# Patient Record
Sex: MALE | Race: WHITE | NOT HISPANIC OR LATINO | Employment: OTHER | URBAN - METROPOLITAN AREA
[De-identification: names, ages, dates, MRNs, and addresses within clinical notes are randomized per-mention and may not be internally consistent; named-entity substitution may affect disease eponyms.]

---

## 2017-01-17 ENCOUNTER — OFFICE VISIT (OUTPATIENT)
Dept: DERMATOLOGY | Facility: CLINIC | Age: 82
End: 2017-01-17
Payer: MEDICARE

## 2017-01-17 DIAGNOSIS — L57.0 ACTINIC KERATOSIS: ICD-10-CM

## 2017-01-17 DIAGNOSIS — Z80.8 FAMILY HISTORY OF MELANOMA: ICD-10-CM

## 2017-01-17 DIAGNOSIS — D48.5 NEOPLASM OF UNCERTAIN BEHAVIOR OF SKIN: ICD-10-CM

## 2017-01-17 DIAGNOSIS — Z85.828 HISTORY OF NONMELANOMA SKIN CANCER: Primary | ICD-10-CM

## 2017-01-17 PROCEDURE — 17000 DESTRUCT PREMALG LESION: CPT | Performed by: DERMATOLOGY

## 2017-01-17 PROCEDURE — 88305 TISSUE EXAM BY PATHOLOGIST: CPT | Performed by: DERMATOLOGY

## 2017-01-17 PROCEDURE — 99213 OFFICE O/P EST LOW 20 MIN: CPT | Mod: 25 | Performed by: DERMATOLOGY

## 2017-01-17 PROCEDURE — 11100 HC DESTRUCT PREMALIGNANT LESION, FIRST: CPT | Mod: 59 | Performed by: DERMATOLOGY

## 2017-01-17 PROCEDURE — 17003 DESTRUCT PREMALG LES 2-14: CPT | Performed by: DERMATOLOGY

## 2017-01-17 NOTE — NURSING NOTE
Dermatology Rooming Note    Ulices Shah Jr.'s goals for this visit include:   Chief Complaint   Patient presents with     Derm Problem     1 yr skin check. Has sore spot on RT hand.       Is a scribe okay for this visit:YES    Are records needed for this visit(If yes, obtain release of information): n/a     Vitals: There were no vitals taken for this visit.    Referring Provider:  No referring provider defined for this encounter.

## 2017-01-17 NOTE — MR AVS SNAPSHOT
After Visit Summary   1/17/2017    Ulices Shah Jr.    MRN: [id number]           Patient Information     Date Of Birth          1935        Visit Information        Provider Department      1/17/2017 1:15 PM Joceline Stone MD [facility]        Today's Diagnoses     History of nonmelanoma skin cancer    -  1     Actinic keratosis         Neoplasm of uncertain behavior of skin         Family history of melanoma           Care Instructions    Cryotherapy    What is it?    Use of a very cold liquid, such as liquid nitrogen, to freeze and destroy abnormal skin cells that need to be removed    What should I expect?    Tenderness and redness    A small blister that might grow and fill with dark purple blood. There may be crusting.    More than one treatment may be needed if the lesions do not go away.    How do I care for the treated area?    Gently wash the area with your hands when bathing.    Use a thin layer of Vaseline to help with healing. You may use a Band-Aid.     The area should heal within 7-10 days and may leave behind a pink or lighter color.     Do not use an antibiotic or Neosporin ointment.     You may take acetaminophen (Tylenol) for pain.     Call your Doctor if you have:    Severe pain    Signs of infection (warmth, redness, cloudy yellow drainage, and or a bad smell)    Questions or concerns    Who should I call with questions?       [facility]: [phone]       [facility]: [phone]       For urgent needs outside of business hours call the [facility] at [phone]        and ask for the dermatology resident on call    Wound Care After a Biopsy    What is a skin biopsy?  A skin biopsy allows the doctor to examine a very small piece of tissue under the microscope to determine the diagnosis and the best treatment for the skin condition. A local anesthetic (numbing medicine)  is injected  with a very small needle into the skin area to be tested. A small piece of skin is taken from the area. Sometimes a suture (stitch) is used.     What are the risks of a skin biopsy?  I will experience scar, bleeding, swelling, pain, crusting and redness. I may experience incomplete removal or recurrence. Risks of this procedure are excessive bleeding, bruising, infection, nerve damage, numbness, thick (hypertrophic or keloidal) scar and non-diagnostic biopsy.    How should I care for my wound for the first 24 hours?    Keep the wound dry and covered for 24 hours    If it bleeds, hold direct pressure on the area for 15 minutes. If bleeding does not stop then go to the emergency room    Avoid strenuous exercise the first 1-2 days or as your doctor instructs you    How should I care for the wound after 24 hours?    After 24 hours, remove the bandage    You may bathe or shower as normal    If you had a scalp biopsy, you can shampoo as usual and can use shower water to clean the biopsy site daily    Clean the wound twice a day with gentle soap and water    Do not scrub, be gentle    Apply white petroleum/Vaseline after cleaning the wound with a cotton swab or a clean finger, and keep the site covered with a Bandaid /bandage. Bandages are not necessary with a scalp biopsy    If you are unable to cover the site with a Bandaid /bandage, re-apply ointment 2-3 times a day to keep the site moist. Moisture will help with healing    Avoid strenuous activity for first 1-2 days    Avoid lakes, rivers, pools, and oceans until the stitches are removed or the site is healed    How do I clean my wound?    Wash hands for 15 minutes with soap or use hand  before all wound care    Clean the wound with gentle soap and water    Apply white petroleum/Vaseline  to wound after it is clean    Replace the Bandaid /bandage to keep the wound covered for the first few days or as instructed by your doctor    If you had a scalp biopsy, warm  shower water to the area on a daily basis should suffice    What should I use to clean my wound?     Cotton-tipped applicators (Qtips )    White petroleum jelly (Vaseline ). Use a clean new container and use Q-tips to apply.    Bandaids   as needed    Gentle soap     How should I care for my wound long term?    Do not get your wound dirty    Keep up with wound care for one week or until the area is healed.    A small scab will form and fall off by itself when the area is completely healed. The area will be red and will become pink in color as it heals. Sun protection is very important for how your scar will turn out. Sunscreen with an SPF 30 or greater is recommended once the area is healed.    You should have some soreness but it should be mild and slowly go away over several days. Talk to your doctor about using tylenol for pain,    When should I call my doctor?  If you have increased:     Pain or swelling    Pus or drainage (clear or slightly yellow drainage is ok)    Temperature over 100F    Spreading redness or warmth around wound    When will I hear about my results?  The biopsy results can take 2-3 weeks to come back. The clinic will call you with the results, send you a [employer] message, or have you schedule a follow-up clinic or phone time to discuss the results. Contact our clinics if you do not hear from us in 3 weeks.     Who should I call with questions?    [facility]: [phone]     [facility]: [phone]    For urgent needs outside of business hours call the [facility] at [phone] and ask for the dermatology resident on call            Follow-ups after your visit        Who to contact     If you have questions or need follow up information about today's clinic visit or your schedule please contact [facility] directly at [phone].  Normal or non-critical lab and imaging results will be communicated to  you by MyChart, letter or phone within 4 business days after the clinic has received the results. If you do not hear from us within 7 days, please contact the clinic through [employer] or phone. If you have a critical or abnormal lab result, we will notify you by phone as soon as possible.  Submit refill requests through [employer] or call your pharmacy and they will forward the refill request to us. Please allow 3 business days for your refill to be completed.          Additional Information About Your Visit        [employer] Information     [employer] is an electronic gateway that provides easy, online access to your medical records. With [employer], you can request a clinic appointment, read your test results, renew a prescription or communicate with your care team.     To sign up for [employer] visit the website at [email or web address]   You will be asked to enter the access code listed below, as well as some personal information. Please follow the directions to create your username and password.     Your access code is: [email or web address]  Expires: 2017  1:45 PM     Your access code will  in 90 days. If you need help or a new code, please contact your [facility] or call [phone] for assistance.        Care EveryWhere ID     This is your Care EveryWhere ID. This could be used by other organizations to access your [place] medical records  [id number]         Blood Pressure from Last 3 Encounters:   16 123/77   16 138/76   12/07/15 128/72    Weight from Last 3 Encounters:   16 183 lb (83.008 kg)   16 187 lb (84.823 kg)   12/07/15 187 lb (84.823 kg)              We Performed the Following     BIOPSY SKIN/SUBQ/MUC MEM, SINGLE LESION     DESTRUCT PREMALIGNANT LESION, 2-14     DESTRUCT PREMALIGNANT LESION, FIRST     Surgical pathology exam        Primary Care Provider Office Phone # Fax #    Kevin Vaughn [phone]       [facility]  CLINIC [address] [place] [address]        Thank you!     Thank you for choosing [facility]  for your care. Our goal is always to provide you with excellent care. Hearing back from our patients is one way we can continue to improve our services. Please take a few minutes to complete the written survey that you may receive in the mail after your visit with us. Thank you!             Your Updated Medication List - Protect others around you: Learn how to safely use, store and throw away your medicines at www.disposemymeds.org.          This list is accurate as of: 1/17/17  1:45 PM.  Always use your most recent med list.                   Brand Name Dispense Instructions for use    albuterol 108 (90 BASE) MCG/ACT Inhaler    PROAIR HFA/PROVENTIL HFA/VENTOLIN HFA    1 Inhaler    Inhale 2 puffs into the lungs every 4 hours as needed for shortness of breath / dyspnea or wheezing       aspirin 81 MG tablet     100 tablet    Take 1 tablet (81 mg) by mouth daily       atorvastatin 40 MG tablet    LIPITOR     Take 40 mg by mouth daily       clopidogrel 75 MG tablet    PLAVIX    90 tablet    Take 1 tablet by mouth daily.       COREG 25 MG tablet   Generic drug:  carvedilol      Take 25 mg by mouth 2 times daily (with meals).       Fish Oil 1000 MG Cpdr      Take 1 capsule by mouth 2 times daily.       lisinopril 5 MG tablet    PRINIVIL/ZESTRIL     Take 2.5 mg by mouth 2 times daily       nitroglycerin 0.4 MG sublingual tablet    NITROSTAT    25 tablet    Place 1 tablet (0.4 mg) under the tongue every 5 minutes as needed

## 2017-01-17 NOTE — PROGRESS NOTES
[facility] Dermatology Note      Dermatology Problem List:  1. Family history of melanoma  2. NMSC  -BCC, superficial nodular, right mid cheek, s/p Mohs 9/4/14  -BCC, nodular pigmented, left nasolabial fold, s/p Mohs 9/4/14  -BCC, left superior helix, s/p Mohs 9/4/14  -SCC, left postauricular, s/p Mohs 9/3/2015  -Prior to 2005, history of BCC on the forehead, s/p excision  3. Actinic keratoses  -s/p cryotherapy    Encounter Date: Jan 17, 2017    CC:  Chief Complaint   Patient presents with     Derm Problem     1 yr skin check. Has sore spot on RT hand.         History of Present Illness:  Mr. Ulices Shah Jr. is a 81 year old male who presents as a follow-up for family history of melanoma, personal history of NMSC and lesion on the right hand. The patient was last seen 1/21/2016 when 3 AKs were treated for cryotherapy. Today, the patient reports a tender lesion on the right dorsal hand. No other lesions bleeding, crusting or changing. The patient reports no other lesions of concern.      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     History of heart bypass surgery     Past Medical History   Diagnosis Date     Unspecified essential hypertension      Past Surgical History   Procedure Laterality Date     C appendectomy  1951     C explor heart surg wnd w cp bypass  10/25/01     4 way heart bypass       remv cataract extracap,insert lens  10/21/2004     left     Hc remv cataract extracap,insert lens  11/18/2004     right     Colonoscopy  12/10/08     Hc yag laser capsulotomy  06/18/09     right eye     Heart cath, angioplasty  4/30/12     3 stents     Hemilaminectomy, discectomy lumbar three levels, combined  6/26/2013     Procedure: COMBINED HEMILAMINECTOMY, DISCECTOMY LUMBAR THREE LEVELS;  Bilateral L3, L4 and L5 Guille-Laminectomies;  Surgeon: Ollie Dodson MD;  Location: [facility] OR     Social History:  The patient is retired. He denies use of tanning beds. He has never been .     Family History:  The patient reports a family history of melanoma in his father.  The patient denies family history of asthma, psoriasis, eczema or seasonal allergies.    Medications:  Current Outpatient Prescriptions   Medication Sig Dispense Refill     albuterol (PROAIR HFA, PROVENTIL HFA, VENTOLIN HFA) 108 (90 BASE) MCG/ACT inhaler Inhale 2 puffs into the lungs every 4 hours as needed for shortness of breath / dyspnea or wheezing 1 Inhaler 0     aspirin 81 MG tablet Take 1 tablet (81 mg) by mouth daily 100 tablet 0     nitroglycerin (NITROSTAT) 0.4 MG SL tablet Place 1 tablet (0.4 mg) under the tongue every 5 minutes as needed 25 tablet 1     atorvastatin (LIPITOR) 40 MG tablet Take 40 mg by mouth daily       clopidogrel (PLAVIX) 75 MG tablet Take 1 tablet by mouth daily. 90 tablet 3     lisinopril (PRINIVIL,ZESTRIL) 5 MG tablet Take 2.5 mg by mouth 2 times daily        carvedilol (COREG) 25 MG tablet Take 25 mg by mouth 2 times daily (with meals).       Omega-3 Fatty Acids (FISH OIL) 1000 MG CPDR Take 1 capsule by mouth 2 times daily.       Allergies   Allergen Reactions     No Known Drug Allergies      Review of Systems:   -Skin: As above in HPI. No additional skin concerns.  -Const: The patient is generally feeling well today.     Physical exam:  There were no vitals taken for this visit.  -This is a well developed,  well-nourished male in no acute distress, in a pleasant mood.    SKIN: Full skin, which includes the head/face, both arms, chest, back, abdomen,both legs, genitalia and/or groin buttocks, digits and/or nails, was examined.  -There are well healed surgical scars without erythema, nodularity or telangiectasias on the right mid cheek, left nasolabial fold, left superior helix and left postauricular.   -There are erythematous macules with overyling adherent scale on the right earlobe, left cheek, left tragus, right forehead, and vertex scalp.   -Tender 5mm macule with scale on the right dorsal hand  -No other lesions of concern on areas examined.     Impression/Plan:  1. Family history of melanoma  2. History of nonmelanoma skin cancer, no clincial evidence of recurrence:  Sun precaution was advised including the use of sun screens of SPF 30 or higher, and sun protective clothing.  3. Actinic keratosis, right earlobe, left cheek, left tragus, vertex scalp, right forehead  Cryotherapy procedure note: After verbal consent and discussion of risks and benefits including but no limited to dyspigmentation/scar, blister, and pain, 9 were treated with 1-2mm freeze border for 1 cycle with liquid nitrogen. Post cryotherapy instructions were provided.  4. Tender 5mm macule with scale, right dorsal hand. Neoplasm of uncertain behavior. Differential diagnosis includes AK versus other    Shave biopsy:  After discussion of benefits and risks including but not limited to bleeding/bruising, pain/swelling, infection, scar, incomplete removal, nerve damage/numbness, recurrence, and non-diagnostic biopsy, written consent, verbal consent and photographs were obtained. Time-out was performed. The area was cleaned with isopropyl alcohol. 0.5 mL of 1% lidocaine with epinephrine was injected to obtain adequate anesthesia of the lesion on the right dorsal hand. A shave biopsy was performed. Hemostasis was achieved with aluminium chloride.  Vaseline and a sterile dressing were applied. The patient tolerated the procedure and no complications were noted. The patient was provided with verbal and written post care instructions.     Follow up in 1 year, earlier for new or changing lesions and pending results of biopsy    Staff Involved:  Scribe/Staff    Scribe Disclosure:   I, Iram Solis, am serving as a scribe to document services personally performed by Dr. Joceline Stone, based on data collection and the provider's statements to me.       Provider Disclosure:   I agree with above History, Review of Systems, Physical exam and Plan. I have reviewed the content of the documentation and have edited it as needed. I have personally performed the services documented here and the documentation accurately represents those services and the decisions I have made.     Joceline Stone MD    Department of Dermatology  [facility]: Phone: [phone], Fax:[phone]  [facility]: Phone: [phone], Fax: [phone]

## 2017-01-17 NOTE — Clinical Note
1/17/2017       RE: Ulices Shah Jr.  [address]   [facility] [address]     Dear Colleague,    Thank you for referring your patient, Ulices Shah Jr., to the [facility]. Please see a copy of my visit note below.    [facility] Note    Dermatology Problem List:  1. Family history of melanoma  2. NMSC  -BCC, superficial nodular, right mid cheek, s/p Mohs 9/4/14  -BCC, nodular pigmented, left nasolabial fold, s/p Mohs 9/4/14  -BCC, left superior helix, s/p Mohs 9/4/14  -SCC, left postauricular, s/p Mohs 9/3/2015  -Prior to 2005, history of BCC on the forehead, s/p excision  3. Actinic keratoses  -s/p cryotherapy    Encounter Date: Jan 17, 2017    CC:  Chief Complaint   Patient presents with     Derm Problem     1 yr skin check. Has sore spot on RT hand.     History of Present Illness:  Mr. Ulices Shah Jr. is a 81 year old male who presents as a follow-up for family history of melanoma, personal history of NMSC and lesion on the right hand. The patient was last seen 1/21/2016 when 3 AKs were treated for cryotherapy. Today, the patient reports a tender lesion on the right dorsal hand. No other lesions bleeding, crusting or changing. The patient reports no other lesions of concern.      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  s/p [facility] 9/3/15     History of heart bypass surgery     Past Medical History   Diagnosis Date     Unspecified essential hypertension      Past Surgical History   Procedure Laterality Date     C appendectomy  1951     C explor heart surg wnd w cp bypass  10/25/01     4 way heart bypass     Hc remv cataract extracap,insert lens  10/21/2004     left     Hc remv cataract extracap,insert lens  11/18/2004     right     Colonoscopy  12/10/08     Hc yag laser capsulotomy  06/18/09     right eye     Heart cath, angioplasty  4/30/12     3 stents     Hemilaminectomy, discectomy lumbar three levels, combined  6/26/2013     Procedure: COMBINED HEMILAMINECTOMY, DISCECTOMY LUMBAR THREE LEVELS;  Bilateral L3, L4 and L5 Guille-Laminectomies;  Surgeon: Ollie Dodson MD;  Location:  OR     Social History:  The patient is retired. He denies use of tanning beds. He has never been .     Family History:  The patient reports a family history of melanoma in his father.  The patient denies family history of asthma, psoriasis, eczema or seasonal allergies.    Medications:  Current Outpatient Prescriptions   Medication Sig Dispense Refill     albuterol (PROAIR HFA, PROVENTIL HFA, VENTOLIN HFA) 108 (90 BASE) MCG/ACT inhaler Inhale 2 puffs into the lungs every 4 hours as needed for shortness of breath / dyspnea or wheezing 1 Inhaler 0     aspirin 81 MG tablet Take 1 tablet (81 mg) by mouth daily 100 tablet 0     nitroglycerin (NITROSTAT) 0.4 MG SL tablet Place 1 tablet (0.4 mg) under the tongue every 5 minutes as needed 25 tablet 1     atorvastatin (LIPITOR) 40 MG tablet Take 40 mg by mouth daily       clopidogrel (PLAVIX) 75 MG tablet Take 1 tablet by mouth daily. 90 tablet 3     lisinopril (PRINIVIL,ZESTRIL) 5 MG tablet Take 2.5 mg by mouth 2 times daily        carvedilol (COREG) 25 MG tablet Take 25 mg by mouth 2 times daily (with meals).       Omega-3 Fatty Acids (FISH OIL) 1000 MG CPDR Take 1 capsule by mouth 2 times daily.        Allergies   Allergen Reactions     No Known Drug Allergies      Review of Systems:   -Skin: As above in HPI. No additional skin concerns.  -Const: The patient is generally feeling well today.     Physical exam:  There were no vitals taken for this visit.  -This is a well developed, well-nourished male in no acute distress, in a pleasant mood.    SKIN: Full skin, which includes the head/face, both arms, chest, back, abdomen,both legs, genitalia and/or groin buttocks, digits and/or nails, was examined.  -There are well healed surgical scars without erythema, nodularity or telangiectasias on the right mid cheek, left nasolabial fold, left superior helix and left postauricular.   -There are erythematous macules with overyling adherent scale on the right earlobe, left cheek, left tragus, right forehead, and vertex scalp.   -Tender 5mm macule with scale on the right dorsal hand  -No other lesions of concern on areas examined.     Impression/Plan:  1. Family history of melanoma  2. History of nonmelanoma skin cancer, no clincial evidence of recurrence:  Sun precaution was advised including the use of sun screens of SPF 30 or higher, and sun protective clothing.  3. Actinic keratosis, right earlobe, left cheek, left tragus, vertex scalp, right forehead  Cryotherapy procedure note: After verbal consent and discussion of risks and benefits including but no limited to dyspigmentation/scar, blister, and pain, 9 were treated with 1-2mm freeze border for 1 cycle with liquid nitrogen. Post cryotherapy instructions were provided.  4. Tender 5mm macule with scale, right dorsal hand. Neoplasm of uncertain behavior. Differential diagnosis includes AK versus other    Shave biopsy:  After discussion of benefits and risks including but not limited to bleeding/bruising, pain/swelling, infection, scar, incomplete removal, nerve damage/numbness, recurrence, and non-diagnostic biopsy, written consent, verbal consent and photographs were  obtained. Time-out was performed. The area was cleaned with isopropyl alcohol. 0.5 mL of 1% lidocaine with epinephrine was injected to obtain adequate anesthesia of the lesion on the right dorsal hand. A shave biopsy was performed. Hemostasis was achieved with aluminium chloride. Vaseline and a sterile dressing were applied. The patient tolerated the procedure and no complications were noted. The patient was provided with verbal and written post care instructions.     Follow up in 1 year, earlier for new or changing lesions and pending results of biopsy    Staff Involved:  Scribe/Staff    Scribe Disclosure:   I, Iram Solis, am serving as a scribe to document services personally performed by Dr. Joceline Stone, based on data collection and the provider's statements to me.     Provider Disclosure:   I agree with above History, Review of Systems, Physical exam and Plan. I have reviewed the content of the documentation and have edited it as needed. I have personally performed the services documented here and the documentation accurately represents those services and the decisions I have made.     Joceline Stone MD    Department of Dermatology  [facility] [place]: Phone: [phone], Fax:[phone]  [facility]: Phone: [phone], Fax: [phone]

## 2017-01-23 LAB — COPATH REPORT: NORMAL

## 2017-01-24 ENCOUNTER — TELEPHONE (OUTPATIENT)
Dept: DERMATOLOGY | Facility: CLINIC | Age: 82
End: 2017-01-24

## 2017-01-24 NOTE — TELEPHONE ENCOUNTER
I left a message for patient to call [facility].  Per result note:    Notes Recorded by Ligia Vaca MD on 1/23/2017 at 4:36 PM  Schedule for mohs and notify patient SCC, invasive        1/17/17  1:36 PM     Copath Report Patient Name: GENA CABELLO   MR#: [id number]   Specimen #:    Collected: 1/17/2017   Received: 1/18/2017   Reported: 1/23/2017 09:50   Ordering Phy(s): LIGIA VACA     For improved result formatting, select 'View Enhanced Report Format'   under Linked Documents section.     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

## 2017-01-24 NOTE — Clinical Note
January 25, 2017        Mr. Ulices Shah Jr.  [address]   [facility] [address]        Dear Paige Alyssa,    You have an upcoming appointment with Dr. Caballero for Mohs surgery.   It is scheduled for 3/2/17 at 8:30 AM.    Your appointment will be at:  [id number] [address] [place], [place] [address]      Please arrive 10 minutes prior to your appointment at check in #4 on the 2nd level.    Please bring any insurance changes and updated medication list.  As discussed, you may be here for several hours.  Please eat breakfast before your appointment and pack a cold lunch.  If you have any questions or concerns, please call [phone].    Sincerely,      Shyla Wetzel RN

## 2017-01-25 NOTE — TELEPHONE ENCOUNTER
Results reviewed with patient.  He verbalized understanding and agreed with the plan.  Mohs scheduled for 3/2/17.    MOHS previsit information                                                    Ulices Shah Jr. is a 81 year old male       Patient is being referred to Dr. Pamla Caballero  Referring Provider: Dr. Stone  For treatment of: squamous cell carcinoma  Site(s): right dorsal hand  Previous Records received:  N/A      Medication & Allergy Information                                                    CURRENT MEDICATIONS:   Current Outpatient Prescriptions   Medication Sig Dispense Refill     albuterol (PROAIR HFA, PROVENTIL HFA, VENTOLIN HFA) 108 (90 BASE) MCG/ACT inhaler Inhale 2 puffs into the lungs every 4 hours as needed for shortness of breath / dyspnea or wheezing 1 Inhaler 0     aspirin 81 MG tablet Take 1 tablet (81 mg) by mouth daily 100 tablet 0     nitroglycerin (NITROSTAT) 0.4 MG SL tablet Place 1 tablet (0.4 mg) under the tongue every 5 minutes as needed 25 tablet 1     atorvastatin (LIPITOR) 40 MG tablet Take 40 mg by mouth daily       clopidogrel (PLAVIX) 75 MG tablet Take 1 tablet by mouth daily. 90 tablet 3     lisinopril (PRINIVIL,ZESTRIL) 5 MG tablet Take 2.5 mg by mouth 2 times daily        carvedilol (COREG) 25 MG tablet Take 25 mg by mouth 2 times daily (with meals).       Omega-3 Fatty Acids (FISH OIL) 1000 MG CPDR Take 1 capsule by mouth 2 times daily.         Do you take the following medications:  Aspirin:  YES  Ibuprofen, Advil, Motrin, Naproxen:   NO  Warfarin/Coumadin:   NO  Vitamin E:   NO  Plavix:   YES    Caryn's warts: NO   Garlic supplement:  NO   Antibiotic Prophylaxis:  NO  Fish Oil: YES    Do you have an ALLERGIC REACTION to any medications:  NO   No known drug allergies      Past Medical History                                                    Do you currently or have you previously had any of the following conditions:       HIV/AIDS:  NO    Hepatitis:   NO    Suppressed Immune System:  NO    Autoimmune disorder/Lupus:  NO    Prolonged bleeding or Bleeding disorder:  YES    Fever blisters/herpes, cold sores:  NO    Kidney disease:  NO     CREATININE   Date Value Ref Range Status   01/11/2016 0.90 0.66 - 1.25 mg/dL Final     ]    Implanted device (pacemaker, defibrillator):  NO.      History of artificial or heart valve replacement:  NO.      Skin cancer:  YES.      Previous Mohs surgery: YES.   See Epic chart     Organ transplant: NO.      Diabetes: NO    Pregnant: N/A    Keloids or Abnormal scars: NO    Mobility device (wheelchair, transfer difficulty): NO    Tobacco use:  NO.       History   Smoking status     Never Smoker    Smokeless tobacco     Never Used         Reviewed by:  Shyla Wetzel RN

## 2017-02-17 ENCOUNTER — OFFICE VISIT (OUTPATIENT)
Dept: FAMILY MEDICINE | Facility: CLINIC | Age: 82
End: 2017-02-17
Payer: MEDICARE

## 2017-02-17 VITALS
HEART RATE: 80 BPM | BODY MASS INDEX: 27.55 KG/M2 | TEMPERATURE: 96.7 F | HEIGHT: 69 IN | OXYGEN SATURATION: 96 % | RESPIRATION RATE: 16 BRPM | WEIGHT: 186 LBS | DIASTOLIC BLOOD PRESSURE: 70 MMHG | SYSTOLIC BLOOD PRESSURE: 126 MMHG

## 2017-02-17 DIAGNOSIS — E78.5 HYPERLIPIDEMIA LDL GOAL <100: ICD-10-CM

## 2017-02-17 DIAGNOSIS — Z71.89 ADVANCED DIRECTIVES, COUNSELING/DISCUSSION: ICD-10-CM

## 2017-02-17 DIAGNOSIS — I25.9 CHRONIC ISCHEMIC HEART DISEASE, UNSPECIFIED: ICD-10-CM

## 2017-02-17 DIAGNOSIS — J01.90 ACUTE SINUSITIS WITH SYMPTOMS > 10 DAYS: Primary | ICD-10-CM

## 2017-02-17 DIAGNOSIS — I10 HYPERTENSION GOAL BP (BLOOD PRESSURE) < 140/90: ICD-10-CM

## 2017-02-17 PROCEDURE — 99213 OFFICE O/P EST LOW 20 MIN: CPT | Performed by: FAMILY MEDICINE

## 2017-02-17 RX ORDER — PREDNISONE 20 MG/1
20 TABLET ORAL DAILY
Qty: 5 TABLET | Refills: 0 | Status: SHIPPED | OUTPATIENT
Start: 2017-02-17 | End: 2017-03-27

## 2017-02-17 RX ORDER — AMOXICILLIN 500 MG/1
500 CAPSULE ORAL 3 TIMES DAILY
Qty: 30 CAPSULE | Refills: 0 | Status: SHIPPED | OUTPATIENT
Start: 2017-02-17 | End: 2017-03-02

## 2017-02-17 ASSESSMENT — PAIN SCALES - GENERAL: PAINLEVEL: NO PAIN (0)

## 2017-02-17 NOTE — MR AVS SNAPSHOT
After Visit Summary   2/17/2017    Ulices Shah Jr.    MRN: [id number]           Patient Information     Date Of Birth          1935        Visit Information        Provider Department      2/17/2017 9:00 AM Kevin Vaughn MD [facility]        Today's Diagnoses     Acute sinusitis with symptoms > 10 days    -  1    CHR ISCHEMIC HRT DIS NOS        Hypertension goal BP (blood pressure) < 140/90        Hyperlipidemia LDL goal <100        Advanced directives, counseling/discussion          Care Instructions    If your nasal spray has a decongestant often oxymetazoline, make sure you do limit use.  Use antibiotics and prednisone for sinus  Come for visit as planned, I will order labs ahead of time        Follow-ups after your visit        Your next 10 appointments already scheduled     Mar 02, 2017  8:30 AM CST   PROCEDURE with Palma Caballero MD   [facility]    [address] [place] [id number] [phone]            Mar 27, 2017  8:00 AM CDT   Office Visit with Kevin Vaughn MD   [facility]    [address] [place] [id number] [phone]           Bring a current list of meds and any records pertaining to this visit.  For Physicals, please bring immunization records and any forms needing to be filled out.  Please arrive 10 minutes early to complete paperwork.            Jan 18, 2018  1:00 PM CST   Return Visit with Joceline Stone MD   [facility]    [address] [place] [address] [phone]              Who to contact     If you have questions or need follow up information about today's clinic visit or your schedule please contact [facility] directly at [phone].  Normal or non-critical lab and imaging results will be communicated to you by MyChart, letter or  "phone within 4 business days after the clinic has received the results. If you do not hear from us within 7 days, please contact the clinic through [employer] or phone. If you have a critical or abnormal lab result, we will notify you by phone as soon as possible.  Submit refill requests through [employer] or call your pharmacy and they will forward the refill request to us. Please allow 3 business days for your refill to be completed.          Additional Information About Your Visit        [employer] Information     [employer] lets you send messages to your doctor, view your test results, renew your prescriptions, schedule appointments and more. To sign up, go to [email or web address] . Click on \"Log in\" on the left side of the screen, which will take you to the Welcome page. Then click on \"Sign up Now\" on the right side of the page.     You will be asked to enter the access code listed below, as well as some personal information. Please follow the directions to create your username and password.     Your access code is: [email or web address]  Expires: 2017  1:45 PM     Your access code will  in 90 days. If you need help or a new code, please call your [facility] clinic or [phone].        Care EveryWhere ID     This is your Care EveryWhere ID. This could be used by other organizations to access your [facility] medical records  [id number]        Your Vitals Were     Pulse Temperature Respirations Height Pulse Oximetry BMI (Body Mass Index)    80 96.7  F (35.9  C) (Temporal) 16 5' 9\" (1.753 m) 96% 27.47 kg/m2       Blood Pressure from Last 3 Encounters:   17 126/70   16 123/77   16 138/76    Weight from Last 3 Encounters:   17 186 lb (84.4 kg)   16 183 lb (83 kg)   16 187 lb (84.8 kg)              Today, you had the following     No orders found for display         Today's Medication Changes          These changes are accurate as of: 17  9:25 AM.  If you have any questions, ask " your nurse or doctor.               Start taking these medicines.        Dose/Directions    amoxicillin 500 MG capsule   Commonly known as:  AMOXIL   Used for:  Acute sinusitis with symptoms > 10 days   Started by:  Kevin Vaughn MD        Dose:  500 mg   Take 1 capsule (500 mg) by mouth 3 times daily   Quantity:  30 capsule   Refills:  0       predniSONE 20 MG tablet   Commonly known as:  DELTASONE   Used for:  Acute sinusitis with symptoms > 10 days   Started by:  Kevin Vaughn MD        Dose:  20 mg   Take 1 tablet (20 mg) by mouth daily   Quantity:  5 tablet   Refills:  0            Where to get your medicines      These medications were sent to [id number] [place] [address]   [address] [place] [address]     Phone:  [phone]     amoxicillin 500 MG capsule    predniSONE 20 MG tablet                Primary Care Provider Office Phone # Fax #    Kevin Vaughn [phone]       [facility] [address] [place] [address]        Thank you!     Thank you for choosing [facility]  for your care. Our goal is always to provide you with excellent care. Hearing back from our patients is one way we can continue to improve our services. Please take a few minutes to complete the written survey that you may receive in the mail after your visit with us. Thank you!             Your Updated Medication List - Protect others around you: Learn how to safely use, store and throw away your medicines at www.disposemymeds.org.          This list is accurate as of: 2/17/17  9:25 AM.  Always use your most recent med list.                   Brand Name Dispense Instructions for use    amoxicillin 500 MG capsule    AMOXIL    30 capsule    Take 1 capsule (500 mg) by mouth 3 times daily       aspirin 81 MG tablet     100 tablet    Take 1 tablet (81 mg) by mouth daily       atorvastatin 40 MG tablet    LIPITOR     Take 40 mg by mouth  daily       clopidogrel 75 MG tablet    PLAVIX    90 tablet    Take 1 tablet by mouth daily.       COREG 25 MG tablet   Generic drug:  carvedilol      Take 25 mg by mouth 2 times daily (with meals).       Fish Oil 1000 MG Cpdr      Take 1 capsule by mouth 2 times daily.       lisinopril 5 MG tablet    PRINIVIL/ZESTRIL     Take 2.5 mg by mouth 2 times daily       nitroglycerin 0.4 MG sublingual tablet    NITROSTAT    25 tablet    Place 1 tablet (0.4 mg) under the tongue every 5 minutes as needed       predniSONE 20 MG tablet    DELTASONE    5 tablet    Take 1 tablet (20 mg) by mouth daily

## 2017-02-17 NOTE — NURSING NOTE
"Chief Complaint   Patient presents with     Sinus Problem       Initial /70 (BP Location: Left arm, Patient Position: Chair, Cuff Size: Adult Regular)  Pulse 80  Temp 96.7  F (35.9  C) (Temporal)  Resp 16  Ht 5' 9\" (1.753 m)  Wt 186 lb (84.4 kg)  SpO2 96%  BMI 27.47 kg/m2 Estimated body mass index is 27.47 kg/(m^2) as calculated from the following:    Height as of this encounter: 5' 9\" (1.753 m).    Weight as of this encounter: 186 lb (84.4 kg).  Medication Reconciliation: complete   Apt for chronic conditions and Medicare Pe scheduled in March  "

## 2017-02-17 NOTE — PATIENT INSTRUCTIONS
If your nasal spray has a decongestant often oxymetazoline, make sure you do limit use.  Use antibiotics and prednisone for sinus  Come for visit as planned, I will order labs ahead of time

## 2017-02-18 NOTE — PROGRESS NOTES
Ulices Shah Jr. is a 81 year old male who presents to clinic today for Sinus Problem   He complains of nasal pressure and drainage    For the lasts few weeks he has facial pressure, drainage, maybe with fever at first.  Not clearing with OTC meds. Some slight cough.      He is well known to me.    Past medical, surgical, social histories were reviewed and updated.  Social History   Substance Use Topics     Smoking status: Never Smoker     Smokeless tobacco: Never Used     Alcohol use 0.0 oz/week     0 Standard drinks or equivalent per week      Comment: rare, maybe 3 beers all year.     Current Outpatient Prescriptions   Medication Sig     aspirin 81 MG tablet Take 1 tablet (81 mg) by mouth daily     predniSONE (DELTASONE) 20 MG tablet Take 1 tablet (20 mg) by mouth daily     amoxicillin (AMOXIL) 500 MG capsule Take 1 capsule (500 mg) by mouth 3 times daily     atorvastatin (LIPITOR) 40 MG tablet Take 40 mg by mouth daily     clopidogrel (PLAVIX) 75 MG tablet Take 1 tablet by mouth daily.     lisinopril (PRINIVIL,ZESTRIL) 5 MG tablet Take 2.5 mg by mouth 2 times daily      carvedilol (COREG) 25 MG tablet Take 25 mg by mouth 2 times daily (with meals).     Omega-3 Fatty Acids (FISH OIL) 1000 MG CPDR Take 1 capsule by mouth 2 times daily.     nitroglycerin (NITROSTAT) 0.4 MG SL tablet Place 1 tablet (0.4 mg) under the tongue every 5 minutes as needed     No current facility-administered medications for this visit.        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    EXAM:  /70 (BP Location: Left arm, Patient Position: Chair, Cuff Size: Adult Regular)  Pulse 80  Temp 96.7  F (35.9  C) (Temporal)  Resp 16  Ht 5' 9\" (1.753 m)  Wt 186 lb (84.4 kg)  SpO2 96%  BMI 27.47 kg/m2  GENERAL APPEARANCE: healthy, alert and no distress  EYES: Eyes grossly normal to inspection, PERRL and conjunctivae and sclerae normal  HENT: tender frontal sinus area and boggy red nasal mucosa, possible polyp right nasal passage, more narrowed left side, oral cavity ok  NECK: no adenopathy, no asymmetry, masses, or scars and thyroid normal to palpation  RESP: lungs clear to auscultation - no rales, rhonchi or wheezes  CV: regular rate and rhythm, normal S1 S2, no S3 or S4 and no murmur, click or rub     ASSESSMENT:    ICD-10-CM    1. Acute sinusitis with symptoms > 10 days J01.90 predniSONE (DELTASONE) 20 MG tablet     amoxicillin (AMOXIL) 500 MG capsule   2. CHR ISCHEMIC HRT DIS NOS I25.9 aspirin 81 MG tablet   3. Hypertension goal BP (blood pressure) < 140/90 I10 aspirin 81 MG tablet   4. Hyperlipidemia LDL goal <100 E78.5 aspirin 81 MG tablet   5. Advanced directives, counseling/discussion Z71.89        PLAN:  Antibiotic and prednisone for sinus.  Allergy may play part.  For 2-5, he is coming back for recheck with labs. currently ok.       Dicussed general issues around the treatment, evaluation and prevetion  Reviewed diet, exercise and weight control.  Reviewed medications and side effects in detail.  Return to clinic 2 months  AVS printed. Electronically signed by Kevin Vaughn MD      "

## 2017-03-02 ENCOUNTER — OFFICE VISIT (OUTPATIENT)
Dept: DERMATOLOGY | Facility: CLINIC | Age: 82
End: 2017-03-02
Payer: MEDICARE

## 2017-03-02 VITALS — SYSTOLIC BLOOD PRESSURE: 109 MMHG | HEART RATE: 73 BPM | DIASTOLIC BLOOD PRESSURE: 62 MMHG

## 2017-03-02 DIAGNOSIS — C44.622 SQUAMOUS CELL CARCINOMA OF DORSUM OF RIGHT HAND: Primary | ICD-10-CM

## 2017-03-02 PROCEDURE — 17311 MOHS 1 STAGE H/N/HF/G: CPT | Performed by: DERMATOLOGY

## 2017-03-02 PROCEDURE — 12041 INTMD RPR N-HF/GENIT 2.5CM/<: CPT | Performed by: DERMATOLOGY

## 2017-03-02 RX ORDER — MUPIROCIN 20 MG/G
OINTMENT TOPICAL
Qty: 22 G | Refills: 0 | Status: SHIPPED | OUTPATIENT
Start: 2017-03-02 | End: 2017-03-27

## 2017-03-02 ASSESSMENT — PAIN SCALES - GENERAL: PAINLEVEL: NO PAIN (0)

## 2017-03-02 NOTE — PATIENT INSTRUCTIONS
MOHS Wound Care Instructions  I will experience scar, altered skin color, bleeding, swelling, pain, crusting and redness. I may experience altered sensation. Risks are excessive bleeding, infection, muscle weakness, thick (hypertrophic or keloidal) scar, and recurrence,. A second procedure may be recommended to obtain the best cosmetic or functional result.  Possible complications of any surgical procedure are bleeding, infection, scarring, alteration in skin color and sensation, muscle weakness in the area, wound dehiscence or seperation, or recurrence of the lesion or disease. On occasion, after healing, a secondary procedure or revision may be recommended in order to obtain the best cosmetic or functional result.   After your surgery, a pressure bandage will be placed over the area that has sutures. This will help prevent bleeding. Please follow these instructions as they will help you to prevent complications as your wound heals.  For the First 48 hours After Surgery:  1. Leave the pressure bandage on and keep it dry. If it should come loose, you may retape it, but do not take it off.  2. Relax and take it easy. Do not do any vigorous exercise, heavy lifting, or bending forward. This could cause the wound to bleed.  3. Post-operative pain is usually mild. You may take plain or extra strength Tylenol every 4 hours as needed (do not take more than 4,000mg in one day). Do not take any medicine that contains aspirin, ibuprofen or motrin unless you have been recommended these by a doctor.  Avoid alcohol and vitamin E as these may increase your tendency to bleed.  4. You may put an ice pack around the bandaged area for 20 minutes every 2-3 hours. This may help reduce swelling, bruising, and pain. Make sure the ice pack is waterproof so that the pressure bandage does not get wet.   5. You may see a small amount of drainage or blood on your pressure bandage. This is normal. However, if drainage or bleeding continues or  saturates the bandage, you will need to apply firm pressure over the bandage with a washcloth for 15 minutes. If bleeding continues after applying pressure for 15 minutes then go to the nearest emergency room.  48 Hours After Surgery  Carefully remove the bandage and start daily wound care and dressing changes. You may also now shower and get the wound wet. Wash wound with a mild soap and water.  Use caution when washing the wound. Be gentle and do not let the forceful shower stream hit the wound directly.  PAT dry.  Daily Wound Care:  1. Wash wound with a mild soap and water.  Use caution when washing the wound, be gentle and do not let the forceful shower stream hit the wound directly.  2. PAT DRY.  3. Apply Mupirocin over the suture line with a Q-tip. It is very important to keep the wound continuously moist, as wounds heal best in a moist environment.  4.  Keep the site covered until sutures are removed, you can cover it with a Telfa (non-stick) dressing and tape or a band-aid.    5. If you are unable to keep wound covered, you must apply Mupirocin every 2 - 3 hours (while awake) to ensure it is being kept moist for optimal healing. A dressing overnight is recommended to keep the area moist.   Call Us If:  1. You have pain that is not controlled with Tylenol.  2. You have signs or symptoms of an infection, such as: fever over 100 degrees F, redness, warmth, or foul-smelling or yellow/creamy drainage from the wound.  Who should I call with questions?    [facility]: [phone]     [facility]: [phone]    For urgent needs outside of business hours call the [facility] at [phone] and ask for the dermatology resident on call

## 2017-03-02 NOTE — LETTER
3/2/2017       RE: Ulices Shah JrPaige  [address]   [facility] [address]     Dear Colleague,    Thank you for referring your patient, Ulices Shah Jr., to the [email or web address] at [facility]. Please see a copy of my visit note below.    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  Because of its size, location and morphology, Mohs surgery was indicated.  After appropriate discussion and informed consent the patient underwent Mohs surgery using the fresh tissue technique as follows:    STAGE I:  The patient was placed on the operating room table.  The area was infiltrated with 1% lidocaine and epinephrine mixed 1:2 with 0.5% Bupivacaine. Using a #15-blade, complete excision was made around the tumor in one section.  Hemostasis was obtained by electrodessication.  A dressing was placed.  Tissue was divided into two tissue blocks which were subsequently mapped, color coded at their margins and processed in the Mohs Laboratory.  Microscopic tumor was not found in the tissue blocks.    With the lesion clear of micrographic tumor, surgery was considered complete.  The defect extended to the subcutaneous fat and measured 1.0 cm x 0.8 cm. After discussion with patient, the defect was reconstructed.      Palma Caballero MD             Test Performed at:   Dept. of Dermatology   [facility]    [facility]    [address] [place] [address]          [phone]        DERMATOLOGIC  SURGERY REPORT    NAME OF PROCEDURE: INTERMEDIATE LINEAR LAYERED CLOSURE    Surgeon: Palma Caballero  Fellow:    Resident:     PREOPERATIVE DIAGNOSIS:   Status post Mohs excision of a primary well differentiated squamous cell carcinoma  POSTOPERATIVE DIAGNOSIS:  Same  FINAL REPAIR LENGTH: 2.4 cm    INDICATIONS:  This patient was left with a 1.0 cm x 0.8 cm defect involving the right dorsal hand following Mohs excision of a primary well differentiated squamous cell carcinoma. In order to repair this defect while maintaining the normal anatomic relations and function, we elected to utilize an intermediate linear closure.  We discussed the principles of treatment and most likely complications including bleeding, infection, wound dehiscence and scarring.  Informed consent was obtained and the patient underwent the procedure as follows.    PROCEDURE:  The patient was taken to the operative suite and placed on the operating room table.  The treatment area was anesthetized with 1% Lidocaine and epinephrine mixed 1:2 with 0.5% bupivacaine.  The area was washed with Hibiclens, rinsed with saline and draped with sterile towels.  The wound edges were undermined.  Hemostasis was obtained by electrocoagulation.  Closure was oriented so that the wound was in the patient's natural skin tension lines.  Deeper layers of subcutaneous tissue were undermined first.  Deep dermal and subcutaneous closure was performed using 4-0 Vicryl deep, intradermal and subcutaneous sutures.  Two standing cones were removed by triangulation.  Final cutaneous approximation was achieved with 4-0 Chromic Gut simple running sutures.       The final repair length was 2.4 cm.  Total anesthesia used was 4.5 ml and estimated blood loss was less than 10.0 ml.  A sterile pressure dressing was applied and wound care instructions, with a written handout, were given.  The patient was discharged from the [facility] alert and  ambulatory.      Palma Caballero MD             Test Performed at:   Dept. of Dermatology   [facility]    Dermatologic Surgery & [facility] Center    [address] [place] [address]          [phone]            [facility] Mohs Micrographic Surgery Note:  Chief complaint: Squamous cell carcinoma.    Ulices Shah Jr. is a 81 year old male who was referred by Dr. Stone for Mohs excision of a Squamous cell carcinoma located on the R dorsal hand. The lesion was excised using Mohs micrographic surgery, and the defect was closed using layered linear closure technique. Please refer to the operative reports. The patient was asked to return in 6 mo for skin check, sooner as necessary.  Palma Caballero MD    [facility]

## 2017-03-02 NOTE — MR AVS SNAPSHOT
After Visit Summary   3/2/2017    Ulices Shah Jr.    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 I may experience altered sensation. Risks are excessive bleeding, infection, muscle weakness, thick (hypertrophic or keloidal) scar, and recurrence,. A second procedure may be recommended to obtain the best cosmetic or functional result.  Possible complications of any surgical procedure are bleeding, infection, scarring, alteration in skin color and sensation, muscle weakness in the area, wound dehiscence or seperation, or recurrence of the lesion or disease. On occasion, after healing, a secondary procedure or revision may be recommended in order to obtain the best cosmetic or functional result.   After your surgery, a pressure bandage will be placed over the area that has sutures. This will help prevent bleeding. Please follow these instructions as they will help you to prevent complications as your wound heals.  For the First 48 hours After Surgery:  1. Leave the pressure bandage on and keep it dry. If it should come loose, you may retape it, but do not take it off.  2. Relax and take it easy. Do not do any vigorous exercise, heavy lifting, or bending forward. This could cause the wound to bleed.  3. Post-operative pain is usually mild. You may take plain or extra strength Tylenol every 4 hours as needed (do not take more than 4,000mg in one day). Do not take any medicine that contains aspirin, ibuprofen or motrin unless you have been recommended these by a doctor.  Avoid alcohol and vitamin E as these may  increase your tendency to bleed.  4. You may put an ice pack around the bandaged area for 20 minutes every 2-3 hours. This may help reduce swelling, bruising, and pain. Make sure the ice pack is waterproof so that the pressure bandage does not get wet.   5. You may see a small amount of drainage or blood on your pressure bandage. This is normal. However, if drainage or bleeding continues or saturates the bandage, you will need to apply firm pressure over the bandage with a washcloth for 15 minutes. If bleeding continues after applying pressure for 15 minutes then go to the nearest emergency room.  48 Hours After Surgery  Carefully remove the bandage and start daily wound care and dressing changes. You may also now shower and get the wound wet. Wash wound with a mild soap and water.  Use caution when washing the wound. Be gentle and do not let the forceful shower stream hit the wound directly.  PAT dry.  Daily Wound Care:  1. Wash wound with a mild soap and water.  Use caution when washing the wound, be gentle and do not let the forceful shower stream hit the wound directly.  2. PAT DRY.  3. Apply Mupirocin over the suture line with a Q-tip. It is very important to keep the wound continuously moist, as wounds heal best in a moist environment.  4.  Keep the site covered until sutures are removed, you can cover it with a Telfa (non-stick) dressing and tape or a band-aid.    5. If you are unable to keep wound covered, you must apply Mupirocin every 2 - 3 hours (while awake) to ensure it is being kept moist for optimal healing. A dressing overnight is recommended to keep the area moist.   Call Us If:  1. You have pain that is not controlled with Tylenol.  2. You have signs or symptoms of an infection, such as: fever over 100 degrees F, redness, warmth, or foul-smelling or yellow/creamy drainage from the wound.  Who should I call with questions?    [facility]: [phone]     [place]  [facility]: [phone]    For urgent needs outside of business hours call the [facility] at [phone] and ask for the dermatology resident on call            Follow-ups after your visit        Your next 10 appointments already scheduled     Mar 27, 2017  8:00 AM CDT   [facility]    [address] [address] [phone]           Bring a current list of meds and any records pertaining to this visit.  For Physicals, please bring immunization records and any forms needing to be filled out.  Please arrive 10 minutes early to complete paperwork.            Aug 14, 2017  1:00 PM CDT   [facility] with Akhil High MD   [facility]    [address] [place] [address] [phone]            Jan 18, 2018  1:00 PM CST   Return Visit with Joceline Stone MD   [facility]    [address] [place] [address] [phone]              Who to contact     If you have questions or need follow up information about today's clinic visit or your schedule please contact [facility] directly at [phone].  Normal or non-critical lab and imaging results will be communicated to you by [employer], letter or phone within 4 business days after the clinic has received the results. If you do not hear from us within 7 days, please contact the clinic through [employer] or phone. If you have a critical or abnormal lab result, we will notify you by phone as soon as possible.  Submit refill requests through [employer] or call your pharmacy and they will forward the refill request to us. Please allow 3 business days for your refill to be completed.          Additional Information About Your Visit        [employer] Information     [employer] is an electronic gateway that provides  easy, online access to your medical records. With [employer], you can request a clinic appointment, read your test results, renew a prescription or communicate with your care team.     To sign up for [employer] visit the website at [email or web address]   You will be asked to enter the access code listed below, as well as some personal information. Please follow the directions to create your username and password.     Your access code is: [email or web address]  Expires: 2017  1:45 PM     Your access code will  in 90 days. If you need help or a new code, please contact your [facility] or call [phone] for assistance.        Care EveryWhere ID     This is your Care EveryWhere ID. This could be used by other organizations to access your [facility] medical records  [id number]        Your Vitals Were     Pulse                   73            Blood Pressure from Last 3 Encounters:   17 109/62   17 126/70   16 123/77    Weight from Last 3 Encounters:   17 84.4 kg (186 lb)   16 83 kg (183 lb)   16 84.8 kg (187 lb)              Today, you had the following     No orders found for display         Today's Medication Changes          These changes are accurate as of: 3/2/17 10:51 AM.  If you have any questions, ask your nurse or doctor.               Start taking these medicines.        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   Quantity:  22 g   Refills:  0            Where to get your medicines      These medications were sent to [id number] - [place]  [address]  [address], [place] [address]     Phone:  [phone]     mupirocin 2 % ointment                Primary Care Provider Office Phone # Fax #    Kevin Vaughn [phone]  [phone]       [facility] [address] [place] [address]        Thank you!     Thank you for choosing [facility]  for your care. Our goal is always to provide you with excellent care. Hearing back from our patients is one way we can continue to improve our services. Please take a few minutes to complete the written survey that you may receive in the mail after your visit with us. Thank you!             Your Updated Medication List - Protect others around you: Learn how to safely use, store and throw away your medicines at www.disposemymeds.org.          This list is accurate as of: 3/2/17 10:51 AM.  Always use your most recent med list.                   Brand Name Dispense Instructions for use    aspirin 81 MG tablet     100 tablet    Take 1 tablet (81 mg) by mouth daily       atorvastatin 40 MG tablet    LIPITOR     Take 40 mg by mouth daily       clopidogrel 75 MG tablet    PLAVIX    90 tablet    Take 1 tablet by mouth daily.       COREG 25 MG tablet   Generic drug:  carvedilol      Take 25 mg by mouth 2 times daily (with meals).       Fish Oil 1000 MG Cpdr      Take 1 capsule by mouth 2 times daily.       lisinopril 5 MG tablet    PRINIVIL/ZESTRIL     Take 2.5 mg by mouth 2 times daily       mupirocin 2 % ointment    BACTROBAN    22 g    Apply to surgical wound on hand once daily with dressing change for 14 days.       nitroglycerin 0.4 MG sublingual tablet    NITROSTAT    25 tablet    Place 1 tablet (0.4 mg) under the tongue every 5 minutes as needed       predniSONE 20 MG tablet    DELTASONE    5 tablet    Take 1 tablet (20 mg) by mouth daily

## 2017-03-02 NOTE — PROGRESS NOTES
[facility] Mohs Micrographic Surgery Note:  Chief complaint: Squamous cell carcinoma.    Ulices Shah Jr. is a 81 year old male who was referred by Dr. Stone for Mohs excision of a Squamous cell carcinoma located on the R dorsal hand. The lesion was excised using Mohs micrographic surgery, and the defect was closed using layered linear closure technique. Please refer to the operative reports. The patient was asked to return in 6 mo for skin check, sooner as necessary.  Palma Caballero MD    [facility]

## 2017-03-02 NOTE — NURSING NOTE
Dermatology Rooming Note    Ulices Shah Jr.'s goals for this visit include:   Chief Complaint   Patient presents with     Procedure     Mohs - SCC right dorsal hand       Is a scribe okay for this visit:Not applicable    Are records needed for this visit(If yes, obtain release of information): No     Vitals: There were no vitals taken for this visit.    Referring Provider:  No referring provider defined for this encounter.      Shyla Wetzel RN

## 2017-03-06 ENCOUNTER — TELEPHONE (OUTPATIENT)
Dept: DERMATOLOGY | Facility: CLINIC | Age: 82
End: 2017-03-06

## 2017-03-06 NOTE — PROGRESS NOTES
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  Because of its size, location and morphology, Mohs surgery was indicated.  After appropriate discussion and informed consent the patient underwent Mohs surgery using the fresh tissue technique as follows:    STAGE I:  The patient was placed on the operating room table.  The area was infiltrated with 1% lidocaine and epinephrine mixed 1:2 with 0.5% Bupivacaine. Using a #15-blade, complete excision was made around the tumor in one section.  Hemostasis was obtained by electrodessication.  A dressing was placed.  Tissue was divided into two tissue blocks which were subsequently mapped, color coded at their margins and processed in the Mohs Laboratory.  Microscopic tumor was not found in the tissue blocks.    With the lesion clear of micrographic tumor, surgery was considered complete.  The defect extended to the subcutaneous fat and measured 1.0 cm x 0.8 cm. After discussion with patient, the defect was reconstructed.      Palma Caballero MD             Test Performed at:   Dept. of Dermatology   [facility]    Dermatologic Surgery & Laser Center    [address] [place] [address] [place] [address]          [phone]        DERMATOLOGIC SURGERY REPORT    NAME OF PROCEDURE: INTERMEDIATE LINEAR LAYERED CLOSURE    Surgeon: Palma Caballero  Fellow:    Resident:     PREOPERATIVE DIAGNOSIS:   Status post Mohs excision of a primary well differentiated squamous cell carcinoma  POSTOPERATIVE DIAGNOSIS:  Same  FINAL REPAIR LENGTH: 2.4  cm    INDICATIONS:  This patient was left with a 1.0 cm x 0.8 cm defect involving the right dorsal hand following Mohs excision of a primary well differentiated squamous cell carcinoma. In order to repair this defect while maintaining the normal anatomic relations and function, we elected to utilize an intermediate linear closure.  We discussed the principles of treatment and most likely complications including bleeding, infection, wound dehiscence and scarring.  Informed consent was obtained and the patient underwent the procedure as follows.    PROCEDURE:  The patient was taken to the operative suite and placed on the operating room table.  The treatment area was anesthetized with 1% Lidocaine and epinephrine mixed 1:2 with 0.5% bupivacaine.  The area was washed with Hibiclens, rinsed with saline and draped with sterile towels.  The wound edges were undermined.  Hemostasis was obtained by electrocoagulation.  Closure was oriented so that the wound was in the patient's natural skin tension lines.  Deeper layers of subcutaneous tissue were undermined first.  Deep dermal and subcutaneous closure was performed using 4-0 Vicryl deep, intradermal and subcutaneous sutures.  Two standing cones were removed by triangulation.  Final cutaneous approximation was achieved with 4-0 Chromic Gut simple running sutures.       The final repair length was 2.4 cm.  Total anesthesia used was 4.5 ml and estimated blood loss was less than 10.0 ml.  A sterile pressure dressing was applied and wound care instructions, with a written handout, were given.  The patient was discharged from the [facility] alert and ambulatory.      Palma Caballero MD             Test Performed at:   Dept. of Dermatology   [facility]    Dermatologic Surgery [facility] Center    [id number] [place] [id number]          [phone]

## 2017-03-06 NOTE — TELEPHONE ENCOUNTER
Mohs Post-Op Call  Patient had Mohs surgery on 3/2/17 for SCC on right dorsal hand.  I left a message asking patient to call back if he has any questions or concerns.  Shyla Wetzel RN

## 2017-03-22 DIAGNOSIS — I10 HYPERTENSION GOAL BP (BLOOD PRESSURE) < 140/90: ICD-10-CM

## 2017-03-22 DIAGNOSIS — R73.09 ELEVATED GLUCOSE: Primary | ICD-10-CM

## 2017-03-22 DIAGNOSIS — E78.5 HYPERLIPIDEMIA LDL GOAL <100: ICD-10-CM

## 2017-03-22 LAB
ANION GAP SERPL CALCULATED.3IONS-SCNC: 7 MMOL/L (ref 3–14)
BUN SERPL-MCNC: 13 MG/DL (ref 7–30)
CALCIUM SERPL-MCNC: 8.6 MG/DL (ref 8.5–10.1)
CHLORIDE SERPL-SCNC: 105 MMOL/L (ref 94–109)
CHOLEST SERPL-MCNC: 145 MG/DL
CO2 SERPL-SCNC: 28 MMOL/L (ref 20–32)
CREAT SERPL-MCNC: 0.9 MG/DL (ref 0.66–1.25)
ERYTHROCYTE [DISTWIDTH] IN BLOOD BY AUTOMATED COUNT: 12.7 % (ref 10–15)
GFR SERPL CREATININE-BSD FRML MDRD: 81 ML/MIN/1.7M2
GLUCOSE SERPL-MCNC: 177 MG/DL (ref 70–99)
HBA1C MFR BLD: 8.3 % (ref 4.3–6)
HCT VFR BLD AUTO: 43.4 % (ref 40–53)
HDLC SERPL-MCNC: 43 MG/DL
HGB BLD-MCNC: 15.1 G/DL (ref 13.3–17.7)
LDLC SERPL CALC-MCNC: 83 MG/DL
MCH RBC QN AUTO: 31.3 PG (ref 26.5–33)
MCHC RBC AUTO-ENTMCNC: 34.8 G/DL (ref 31.5–36.5)
MCV RBC AUTO: 90 FL (ref 78–100)
NONHDLC SERPL-MCNC: 102 MG/DL
PLATELET # BLD AUTO: 205 10E9/L (ref 150–450)
POTASSIUM SERPL-SCNC: 4.2 MMOL/L (ref 3.4–5.3)
RBC # BLD AUTO: 4.82 10E12/L (ref 4.4–5.9)
SODIUM SERPL-SCNC: 140 MMOL/L (ref 133–144)
TRIGL SERPL-MCNC: 97 MG/DL
WBC # BLD AUTO: 5.8 10E9/L (ref 4–11)

## 2017-03-22 PROCEDURE — 80048 BASIC METABOLIC PNL TOTAL CA: CPT | Performed by: FAMILY MEDICINE

## 2017-03-22 PROCEDURE — 36415 COLL VENOUS BLD VENIPUNCTURE: CPT | Performed by: FAMILY MEDICINE

## 2017-03-22 PROCEDURE — 80061 LIPID PANEL: CPT | Performed by: FAMILY MEDICINE

## 2017-03-22 PROCEDURE — 83036 HEMOGLOBIN GLYCOSYLATED A1C: CPT | Mod: QW | Performed by: FAMILY MEDICINE

## 2017-03-22 PROCEDURE — 85027 COMPLETE CBC AUTOMATED: CPT | Performed by: FAMILY MEDICINE

## 2017-03-27 ENCOUNTER — OFFICE VISIT (OUTPATIENT)
Dept: FAMILY MEDICINE | Facility: CLINIC | Age: 82
End: 2017-03-27
Payer: MEDICARE

## 2017-03-27 VITALS
SYSTOLIC BLOOD PRESSURE: 130 MMHG | OXYGEN SATURATION: 97 % | HEIGHT: 69 IN | WEIGHT: 182 LBS | DIASTOLIC BLOOD PRESSURE: 62 MMHG | RESPIRATION RATE: 16 BRPM | BODY MASS INDEX: 26.96 KG/M2 | HEART RATE: 80 BPM | TEMPERATURE: 96.8 F

## 2017-03-27 DIAGNOSIS — I25.9 CHRONIC ISCHEMIC HEART DISEASE: ICD-10-CM

## 2017-03-27 DIAGNOSIS — M48.061 SPINAL STENOSIS, LUMBAR REGION, WITHOUT NEUROGENIC CLAUDICATION: ICD-10-CM

## 2017-03-27 DIAGNOSIS — E78.5 HYPERLIPIDEMIA LDL GOAL <100: ICD-10-CM

## 2017-03-27 DIAGNOSIS — Z00.01 ENCOUNTER FOR ROUTINE ADULT HEALTH EXAMINATION WITH ABNORMAL FINDINGS: Primary | ICD-10-CM

## 2017-03-27 DIAGNOSIS — R73.9 ELEVATED BLOOD SUGAR: ICD-10-CM

## 2017-03-27 DIAGNOSIS — G47.00 PERSISTENT INSOMNIA: ICD-10-CM

## 2017-03-27 DIAGNOSIS — I10 HYPERTENSION GOAL BP (BLOOD PRESSURE) < 140/90: ICD-10-CM

## 2017-03-27 PROBLEM — R01.1 HEART MURMUR, SYSTOLIC: Status: ACTIVE | Noted: 2017-03-27

## 2017-03-27 PROCEDURE — G0439 PPPS, SUBSEQ VISIT: HCPCS | Performed by: FAMILY MEDICINE

## 2017-03-27 RX ORDER — MIRTAZAPINE 15 MG/1
15 TABLET, FILM COATED ORAL AT BEDTIME
Qty: 30 TABLET | Refills: 1 | Status: SHIPPED | OUTPATIENT
Start: 2017-03-27 | End: 2017-06-30

## 2017-03-27 ASSESSMENT — PAIN SCALES - GENERAL: PAINLEVEL: NO PAIN (0)

## 2017-03-27 NOTE — PATIENT INSTRUCTIONS
Preventive Health Recommendations:       Male Ages 65 and over    Yearly exam:             See your health care provider every year in order to  o   Review health changes.   o   Discuss preventive care.    o   Review your medicines if your doctor has prescribed any.    Talk with your health care provider about whether you should have a test to screen for prostate cancer (PSA).    Every 3 years, have a diabetes test (fasting glucose). If you are at risk for diabetes, you should have this test more often.    Every 5 years, have a cholesterol test. Have this test more often if you are at risk for high cholesterol or heart disease.     Every 10 years, have a colonoscopy. Or, have a yearly FIT test (stool test). These exams will check for colon cancer.    Talk to with your health care provider about screening for Abdominal Aortic Aneurysm if you have a family history of AAA or have a history of smoking.  Shots:     Get a flu shot each year.     Get a tetanus shot every 10 years.     Talk to your doctor about your pneumonia vaccines. There are now two you should receive - Pneumovax (PPSV 23) and Prevnar (PCV 13).    Talk to your doctor about a shingles vaccine.     Talk to your doctor about the hepatitis B vaccine.  Nutrition:     Eat at least 5 servings of fruits and vegetables each day.     Eat whole-grain bread, whole-wheat pasta and brown rice instead of white grains and rice.     Talk to your doctor about Calcium and Vitamin D.   Lifestyle    Exercise for at least 150 minutes a week (30 minutes a day, 5 days a week). This will help you control your weight and prevent disease.     Limit alcohol to one drink per day.     No smoking.     Wear sunscreen to prevent skin cancer.     See your dentist every six months for an exam and cleaning.     See your eye doctor every 1 to 2 years to screen for conditions such as glaucoma, macular degeneration and cataracts.    With HGBA1C at 8.3 we should recheck labs in June. Come  fasting for these

## 2017-03-27 NOTE — PROGRESS NOTES
SUBJECTIVE:                                                            Ulices Shah Jr. is a 81 year old male who presents for Preventive Visit.      Are you in the first 12 months of your Medicare Part B coverage?  No    Healthy Habits:    Do you get at least three servings of calcium containing foods daily (dairy, green leafy vegetables, etc.)? yes    Amount of exercise or daily activities, outside of work: 3 day(s) per week    Problems taking medications regularly No    Medication side effects: No    Have you had an eye exam in the past two years? yes    Do you see a dentist twice per year? yes    Do you have sleep apnea, excessive snoring or daytime drowsiness?unsure    COGNITIVE SCREEN  1) Repeat 3 items (Banana, Sunrise, Chair)    2) Clock draw: ABNORMAL   3) 3 item recall: Recalls 3 objects  Results: ABNORMAL clock, 1-2 items recalled: PROBABLE COGNITIVE IMPAIRMENT, **INFORM PROVIDER**    Mini-CogTM Copyright S Canelo. Licensed by the author for use in [facility]; reprinted with permission (ghada@[facility]). All rights reserved.            Hyperlipidemia Follow-Up      Rate your low fat/cholesterol diet?: good    Taking statin?  No    Other lipid medications/supplements?:  Fish oil/Omega 3, dose 2,000 mg without side effects     Hypertension Follow-up      Outpatient blood pressures are being checked at home.  Results are 130's - 120's/70's.    Low Salt Diet: no added salt     Vascular Disease Follow-up:  Coronary Artery Disease (CAD)      Chest pain or pressure, left side neck or arm pain: No    Shortness of breath/increased sweats/nausea with exertion: No    Pain in calves walking 1-2 blocks: No    Worsened or new symptoms since last visit: No    Nitroglycerin use: no and yes    Daily aspirin use: Yes       Reviewed and updated as needed this visit by clinical staff         Reviewed and updated as needed this visit by Provider        Social History   Substance Use Topics     Smoking status:  Never Smoker     Smokeless tobacco: Never Used     Alcohol use 0.0 oz/week     0 Standard drinks or equivalent per week      Comment: rare, maybe 3 beers all year.       The patient does not drink >3 drinks per day nor >7 drinks per week.    Today's PHQ-2 Score:   PHQ-2 ( 1999 Pfizer) 2/17/2017 1/11/2016   Q1: Little interest or pleasure in doing things 0 0   Q2: Feeling down, depressed or hopeless 0 0   PHQ-2 Score 0 0       Do you feel safe in your environment - Yes    Do you have a Health Care Directive?: No: Advance care planning was reviewed with patient; patient declined at this time.    Current providers sharing in care for this patient include:   Patient Care Team:  Kevin Vaughn MD as PCP - General (Family Practice)  None      Hearing impairment: Yes, Difficulty following a conversation in a noisy restaurant or crowded room.    Ability to successfully perform activities of daily living: Yes, no assistance needed     Fall risk:  Fallen 2 or more times in the past year?: No  Any fall with injury in the past year?: No    Home safety:  none identified      The following health maintenance items are reviewed in Epic and correct as of today:  Health Maintenance   Topic Date Due     MEDICARE ANNUAL WELLNESS VISIT  01/11/2017     INFLUENZA VACCINE (SYSTEM ASSIGNED)  09/01/2017     FALL RISK ASSESSMENT  02/17/2018     TETANUS IMMUNIZATION (SYSTEM ASSIGNED)  11/26/2020     ADVANCE DIRECTIVE PLANNING Q5 YRS (NO INBASKET)  02/17/2022     LIPID SCREEN Q5 YR MALE (SYSTEM ASSIGNED)  03/22/2022     PNEUMOCOCCAL  Completed         Pneumonia Vaccine:Adults age 65+ who received Pneumovax (PPSV23) at 65 years or older: Should be given PCV13 > 1 year after their most recent PPSV23     ROS:  Constitutional, HEENT, cardiovascular, pulmonary, gi and gu systems are negative, except as otherwise noted.    Problem list, Medication list, Allergies, and Medical/Social/Surgical histories reviewed in EPIC and updated as  "appropriate.  Labs reviewed in EPIC  BP Readings from Last 3 Encounters:   03/27/17 130/62   03/02/17 109/62   02/17/17 126/70    Wt Readings from Last 3 Encounters:   03/27/17 182 lb (82.6 kg)   02/17/17 186 lb (84.4 kg)   03/12/16 183 lb (83 kg)                  OBJECTIVE:                                                            /62 (BP Location: Right arm, Patient Position: Chair, Cuff Size: Adult Regular)  Pulse 80  Temp 96.8  F (36  C) (Temporal)  Resp 16  Ht 5' 8.75\" (1.746 m)  Wt 182 lb (82.6 kg)  SpO2 97%  BMI 27.07 kg/m2 Estimated body mass index is 27.47 kg/(m^2) as calculated from the following:    Height as of 2/17/17: 5' 9\" (1.753 m).    Weight as of 2/17/17: 186 lb (84.4 kg).  EXAM:   GENERAL: healthy, alert and no distress  EYES: Eyes grossly normal to inspection, PERRL and conjunctivae and sclerae normal  NECK: no adenopathy, no asymmetry, masses, or scars and thyroid normal to palpation  RESP: lungs clear to auscultation - no rales, rhonchi or wheezes  CV: regular rate and rhythm, normal S1 S2, no S3 or S4, no murmur, click or rub, no peripheral edema and peripheral pulses strong  ABDOMEN: soft, nontender, no hepatosplenomegaly, no masses and bowel sounds normal  MS: no gross musculoskeletal defects noted, no edema  SKIN: no suspicious lesions or rashes  NEURO: Normal strength and tone, mentation intact and speech normal  PSYCH: mentation appears normal, affect normal/bright    ASSESSMENT / PLAN:                                                            1. Encounter for routine adult health examination with abnormal findings  Active healthy gentleman    2. Chronic ischemic heart disease  No signs of problems    3. Spinal stenosis, lumbar region, without neurogenic claudication  Moves well with no symptoms    4. Hypertension goal BP (blood pressure) < 140/90  Controlled    5. Hyperlipidemia LDL goal <100  Controlled    6. Persistent insomnia  Patient describes falling asleep and " "waking up short time later and then not sleeping much all night. Does not have daytime sleepiness. He is interested in trying something for sleep and mirtazapine will be tried as a routine use of medication. Side effects discussed  - mirtazapine (REMERON) 15 MG tablet; Take 1 tablet (15 mg) by mouth At Bedtime  Dispense: 30 tablet; Refill: 1    7. Elevated blood sugar  Since both were elevated this time and not just the blood sugar, diabetes is a possibility. He has been less active this winter and had a sinus infection, we will recheck in 3 months. Dietary changes will be made as well  - **A1C FUTURE 3mo; Future  - **Glucose FUTURE 2mo; Future    End of Life Planning:  Patient currently has an advanced directive: Yes.  Practitioner is supportive of decision.    COUNSELING:  Reviewed preventive health counseling, as reflected in patient instructions        Estimated body mass index is 27.47 kg/(m^2) as calculated from the following:    Height as of 2/17/17: 5' 9\" (1.753 m).    Weight as of 2/17/17: 186 lb (84.4 kg).  Weight management plan: Discussed healthy diet and exercise guidelines and patient will follow up in 3 months in clinic to re-evaluate.   reports that he has never smoked. He has never used smokeless tobacco.      Appropriate preventive services were discussed with this patient, including applicable screening as appropriate for cardiovascular disease, diabetes, osteopenia/osteoporosis, and glaucoma.  As appropriate for age/gender, discussed screening for colorectal cancer, prostate cancer, breast cancer, and cervical cancer. Checklist reviewing preventive services available has been given to the patient.    Reviewed patients plan of care and provided an AVS. The Basic Care Plan (routine screening as documented in Health Maintenance) for Ulices meets the Care Plan requirement. This Care Plan has been established and reviewed with the Patient.    Counseling Resources:  ATP IV Guidelines  Pooled Cohorts " Equation Calculator  Breast Cancer Risk Calculator  FRAX Risk Assessment  ICSI Preventive Guidelines  Dietary Guidelines for Americans, 2010  [employer]'s MyPlate  ASA Prophylaxis  Lung CA Screening    Kevin Brayden Vaughn MD  [facility]

## 2017-03-27 NOTE — NURSING NOTE
"Chief Complaint   Patient presents with     Medicare Visit     Coronary Artery Disease     Recheck     Hypertension     Recheck     Lipids     Recheck        Initial There were no vitals taken for this visit. Estimated body mass index is 27.47 kg/(m^2) as calculated from the following:    Height as of 2/17/17: 5' 9\" (1.753 m).    Weight as of 2/17/17: 186 lb (84.4 kg).  Medication Reconciliation: complete  "

## 2017-03-27 NOTE — MR AVS SNAPSHOT
After Visit Summary   3/27/2017    Ulices Shah Jr.    MRN: [id number]           Patient Information     Date Of Birth          1935        Visit Information        Provider Department      3/27/2017 8:00 AM Kevin Vaughn MD [facility]        Today's Diagnoses     Encounter for routine adult health examination with abnormal findings    -  1    Chronic ischemic heart disease        Spinal stenosis, lumbar region, without neurogenic claudication        Hypertension goal BP (blood pressure) < 140/90        Hyperlipidemia LDL goal <100        Persistent insomnia          Care Instructions      Preventive Health Recommendations:       Male Ages 65 and over    Yearly exam:             See your health care provider every year in order to  o   Review health changes.   o   Discuss preventive care.    o   Review your medicines if your doctor has prescribed any.    Talk with your health care provider about whether you should have a test to screen for prostate cancer (PSA).    Every 3 years, have a diabetes test (fasting glucose). If you are at risk for diabetes, you should have this test more often.    Every 5 years, have a cholesterol test. Have this test more often if you are at risk for high cholesterol or heart disease.     Every 10 years, have a colonoscopy. Or, have a yearly FIT test (stool test). These exams will check for colon cancer.    Talk to with your health care provider about screening for Abdominal Aortic Aneurysm if you have a family history of AAA or have a history of smoking.  Shots:     Get a flu shot each year.     Get a tetanus shot every 10 years.     Talk to your doctor about your pneumonia vaccines. There are now two you should receive - Pneumovax (PPSV 23) and Prevnar (PCV 13).    Talk to your doctor about a shingles vaccine.     Talk to your doctor about the hepatitis B vaccine.  Nutrition:     Eat at least 5 servings of fruits and vegetables each day.      Eat whole-grain bread, whole-wheat pasta and brown rice instead of white grains and rice.     Talk to your doctor about Calcium and Vitamin D.   Lifestyle    Exercise for at least 150 minutes a week (30 minutes a day, 5 days a week). This will help you control your weight and prevent disease.     Limit alcohol to one drink per day.     No smoking.     Wear sunscreen to prevent skin cancer.     See your dentist every six months for an exam and cleaning.     See your eye doctor every 1 to 2 years to screen for conditions such as glaucoma, macular degeneration and cataracts.    With HGBA1C at 8.3 we should recheck labs in June. Come fasting for these        Follow-ups after your visit        Your next 10 appointments already scheduled     Aug 14, 2017  1:00 PM CDT   Return Visit with Akhil High MD   [facility])    [address] [place] [id number] [phone]            Jan 18, 2018  1:00 PM CST   Return Visit with Joceline Stone MD   [facility])    [address] [place] [id number] [phone]              Who to contact     If you have questions or need follow up information about today's clinic visit or your schedule please contact [facility] directly at [phone].  Normal or non-critical lab and imaging results will be communicated to you by MyChart, letter or phone within 4 business days after the clinic has received the results. If you do not hear from us within 7 days, please contact the clinic through MyChart or phone. If you have a critical or abnormal lab result, we will notify you by phone as soon as possible.  Submit refill requests through [employer] or call your pharmacy and they will forward the refill request to us. Please allow 3 business days for your refill to be completed.          Additional Information About Your Visit        MyChart Information   "   [employer] lets you send messages to your doctor, view your test results, renew your prescriptions, schedule appointments and more. To sign up, go to [email or web address] . Click on \"Log in\" on the left side of the screen, which will take you to the Welcome page. Then click on \"Sign up Now\" on the right side of the page.     You will be asked to enter the access code listed below, as well as some personal information. Please follow the directions to create your username and password.     Your access code is: [email or web address]  Expires: 2017  2:45 PM     Your access code will  in 90 days. If you need help or a new code, please call your [place] clinic or [phone].        Care EveryWhere ID     This is your Care EveryWhere ID. This could be used by other organizations to access your [place] medical records  [id number]        Your Vitals Were     Pulse Temperature Respirations Height Pulse Oximetry BMI (Body Mass Index)    80 96.8  F (36  C) (Temporal) 16 5' 8.75\" (1.746 m) 97% 27.07 kg/m2       Blood Pressure from Last 3 Encounters:   17 130/62   17 109/62   17 126/70    Weight from Last 3 Encounters:   17 182 lb (82.6 kg)   17 186 lb (84.4 kg)   16 183 lb (83 kg)              Today, you had the following     No orders found for display         Today's Medication Changes          These changes are accurate as of: 3/27/17  8:32 AM.  If you have any questions, ask your nurse or doctor.               Start taking these medicines.        Dose/Directions    mirtazapine 15 MG tablet   Commonly known as:  REMERON   Used for:  Persistent insomnia   Started by:  Kevin Vaughn MD        Dose:  15 mg   Take 1 tablet (15 mg) by mouth At Bedtime   Quantity:  30 tablet   Refills:  1            Where to get your medicines      These medications were sent to [id number] - [place] - [address]  [address] [place] [address]     Phone:  [phone] "     mirtazapine 15 MG tablet                Primary Care Provider Office Phone # Fax #    Kevin Vaughn [phone]       [facility] [address] [place] [address]        Thank you!     Thank you for choosing [facility]  for your care. Our goal is always to provide you with excellent care. Hearing back from our patients is one way we can continue to improve our services. Please take a few minutes to complete the written survey that you may receive in the mail after your visit with us. Thank you!             Your Updated Medication List - Protect others around you: Learn how to safely use, store and throw away your medicines at www.disposemymeds.org.          This list is accurate as of: 3/27/17  8:32 AM.  Always use your most recent med list.                   Brand Name Dispense Instructions for use    aspirin 81 MG tablet     100 tablet    Take 1 tablet (81 mg) by mouth daily       atorvastatin 40 MG tablet    LIPITOR     Take 40 mg by mouth daily       clopidogrel 75 MG tablet    PLAVIX    90 tablet    Take 1 tablet by mouth daily.       COREG 25 MG tablet   Generic drug:  carvedilol      Take 25 mg by mouth 2 times daily (with meals).       Fish Oil 1000 MG Cpdr      Take 1 capsule by mouth 2 times daily.       lisinopril 5 MG tablet    PRINIVIL/ZESTRIL     Take 2.5 mg by mouth 2 times daily       mirtazapine 15 MG tablet    REMERON    30 tablet    Take 1 tablet (15 mg) by mouth At Bedtime       nitroglycerin 0.4 MG sublingual tablet    NITROSTAT    25 tablet    Place 1 tablet (0.4 mg) under the tongue every 5 minutes as needed

## 2017-06-27 DIAGNOSIS — R73.9 ELEVATED BLOOD SUGAR: ICD-10-CM

## 2017-06-27 LAB
GLUCOSE SERPL-MCNC: 128 MG/DL (ref 70–99)
HBA1C MFR BLD: 6.9 % (ref 4.3–6)

## 2017-06-27 PROCEDURE — 36415 COLL VENOUS BLD VENIPUNCTURE: CPT | Performed by: FAMILY MEDICINE

## 2017-06-27 PROCEDURE — 82947 ASSAY GLUCOSE BLOOD QUANT: CPT | Performed by: FAMILY MEDICINE

## 2017-06-27 PROCEDURE — 83036 HEMOGLOBIN GLYCOSYLATED A1C: CPT | Mod: QW | Performed by: FAMILY MEDICINE

## 2017-06-30 ENCOUNTER — OFFICE VISIT (OUTPATIENT)
Dept: FAMILY MEDICINE | Facility: CLINIC | Age: 82
End: 2017-06-30
Payer: MEDICARE

## 2017-06-30 VITALS
SYSTOLIC BLOOD PRESSURE: 130 MMHG | OXYGEN SATURATION: 98 % | HEART RATE: 72 BPM | WEIGHT: 184 LBS | DIASTOLIC BLOOD PRESSURE: 62 MMHG | TEMPERATURE: 96.9 F | RESPIRATION RATE: 16 BRPM | BODY MASS INDEX: 27.37 KG/M2

## 2017-06-30 DIAGNOSIS — I10 HYPERTENSION GOAL BP (BLOOD PRESSURE) < 140/90: ICD-10-CM

## 2017-06-30 DIAGNOSIS — R73.9 ELEVATED BLOOD SUGAR: Primary | ICD-10-CM

## 2017-06-30 DIAGNOSIS — G47.00 PERSISTENT INSOMNIA: ICD-10-CM

## 2017-06-30 DIAGNOSIS — I25.810 CORONARY ATHEROSCLEROSIS OF AUTOLOGOUS VEIN BYPASS GRAFT WITHOUT ANGINA: ICD-10-CM

## 2017-06-30 DIAGNOSIS — I25.9 CHRONIC ISCHEMIC HEART DISEASE: ICD-10-CM

## 2017-06-30 DIAGNOSIS — R01.1 HEART MURMUR, SYSTOLIC: ICD-10-CM

## 2017-06-30 PROCEDURE — 99214 OFFICE O/P EST MOD 30 MIN: CPT | Performed by: FAMILY MEDICINE

## 2017-06-30 RX ORDER — MIRTAZAPINE 15 MG/1
15 TABLET, FILM COATED ORAL AT BEDTIME
Qty: 30 TABLET | Refills: 5 | Status: SHIPPED | OUTPATIENT
Start: 2017-06-30 | End: 2023-05-30

## 2017-06-30 ASSESSMENT — PAIN SCALES - GENERAL: PAINLEVEL: NO PAIN (0)

## 2017-06-30 NOTE — PROGRESS NOTES
SUBJECTIVE:                                                    Ulices Shah Jr. is a 82 year old male who presents to clinic today for the following health issues:       HIgh blood sugar follow-up      Patient is checking blood sugars: not at all    Diabetic concerns: None     Symptoms of hypoglycemia (low blood sugar): none     Paresthesias (numbness or burning in feet) or sores: No     Date of last diabetic eye exam: had this year    Hyperlipidemia Follow-Up      Rate your low fat/cholesterol diet?: good    Taking statin?  Yes, no muscle aches from statin    Other lipid medications/supplements?:  Fish oil/Omega 3, dose 2000 without side effects    Hypertension Follow-up      Outpatient blood pressures are being checked at home.  Results are 120's/80's.    Low Salt Diet: no added salt      Amount of exercise or physical activity: 4-5 days/week for an average of 45-60 minutes    Problems taking medications regularly: No    Medication side effects: none    Diet: low salt, low fat/cholesterol and carbohydrate counting          Problem list and histories reviewed & adjusted, as indicated.  Additional history: as documented    BP Readings from Last 3 Encounters:   06/30/17 130/62   03/27/17 130/62   03/02/17 109/62    Wt Readings from Last 3 Encounters:   06/30/17 184 lb (83.5 kg)   03/27/17 182 lb (82.6 kg)   02/17/17 186 lb (84.4 kg)                  Labs reviewed in EPIC    Reviewed and updated as needed this visit by clinical staff       Reviewed and updated as needed this visit by Provider         ROS:  Constitutional, HEENT, cardiovascular, pulmonary, gi and gu systems are negative, except as otherwise noted.  Can tell he is getting stronger with more bicycle riding every day.    OBJECTIVE:     /62 (BP Location: Right arm, Patient Position: Chair, Cuff Size: Adult Regular)  Pulse 72  Temp 96.9  F (36.1  C) (Temporal)  Resp 16  Wt 184 lb (83.5 kg)  SpO2 98%  BMI 27.37 kg/m2  Body mass index is  "27.37 kg/(m^2).  GENERAL: healthy, alert and no distress  NECK: no adenopathy, no asymmetry, masses, or scars and thyroid normal to palpation  RESP: lungs clear to auscultation - no rales, rhonchi or wheezes  CV: regular rate and rhythm, normal S1 S2, no S3 or S4, no murmur, click or rub, no peripheral edema and peripheral pulses strong  ABDOMEN: soft, nontender, no hepatosplenomegaly, no masses and bowel sounds normal  MS: no gross musculoskeletal defects noted, no edema  NEURO: Normal strength and tone, mentation intact and speech normal  PSYCH: mentation appears normal, affect normal/bright    Diagnostic Test Results:  Results for orders placed or performed in visit on 06/27/17   **A1C FUTURE 3mo   Result Value Ref Range    Hemoglobin A1C 6.9 (H) 4.3 - 6.0 %   **Glucose FUTURE 2mo   Result Value Ref Range    Glucose 128 (H) 70 - 99 mg/dL       ASSESSMENT/PLAN:           BMI:   Estimated body mass index is 27.37 kg/(m^2) as calculated from the following:    Height as of 3/27/17: 5' 8.75\" (1.746 m).    Weight as of this encounter: 184 lb (83.5 kg).   Weight management plan: Discussed healthy diet and exercise guidelines and patient will follow up in 6 months in clinic to re-evaluate.      1. Elevated blood sugar  This blood sugar is elevated but his hemoglobin A1c is less than 7. He will continue doing exactly what is doing with diet and exercise and weight loss. No medications added. No reason to consider him diabetic    2. Persistent insomnia  Try mirtazapine or continuing it.  - mirtazapine (REMERON) 15 MG tablet; Take 1 tablet (15 mg) by mouth At Bedtime  Dispense: 30 tablet; Refill: 5    3. Hypertension goal BP (blood pressure) < 140/90  Well-controlled and on ACE inhibitor and beta blocker.    4. Heart murmur, systolic  Present but no change    5. Chronic ischemic heart disease  No signs of coronary disease and very active    6. 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    For reasons that we already have him on every medication we would use if he were diabetic and he is physically very active and not being actively treated for diabetic I will not officially consider him diabetic. I will reconsider when I see him in 6 months.      MEDICATIONS:  Continue current medications without change  Patient Instructions   Keep the good work and see you in six months  Come in if you notice any symptoms that is troublesome.       Kevinjessika Vaughn MD  [facility]

## 2017-06-30 NOTE — PATIENT INSTRUCTIONS
Keep the good work and see you in six months  Come in if you notice any symptoms that is troublesome.

## 2017-06-30 NOTE — NURSING NOTE
"Chief Complaint   Patient presents with     RECHECK     blood sugar     Lipids     Recheck     Hypertension     Recheck       Initial /62 (BP Location: Right arm, Patient Position: Chair, Cuff Size: Adult Regular)  Pulse 72  Temp 96.9  F (36.1  C) (Temporal)  Resp 16  Wt 184 lb (83.5 kg)  SpO2 98%  BMI 27.37 kg/m2 Estimated body mass index is 27.37 kg/(m^2) as calculated from the following:    Height as of 3/27/17: 5' 8.75\" (1.746 m).    Weight as of this encounter: 184 lb (83.5 kg).  Medication Reconciliation: complete  "

## 2017-06-30 NOTE — MR AVS SNAPSHOT
After Visit Summary   6/30/2017    Ulices Shah Jr.    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           Follow-ups after your visit        Your next 10 appointments already scheduled     Aug 14, 2017  1:00 PM CDT   [facility]    [address] [place] [address] [phone]            Jan 18, 2018  1:00 PM CST   [facility] Visit with [facility]eline Stone [facility]   [facility]    [address] [place] [address] [phone]              Who to contact     If you have questions or need follow up information about today's clinic visit or your schedule please contact [facility] directly at [phone].  Normal or non-critical lab and imaging results will be communicated to you by MyChart, letter or phone within 4 business days after the clinic has received the results. If you do not hear from us within 7 days, please contact the clinic through MyChart or phone. If you have a critical or abnormal lab result, we will notify you by phone as soon as possible.  Submit refill requests through [employer] or call your pharmacy and they will forward the refill request to us. Please allow 3 business days for your refill to be completed.          Additional Information About  "Your Visit        MyChart Information     [email or web address] lets you send messages to your doctor, view your test results, renew your prescriptions, schedule appointments and more. To sign up, go to [email or web address] . Click on \"Log in\" on the left side of the screen, which will take you to the Welcome page. Then click on \"Sign up Now\" on the right side of the page.     You will be asked to enter the access code listed below, as well as some personal information. Please follow the directions to create your username and password.     Your access code is: [email or web address]  Expires: 2017  1:38 PM     Your access code will  in 90 days. If you need help or a new code, please call your [place] clinic or [phone].        Care EveryWhere ID     This is your Care EveryWhere ID. This could be used by other organizations to access your [place] medical records  [id number]        Your Vitals Were     Pulse Temperature Respirations Pulse Oximetry BMI (Body Mass Index)       72 96.9  F (36.1  C) (Temporal) 16 98% 27.37 kg/m2        Blood Pressure from Last 3 Encounters:   17 130/62   17 130/62   17 109/62    Weight from Last 3 Encounters:   17 184 lb (83.5 kg)   17 182 lb (82.6 kg)   17 186 lb (84.4 kg)              Today, you had the following     No orders found for display         Where to get your medicines      These medications were sent to [id number] - [place], [place] - [address]  [address] [address] [place] [address]     Phone:  [phone]     mirtazapine 15 MG tablet          Primary Care Provider Office Phone # Fax #    Kevin Vaughn [phone]       [facility] [address] [address]        Equal Access to Services     [facility] VIVI AH: Elie Gray, wamarcioda shahnazqena, qaedvin chinchilla. [facility] [phone].    ATENCIÓN: Si george clark, " tiene a payne disposición servicios gratuitos de asistencia lingüística. Azalia akers [phone].    We comply with applicable federal civil rights laws and [place] laws. We do not discriminate on the basis of race, color, national origin, age, disability sex, sexual orientation or gender identity.            Thank you!     Thank you for choosing [facility]  for your care. Our goal is always to provide you with excellent care. Hearing back from our patients is one way we can continue to improve our services. Please take a few minutes to complete the written survey that you may receive in the mail after your visit with us. Thank you!             Your Updated Medication List - Protect others around you: Learn how to safely use, store and throw away your medicines at www.disposemymeds.org.          This list is accurate as of: 6/30/17  1:38 PM.  Always use your most recent med list.                   Brand Name Dispense Instructions for use Diagnosis    aspirin 81 MG tablet     100 tablet    Take 1 tablet (81 mg) by mouth daily    Chronic ischemic heart disease, unspecified, Hypertension goal BP (blood pressure) < 140/90, Hyperlipidemia LDL goal <100       atorvastatin 40 MG tablet    LIPITOR     Take 40 mg by mouth daily        clopidogrel 75 MG tablet    PLAVIX    90 tablet    Take 1 tablet by mouth daily.    Chronic ischemic heart disease, unspecified       COREG 25 MG tablet   Generic drug:  carvedilol      Take 25 mg by mouth 2 times daily (with meals).        Fish Oil 1000 MG Cpdr      Take 1 capsule by mouth 2 times daily.        lisinopril 5 MG tablet    PRINIVIL/ZESTRIL     Take 2.5 mg by mouth 2 times daily        mirtazapine 15 MG tablet    REMERON    30 tablet    Take 1 tablet (15 mg) by mouth At Bedtime    Persistent insomnia       nitroGLYcerin 0.4 MG sublingual tablet    NITROSTAT    25 tablet    Place 1 tablet (0.4 mg) under the tongue every 5 minutes as needed    Chronic ischemic heart  disease, unspecified

## 2017-08-14 ENCOUNTER — OFFICE VISIT (OUTPATIENT)
Dept: DERMATOLOGY | Facility: CLINIC | Age: 82
End: 2017-08-14
Payer: MEDICARE

## 2017-08-14 DIAGNOSIS — Z80.8 FAMILY HISTORY OF MELANOMA: Primary | ICD-10-CM

## 2017-08-14 DIAGNOSIS — D18.01 CHERRY ANGIOMA: ICD-10-CM

## 2017-08-14 DIAGNOSIS — L57.0 AK (ACTINIC KERATOSIS): ICD-10-CM

## 2017-08-14 DIAGNOSIS — Z85.828 HISTORY OF NONMELANOMA SKIN CANCER: ICD-10-CM

## 2017-08-14 DIAGNOSIS — D22.9 MULTIPLE NEVI: ICD-10-CM

## 2017-08-14 DIAGNOSIS — B07.0 VERRUCA PLANTARIS: ICD-10-CM

## 2017-08-14 DIAGNOSIS — L82.1 SK (SEBORRHEIC KERATOSIS): ICD-10-CM

## 2017-08-14 PROCEDURE — 17003 DESTRUCT PREMALG LES 2-14: CPT | Performed by: DERMATOLOGY

## 2017-08-14 PROCEDURE — 17110 DESTRUCTION B9 LES UP TO 14: CPT | Performed by: DERMATOLOGY

## 2017-08-14 PROCEDURE — 17000 DESTRUCT PREMALG LESION: CPT | Mod: 59 | Performed by: DERMATOLOGY

## 2017-08-14 PROCEDURE — 99214 OFFICE O/P EST MOD 30 MIN: CPT | Mod: 25 | Performed by: DERMATOLOGY

## 2017-08-14 NOTE — NURSING NOTE
Dermatology Rooming Note    Ulices Shah Jr.'s goals for this visit include:   Chief Complaint   Patient presents with     Derm Problem     6 month skin check        Is a scribe okay for this visit:YES    Are records needed for this visit(If yes, obtain release of information): Not applicable     Vitals: There were no vitals taken for this visit.    Referring Provider:  No referring provider defined for this encounter.

## 2017-08-14 NOTE — MR AVS SNAPSHOT
After Visit Summary   8/14/2017    Ulices Shah Jr.    MRN: [id number]           Patient Information     Date Of Birth          1935        Visit Information        Provider Department      8/14/2017 1:00 PM Akhil High MD [facility]        Today's Diagnoses     Family history of melanoma    -  1    History of nonmelanoma skin cancer        Verruca plantaris        Cherry angioma        SK (seborrheic keratosis)        Multiple nevi        AK (actinic keratosis)          Care Instructions    Cryotherapy    What is it?    Use of a very cold liquid, such as liquid nitrogen, to freeze and destroy abnormal skin cells that need to be removed    What should I expect?    Tenderness and redness    A small blister that might grow and fill with dark purple blood. There may be crusting.    More than one treatment may be needed if the lesions do not go away.    How do I care for the treated area?    Gently wash the area with your hands when bathing.    Use a thin layer of Vaseline to help with healing. You may use a Band-Aid.     The area should heal within 7-10 days and may leave behind a pink or lighter color.     Do not use an antibiotic or Neosporin ointment.     You may take acetaminophen (Tylenol) for pain.     Call your Doctor if you have:    Severe pain    Signs of infection (warmth, redness, cloudy yellow drainage, and or a bad smell)    Questions or concerns    Who should I call with questions?       [facility]: [phone]       [facility]: [phone]       For urgent needs outside of business hours call the [facility] at [phone]        and ask for the dermatology resident on call            Follow-ups after your visit        Your next 10 appointments already scheduled     Jan 16, 2018  1:15 PM CST   Return Visit with Joceline Stone MD   [facility] ([facility])    [address] [place] [address] [phone]            2018  1:00 PM CST   Return Visit with Joceline Stone MD   [facility]    [address] [place] [address]   [phone]              Who to contact     If you have questions or need follow up information about today's clinic visit or your schedule please contact [facility] directly at [phone].  Normal or non-critical lab and imaging results will be communicated to you by [employer], letter or phone within 4 business days after the clinic has received the results. If you do not hear from us within 7 days, please contact the clinic through [employer] or phone. If you have a critical or abnormal lab result, we will notify you by phone as soon as possible.  Submit refill requests through [employer] or call your pharmacy and they will forward the refill request to us. Please allow 3 business days for your refill to be completed.          Additional Information About Your Visit        [employer] Information     [employer] is an electronic gateway that provides easy, online access to your medical records. With [employer], you can request a clinic appointment, read your test results, renew a prescription or communicate with your care team.     To sign up for [employer] visit the website at [email or web address]   You will be asked to enter the access code listed below, as well as some personal information. Please follow the directions to create your username and password.     Your access code is: [email or web address]  Expires: 2017  1:38 PM     Your access code will  in 90 days. If you need help or a new code, please contact your [facility] or call [phone] for assistance.        Care EveryWhere ID     This is your Care EveryWhere ID. This could be used by other organizations to access your [facility]  records  [id number]         Blood Pressure from Last 3 Encounters:   06/30/17 130/62   03/27/17 130/62   03/02/17 109/62    Weight from Last 3 Encounters:   06/30/17 83.5 kg (184 lb)   03/27/17 82.6 kg (182 lb)   02/17/17 84.4 kg (186 lb)              We Performed the Following     DESTRUCT BENIGN LESION, UP TO 14     DESTRUCT PREMALIGNANT LESION, 2-14     DESTRUCT PREMALIGNANT LESION, FIRST        Primary Care Provider Office Phone # Fax #    Kevin Brayden Vaughn [phone] [address] [place] [address]        Equal Access to Services     DOMENICA TRINIDAD : Elie Gray, wamarcioda belinda, qaybta kaalmada soledad, edvin villalta. [facility] [phone].    ATENCIÓN: Si habla español, tiene a payne disposición servicios gratuitos de asistencia lingüística. Llame al [phone].    We comply with applicable federal civil rights laws and [place] laws. We do not discriminate on the basis of race, color, national origin, age, disability sex, sexual orientation or gender identity.            Thank you!     Thank you for choosing [facility]  for your care. Our goal is always to provide you with excellent care. Hearing back from our patients is one way we can continue to improve our services. Please take a few minutes to complete the written survey that you may receive in the mail after your visit with us. Thank you!             Your Updated Medication List - Protect others around you: Learn how to safely use, store and throw away your medicines at www.disposemymeds.org.          This list is accurate as of: 8/14/17  1:17 PM.  Always use your most recent med list.                   Brand Name Dispense Instructions for use Diagnosis    aspirin 81 MG tablet     100 tablet    Take 1 tablet (81 mg) by mouth daily    Chronic ischemic heart disease, unspecified, Hypertension goal BP (blood pressure) < 140/90, Hyperlipidemia LDL goal <100        atorvastatin 40 MG tablet    LIPITOR     Take 40 mg by mouth daily        clopidogrel 75 MG tablet    PLAVIX    90 tablet    Take 1 tablet by mouth daily.    Chronic ischemic heart disease, unspecified       COREG 25 MG tablet   Generic drug:  carvedilol      Take 25 mg by mouth 2 times daily (with meals).        Fish Oil 1000 MG Cpdr      Take 1 capsule by mouth 2 times daily.        lisinopril 5 MG tablet    PRINIVIL/ZESTRIL     Take 2.5 mg by mouth 2 times daily        mirtazapine 15 MG tablet    REMERON    30 tablet    Take 1 tablet (15 mg) by mouth At Bedtime    Persistent insomnia       nitroGLYcerin 0.4 MG sublingual tablet    NITROSTAT    25 tablet    Place 1 tablet (0.4 mg) under the tongue every 5 minutes as needed    Chronic ischemic heart disease, unspecified

## 2017-08-14 NOTE — PATIENT INSTRUCTIONS
Cryotherapy    What is it?    Use of a very cold liquid, such as liquid nitrogen, to freeze and destroy abnormal skin cells that need to be removed    What should I expect?    Tenderness and redness    A small blister that might grow and fill with dark purple blood. There may be crusting.    More than one treatment may be needed if the lesions do not go away.    How do I care for the treated area?    Gently wash the area with your hands when bathing.    Use a thin layer of Vaseline to help with healing. You may use a Band-Aid.     The area should heal within 7-10 days and may leave behind a pink or lighter color.     Do not use an antibiotic or Neosporin ointment.     You may take acetaminophen (Tylenol) for pain.     Call your Doctor if you have:    Severe pain    Signs of infection (warmth, redness, cloudy yellow drainage, and or a bad smell)    Questions or concerns    Who should I call with questions?       [facility]: [phone]       [facility]: [phone]       For urgent needs outside of business hours call the [facility] at [phone]        and ask for the dermatology resident on call

## 2017-08-14 NOTE — PROGRESS NOTES
[facility] Dermatology Note      Dermatology Problem List:  1. Family history of melanoma  2. NMSC  -SCC, right dorsal hand, s/p Mohs 3/2/2017  -SCC, left postauricular, s/p Mohs 9/3/2015  -BCC, superficial nodular, right mid cheek, s/p Mohs 9/4/14  -BCC, nodular pigmented, left nasolabial fold, s/p Mohs 9/4/14  -BCC, left superior helix, s/p Mohs 9/4/14  -Prior to 2005, history of BCC on the forehead, s/p excision  3. Actinic keratoses: discuss field therapy on follow up Winter 4117-7930  -s/p cryotherapy    Last TBSE: 8/14/2017    Encounter Date: Aug 14, 2017    CC:  Chief Complaint   Patient presents with     Derm Problem     6 month skin check        History of Present Illness:  Mr. Ulices Shah Jr. is a 82 year old male who presents as a follow-up for family history of melanoma and personal history of NMSC. She was last seen by Dr. Stone 1/17/2017 when a lesion of the right dorsal hand was biopsied as SCC which was later removed via Mohs by Dr. Caballero. Today Ulices reports two lesions he would like examined, one on his right wrist and one on the right elbow. The lesion on his wrist looks to him like a pimple that is slightly tender but is otherwise not bothersome or symptomatic. The lesion on his right elbow has been present for a long time and he forgot to mention it at his last visit. No pruritus, burning, or spontaneous bleeding. No pain on palpation. No other skin lesions of concern.    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     History of heart bypass surgery     Atherosclerosis of coronary artery     Persistent insomnia     Elevated blood sugar     Heart murmur, systolic     Past Medical History:   Diagnosis Date     Unspecified essential hypertension      Past Surgical History:   Procedure Laterality Date     C APPENDECTOMY  1951     C EXPLOR HEART SURG WND W CP BYPASS  10/25/01    4 way heart bypass     COLONOSCOPY  12/10/08     EXCISE MASS HAND Right 02/2017    \"skin cancer\" removal     HC REMV CATARACT EXTRACAP,INSERT LENS  10/21/2004    left     HC REMV CATARACT EXTRACAP,INSERT LENS  11/18/2004    right     HC YAG LASER CAPSULOTOMY  06/18/09    right eye     HEART CATH, ANGIOPLASTY  4/30/12    3 stents     HEMILAMINECTOMY, DISCECTOMY LUMBAR THREE LEVELS, COMBINED  6/26/2013    Procedure: COMBINED HEMILAMINECTOMY, DISCECTOMY LUMBAR THREE LEVELS;  Bilateral L3, L4 and L5 Guille-Laminectomies;  Surgeon: Ollie Dodson MD;  Location: [facility] OR     Social History:  Reviewed and unchanged but kept in chart for clinician convenience  The patient is retired. He denies use of tanning beds. He has never been .     Family History:  Reviewed and unchanged but kept in chart for clinician convenience  The patient reports a family history of melanoma in his father.  The patient denies family history of asthma, psoriasis, eczema or seasonal allergies.    Medications:  Current Outpatient Prescriptions   Medication Sig Dispense Refill     mirtazapine (REMERON) 15 MG tablet Take 1 tablet (15 mg) by mouth At Bedtime 30 tablet 5     aspirin 81 MG tablet Take 1 tablet (81 mg) by mouth daily 100 tablet 0     nitroglycerin (NITROSTAT) 0.4 MG SL tablet Place 1 tablet (0.4 mg) under the tongue every 5 minutes as needed 25 tablet 1     atorvastatin (LIPITOR) 40 MG tablet Take 40 mg by mouth daily       clopidogrel " (PLAVIX) 75 MG tablet Take 1 tablet by mouth daily. 90 tablet 3     lisinopril (PRINIVIL,ZESTRIL) 5 MG tablet Take 2.5 mg by mouth 2 times daily        carvedilol (COREG) 25 MG tablet Take 25 mg by mouth 2 times daily (with meals).       Omega-3 Fatty Acids (FISH OIL) 1000 MG CPDR Take 1 capsule by mouth 2 times daily.       Allergies   Allergen Reactions     No Known Drug Allergies      Review of Systems:   -Skin: As above in HPI. No additional skin concerns.  -Const: The patient is generally feeling well today.     Physical exam:  There were no vitals taken for this visit.  GEN: This is a well-nourished, well developed male in no acute distress  NEURO: Alert and oriented  PSYCH: in a pleasant mood, appropriate affect  SKIN: Total skin excluding the undergarment areas was performed. The exam included the head/face, neck, both arms, chest, back, abdomen, both legs, digits and/or nails.   -there is a pink scaly papule on the right medial wrist  -on the right elbow there is a pink scaly papule  -There are bright red some shaped papules scattered on examined surfaces.   -Multiple regular brown pigmented macules and papules are identified on examined surfaces.   -There are waxy stuck on tan to brown papules on examined surfaces.  -papilloma on the left plantar surface on the left 4th MTP  -multiple AKs on the right temple, vertex scalp, frontal scalp, and right cheek  -sites of prior NMSC without evidence of recurrence  -No other lesions of concern on areas examined.     Impression/Plan:  1. Family history of melanoma: no concerns today.  2. History of nonmelanoma skin cancer, no clincial evidence of recurrence:  Sun precaution was advised including the use of sun screens of SPF 30 or higher, and sun protective clothing.    3. Verruca plantaris: L 4th MTP joint area. Cryo today.   4. Actinic keratoses: pt has a lot of AK's and should undergo field therapy in the winter.  Cryotherapy procedure note: After verbal consent  and discussion of risks and benefits including but no limited to dyspigmentation/scar, blister, and pain, 13 AKs, and 1 wart were treated with 1-2mm freeze border for 2 cycles with liquid nitrogen. Post cryotherapy instructions were provided.     5. Cherry angiomas, Multiple clinically benign nevi, and Seborrheic keratosis, non irritated    Benign nature was discussed. No further intervention required at this time.     Follow up in 5 months for discussion/initiation of AK field treatment, earlier for new or changing lesions and pending results of biopsy    Staff Involved:  Scribe/Staff    Scribe Disclosure:   I, Vern Durán, am serving as a scribe to document services personally performed by Dr. Akhil High, based on data collection and the provider's statements to me.    Provider Disclosure:   I have reviewed the documentation recorded by the scribe and have edited it as needed. I have personally performed the services documented here and the documentation accurately represents those services and the decisions made by me.     Akhil High MD, MS    Department of Dermatology  [facility]: Phone: [phone], Fax:[phone]  [facility]: Phone: [phone], Fax: [phone]

## 2017-11-30 ENCOUNTER — TRANSFERRED RECORDS (OUTPATIENT)
Dept: HEALTH INFORMATION MANAGEMENT | Facility: CLINIC | Age: 82
End: 2017-11-30

## 2018-01-18 ENCOUNTER — OFFICE VISIT (OUTPATIENT)
Dept: DERMATOLOGY | Facility: CLINIC | Age: 83
End: 2018-01-18
Payer: MEDICARE

## 2018-01-18 DIAGNOSIS — D18.01 CHERRY ANGIOMA: ICD-10-CM

## 2018-01-18 DIAGNOSIS — L57.0 ACTINIC KERATOSIS: Primary | ICD-10-CM

## 2018-01-18 DIAGNOSIS — L82.1 SEBORRHEIC KERATOSIS: ICD-10-CM

## 2018-01-18 PROCEDURE — 99213 OFFICE O/P EST LOW 20 MIN: CPT | Mod: 25 | Performed by: DERMATOLOGY

## 2018-01-18 PROCEDURE — 17000 DESTRUCT PREMALG LESION: CPT | Performed by: DERMATOLOGY

## 2018-01-18 PROCEDURE — 17003 DESTRUCT PREMALG LES 2-14: CPT | Performed by: DERMATOLOGY

## 2018-01-18 RX ORDER — FLUOROURACIL 50 MG/G
CREAM TOPICAL
Qty: 80 G | Refills: 0 | Status: SHIPPED | OUTPATIENT
Start: 2018-01-18 | End: 2018-07-12

## 2018-01-18 NOTE — PROGRESS NOTES
[facility] Dermatology Note      Dermatology Problem List:  1. Family history of melanoma  2. NMSC  -SCC, right dorsal hand, s/p Mohs 3/2/2017  -SCC, left postauricular, s/p Mohs 9/3/2015  -BCC, superficial nodular, right mid cheek, s/p Mohs 9/4/14  -BCC, nodular pigmented, left nasolabial fold, s/p Mohs 9/4/14  -BCC, left superior helix, s/p Mohs 9/4/14  -Prior to 2005, history of BCC on the forehead, s/p excision  3. Actinic keratoses:   -s/p cryotherapy  -s/p Efudex cream     Last TBSE: 1/18/17    Encounter Date: Jan 18, 2018    CC:  Chief Complaint   Patient presents with     RECHECK     6 month skin check        History of Present Illness:  Mr. Ulices Shah Jr. is a 82 year old male who presents as a follow-up for family history of melanoma and personal history of NMSC. She was last seen by Dr. Stone 8/14/17 when the patient had cryo. He has no areas of concern today. Nothing bleeding, crusting or changing.     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     History of heart bypass surgery     Atherosclerosis of coronary artery     Persistent insomnia     Elevated blood sugar     Heart murmur, systolic     Past Medical History:   Diagnosis Date     Unspecified essential hypertension      Past Surgical History:   Procedure Laterality Date     C APPENDECTOMY  1951     C EXPLOR HEART SURG WND W CP  "BYPASS  10/25/01    4 way heart bypass     COLONOSCOPY  12/10/08     EXCISE MASS HAND Right 02/2017    \"skin cancer\" removal     HC REMV CATARACT EXTRACAP,INSERT LENS  10/21/2004    left     HC REMV CATARACT EXTRACAP,INSERT LENS  11/18/2004    right     HC YAG LASER CAPSULOTOMY  06/18/09    right eye     HEART CATH, ANGIOPLASTY  4/30/12    3 stents     HEMILAMINECTOMY, DISCECTOMY LUMBAR THREE LEVELS, COMBINED  6/26/2013    Procedure: COMBINED HEMILAMINECTOMY, DISCECTOMY LUMBAR THREE LEVELS;  Bilateral L3, L4 and L5 Guille-Laminectomies;  Surgeon: Ollie Dodson MD;  Location: [facility] OR     Social History:  Reviewed and unchanged but kept in chart for clinician convenience  The patient is retired. He denies use of tanning beds. He has never been .  He has no children. He lives in [place].     Family History:  Reviewed and unchanged but kept in chart for clinician convenience  The patient reports a family history of melanoma in his father.  The patient denies family history of asthma, psoriasis, eczema or seasonal allergies.    Medications:  Current Outpatient Prescriptions   Medication Sig Dispense Refill     mirtazapine (REMERON) 15 MG tablet Take 1 tablet (15 mg) by mouth At Bedtime 30 tablet 5     aspirin 81 MG tablet Take 1 tablet (81 mg) by mouth daily 100 tablet 0     nitroglycerin (NITROSTAT) 0.4 MG SL tablet Place 1 tablet (0.4 mg) under the tongue every 5 minutes as needed 25 tablet 1     atorvastatin (LIPITOR) 40 MG tablet Take 40 mg by mouth daily       clopidogrel (PLAVIX) 75 MG tablet Take 1 tablet by mouth daily. 90 tablet 3     lisinopril (PRINIVIL,ZESTRIL) 5 MG tablet Take 2.5 mg by mouth 2 times daily        carvedilol (COREG) 25 MG tablet Take 25 mg by mouth 2 times daily (with meals).       Omega-3 Fatty Acids (FISH OIL) 1000 MG CPDR Take 1 capsule by mouth 2 times daily.       Allergies   Allergen Reactions     No Known Drug Allergies      Review of Systems:   -Skin: As above in HPI. No " additional skin concerns.  -Const: The patient is generally feeling well today.     Physical exam:  There were no vitals taken for this visit.  GEN: This is a well-nourished, well developed male in no acute distress  SKIN: Total skin excluding the undergarment areas was performed. The exam included the head/face, neck, both arms, chest, back, abdomen, both legs, digits and/or nails. Declines genital exam.   - There is no erythema, telangectasias, nodularity, or pigmentation on the right dorsal hand, left postauricular, right mid cheek,  left nasolabial fold, left superior helix.   - Multiple regular brown pigmented macules and papules are identified on the trunk and extremities.   - There are waxy stuck on tan to brown papules on the trunk and extremities.  - There are bright red some shaped papules scattered on the trunk and extremities.   -There is/are erythematous macules with overlying adherent scale on the right temple, right preauricular, right helix, left preauricular and vertex scalp.     -No other lesions of concern on areas examined.     Impression/Plan:  1. Family history of melanoma  2. History of nonmelanoma skin cancer, no clincial evidence of recurrence:  Sun precaution was advised including the use of sun screens of SPF 30 or higher, and sun protective clothing.  3. Actinic keratoses: right temple, right preauricular, right helix, left preauricular, vertex scalp  Cryotherapy procedure note: After verbal consent and discussion of risks and benefits including but no limited to dyspigmentation/scar, blister, and pain, 13 was(were) treated with 1-2mm freeze border for 2 cycles with liquid nitrogen. Post cryotherapy instructions were provided.   After sties are healed, start Efudex twice daily for 2-3 weeks or until the onset of irritation (he will start with temples and forehead, if he does well then cheeks and nose, if he does well then tops of the hands. Discussed that we would expect irritation form  this medications. Recommend the patient wash hands after use or use gloves to apply. Keep medication away from pets. Avoid sun exposure to treated area. Do not occlude treated area with bandages. Follow up 4 weeks after the last application.   4. Cherry angiomas,    Benign nature was discussed. No further intervention required at this time.   5. Multiple clinically benign nevi,     Benign nature was discussed. No further intervention required at this time.   6. Seborrheic keratosis, non irritated    Benign nature was discussed. No further intervention required at this time.     Follow up in 6 months, earlier for new or changing lesions and pending results of biopsy    Staff Involved:  Scribe/Staff    Scribe Disclosure:   I, Tennille Mock, am serving as a scribe to document services personally performed by Dr. Joceline Stone, based on data collection and the provider's statements to me.       Provider Disclosure:   The documentation recorded by the scribe accurately reflects the services I personally performed and the decisions made by me.    Joceline Stone MD    Department of Dermatology  [facility]: Phone: [phone], Fax:[phone]  [facility]: Phone: [phone], Fax: [phone]

## 2018-01-18 NOTE — MR AVS SNAPSHOT
After Visit Summary   1/18/2018    Ulices Shah Jr.    MRN: [id number]           Patient Information     Date Of Birth          1935        Visit Information        Provider Department      1/18/2018 1:00 PM Joceline Stone MD [facility]        Today's Diagnoses     Actinic keratosis    -  1    Seborrheic keratosis        Cherry angioma          Care Instructions    Efudex Treatment    Today, you are being prescribed Fluorouracil (Efudex) a topical cream used for the treatment of Actinic Keratosis (AK's).  The medication is working to eliminate the unhealthy cells. Even though this treatment may be unattractive and somewhat uncomfortable.    Your treatment will last twice daily for 2-3 weeks or until the onset of irritation on the scalp and forehead first, once healed then do cheeks and nose, then once healed do the tops of the hands.  You may experience some mild discomfort while being treated.    You will want to stop any other creams such as glycolic acid products, retin A, Tazorac, etc. to the area. You may use bland makeup/cover-up as long as it doesn't sting or cause you discomfort.    Apply the cream at night as your physician recommends. Use a cotton-tipped applicator, or use gloves if applying it with your fingertips. If applied with unprotected fingertips, it is important to wash your hands well after you apply this medicine.     Keep this medication away from pets.    We recommend avoiding excessive sun exposure to the treated area    You may use moisturizing creams over bothersome areas such as Vanicream or Cetaphil cream if the reaction becomes too bothersome. Please, call the clinic if this occurs.   Potential Side Effects    Your treated areas may be unsightly during therapy.  This will improve slowly following the discontinuation of therapy.     During the first week of application, mild inflammation may occur.     During the following weeks, redness, and  swelling may occur with some crusting and burning.     Lesions resolve as the skin exfoliates.     Over 1 to 2 weeks, new skin grows into the treatment area.    Keep this medication away from pets  Specific side effects that usually do not require medical attention (report to your doctor or health care professional if they continue or are bothersome) include: Red or dark-colored skin     Mild erosion (loss of upper layer of skin)     Mild eye irritation including burning, itching, sensitivity, stinging, or watering     Increased sensitivity of the skin to sun and ultraviolet light     Pain and burning of the affected area     Dryness, scaling or swelling of the affected area     Skin rash, itching of the affected area     Tenderness   Who should I call with questions?     [facility]: [phone]     [facility]: [phone]     For urgent needs outside of business hours call the [facility] at [phone]  and ask for the dermatology resident on call        Cryotherapy    What is it?    Use of a very cold liquid, such as liquid nitrogen, to freeze and destroy abnormal skin cells that need to be removed    What should I expect?    Tenderness and redness    A small blister that might grow and fill with dark purple blood. There may be crusting.    More than one treatment may be needed if the lesions do not go away.    How do I care for the treated area?    Gently wash the area with your hands when bathing.    Use a thin layer of Vaseline to help with healing. You may use a Band-Aid.     The area should heal within 7-10 days and may leave behind a pink or lighter color.     Do not use an antibiotic or Neosporin ointment.     You may take acetaminophen (Tylenol) for pain.     Call your Doctor if you have:    Severe pain    Signs of infection (warmth, redness, cloudy yellow drainage, and or a bad smell)    Questions or concerns    Who should I call  with questions?       [facility]: [phone]       [facility]: [phone]       For urgent needs outside of business hours call the [facility] at [phone]        and ask for the dermatology resident on call            Follow-ups after your visit        Follow-up notes from your care team     Return in about 6 months (around 2018) for hx of NMSC.      Who to contact     If you have questions or need follow up information about today's clinic visit or your schedule please contact [facility] directly at [phone].  Normal or non-critical lab and imaging results will be communicated to you by [employer], letter or phone within 4 business days after the clinic has received the results. If you do not hear from us within 7 days, please contact the clinic through [employer] or phone. If you have a critical or abnormal lab result, we will notify you by phone as soon as possible.  Submit refill requests through [employer] or call your pharmacy and they will forward the refill request to us. Please allow 3 business days for your refill to be completed.          Additional Information About Your Visit        [employer]-Generator Information     [employer]-Generator is an electronic gateway that provides easy, online access to your medical records. With [employer]-Generator, you can request a clinic appointment, read your test results, renew a prescription or communicate with your care team.     To sign up for [employer] visit the website at [email or web address]   You will be asked to enter the access code listed below, as well as some personal information. Please follow the directions to create your username and password.     Your access code is: [email or web address]  Expires: 2018  1:50 PM     Your access code will  in 90 days. If you need help or a new code, please contact your [facility] or call [phone] for assistance.         Care EveryWhere ID     This is your Care EveryWhere ID. This could be used by other organizations to access your [place] medical records  [id number]         Blood Pressure from Last 3 Encounters:   06/30/17 130/62   03/27/17 130/62   03/02/17 109/62    Weight from Last 3 Encounters:   06/30/17 83.5 kg (184 lb)   03/27/17 82.6 kg (182 lb)   02/17/17 84.4 kg (186 lb)              We Performed the Following     DESTRUCT PREMALIGNANT LESION, 2-14     DESTRUCT PREMALIGNANT LESION, FIRST          Today's Medication Changes          These changes are accurate as of: 1/18/18  1:54 PM.  If you have any questions, ask your nurse or doctor.               Start taking these medicines.        Dose/Directions    fluorouracil 5 % cream   Commonly known as:  EFUDEX   Used for:  Actinic keratosis        Apply twice daily for up to 2 weeks in a row. Start with forehead and temples   Quantity:  80 g   Refills:  0            Where to get your medicines      These medications were sent to [id number] - [place], [place] - [address] [facility]   [address] [place] [address] , [facility] [address]     Phone:  [phone]     fluorouracil 5 % cream                Primary Care Provider Office Phone # Fax #    Kevin Brayden Vaughn [phone] [address] [facility]   [facility] [place] [place] [address]        Equal Access to Services     [facility] : Hadii mikie ku hadasho Soomaali, waaxda luqadaha, qaybta kaalmada adeegyada, edvin villalta. So [facility] [phone].    ATENCIÓN: Si habla español, tiene a payne disposición servicios gratuitos de asistencia lingüística. Azalia al [phone].    We comply with applicable federal civil rights laws and [place] laws. We do not discriminate on the basis of race, color, national origin, age, disability, sex, sexual orientation, or gender identity.            Thank you!     Thank you for choosing [facility]  for your care. Our goal is always to provide you with  excellent care. Hearing back from our patients is one way we can continue to improve our services. Please take a few minutes to complete the written survey that you may receive in the mail after your visit with us. Thank you!             Your Updated Medication List - Protect others around you: Learn how to safely use, store and throw away your medicines at www.disposemymeds.org.          This list is accurate as of: 1/18/18  1:54 PM.  Always use your most recent med list.                   Brand Name Dispense Instructions for use Diagnosis    aspirin 81 MG tablet     100 tablet    Take 1 tablet (81 mg) by mouth daily    Chronic ischemic heart disease, unspecified, Hypertension goal BP (blood pressure) < 140/90, Hyperlipidemia LDL goal <100       atorvastatin 40 MG tablet    LIPITOR     Take 40 mg by mouth daily        clopidogrel 75 MG tablet    PLAVIX    90 tablet    Take 1 tablet by mouth daily.    Chronic ischemic heart disease, unspecified       COREG 25 MG tablet   Generic drug:  carvedilol      Take 25 mg by mouth 2 times daily (with meals).        Fish Oil 1000 MG Cpdr      Take 1 capsule by mouth 2 times daily.        fluorouracil 5 % cream    EFUDEX    80 g    Apply twice daily for up to 2 weeks in a row. Start with forehead and temples    Actinic keratosis       lisinopril 5 MG tablet    PRINIVIL/ZESTRIL     Take 2.5 mg by mouth 2 times daily        mirtazapine 15 MG tablet    REMERON    30 tablet    Take 1 tablet (15 mg) by mouth At Bedtime    Persistent insomnia       nitroGLYcerin 0.4 MG sublingual tablet    NITROSTAT    25 tablet    Place 1 tablet (0.4 mg) under the tongue every 5 minutes as needed    Chronic ischemic heart disease, unspecified

## 2018-01-18 NOTE — NURSING NOTE
"Ulices Shah Jr.'s goals for this visit include:   Chief Complaint   Patient presents with     RECHECK     6 month skin check        He requests these members of his care team be copied on today's visit information: yes    PCP: Kevin Vaughn    Referring Provider:  No referring provider defined for this encounter.    Chief Complaint   Patient presents with     RECHECK     6 month skin check        Initial There were no vitals taken for this visit. Estimated body mass index is 27.37 kg/(m^2) as calculated from the following:    Height as of 3/27/17: 1.746 m (5' 8.75\").    Weight as of 6/30/17: 83.5 kg (184 lb).  Medication Reconciliation: complete    Do you need any medication refills at today's visit? No     Amorrbenson Mosley CMA        "

## 2018-01-18 NOTE — PATIENT INSTRUCTIONS
Efudex Treatment    Today, you are being prescribed Fluorouracil (Efudex) a topical cream used for the treatment of Actinic Keratosis (AK's).  The medication is working to eliminate the unhealthy cells. Even though this treatment may be unattractive and somewhat uncomfortable.    Your treatment will last twice daily for 2-3 weeks or until the onset of irritation on the scalp and forehead first, once healed then do cheeks and nose, then once healed do the tops of the hands.  You may experience some mild discomfort while being treated.    You will want to stop any other creams such as glycolic acid products, retin A, Tazorac, etc. to the area. You may use bland makeup/cover-up as long as it doesn't sting or cause you discomfort.    Apply the cream at night as your physician recommends. Use a cotton-tipped applicator, or use gloves if applying it with your fingertips. If applied with unprotected fingertips, it is important to wash your hands well after you apply this medicine.     Keep this medication away from pets.    We recommend avoiding excessive sun exposure to the treated area    You may use moisturizing creams over bothersome areas such as Vanicream or Cetaphil cream if the reaction becomes too bothersome. Please, call the clinic if this occurs.   Potential Side Effects    Your treated areas may be unsightly during therapy.  This will improve slowly following the discontinuation of therapy.     During the first week of application, mild inflammation may occur.     During the following weeks, redness, and swelling may occur with some crusting and burning.     Lesions resolve as the skin exfoliates.     Over 1 to 2 weeks, new skin grows into the treatment area.    Keep this medication away from pets  Specific side effects that usually do not require medical attention (report to your doctor or health care professional if they continue or are bothersome) include: Red or dark-colored skin     Mild erosion (loss of  upper layer of skin)     Mild eye irritation including burning, itching, sensitivity, stinging, or watering     Increased sensitivity of the skin to sun and ultraviolet light     Pain and burning of the affected area     Dryness, scaling or swelling of the affected area     Skin rash, itching of the affected area     Tenderness   Who should I call with questions?     [facility]: [phone]     [facility]: [phone]     For urgent needs outside of business hours call the [facility] at [phone]  and ask for the dermatology resident on call        Cryotherapy    What is it?    Use of a very cold liquid, such as liquid nitrogen, to freeze and destroy abnormal skin cells that need to be removed    What should I expect?    Tenderness and redness    A small blister that might grow and fill with dark purple blood. There may be crusting.    More than one treatment may be needed if the lesions do not go away.    How do I care for the treated area?    Gently wash the area with your hands when bathing.    Use a thin layer of Vaseline to help with healing. You may use a Band-Aid.     The area should heal within 7-10 days and may leave behind a pink or lighter color.     Do not use an antibiotic or Neosporin ointment.     You may take acetaminophen (Tylenol) for pain.     Call your Doctor if you have:    Severe pain    Signs of infection (warmth, redness, cloudy yellow drainage, and or a bad smell)    Questions or concerns    Who should I call with questions?       [facility]: [phone]       [facility]: [phone]       For urgent needs outside of business hours call the [facility] at [phone]        and ask for the dermatology resident on call

## 2018-01-18 NOTE — LETTER
1/18/2018         RE: Ulices Shah Jr.  [address]   [facility] [address]        Dear Colleague,    Thank you for referring your patient, Ulices Shah Jr., to the [facility]. Please see a copy of my visit note below.    [facility] Dermatology Note      Dermatology Problem List:  1. Family history of melanoma  2. NMSC  -SCC, right dorsal hand, s/p Mohs 3/2/2017  -SCC, left postauricular, s/p Mohs 9/3/2015  -BCC, superficial nodular, right mid cheek, s/p Mohs 9/4/14  -BCC, nodular pigmented, left nasolabial fold, s/p Mohs 9/4/14  -BCC, left superior helix, s/p Mohs 9/4/14  -Prior to 2005, history of BCC on the forehead, s/p excision  3. Actinic keratoses:   -s/p cryotherapy  -s/p Efudex cream     Last TBSE: 1/18/17    Encounter Date: Jan 18, 2018    CC:  Chief Complaint   Patient presents with     RECHECK     6 month skin check        History of Present Illness:  Mr. Ulices Shah Jr. is a 82 year old male who presents as a follow-up for family history of melanoma and personal history of NMSC. She was last seen by Dr. Stone 8/14/17 when the patient had cryo. He has no areas of concern today. Nothing bleeding, crusting or changing.     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     History of heart bypass surgery     Atherosclerosis of coronary artery     Persistent  "insomnia     Elevated blood sugar     Heart murmur, systolic     Past Medical History:   Diagnosis Date     Unspecified essential hypertension      Past Surgical History:   Procedure Laterality Date     C APPENDECTOMY  1951     C EXPLOR HEART SURG WND W CP BYPASS  10/25/01    4 way heart bypass     COLONOSCOPY  12/10/08     EXCISE MASS HAND Right 02/2017    \"skin cancer\" removal     HC REMV CATARACT EXTRACAP,INSERT LENS  10/21/2004    left     HC REMV CATARACT EXTRACAP,INSERT LENS  11/18/2004    right     HC YAG LASER CAPSULOTOMY  06/18/09    right eye     HEART CATH, ANGIOPLASTY  4/30/12    3 stents     HEMILAMINECTOMY, DISCECTOMY LUMBAR THREE LEVELS, COMBINED  6/26/2013    Procedure: COMBINED HEMILAMINECTOMY, DISCECTOMY LUMBAR THREE LEVELS;  Bilateral L3, L4 and L5 Guille-Laminectomies;  Surgeon: Ollie Dodson MD;  Location:  OR     Social History:  Reviewed and unchanged but kept in chart for clinician convenience  The patient is retired. He denies use of tanning beds. He has never been .  He has no children. He lives in [place].     Family History:  Reviewed and unchanged but kept in chart for clinician convenience  The patient reports a family history of melanoma in his father.  The patient denies family history of asthma, psoriasis, eczema or seasonal allergies.    Medications:  Current Outpatient Prescriptions   Medication Sig Dispense Refill     mirtazapine (REMERON) 15 MG tablet Take 1 tablet (15 mg) by mouth At Bedtime 30 tablet 5     aspirin 81 MG tablet Take 1 tablet (81 mg) by mouth daily 100 tablet 0     nitroglycerin (NITROSTAT) 0.4 MG SL tablet Place 1 tablet (0.4 mg) under the tongue every 5 minutes as needed 25 tablet 1     atorvastatin (LIPITOR) 40 MG tablet Take 40 mg by mouth daily       clopidogrel (PLAVIX) 75 MG tablet Take 1 tablet by mouth daily. 90 tablet 3     lisinopril (PRINIVIL,ZESTRIL) 5 MG tablet Take 2.5 mg by mouth 2 times daily        carvedilol (COREG) 25 MG " tablet Take 25 mg by mouth 2 times daily (with meals).       Omega-3 Fatty Acids (FISH OIL) 1000 MG CPDR Take 1 capsule by mouth 2 times daily.       Allergies   Allergen Reactions     No Known Drug Allergies      Review of Systems:   -Skin: As above in HPI. No additional skin concerns.  -Const: The patient is generally feeling well today.     Physical exam:  There were no vitals taken for this visit.  GEN: This is a well-nourished, well developed male in no acute distress  SKIN: Total skin excluding the undergarment areas was performed. The exam included the head/face, neck, both arms, chest, back, abdomen, both legs, digits and/or nails. Declines genital exam.   - There is no erythema, telangectasias, nodularity, or pigmentation on the right dorsal hand, left postauricular, right mid cheek,  left nasolabial fold, left superior helix.   - Multiple regular brown pigmented macules and papules are identified on the trunk and extremities.   - There are waxy stuck on tan to brown papules on the trunk and extremities.  - There are bright red some shaped papules scattered on the trunk and extremities.   -There is/are erythematous macules with overlying adherent scale on the right temple, right preauricular, right helix, left preauricular and vertex scalp.     -No other lesions of concern on areas examined.     Impression/Plan:  1. Family history of melanoma  2. History of nonmelanoma skin cancer, no clincial evidence of recurrence:  Sun precaution was advised including the use of sun screens of SPF 30 or higher, and sun protective clothing.  3. Actinic keratoses: right temple, right preauricular, right helix, left preauricular, vertex scalp  Cryotherapy procedure note: After verbal consent and discussion of risks and benefits including but no limited to dyspigmentation/scar, blister, and pain, 13 was(were) treated with 1-2mm freeze border for 2 cycles with liquid nitrogen. Post cryotherapy instructions were provided.    After sties are healed, start Efudex twice daily for 2-3 weeks or until the onset of irritation (he will start with temples and forehead, if he does well then cheeks and nose, if he does well then tops of the hands. Discussed that we would expect irritation form this medications. Recommend the patient wash hands after use or use gloves to apply. Keep medication away from pets. Avoid sun exposure to treated area. Do not occlude treated area with bandages. Follow up 4 weeks after the last application.   4. Cherry angiomas,    Benign nature was discussed. No further intervention required at this time.   5. Multiple clinically benign nevi,     Benign nature was discussed. No further intervention required at this time.   6. Seborrheic keratosis, non irritated    Benign nature was discussed. No further intervention required at this time.     Follow up in 6 months, earlier for new or changing lesions and pending results of biopsy    Staff Involved:  Scribe/Staff    Scribe Disclosure:   I, Tennille Mock, am serving as a scribe to document services personally performed by Dr. Joceline Stone, based on data collection and the provider's statements to me.       Provider Disclosure:   The documentation recorded by the scribe accurately reflects the services I personally performed and the decisions made by me.    Joceline Stone MD    Department of Dermatology  [facility]: Phone: [phone], Fax:[phone]  [facility]: Phone: [phone], Fax: [phone]        Again, thank you for allowing me to participate in the care of your patient.        Sincerely,        Joceline Stone MD

## 2018-07-12 ENCOUNTER — OFFICE VISIT (OUTPATIENT)
Dept: FAMILY MEDICINE | Facility: CLINIC | Age: 83
End: 2018-07-12
Payer: MEDICARE

## 2018-07-12 ENCOUNTER — HOSPITAL ENCOUNTER (OUTPATIENT)
Dept: ULTRASOUND IMAGING | Facility: CLINIC | Age: 83
Discharge: HOME | End: 2018-07-12
Attending: OBSTETRICS & GYNECOLOGY | Admitting: OBSTETRICS & GYNECOLOGY
Payer: MEDICARE

## 2018-07-12 VITALS
OXYGEN SATURATION: 98 % | RESPIRATION RATE: 18 BRPM | DIASTOLIC BLOOD PRESSURE: 68 MMHG | HEART RATE: 80 BPM | BODY MASS INDEX: 26.22 KG/M2 | TEMPERATURE: 96.8 F | SYSTOLIC BLOOD PRESSURE: 124 MMHG | HEIGHT: 69 IN | WEIGHT: 177 LBS

## 2018-07-12 DIAGNOSIS — M79.605 PAIN OF LEFT LOWER EXTREMITY: Primary | ICD-10-CM

## 2018-07-12 DIAGNOSIS — M79.605 PAIN OF LEFT LOWER EXTREMITY: ICD-10-CM

## 2018-07-12 PROCEDURE — 99213 OFFICE O/P EST LOW 20 MIN: CPT | Performed by: OBSTETRICS & GYNECOLOGY

## 2018-07-12 PROCEDURE — 93971 EXTREMITY STUDY: CPT | Mod: LT

## 2018-07-12 ASSESSMENT — PAIN SCALES - GENERAL: PAINLEVEL: MILD PAIN (3)

## 2018-07-12 NOTE — PROGRESS NOTES
Nicolette Please inform Ulices/ or caretaker  that this result(s) is/are normal.  Thanks. ALANNA Tello MD

## 2018-07-12 NOTE — PROGRESS NOTES
"Subjective: He does quite a bit of biking years.  This year not so much.  2 weeks ago he went to 12 miles in the next day he developed some pain on the anterior left thigh.  Also some minor low back pain.  No numbness of either leg.  The pain comes and goes but he still has some discomfort over the anterior left thigh.      The past medical history, social history, past surgical history and family history as shown below have been reviewed by me today.  Past Medical History:   Diagnosis Date     Unspecified essential hypertension         Allergies   Allergen Reactions     No Known Drug Allergies      Current Outpatient Prescriptions   Medication Sig Dispense Refill     aspirin 81 MG tablet Take 1 tablet (81 mg) by mouth daily 100 tablet 0     atorvastatin (LIPITOR) 40 MG tablet Take 40 mg by mouth daily       carvedilol (COREG) 25 MG tablet Take 25 mg by mouth 2 times daily (with meals).       clopidogrel (PLAVIX) 75 MG tablet Take 1 tablet by mouth daily. 90 tablet 3     lisinopril (PRINIVIL,ZESTRIL) 5 MG tablet Take 2.5 mg by mouth 2 times daily        mirtazapine (REMERON) 15 MG tablet Take 1 tablet (15 mg) by mouth At Bedtime 30 tablet 5     nitroglycerin (NITROSTAT) 0.4 MG SL tablet Place 1 tablet (0.4 mg) under the tongue every 5 minutes as needed 25 tablet 1     Omega-3 Fatty Acids (FISH OIL) 1000 MG CPDR Take 1 capsule by mouth 2 times daily.       Past Surgical History:   Procedure Laterality Date     C APPENDECTOMY  1951     C EXPLOR HEART SURG WND W CP BYPASS  10/25/01    4 way heart bypass     COLONOSCOPY  12/10/08     EXCISE MASS HAND Right 02/2017    \"skin cancer\" removal     HC REMV CATARACT EXTRACAP,INSERT LENS  10/21/2004    left     HC REMV CATARACT EXTRACAP,INSERT LENS  11/18/2004    right     HC YAG LASER CAPSULOTOMY  06/18/09    right eye     HEART CATH, ANGIOPLASTY  4/30/12    3 stents     HEMILAMINECTOMY, DISCECTOMY LUMBAR THREE LEVELS, COMBINED  6/26/2013    Procedure: COMBINED " "HEMILAMINECTOMY, DISCECTOMY LUMBAR THREE LEVELS;  Bilateral L3, L4 and L5 Guille-Laminectomies;  Surgeon: Ollie Dodson MD;  Location:  OR     Social History     Social History     Marital status: Single     Spouse name: N/A     Number of children: 0     Years of education: 12     Occupational History     retired       Retired     Social History Main Topics     Smoking status: Never Smoker     Smokeless tobacco: Never Used     Alcohol use 0.0 oz/week     0 Standard drinks or equivalent per week      Comment: rare, maybe 3 beers all year.     Drug use: No     Sexual activity: No     Other Topics Concern      Service Yes     [employer] for 3 years.     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      Stress Concern No     Weight Concern No     Special Diet Yes     cholesterol      Back Care No     Exercise Yes     3-4 times a week and rides alot of bike (900 miles)     Bike Helmet Yes     Seat Belt Yes     Self-Exams Yes     Social History Narrative     Family History   Problem Relation Age of Onset     C.A.D. Mother      Alzheimer Disease Mother      Neurologic Disorder Mother 96     dementia     Cancer Father      colon     Cancer - colorectal Father        ROS: A 12 point review of systems was done. Except for what is listed above in the HPI, the systems review is negative .      Objective: Vital signs: Blood pressure 124/68, pulse 80, temperature 96.8  F (36  C), temperature source Tympanic, resp. rate 18, height 5' 9\" (1.753 m), weight 177 lb (80.3 kg), SpO2 98 %.    Chest is clear to auscultation.  No wheezes, rales or rhonchi heard.  Cardiac exam is normal with s1, s2, no adventitious sounds.Normal rate and rhythm is heard.  Slight grade 1/6 systolic " murmur heard.  He has tenderness to palpation over the lateral aspect of the anterior thigh over the quadriceps muscle.  This is quite consistent with a strain from biking.  There is no redness or swelling.  He has normal pulses the groin and also in the feet.  Normal sensation in both legs.  No tenderness to palpation of the lower back.        Assessment/Plan:    1.  Painful left anterior thigh.  I suspect this is a biking injury related to muscle strain from biking and rule out a DVT.  UltraSound to rule this out.  I suggested he give this several weeks for it to resolve.  I discussed stretching exercises.  If pain persists will consider MRI scan of lumbar spine to look for disc impingement.        ALANNA Tello MD

## 2018-07-17 ENCOUNTER — OFFICE VISIT (OUTPATIENT)
Dept: ORTHOPEDICS | Facility: CLINIC | Age: 83
End: 2018-07-17
Payer: MEDICARE

## 2018-07-17 VITALS
BODY MASS INDEX: 26.22 KG/M2 | WEIGHT: 177 LBS | DIASTOLIC BLOOD PRESSURE: 73 MMHG | SYSTOLIC BLOOD PRESSURE: 115 MMHG | HEIGHT: 69 IN | HEART RATE: 93 BPM

## 2018-07-17 DIAGNOSIS — M79.652 LEFT THIGH PAIN: Primary | ICD-10-CM

## 2018-07-17 PROCEDURE — 99203 OFFICE O/P NEW LOW 30 MIN: CPT | Performed by: PHYSICAL MEDICINE & REHABILITATION

## 2018-07-17 NOTE — LETTER
"    7/17/2018         RE: Ulices Shah Jr.  [address]  [facility] [address]        Dear Colleague,    Thank you for referring your patient, Ulices Shah Jr., to the [facility]. Please see a copy of my visit note below.    Sports Medicine Clinic Visit    PCP: Kevin Vaughn    CC: Patient presents with:  Leg Pain: left      HPI:  Ulices Shah Jr. is a 83 year old male who is seen in consultation at the request of Dr. Tello.   He notes left anterior thigh and medial thigh pain that began a few days after a 10 mile bike ride. He notes he bikes on a regular basis and can go up to 30 miles per day. He did not have pain right away, it developed a few days after the bike ride so he is unsure if this is the cause of the pain. He notes the pain moves and is sometimes in the mid medial thigh and sometimes closer to the groin. He notes the pain is always worse in the morning. He reports the the pain goes down the medial thigh to the medial knee. He rates the pain at a 9/10 at its worst and a 4/10 currently.  Symptoms are relieved with Percocet (had the medication from a surgery 5 years ago). Symptoms are worsened by walking and first thing in the morning. He endorses pain.   He denies swelling, bruising, popping, grinding, catching, locking, instability, numbness and tingling.  Other treatment has included Tylenol without relief.       Of note, he had a lumbar hemilaminectomy and discectomy in 2013 for \"sciatica\" per Filiberto.  He has done well since then.        Review of Systems:  Musculoskeletal: as above  Remainder of review of systems is negative including constitutional, eyes, ENT, CV, pulmonary, GI, , endocrine, skin, hematologic, and neurologic except as noted in HPI or medical history.    History reviewed. No pertinent past surgical/medical/family/social history other than as mentioned in HPI.    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     History of heart bypass surgery     Atherosclerosis of coronary artery     Persistent insomnia     Elevated blood sugar     Heart murmur, systolic     Past Medical History:   Diagnosis Date     Unspecified essential hypertension      Past Surgical History:   Procedure Laterality Date     C APPENDECTOMY  1951     C EXPLOR HEART SURG WND W CP BYPASS  10/25/01    4 way heart bypass     COLONOSCOPY  12/10/08     EXCISE MASS HAND Right 02/2017    \"skin cancer\" removal     HC REMV CATARACT EXTRACAP,INSERT LENS  10/21/2004    left     HC REMV CATARACT EXTRACAP,INSERT LENS  11/18/2004    right     HC YAG LASER CAPSULOTOMY  06/18/09    right eye     HEART CATH, ANGIOPLASTY  4/30/12    3 stents     HEMILAMINECTOMY, DISCECTOMY LUMBAR THREE LEVELS, COMBINED  6/26/2013    Procedure: COMBINED HEMILAMINECTOMY, DISCECTOMY LUMBAR THREE LEVELS;  Bilateral L3, L4 and L5 Guille-Laminectomies;  Surgeon: Ollie Dodson MD;  Location: [facility] OR     Family History   Problem Relation Age of Onset     C.A.D. Mother      Alzheimer Disease Mother      Neurologic Disorder Mother 96     dementia     Cancer Father      colon     Cancer - colorectal Father      Social History     Social History     Marital status: Single     Spouse name: N/A     Number of children: 0     Years of education: 12     Occupational History     retired       Retired     Social History Main Topics     Smoking status: Never Smoker     " "Smokeless tobacco: Never Used     Alcohol use 0.0 oz/week     0 Standard drinks or equivalent per week      Comment: rare, maybe 3 beers all year.     Drug use: No     Sexual activity: No     Other Topics Concern      Service Yes     [employer] for 3 years.     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      Stress Concern No     Weight Concern No     Special Diet Yes     cholesterol      Back Care No     Exercise Yes     3-4 times a week and rides alot of bike (900 miles)     Bike Helmet Yes     Seat Belt Yes     Self-Exams Yes     Social History Narrative       He is retired.     Current Outpatient Prescriptions   Medication     aspirin 81 MG tablet     atorvastatin (LIPITOR) 40 MG tablet     carvedilol (COREG) 25 MG tablet     clopidogrel (PLAVIX) 75 MG tablet     lisinopril (PRINIVIL,ZESTRIL) 5 MG tablet     mirtazapine (REMERON) 15 MG tablet     nitroglycerin (NITROSTAT) 0.4 MG SL tablet     Omega-3 Fatty Acids (FISH OIL) 1000 MG CPDR     No current facility-administered medications for this visit.      Allergies   Allergen Reactions     No Known Drug Allergies          Objective:  /73  Pulse 93  Ht 5' 9\" (1.753 m)  Wt 177 lb (80.3 kg)  BMI 26.14 kg/m2    General: Alert and in no distress    Head: Normocephalic, atraumatic  Eyes: no scleral icterus or conjunctival erythema   Oropharynx:  Mucous membranes moist  Skin: no erythema, petechiae, or jaundice  CV: regular rhythm by palpation, 2+ distal pulses  Resp: normal respiratory effort without conversational dyspnea   Psych: normal mood and affect    Gait: Non-antalgic, appropriate coordination and balance   Neuro: Motor strength and sensation as noted below    Musculoskeletal:    Low back and hip " exam:    Inspection:    Healed lumbar surgical scar, healed groin scar       normal skin       normal vascular       normal lymphatic       no asymmetry    Palpation:  -No tenderness to palpation over the lumbar spine, paraspinal muscles, or bilateral thighs    ROM: Full lumbar extension and rotation without pain.  Forward flexion and lateral flexion are decreased but not painful.  Hip internal rotation is decreased bilaterally but not painful.    Strength:  5/5 hip flexion/abduction/adduction, knee flexion/extension, ankle dorsiflexion/plantarflexion, great toe extension, toe flexion    Sensation:    grossly intact throughout lower extremities    Special tests: Negative log roll        Radiology:  No imaging today  Recent Results (from the past 744 hour(s))   US Lower Extremity Venous Duplex Left    Narrative    VENOUS ULTRASOUND LEFT LEG  7/12/2018 12:04 PM     HISTORY:  Pain of left lower extremity.    COMPARISON: None.    FINDINGS:  Examination of the deep veins with graded compression and  color flow Doppler with spectral wave form analysis shows no evidence  of thrombus in the left common femoral vein, femoral vein, popliteal  vein or calf veins. Left great saphenous vein was removed for bypass  surgery. Images obtained at the site of patient's pain show no focal  normality.    Right common femoral vein was evaluated for comparison and is normal.      Impression    IMPRESSION: No deep vein thrombosis in the left lower extremity.  Images obtained at the site of patient's pain show no focal  abnormality.    RAVI FREEDMAN,          Assessment:  1. Left thigh pain        Plan:  Discussed the assessment with the patient and developed a plan together:  -Pain likely related to muscular etiology versus radiculopathy pain arising from the lumbar spine.  -Physical therapy ordered in [place].  Please do 5-6 days of exercises per week between formal sessions and the home exercises they provide.  -Ice or heat 15-20  minutes as needed (Avoid sleeping on a heating pad or ice)  -Patient's preferred over the counter medications as directed on packaging as needed for pain or soreness.  Please take ibuprofen with food.  -Over the counter lidocaine cream as needed (i.e. Aspercreme or generic equivalent)  -Percocet for severe pain as previously prescribed.  Do not take prior to driving.  -Avoid aggravating activities.  -We also discussed MRI of the lumbar spine.    -Follow up in 4-6 weeks or sooner if symptoms fail to improve or worsen.  Please call with questions or concerns.        Neyda Alfaro MD, [facility] Sports Medicine  [facility]      Again, thank you for allowing me to participate in the care of your patient.        Sincerely,        Nhi Alfaro MD

## 2018-07-17 NOTE — PROGRESS NOTES
"Sports Medicine Clinic Visit    PCP: Kevin Vaughn    CC: Patient presents with:  Leg Pain: left      HPI:  Ulices Shah Jr. is a 83 year old male who is seen in consultation at the request of Dr. Tello.   He notes left anterior thigh and medial thigh pain that began a few days after a 10 mile bike ride. He notes he bikes on a regular basis and can go up to 30 miles per day. He did not have pain right away, it developed a few days after the bike ride so he is unsure if this is the cause of the pain. He notes the pain moves and is sometimes in the mid medial thigh and sometimes closer to the groin. He notes the pain is always worse in the morning. He reports the the pain goes down the medial thigh to the medial knee. He rates the pain at a 9/10 at its worst and a 4/10 currently.  Symptoms are relieved with Percocet (had the medication from a surgery 5 years ago). Symptoms are worsened by walking and first thing in the morning. He endorses pain.   He denies swelling, bruising, popping, grinding, catching, locking, instability, numbness and tingling.  Other treatment has included Tylenol without relief.       Of note, he had a lumbar hemilaminectomy and discectomy in 2013 for \"sciatica\" per Filiberto.  He has done well since then.        Review of Systems:  Musculoskeletal: as above  Remainder of review of systems is negative including constitutional, eyes, ENT, CV, pulmonary, GI, , endocrine, skin, hematologic, and neurologic except as noted in HPI or medical history.    History reviewed. No pertinent past surgical/medical/family/social history other than as mentioned in HPI.    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     History of heart bypass surgery     Atherosclerosis of coronary artery     Persistent insomnia     Elevated blood sugar     Heart murmur, systolic     Past Medical History:   Diagnosis Date     Unspecified essential hypertension      Past Surgical History:   Procedure Laterality Date     C APPENDECTOMY  1951     C EXPLOR HEART SURG WND W CP BYPASS  10/25/01    4 way heart bypass     COLONOSCOPY  12/10/08     EXCISE MASS HAND Right 02/2017    \"skin cancer\" removal     HC REMV CATARACT EXTRACAP,INSERT LENS  10/21/2004    left     HC REMV CATARACT EXTRACAP,INSERT LENS  11/18/2004    right     HC YAG LASER CAPSULOTOMY  06/18/09    right eye     HEART CATH, ANGIOPLASTY  4/30/12    3 stents     HEMILAMINECTOMY, DISCECTOMY LUMBAR THREE LEVELS, COMBINED  6/26/2013    Procedure: COMBINED HEMILAMINECTOMY, DISCECTOMY LUMBAR THREE LEVELS;  Bilateral L3, L4 and L5 Guille-Laminectomies;  Surgeon: Ollie Dodson MD;  Location: [facility] OR     Family History   Problem Relation Age of Onset     C.A.D. Mother      Alzheimer Disease Mother      Neurologic Disorder Mother 96     dementia     Cancer Father      colon     Cancer - colorectal Father      Social History     Social History     Marital status: Single     Spouse name: N/A     Number of children: 0     Years of education: 12     Occupational History     retired       Retired     Social History Main Topics     Smoking status: Never Smoker     Smokeless tobacco: Never Used     Alcohol use 0.0 oz/week     0 Standard drinks or equivalent per week      Comment: rare, maybe 3 beers all year.     Drug use: No     Sexual activity: No     Other Topics Concern      Service Yes     [employer] " "for 3 years.     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      Stress Concern No     Weight Concern No     Special Diet Yes     cholesterol      Back Care No     Exercise Yes     3-4 times a week and rides alot of bike (900 miles)     Bike Helmet Yes     Seat Belt Yes     Self-Exams Yes     Social History Narrative       He is retired.     Current Outpatient Prescriptions   Medication     aspirin 81 MG tablet     atorvastatin (LIPITOR) 40 MG tablet     carvedilol (COREG) 25 MG tablet     clopidogrel (PLAVIX) 75 MG tablet     lisinopril (PRINIVIL,ZESTRIL) 5 MG tablet     mirtazapine (REMERON) 15 MG tablet     nitroglycerin (NITROSTAT) 0.4 MG SL tablet     Omega-3 Fatty Acids (FISH OIL) 1000 MG CPDR     No current facility-administered medications for this visit.      Allergies   Allergen Reactions     No Known Drug Allergies          Objective:  /73  Pulse 93  Ht 5' 9\" (1.753 m)  Wt 177 lb (80.3 kg)  BMI 26.14 kg/m2    General: Alert and in no distress    Head: Normocephalic, atraumatic  Eyes: no scleral icterus or conjunctival erythema   Oropharynx:  Mucous membranes moist  Skin: no erythema, petechiae, or jaundice  CV: regular rhythm by palpation, 2+ distal pulses  Resp: normal respiratory effort without conversational dyspnea   Psych: normal mood and affect    Gait: Non-antalgic, appropriate coordination and balance   Neuro: Motor strength and sensation as noted below    Musculoskeletal:    Low back and hip exam:    Inspection:    Healed lumbar surgical scar, healed groin scar       normal skin       normal vascular       normal lymphatic       no asymmetry    Palpation:  -No tenderness to palpation over the lumbar spine, paraspinal muscles, or bilateral " thighs    ROM: Full lumbar extension and rotation without pain.  Forward flexion and lateral flexion are decreased but not painful.  Hip internal rotation is decreased bilaterally but not painful.    Strength:  5/5 hip flexion/abduction/adduction, knee flexion/extension, ankle dorsiflexion/plantarflexion, great toe extension, toe flexion    Sensation:    grossly intact throughout lower extremities    Special tests: Negative log roll        Radiology:  No imaging today  Recent Results (from the past 744 hour(s))   US Lower Extremity Venous Duplex Left    Narrative    VENOUS ULTRASOUND LEFT LEG  7/12/2018 12:04 PM     HISTORY:  Pain of left lower extremity.    COMPARISON: None.    FINDINGS:  Examination of the deep veins with graded compression and  color flow Doppler with spectral wave form analysis shows no evidence  of thrombus in the left common femoral vein, femoral vein, popliteal  vein or calf veins. Left great saphenous vein was removed for bypass  surgery. Images obtained at the site of patient's pain show no focal  normality.    Right common femoral vein was evaluated for comparison and is normal.      Impression    IMPRESSION: No deep vein thrombosis in the left lower extremity.  Images obtained at the site of patient's pain show no focal  abnormality.    RAVI FREEDMAN DO         Assessment:  1. Left thigh pain        Plan:  Discussed the assessment with the patient and developed a plan together:  -Pain likely related to muscular etiology versus radiculopathy pain arising from the lumbar spine.  -Physical therapy ordered in [place].  Please do 5-6 days of exercises per week between formal sessions and the home exercises they provide.  -Ice or heat 15-20 minutes as needed (Avoid sleeping on a heating pad or ice)  -Patient's preferred over the counter medications as directed on packaging as needed for pain or soreness.  Please take ibuprofen with food.  -Over the counter lidocaine cream as needed (i.e.  Aspercreme or generic equivalent)  -Percocet for severe pain as previously prescribed.  Do not take prior to driving.  -Avoid aggravating activities.  -We also discussed MRI of the lumbar spine.    -Follow up in 4-6 weeks or sooner if symptoms fail to improve or worsen.  Please call with questions or concerns.        Neyda Alfaro MD, CAQ Sports Medicine  [facility]

## 2018-07-17 NOTE — PATIENT INSTRUCTIONS
-Physical therapy ordered in [place].  Please do 5-6 days of exercises per week between formal sessions and the home exercises they provide.  -Ice or heat 15-20 minutes as needed (Avoid sleeping on a heating pad or ice)  -Patient's preferred over the counter medications as directed on packaging as needed for pain or soreness.  Please take ibuprofen with food.  -Over the counter lidocaine cream as needed (i.e. Aspercreme or generic equivalent)  -Percocet for severe pain as previously prescribed.  Do not take prior to driving.  -Avoid aggravating activities.  -We also discussed MRI of the lumbar spine.    -Follow up in 4-6 weeks or sooner if symptoms fail to improve or worsen.  Please call with questions or concerns.

## 2018-07-17 NOTE — MR AVS SNAPSHOT
After Visit Summary   7/17/2018    Ulices Shah Jr.    MRN: [id number]           Patient Information     Date Of Birth          1935        Visit Information        Provider Department      7/17/2018 8:00 AM Nhi Alfaro MD [facility]        Today's Diagnoses     Left thigh pain    -  1      Care Instructions    -{hysical therapy ordered in [place].  Please do 5-6 days of exercises per week between formal sessions and the home exercises they provide.  -Ice or heat 15-20 minutes as needed (Avoid sleeping on a heating pad or ice)  -Patient's preferred over the counter medications as directed on packaging as needed for pain or soreness.  Please take ibuprofen with food.  -Over the counter lidocaine cream as needed (i.e. Aspercreme or generic equivalent)  -Percocet for severe pain as previously prescribed.  Do not take prior to driving.  -Avoid aggravating activities.  -We also discussed MRI of the lumbar spine.    --Follow up in 4-6 weeks or sooner if symptoms fail to improve or worsen.  Please call with questions or concerns.                Follow-ups after your visit        Additional Services     PHYSICAL THERAPY REFERRAL       *This therapy referral will be filtered to a centralized scheduling office at [facility] and the patient will receive a call to schedule an appointment at a [place] location most convenient for them. *     [facility] provides Physical Therapy evaluation and treatment and many specialty services across the [place] system.  If requesting a specialty program, please choose from the list below.    If you have not heard from the scheduling office within 2 business days, please call [phone] for all locations, with the exception of [place], please call [phone] and [facility], please call [phone]  Treatment: Evaluation & Treatment  Special Instructions/Modalities: none  Special Programs:  "None    Please be aware that coverage of these services is subject to the terms and limitations of your health insurance plan.  Call member services at your health plan with any benefit or coverage questions.      **Note to Provider:  If you are referring outside of [facility] for the therapy appointment, please list the name of the location in the \"special instructions\" above, print the referral and give to the patient to schedule the appointment.                  Your next 10 appointments already scheduled     Jan 22, 2019 12:45 PM CST   Return Visit with Joceline Stone MD   [facility] ([facility])    [id number] [place] [id number] [phone]              Who to contact     If you have questions or need follow up information about today's clinic visit or your schedule please contact [facility] directly at [phone].  Normal or non-critical lab and imaging results will be communicated to you by MyChart, letter or phone within 4 business days after the clinic has received the results. If you do not hear from us within 7 days, please contact the clinic through MyChart or phone. If you have a critical or abnormal lab result, we will notify you by phone as soon as possible.  Submit refill requests through [employer] or call your pharmacy and they will forward the refill request to us. Please allow 3 business days for your refill to be completed.          Additional Information About Your Visit        Care EveryWhere ID     This is your Care EveryWhere ID. This could be used by other organizations to access your [facility] medical records  [id number]        Your Vitals Were     Pulse Height BMI (Body Mass Index)             93 5' 9\" (1.753 m) 26.14 kg/m2          Blood Pressure from Last 3 Encounters:   07/17/18 115/73   07/12/18 124/68   06/30/17 130/62    Weight from Last 3 Encounters:   07/17/18 177 lb (80.3 kg)   07/12/18 177 lb (80.3 kg)   06/30/17 " 184 lb (83.5 kg)              We Performed the Following     PHYSICAL THERAPY REFERRAL        Primary Care Provider Office Phone # Fax #    Kevin Brayden Vaughn [phone]       [address] [place] [address]        Equal Access to Services     KYLEDEE VIVI : Hadii mikie ku hadsaraio Soomaali, waaxda luqadaha, qaybta kaalmada adeegyada, waxay idiin haybernardon adewes figueroa lajudyjessika pawan. So [facility] [phone].    ATENCIÓN: Si habla español, tiene a payne disposición servicios gratuitos de asistencia lingüística. Llame al [phone].    We comply with applicable federal civil rights laws and [place] laws. We do not discriminate on the basis of race, color, national origin, age, disability, sex, sexual orientation, or gender identity.            Thank you!     Thank you for choosing [facility]  for your care. Our goal is always to provide you with excellent care. Hearing back from our patients is one way we can continue to improve our services. Please take a few minutes to complete the written survey that you may receive in the mail after your visit with us. Thank you!             Your Updated Medication List - Protect others around you: Learn how to safely use, store and throw away your medicines at www.disposemymeds.org.          This list is accurate as of 7/17/18  8:40 AM.  Always use your most recent med list.                   Brand Name Dispense Instructions for use Diagnosis    aspirin 81 MG tablet     100 tablet    Take 1 tablet (81 mg) by mouth daily    Chronic ischemic heart disease, unspecified, Hypertension goal BP (blood pressure) < 140/90, Hyperlipidemia LDL goal <100       atorvastatin 40 MG tablet    LIPITOR     Take 40 mg by mouth daily        clopidogrel 75 MG tablet    PLAVIX    90 tablet    Take 1 tablet by mouth daily.    Chronic ischemic heart disease, unspecified       COREG 25 MG tablet   Generic drug:  carvedilol      Take 25 mg by mouth 2 times daily (with  meals).        Fish Oil 1000 MG Cpdr      Take 1 capsule by mouth 2 times daily.        lisinopril 5 MG tablet    PRINIVIL/ZESTRIL     Take 2.5 mg by mouth 2 times daily        mirtazapine 15 MG tablet    REMERON    30 tablet    Take 1 tablet (15 mg) by mouth At Bedtime    Persistent insomnia       nitroGLYcerin 0.4 MG sublingual tablet    NITROSTAT    25 tablet    Place 1 tablet (0.4 mg) under the tongue every 5 minutes as needed    Chronic ischemic heart disease, unspecified

## 2018-07-19 ENCOUNTER — HOSPITAL ENCOUNTER (OUTPATIENT)
Dept: PHYSICAL THERAPY | Facility: CLINIC | Age: 83
Setting detail: THERAPIES SERIES
End: 2018-07-19
Attending: PHYSICAL MEDICINE & REHABILITATION
Payer: MEDICARE

## 2018-07-19 PROCEDURE — 97140 MANUAL THERAPY 1/> REGIONS: CPT | Mod: GP

## 2018-07-19 PROCEDURE — 97110 THERAPEUTIC EXERCISES: CPT | Mod: GP

## 2018-07-19 PROCEDURE — 97161 PT EVAL LOW COMPLEX 20 MIN: CPT | Mod: GP,CI

## 2018-07-19 PROCEDURE — G8979 MOBILITY GOAL STATUS: HCPCS | Mod: GP,CH

## 2018-07-19 PROCEDURE — 40000718 ZZHC STATISTIC PT DEPARTMENT ORTHO VISIT

## 2018-07-19 PROCEDURE — G8978 MOBILITY CURRENT STATUS: HCPCS | Mod: GP,CI

## 2018-07-19 NOTE — PROGRESS NOTES
07/19/18 1449   General Information   Type of Visit Initial OP Ortho PT Evaluation   Start of Care Date 07/19/18   Referring Physician Nhi Alfaro MD   Patient/Family Goals Statement decrease pain, get back to biking   Orders Evaluate and Treat   Date of Order 07/17/18   Insurance Type Medicare   Medical Diagnosis L thigh pain   Surgical/Medical history reviewed Yes   Precautions/Limitations no known precautions/limitations   Weight-Bearing Status - LUE full weight-bearing   Weight-Bearing Status - RUE full weight-bearing   Weight-Bearing Status - LLE full weight-bearing   Weight-Bearing Status - RLE full weight-bearing       Present No   Body Part(s)   Body Part(s) Hip   Presentation and Etiology   Pertinent history of current problem (include personal factors and/or comorbidities that impact the POC) Pt reports riding bike 4 weeks ago for 10 miles and felt fine but a few days later started to have pain down the L thigh anterior/medial as well as in groin. Pt reports has been resting no biking or going to the gym d/t pain. but continues to amb for [facility] min in halls at [facility] and 240 stairs a day. Pt reports has US performed to rule out blood clot and reports never got the results. Pt reports for the pas week pain has been better and barely notices it anymore. reports pain is worse in the morning and a few days ago was so bad had to take a percacet (from old spine sx 4 years ago) d/t pain was so bad but reorts only did that a few times and has not taken any medication for about 2 days   Impairments A. Pain;F. Decreased strength and endurance   Functional Limitations perform desired leisure / sports activities   Symptom Location L anterior/medial thigh and groin   How/Where did it occur (pt noticed after 10 mile bike ride a few days later)   Onset date of current episode/exacerbation 06/19/18   Chronicity New   Pain rating (0-10 point scale) Best (/10);Worst (/10)    Best (/10) 0/10   Worst (/10) 8/10   Pain quality C. Aching;A. Sharp   Frequency of pain/symptoms B. Intermittent   Pain/symptoms are: Worse in the morning   Pain/symptoms exacerbated by B. Walking;C. Lifting   Pain/symptoms eased by C. Rest;E. Changing positions   Progression of symptoms since onset: Improved   Prior Level of Function   Prior Level of Function-Mobility IND   Prior Level of Function-ADLs IND   Functional Level Prior Comment Pt very active bikes 10-30 miles almost every day   Current Level of Function   Current Community Support Family/friend caregiver   Patient role/employment history F. Retired   Living environment Apartment/condo   Home/community accessibility senior living facility -old high school transformed into apartments. has work out facility, pool and basketball court   Current equipment-Gait/Locomotion None   Current equipment-ADL None   Functional Scales   Functional Scales Other   Other Scales  LEFS (72/80)   Hip Objective Findings   Side (if bilateral, select both right and left) Left   Posture slightly kypohotic- hips aligned, forward head   Gait/Locomotion no major deficits noted   Hip Flexibility Comments Pt demonstrates tightness in hip joint capsule as well as hip flexors and HS   Neurological Testing Comments no neuro sigsn or symptoms   Palpation slighty tender at L3 with PA   Left Hip Flexion PROM decreased   Left Hip ER PROM decreased   Left Hip IR PROM decreased   Left Hip Flexion Strength 4/5   Left Hip Abduction Strength 5/5   Left Hip Extension Strength 4/5   Left Hip IR Strength 4/5   Left Hip ER Strength 4/5   Left Knee Flexion Strength 5/5   Left Knee Extension Strength 5/5   Left Hamstring Flexibility Passive SLR to 60 deg   Left Prone Quad Flexibility very limited 90 deg max with ASIS coming off table   Planned Therapy Interventions   Planned Therapy Interventions manual therapy;joint mobilization;strengthening;ROM;stretching;neuromuscular re-education   Planned  Modality Interventions   Planned Modality Interventions Cryotherapy;Hot packs;TENS  (if needed)   Clinical Impression   Criteria for Skilled Therapeutic Interventions Met yes, treatment indicated   PT Diagnosis L goirn/thigh pain   Influenced by the following impairments pain, decreased flexibility   Functional limitations due to impairments walking for prolonged time, sleeping   Clinical Presentation Evolving/Changing   Clinical Presentation Rationale Per pts pmhx and current medical and functional status   Clinical Decision Making (Complexity) Low complexity   Therapy Frequency 1 time/week   Predicted Duration of Therapy Intervention (days/wks) 30 days   Risk & Benefits of therapy have been explained Yes   Patient, Family & other staff in agreement with plan of care Yes   Education Assessment   Preferred Learning Style Reading;Demonstration;Listening   Barriers to Learning No barriers   ORTHO GOALS   PT Ortho Eval Goals 1;2   Ortho Goal 1   Goal Identifier pain   Goal Description Pt will be able to bike for 20 min with no hip pain   Target Date 08/16/18   Ortho Goal 2   Goal Identifier HEP   Goal Description Pt will be IND with stretching HEP for hips to decrease pain and improve mm function   Target Date 08/16/18   Total Evaluation Time   Total Evaluation Time 30   1x Eval Only-Outpatient/Observation Medicare G-Code   G-code Mobility: Walking & Moving Around   Mobility: Walking & Moving Around   Mobility: Current Status , Goal , Discharge -Pssc Only- Modifier the same for all G-codes CI: 1-19% impairment   Mobility: Current & Discharge Modifier Rationale-Eval Only per pts presentation and clinical judgement   Therapy Certification   Certification date from 07/19/18   Certification date to 08/16/18

## 2018-08-23 NOTE — ADDENDUM NOTE
Encounter addended by: Janeen Norris PT on: 8/23/2018  5:17 PM<BR>     Actions taken: Charge Capture section accepted

## 2019-01-21 NOTE — PROGRESS NOTES
Outpatient Physical Therapy Discharge Note     Patient: Ulices Shah Jr.  : 1935    Beginning/End Dates of Reporting Period:  2018 to 2019    Referring Provider: Nhi Alfaro MD    Therapy Diagnosis: L groin/thigh pain     Client Self Report: see eval for details    Objective Measurements:  See eval for details    Goals:  Goal Identifier pain   Goal Description Pt will be able to bike for 20 min with no hip pain   Target Date 18   Date Met      Progress:     Goal Identifier HEP   Goal Description Pt will be IND with stretching HEP for hips to decrease pain and improve mm function   Target Date 18   Date Met      Progress:     Progress Toward Goals:   Not assessed this period. Pt only seen for initial evaluation.    Plan:  Discharge from therapy.    Discharge:    Reason for Discharge: Patient has failed to schedule further appointments.    Equipment Issued: None    Discharge Plan: Patient to continue home program.    Thank you for your referral.    Lina Lopez, PT, DPT  [facility]  [phone]

## 2019-01-21 NOTE — ADDENDUM NOTE
Encounter addended by: Lina Lopez, PT on: 1/21/2019 3:21 PM   Actions taken: Episode resolved, Sign clinical note

## 2019-01-22 ENCOUNTER — OFFICE VISIT (OUTPATIENT)
Dept: DERMATOLOGY | Facility: CLINIC | Age: 84
End: 2019-01-22
Payer: MEDICARE

## 2019-01-22 DIAGNOSIS — Z85.828 HISTORY OF NONMELANOMA SKIN CANCER: Primary | ICD-10-CM

## 2019-01-22 DIAGNOSIS — D48.5 NEOPLASM OF UNCERTAIN BEHAVIOR OF SKIN: ICD-10-CM

## 2019-01-22 DIAGNOSIS — L57.0 ACTINIC KERATOSIS: ICD-10-CM

## 2019-01-22 PROCEDURE — 11104 PUNCH BX SKIN SINGLE LESION: CPT | Performed by: DERMATOLOGY

## 2019-01-22 PROCEDURE — 99213 OFFICE O/P EST LOW 20 MIN: CPT | Mod: 25 | Performed by: DERMATOLOGY

## 2019-01-22 PROCEDURE — 88305 TISSUE EXAM BY PATHOLOGIST: CPT | Mod: TC | Performed by: DERMATOLOGY

## 2019-01-22 PROCEDURE — 11103 TANGNTL BX SKIN EA SEP/ADDL: CPT | Performed by: DERMATOLOGY

## 2019-01-22 PROCEDURE — 17003 DESTRUCT PREMALG LES 2-14: CPT | Performed by: DERMATOLOGY

## 2019-01-22 PROCEDURE — 17000 DESTRUCT PREMALG LESION: CPT | Mod: 59 | Performed by: DERMATOLOGY

## 2019-01-22 NOTE — PROGRESS NOTES
[facility] Dermatology Note      Dermatology Problem List:  1. Family history of melanoma  2. NMSC  -SCC, right dorsal hand, s/p Mohs 3/2/2017  -SCC, left postauricular, s/p Mohs 9/3/2015  -BCC, superficial nodular, right mid cheek, s/p Mohs 9/4/14  -BCC, nodular pigmented, left nasolabial fold, s/p Mohs 9/4/14  -BCC, left superior helix, s/p Mohs 9/4/14  -Prior to 2005, history of BCC on the forehead, s/p excision  3. Actinic keratoses:   -s/p cryotherapy  -s/p Efudex cream     Encounter Date: Jan 22, 2019    CC:  Chief Complaint   Patient presents with     Derm Problem     skin check- lip and forearm       History of Present Illness:  Mr. Ulices Shah Jr. is a 83 year old male who presents as a follow-up for history of NMSC. He was last seen in dermatology on 1/18/18 at which time he had multiple AKs treated with cryotherapy and started Efudex. Today, the patient reports that he used the Efudex cream. It seemed to help him clear out a number of of the rough spots on his face and hands. However, there are lesions on the chin and the right forearm that concern him. The latter grew fairly quickly, he says. Additionally, there is a spot on the left hand he would like checked. Otherwise, the patient denies any new, bleeding, crusting, or changing spots.     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      "History of heart bypass surgery     Atherosclerosis of coronary artery     Persistent insomnia     Elevated blood sugar     Heart murmur, systolic     Past Medical History:   Diagnosis Date     Unspecified essential hypertension      Past Surgical History:   Procedure Laterality Date     C APPENDECTOMY  1949     C EXPLOR HEART SURG WND W CP BYPASS  10/25/01    4 way heart bypass     COLONOSCOPY  12/10/08     EXCISE MASS HAND Right 02/2017    \"skin cancer\" removal     HC REMV CATARACT EXTRACAP,INSERT LENS  10/21/2004    left     HC REMV CATARACT EXTRACAP,INSERT LENS  11/18/2004    right     HC YAG LASER CAPSULOTOMY  06/18/09    right eye     HEART CATH, ANGIOPLASTY  4/30/12    3 stents     HEMILAMINECTOMY, DISCECTOMY LUMBAR THREE LEVELS, COMBINED  6/26/2013    Procedure: COMBINED HEMILAMINECTOMY, DISCECTOMY LUMBAR THREE LEVELS;  Bilateral L3, L4 and L5 Guille-Laminectomies;  Surgeon: Ollie Dodson MD;  Location: [facility] OR     Social History:  Reviewed and unchanged but kept in chart for clinician convenience  The patient is retired. He denies use of tanning beds. He has never been .  He has no children. He lives in [place]. Has a girlfriend    Family History:  Kept in chart for clinician convenience  The patient reports a family history of melanoma in his father.  The patient denies family history of asthma, psoriasis, eczema or seasonal allergies.    Medications:  Current Outpatient Medications   Medication Sig Dispense Refill     aspirin 81 MG tablet Take 1 tablet (81 mg) by mouth daily 100 tablet 0     atorvastatin (LIPITOR) 40 MG tablet Take 40 mg by mouth daily       carvedilol (COREG) 25 MG tablet Take 25 mg by mouth 2 times daily (with meals).       clopidogrel (PLAVIX) 75 MG tablet Take 1 tablet by mouth daily. 90 tablet 3     lisinopril (PRINIVIL,ZESTRIL) 5 MG tablet Take 2.5 mg by mouth 2 times daily        mirtazapine (REMERON) 15 MG tablet Take 1 tablet (15 mg) by mouth At Bedtime 30 tablet 5 "     nitroglycerin (NITROSTAT) 0.4 MG SL tablet Place 1 tablet (0.4 mg) under the tongue every 5 minutes as needed 25 tablet 1     Omega-3 Fatty Acids (FISH OIL) 1000 MG CPDR Take 1 capsule by mouth 2 times daily.       Allergies   Allergen Reactions     No Known Drug Allergies      Review of Systems:      -Const: The patient is generally feeling well today.     Physical exam:  There were no vitals taken for this visit.  GEN: This is a well-nourished, well developed male in no acute distress  SKIN: Total skin excluding the undergarment areas was performed. The exam included the head/face, neck, both arms, chest, back, abdomen, both legs, digits and/or nails. Declines genital exam.   - 5 mm non healing papule with crust and telangiectasias superiorly on the right lower cutaneous lip  - 7 mm keratotic papule on the right forearm  - 8 mm keratotic papule on the left dorsal hand  - Erythematous macule with overlying adherent scale on the left mid cheek  - There are erythematous macules with overyling adherent scale on the left temple, right lateral infraorbital, left superior helix, crown x3, left dorsal wrist  -No other lesions of concern on areas examined.     Impression/Plan:  1. Family history of melanoma    2. History of nonmelanoma skin cancer, no clincial evidence of recurrence:  Sun precaution was advised including the use of sun screens of SPF 30 or higher, and sun protective clothing.    3. Actinic keratoses: left temple, right lateral infraorbital, left superior helix, left mid cheek (within biopsy scar), crown x3  Cryotherapy procedure note: After verbal consent and discussion of risks and benefits including but no limited to dyspigmentation/scar, blister, and pain, 7 was(were) treated with 1-2mm freeze border for 2 cycles with liquid nitrogen. Post cryotherapy instructions were provided.     4. 5 mm non healing papule with crust and telangiectasias superiorly on the right lower cutaneous lip. NUB.  Differential includes BCC vs other.  Punch biopsy:  After discussion of benefits and risks including but not limited to bleeding/bruising, pain/swelling, infection, scar, incomplete removal, nerve damage/numbness, recurrence, and non-diagnostic biopsy, written consent, verbal consent and photographs were obtained. Time-out was performed. The area was cleaned with isopropyl alcohol. 0.5mL of 1% lidocaine with epinephrine was injected to obtain adequate anesthesia of the lesion on the right lower cutaneous lip. A 2 mm punch biopsy was performed.  6-0 prolene sutures were utilized to approximate the epidermal edges.  White petroleum jelly/VaselineTM and a bandage was applied to the wound.  Explicit verbal and written wound care instructions were provided.  The patient left the Dermatology Clinic in good condition. The patient was counseled to follow up for suture removal in approximately 5-7 days.    5.  7 mm keratotic papule on the right forearm. NUB. Differential includes SCC vs AK.     Shave biopsy:  After discussion of benefits and risks including but not limited to bleeding/bruising, pain/swelling, infection, scar, incomplete removal, nerve damage/numbness, recurrence, and non-diagnostic biopsy, written consent, verbal consent and photographs were obtained. Time-out was performed. The area was cleaned with isopropyl alcohol. 0.5mL of 1% lidocaine with epinephrine was injected to obtain adequate anesthesia of the lesion on the right forearm. A shave biopsy was performed. Hemostasis was achieved with aluminium chloride. Vaseline and a sterile dressing were applied. The patient tolerated the procedure and no complications were noted. The patient was provided with verbal and written post care instructions.     6. 8 mm keratotic papule on the left dorsal hand. NUB. Differential includes SCC vs other.    Shave biopsy:  After discussion of benefits and risks including but not limited to bleeding/bruising, pain/swelling,  infection, scar, incomplete removal, nerve damage/numbness, recurrence, and non-diagnostic biopsy, written consent, verbal consent and photographs were obtained. Time-out was performed. The area was cleaned with isopropyl alcohol. 0.5mL of 1% lidocaine with epinephrine was injected to obtain adequate anesthesia of the lesion on the left dorsal hand. A shave biopsy was performed. Hemostasis was achieved with aluminium chloride. Vaseline and a sterile dressing were applied. The patient tolerated the procedure and no complications were noted. The patient was provided with verbal and written post care instructions.     Follow up in 6 months.     Staff Involved:  Scribe/Staff    Scribe Disclosure  I, Marvel Can, am serving as a scribe to document services personally performed by Dr. Joceline Stone MD, based on data collection and the provider's statements to me.     Provider Disclosure:   The documentation recorded by the scribe accurately reflects the services I personally performed and the decisions made by me.    Joceline Stone MD    Department of Dermatology  [facility]: Phone: [phone], Fax:[phone]  [facility]: Phone: [phone], Fax: [phone]

## 2019-01-22 NOTE — NURSING NOTE
Ulices Shah Jr.'s goals for this visit include:   Chief Complaint   Patient presents with     Derm Problem     skin check- lip and forearm     He requests these members of his care team be copied on today's visit information: yes    PCP: Kevin Vaughn    Referring Provider:  No referring provider defined for this encounter.    There were no vitals taken for this visit.    Do you need any medication refills at today's visit? No     Amorrae BASILIO Mosley

## 2019-01-22 NOTE — PATIENT INSTRUCTIONS
Wound Care After a Biopsy    What is a skin biopsy?  A skin biopsy allows the doctor to examine a very small piece of tissue under the microscope to determine the diagnosis and the best treatment for the skin condition. A local anesthetic (numbing medicine)  is injected with a very small needle into the skin area to be tested. A small piece of skin is taken from the area. Sometimes a suture (stitch) is used.     What are the risks of a skin biopsy?  I will experience scar, bleeding, swelling, pain, crusting and redness. I may experience incomplete removal or recurrence. Risks of this procedure are excessive bleeding, bruising, infection, nerve damage, numbness, thick (hypertrophic or keloidal) scar and non-diagnostic biopsy.    How should I care for my wound for the first 24 hours?    Keep the wound dry and covered for 24 hours    If it bleeds, hold direct pressure on the area for 15 minutes. If bleeding does not stop then go to the emergency room    Avoid strenuous exercise the first 1-2 days or as your doctor instructs you    How should I care for the wound after 24 hours?    After 24 hours, remove the bandage    You may bathe or shower as normal    If you had a scalp biopsy, you can shampoo as usual and can use shower water to clean the biopsy site daily    Clean the wound twice a day with gentle soap and water    Do not scrub, be gentle    Apply white petroleum/Vaseline after cleaning the wound with a cotton swab or a clean finger, and keep the site covered with a Bandaid /bandage. Bandages are not necessary with a scalp biopsy    If you are unable to cover the site with a Bandaid /bandage, re-apply ointment 2-3 times a day to keep the site moist. Moisture will help with healing    Avoid strenuous activity for first 1-2 days    Avoid lakes, rivers, pools, and oceans until the stitches are removed or the site is healed    How do I clean my wound?    Wash hands thoroughly with soap or use hand  before all  wound care    Clean the wound with gentle soap and water    Apply white petroleum/Vaseline  to wound after it is clean    Replace the Bandaid /bandage to keep the wound covered for the first few days or as instructed by your doctor    If you had a scalp biopsy, warm shower water to the area on a daily basis should suffice    What should I use to clean my wound?     Cotton-tipped applicators (Qtips )    White petroleum jelly (Vaseline ). Use a clean new container and use Q-tips to apply.    Bandaids   as needed    Gentle soap     How should I care for my wound long term?    Do not get your wound dirty    Keep up with wound care for one week or until the area is healed.    A small scab will form and fall off by itself when the area is completely healed. The area will be red and will become pink in color as it heals. Sun protection is very important for how your scar will turn out. Sunscreen with an SPF 30 or greater is recommended once the area is healed.    If you have stitches, stitches need to be removed in 5-7 days. You may return to our clinic for this or you may have it done locally at your doctor s office.    You should have some soreness but it should be mild and slowly go away over several days. Talk to your doctor about using tylenol for pain,    When should I call my doctor?  If you have increased:     Pain or swelling    Pus or drainage (clear or slightly yellow drainage is ok)    Temperature over 100F    Spreading redness or warmth around wound    When will I hear about my results?  The biopsy results can take 2-3 weeks to come back. The clinic will call you with the results, send you a HERMEL DELOR message, or have you schedule a follow-up clinic or phone time to discuss the results. Contact our clinics if you do not hear from us in 3 weeks.     Who should I call with questions?    [facility]: [phone]     [facility]: [phone]    For  urgent needs outside of business hours call the [facility] at [phone] and ask for the dermatology resident on call    Cryotherapy    What is it?    Use of a very cold liquid, such as liquid nitrogen, to freeze and destroy abnormal skin cells that need to be removed    What should I expect?    Tenderness and redness    A small blister that might grow and fill with dark purple blood. There may be crusting.    More than one treatment may be needed if the lesions do not go away.    How do I care for the treated area?    Gently wash the area with your hands when bathing.    Use a thin layer of Vaseline to help with healing. You may use a Band-Aid.     The area should heal within 7-10 days and may leave behind a pink or lighter color.     Do not use an antibiotic or Neosporin ointment.     You may take acetaminophen (Tylenol) for pain.     Call your Doctor if you have:    Severe pain    Signs of infection (warmth, redness, cloudy yellow drainage, and or a bad smell)    Questions or concerns    Who should I call with questions?       [facility]: [phone]       [facility]: [phone]       For urgent needs outside of business hours call the [facility] at [phone]        and ask for the dermatology resident on call

## 2019-01-25 LAB — COPATH REPORT: NORMAL

## 2019-01-28 ENCOUNTER — ALLIED HEALTH/NURSE VISIT (OUTPATIENT)
Dept: NURSING | Facility: CLINIC | Age: 84
End: 2019-01-28
Payer: MEDICARE

## 2019-01-28 DIAGNOSIS — Z48.02 VISIT FOR SUTURE REMOVAL: Primary | ICD-10-CM

## 2019-01-28 PROCEDURE — 99207 ZZC NO CHARGE NURSE ONLY: CPT

## 2019-01-28 NOTE — NURSING NOTE
Ulices Shah Jr. comes into clinic today at the request of Dr. Stone  Ordering Provider for suture removal.      Dermatology Suture Removal Record  S: Ulices presents to the clinic today for  removal of suture.  The patient has had the suture in place for 6 days.    There has been no history of infection or drainage.    O: 1 suture was seen located on the right lower lip.  The wound is healing well with no signs of infection.    A: Suture removal.    P:  The suture was easily removed today.  Vaseline applied. Routine wound care discussed.  The patient will follow up as needed.    This service provided today was under the supervising provider of the day Dr. Jones, who was available if needed.    Shyla Wetzel RN

## 2019-01-29 ENCOUNTER — TELEPHONE (OUTPATIENT)
Dept: DERMATOLOGY | Facility: CLINIC | Age: 84
End: 2019-01-29

## 2019-01-29 NOTE — TELEPHONE ENCOUNTER
[facility] Dermatologic Surgery Pre-Surgery Screening Note     Pre-screening Information:  Derm Surgery Pre-Surgery Screening 1/29/2019   Transplant No   Immunocompromised No   Hepatitis No   HIV/AIDS No   Bleeding Disorder(s) No   Pacemaker No   Defibrillator No   Brain/Nerve Stimulator No   Patient wears CPAP mask No   Prosthetic Heart Valve No   Lesion on Leg/Groin No   Prophylactic Antibiotic Needed No   Hx of Skin Cancer Yes   Hx of Mohs Surgery Yes   Current Tobacco Use No   Current Anticoagulant(s) Aspirin;Plavix       Medications/Allergies  Medications and allergies review with patient: Yes     Current Outpatient Medications   Medication Sig Dispense Refill     aspirin 81 MG tablet Take 1 tablet (81 mg) by mouth daily 100 tablet 0     atorvastatin (LIPITOR) 40 MG tablet Take 40 mg by mouth daily       carvedilol (COREG) 25 MG tablet Take 25 mg by mouth 2 times daily (with meals).       clopidogrel (PLAVIX) 75 MG tablet Take 1 tablet by mouth daily. 90 tablet 3     lisinopril (PRINIVIL,ZESTRIL) 5 MG tablet Take 2.5 mg by mouth 2 times daily        mirtazapine (REMERON) 15 MG tablet Take 1 tablet (15 mg) by mouth At Bedtime 30 tablet 5     nitroglycerin (NITROSTAT) 0.4 MG SL tablet Place 1 tablet (0.4 mg) under the tongue every 5 minutes as needed 25 tablet 1     Omega-3 Fatty Acids (FISH OIL) 1000 MG CPDR Take 1 capsule by mouth 2 times daily.       Allergies   Allergen Reactions     No Known Drug Allergies        Pertinent Labs:  Last Creatine:   Creatinine   Date Value Ref Range Status   03/22/2017 0.90 0.66 - 1.25 mg/dL Final     Last INR:   INR   Date Value Ref Range Status   08/10/2005 0.96 0.86 - 1.14 Final        Other Questions:     needed? No    Do you have any mobility issues: No    Do you use any assistive devices/DME? No    Do you have any issues with lying for long periods of time? No    Do you have any other health issue that we should know about: no    Reminded patient  to take any order prophylactic antibiotics 1 hour prior to appointment: N/A    If on a prescribed anticoagulant, advised patient to continue or check with prescribing provider: Yes    If on an OTC anticoagulant/supplement, advised patient to hold these medications 10-14 days prior to surgery and resume 48 hrs after surgery: Yes    Please be aware that this can be an all day procedure. Please bring your daily medications and food/cash. Encourage patient to eat prior to procedure(s). After care instructions were reviewed with patient.    Shyla Wetzel RN

## 2019-01-29 NOTE — TELEPHONE ENCOUNTER
Results reviewed with Filiberto.  He verbalized understanding and agreed with the plan.  Surgery scheduled for 2/11/18.    Notes recorded by Audie Jones MD on 1/28/2019 at 3:44 PM CST  I recommend Mohs for A and C (lip and hand).   I recommend ED&C for B (right forearm).   Ok to schedule after telephone screening if he is comfortable.   Ok to schedule all 3 on same day. Thank you.  ------    Notes recorded by Cristal Mosley MA on 1/25/2019 at 12:29 PM CST  Sent to [facility] to Review.     Cristal Mosley CMA     ------    Notes recorded by Joceline Stone MD on 1/25/2019 at 12:15 PM CST  A. Is bcc for mohs  B. Is SCC for excision  C. Is on hand, Can be mohs or excision, 8mm- defer to heriberto if he wants to mohs this site or excise it   Dermatological path order and indications   Order: [id number]   Status:  Final result   Visible to patient:  No (Not Released) Dx:  Neoplasm of uncertain behavior of skin   Component 7d ago   Copath Report Patient Name: GENA CABELLO   MR#: [id number]   Specimen #:    Collected: 1/22/2019   Received: 1/23/2019   Reported: 1/25/2019 10:42   Ordering Phy(s): JOCELINE STONE     For improved result formatting, select 'View Enhanced Report Format' under    Linked Documents section.     SPECIMEN(S):   A: Right lower cutaneous lip   B: Right forearm   C: Left dorsal hand     FINAL DIAGNOSIS:   A. Right lower cutaneous lip:   - Basal cell carcinoma, nodular and micronodular types, extending to the   lateral margin - (see description)     B. 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 Left dorsal hand:   - Squamous cell carcinoma, at least in situ - (see comment)               Shyla Wetzel RN

## 2019-01-29 NOTE — LETTER
"January 29, 2019        Ulices Shah Jr.  [address] [place] [address]   [facility] [id number]        Dear Ulices,    You have an upcoming appointment with Dr. Jones for Mohs surgery. It is scheduled for 2/11/19 at 8:00 AM. Please check-in 15 minutes prior to your appointment at check-in desk \"D\" on the 2nd floor. Our address is [address] [place] [address].  Please review these important reminders:    1) Expect to be here the majority of the morning.  The Mohs surgical procedure can be an extensive surgery requiring multiple stages. Each stage may take 1-2 hours.     2) You may eat breakfast. You may bring snacks or beverages with you.  3) Take all normal medications morning of surgery -- unless otherwise indicated by your physician.     4) You will need a  to be with you if your surgical site is close to your eyes. You can get swelling/bruising immediately after surgery and will go home with a big bulky bandage that could obstruct your view of driving safely.     5) Wear comfortable clothing -- preferably a button down shirt or a loose fitted shirt (to avoid pulling over pressure bandage off when you get home). If your surgery site is on your leg, try wearing loose fitted pants. If your surgery site is on your arm, try wearing a short-sleeve shirt.     6) Bring reading material or other items to help pass time. We do have internet access available if you own a laptop, iPad, etc.    7) Women - do NOT wear make-up. We will be washing it off anyone needing surgery on the face     8) Do NOT stop blood thinning medication unless instructed to do so by your surgeon. This will include: Warfarin, Coumadin, Eliquis, Jantoven, Aspirin, Plavix and Pradaxa. If you are taking Warfarin or Coumadin, please have your INR blood test results sent to our office no more than 7 days prior to your surgery.     9) Tylenol is a good alternative to take for headaches and pain, and can be taken throughout your surgery and postoperative " recovery.    If you need to reschedule or have any questions or concerns, please call me at [phone].    Sincerely,      Shyla Wetzel RN

## 2019-02-11 ENCOUNTER — OFFICE VISIT (OUTPATIENT)
Dept: DERMATOLOGY | Facility: CLINIC | Age: 84
End: 2019-02-11
Payer: MEDICARE

## 2019-02-11 VITALS — DIASTOLIC BLOOD PRESSURE: 71 MMHG | SYSTOLIC BLOOD PRESSURE: 132 MMHG | HEART RATE: 61 BPM

## 2019-02-11 DIAGNOSIS — D04.62 SQUAMOUS CELL CARCINOMA IN SITU (SCCIS) OF DORSUM OF LEFT HAND: ICD-10-CM

## 2019-02-11 DIAGNOSIS — D04.61 SQUAMOUS CELL CARCINOMA IN SITU OF SKIN OF RIGHT FOREARM: ICD-10-CM

## 2019-02-11 DIAGNOSIS — C44.01: Primary | ICD-10-CM

## 2019-02-11 PROCEDURE — 13151 CMPLX RPR E/N/E/L 1.1-2.5 CM: CPT | Mod: 59 | Performed by: DERMATOLOGY

## 2019-02-11 PROCEDURE — 17261 DSTRJ MAL LES T/A/L .6-1.0CM: CPT | Mod: 59 | Performed by: DERMATOLOGY

## 2019-02-11 PROCEDURE — 17311 MOHS 1 STAGE H/N/HF/G: CPT | Performed by: DERMATOLOGY

## 2019-02-11 PROCEDURE — 13132 CMPLX RPR F/C/C/M/N/AX/G/H/F: CPT | Mod: 59 | Performed by: DERMATOLOGY

## 2019-02-11 RX ORDER — MUPIROCIN 20 MG/G
OINTMENT TOPICAL
Qty: 22 G | Refills: 0 | Status: SHIPPED | OUTPATIENT
Start: 2019-02-11 | End: 2019-02-18

## 2019-02-11 ASSESSMENT — PAIN SCALES - GENERAL: PAINLEVEL: NO PAIN (0)

## 2019-02-11 NOTE — PROGRESS NOTES
Mupirocin and pressure dressing applied to Mohs site on Right lower cutaneous lip and left dorsal hand and ed and c on right forearm.  Wound care instructions reviewed with patient and AVS provided.  Patient verbalized understanding.  Patient will follow up in 6 month skin check.  No further questions or concerns at this time.    Cristal Mosley CMA

## 2019-02-11 NOTE — LETTER
2/11/2019         RE: Ulices Shah Jr.  [address]  [facility] [address]        Dear Colleague,    Thank you for referring your patient, Ulices Shah Jr., to the [facility]. Please see a copy of my visit note below.    Mupirocin and pressure dressing applied to Mohs site on Right lower cutaneous lip and left dorsal hand and ed and c on right forearm.  Wound care instructions reviewed with patient and AVS provided.  Patient verbalized understanding.  Patient will follow up in 6 month skin check.  No further questions or concerns at this time.    Cristal Mosley CMA          [facility] Mohs Dermatologic Surgery Procedure Note      Date of Service:  Feb 11, 2019  Surgery: Mohs micrographic surgery    Case 1  Tumor Type: SCCIS - Squamous cell carcinoma in situ  Location: Left dorsal hand  Derm-Path Accession #:   PreOp Size: 0.8 X 0.7 cm  PostOp Size: 1.0 x 1.1 cm  Mohs Accession #: [id number]  Level of Defect: fascia  Repair Type: complex  Repair Size: 2.9 cm  Suture Material: Fast Absorbing Gut 5-0      Procedure:  We discussed the principles of treatment and most likely complications including scarring, bleeding, infection, swelling, pain, crusting, nerve damage, large wound,  incomplete excision, wound dehiscence,  nerve damage, recurrence, and a second procedure may be recommended to obtain the best cosmetic or functional result.    Informed consent was obtained and the patient underwent the procedure as follows:  The patient was placed supine on the operating table.  The cancer was identified, outlined with a marker, and verified by the patient.  The entire surgical field was prepped with Hibiclens.  The surgical site was anesthetized using Lidocaine 1%.    The area of clinically apparent tumor was debulked. The layer of tissue was then surgically excised using a #15 blade and was then transferred onto a specimen sheet maintaining the orientation of  the specimen. Hemostasis was obtained using bipolar electrocoagulation. The wound site was then covered with a dressing while the tissue samples were processed for examination.    The excised tissue was transported to the Mohs histology laboratory maintaining the tissue orientation.  The tissue specimen was relaxed so that the entire surgical margin was in a a single horizontal plane for sectioning and inked for precise mapping.  A precise reference map was drawn to reflect the sectioning of the specimen, colored inking of the margins, and orientation on the patient. The tissue was processed using horizontal sectioning of the base and continuous peripheral margins.  The histopathologic sections were reviewed in conjunction with the reference map.    Total blocks: 1    Total slides:  2    There were no cancer cells visualized on examination, therefore Mohs surgery was complete.    RECONSTRUCTION: COMPLEX CLOSURE    Primary Surgeon: Robert   Assistant Surgeon: None    Case(s) Specific Information:    CASE 1    The patient was taken to the operative suite and placed in a supine position on the operating room table. The defect was identified.  Appropriate markings were made with a marking pen to plan the repair.  The area was infiltrated with Lidocaine 1% 3.0ml and prepped with Hibiclens and draped with sterile towels.     The wound was debeveled and undermined widely. Cones were excised with relaxed skin tension lines on both sides of the defect. Hemostasis was obtained using bipolar electrocoagulation. The wound was narrowed with a plication suture and the dermis and subcutaneous tissue were then approximated using buried vertical mattress sutures.  The wound edges were then approximated and additional buried sutures were placed in a similar fashion where needed.  Percutaneous simple running sutures were carefully placed for maximum eversion and meticulous approximation.    Repair Size: 2.9 cm  Sutures Used:  Deep:  monocryl 5-0 Superficial: Fast Absorbing Gut 5-0   Anesthesia Visit Total (ml): 8.5 ml    The wound was cleansed with saline and ointment was applied along the wound surface.     A sterile pressure dressing was applied.  Wound care instructions were given verbally and in writing. The patient left the operating suite in stable condition.  Patient was informed that additional refinement of the resulting surgical scar may be used as a second stage of this reconstruction.     The Attending surgeon was present for the entire procedure and always immediately available.              [facility] Dermatologic Surgery Procedure Note    Date of Service:  Feb 11, 2019  Surgery: Mohs micrographic surgery    Case 2  Tumor Type: BCC - Basal cell carcinoma  Location: Right lower cutaneous lip  Derm-Path Accession #:   PreOp Size: 0.9 x 0.5 cm  PostOp Size: 1.2 x 0.9 cm  Mohs Accession #: [id number]  Level of Defect: fascia  Repair Type: complex  Repair Size: 2.2 cm  Suture Material: Fast Absorbing Gut 5-0      Procedure:  We discussed the principles of treatment and most likely complications including scarring, bleeding, infection, swelling, pain, crusting, nerve damage, large wound,  incomplete excision, wound dehiscence,  nerve damage, recurrence, and a second procedure may be recommended to obtain the best cosmetic or functional result.    Informed consent was obtained and the patient underwent the procedure as follows:  The patient was placed supine on the operating table.  The cancer was identified, outlined with a marker, and verified by the patient.  The entire surgical field was prepped with Hibiclens.  The surgical site was anesthetized using Lidocaine 1%.      The area of clinically apparent tumor was debulked. The layer of tissue was then surgically excised using a #15 blade and was then transferred onto a specimen sheet maintaining the orientation of the specimen. Hemostasis was obtained  using bipolar electrocoagulation. The wound site was then covered with a dressing while the tissue samples were processed for examination.    The excised tissue was transported to the Mohs histology laboratory maintaining the tissue orientation.  The tissue specimen was relaxed so that the entire surgical margin was in a a single horizontal plane for sectioning and inked for precise mapping.  A precise reference map was drawn to reflect the sectioning of the specimen, colored inking of the margins, and orientation on the patient.  The tissue was processed using horizontal sectioning of the base and continuous peripheral margins.  The histopathologic sections were reviewed in conjunction with the reference map.    Total blocks: 1    Total slides:  2    There were no cancer cells visualized on examination, therefore Mohs surgery was complete.    RECONSTRUCTION: COMPLEX CLOSURE    Primary Surgeon: Robert   Assistant Surgeon: None    Case(s) Specific Information:    CASE 2    The patient was taken to the operative suite and placed in a  supine position on the operating room table. The defect was identified.  Appropriate markings were made with a marking pen to plan the repair.  The area was infiltrated with  Lidocaine 1% 1.0ml and prepped with  Hibiclens and draped with sterile towels.     The wound was debeveled and undermined widely. Cones were excised with relaxed skin tension lines on both sides of the defect. Hemostasis was obtained using bipolar electrocoagulation. The wound was narrowed with deep plication sutures and the dermis and subcutaneous tissue were then approximated using buried vertical mattress sutures.  The wound edges were then approximated and additional buried sutures were placed in a similar fashion where needed. Percutaneous simple running sutures were carefully placed for maximum eversion and meticulous approximation.    Repair Size: 2.2cm  Sutures Used:  Deep: monocryl 5-0 Superficial: Fast  Absorbing Gut 5-0   Anesthesia Visit Total (ml): 8.5 ml    The wound was cleansed with saline and ointment was applied along the wound surface.     A sterile pressure dressing was applied.  Wound care instructions were given verbally and in writing.  The patient left the operating suite in stable condition.  Patient was informed that additional refinement of the resulting surgical scar may be used as a second stage of this reconstruction.     The Attending surgeon was present for the entire procedure and always immediately available.              Dermatology Procedure Note: Electrodesiccation and Curettage    PREOPERATIVE DIAGNOSIS: Squamous cell carcinoma in situ    POSTOPERATIVE DIAGNOSIS: same    LOCATION: right forearm    SIZE: 0.8 x 0.6 cm     Treatment options including electrodessiccation and curettage (ED and C), excision and topicals were reviewed.  The expected cure rates, healing times and anticipated scars of each option were discussed and the patient elects to proceed with ED and C.     The risks and benefits of the procedure were described to the patient.  These include but are not limited to bleeding, infection, scar, incomplete removal, and recurrence. Written informed consent was obtained. Time-out was performed. The above site was cleansed with and injected with 2.5 mL 1% lidocaine with epinephrine. Once anesthesia was obtained, the site was prepped with Chlorhexidine and rinsed with sterile saline. The lesion was curetted with a 3mm curette in 3 directions with a 3 mm margin and this was followed by electrodessication.  This process was repeated three times. The defect measured 1.4 x 1.2 cm. Vaseline and a bandage were applied to the wound. The patient tolerated the procedure well and was given post care instructions.    Follow up for wound check as needed.       Staff Involved:    Scribe Disclosure  I, Bhanu Sweeney am serving as a scribe to document services personally performed by   Audie Jones DO, based on data collection and the provider's statements to me.     Provider Disclosure:   The documentation recorded by the scribe accurately reflects the services I personally performed and the decisions made by me.  I personally performed the procedures today.    Audie Jones DO    Department of Dermatology  [facility]: Phone: [phone], Fax:[phone]  [facility]: Phone: [phone], Fax: [phone]    Again, thank you for allowing me to participate in the care of your patient.        Sincerely,        Audie Jones MD

## 2019-02-11 NOTE — PATIENT INSTRUCTIONS
Mohs Wound Care Instructions  I will experience scar, altered skin color, bleeding, swelling, pain, crusting and redness. I may experience altered sensation. Risks are excessive bleeding, infection, muscle weakness, thick (hypertrophic or keloidal) scar, and recurrence,. A second procedure may be recommended to obtain the best cosmetic or functional result.  Possible complications of any surgical procedure are bleeding, infection, scarring, alteration in skin color and sensation, muscle weakness in the area, wound dehiscence or seperation, or recurrence of the lesion or disease. On occasion, after healing, a secondary procedure or revision may be recommended in order to obtain the best cosmetic or functional result.   After your surgery, a pressure bandage will be placed over the area that has sutures. This will help prevent bleeding. Please follow these instructions, as they will help you to prevent complications as your wound heals.  For the First 48 hours After Surgery:  1. Leave the pressure bandage on and keep it dry. If it should come loose, you may retape it, but do not take it off.  2. Relax and take it easy. Do not do any vigorous exercise, heavy lifting, or bending forward. This could cause the wound to bleed.  3. Post-operative pain is usually mild. You may take plain or extra strength Tylenol every 4 hours as needed (do not take more than 4,000mg in one day). Do not take any medicine that contains aspirin, ibuprofen or motrin unless you have been recommended these by a doctor.  Avoid alcohol and vitamin E as these may increase your tendency to bleed.  4. You may put an ice pack around the bandaged area for 20 minutes every 2-3 hours. This may help reduce swelling, bruising, and pain. Make sure the ice pack is waterproof so that the pressure bandage does not get wet.   5. You may see a small amount of drainage or blood on your pressure bandage. This is normal. However, if drainage or bleeding continues or  saturates the bandage, you will need to apply firm pressure over the bandage with a washcloth for 15 minutes. If bleeding continues after applying pressure for 15 minutes then go to the nearest emergency room.  48 Hours After Surgery  Carefully remove the bandage and start daily wound care and dressing changes. You may also now shower and get the wound wet. Wash wound with a mild soap and water.  Use caution when washing the wound. Be gentle and do not let the forceful shower stream hit the wound directly.  PAT dry.  Daily Wound Care:  1. Wash wound with a mild soap and water.  Use caution when washing the wound, be gentle and do not let the forceful shower stream hit the wound directly.  2. PAT DRY.  3. Apply Vaseline (from a new container or tube) over the suture line with a Q-tip. It is very important to keep the wound continuously moist, as wounds heal best in a moist environment.  4.  Keep the site covered until sutures are removed, you can cover it with a Telfa (non-stick) dressing and tape or a band-aid.    5. If you are unable to keep wound covered, you must apply Vaseline every 2 - 3 hours (while awake) to ensure it is being kept moist for optimal healing. A dressing overnight is recommended to keep the area moist.   Call Us If:  1. You have pain that is not controlled with Tylenol.  2. You have signs or symptoms of an infection, such as: fever over 100 degrees F, redness, warmth, or foul-smelling or yellow/creamy drainage from the wound.  Who should I call with questions?    [facility]: [phone]     Health system: [phone]    For urgent needs outside of business hours call the [facility] at [phone] and ask for the dermatology resident on call      Wound Care:  Electrodesiccation and Curettage     I will experience scar, altered skin color, bleeding, swelling, pain, crusting and redness. I may experience altered sensation.  Risks are excessive bleeding, infection, muscle weakness, thick (hypertrophic or keloidal) scar, and recurrence,. A second procedure may be recommended to obtain the best cosmetic or functional result.    What is electrodesiccation and curettage ?    Scraping off tissue (curettage)    Destroy tissue using electric current or cautery (electrodessication)    How do I perform wound care?    Keep dressing in place for two days. You may shower with the dressing in place(do not get wet)    After 2 days, wash hands and remove dressing. Clean wound with cotton-swab soaked in hydrogen peroxide to remove drainage and crust    Put on a thick layer of Vaseline on the wound using a cotton-swab     Cover the wound with a Band-AidTM to protect from dust and tight clothing    If wound is draining before two days, change your dressing as described above sooner    During wound care, do not allow the area to dry out or form a scab    What do I need?    Hydrogen peroxide     Cotton-swabs     Vaseline or petroleum jelly     Band-AidsTM or dressing supplies as needed     When should I call the doctor?  [facility]: [phone]  [facility] [place]: [phone]  For urgent needs outside of business hours call the [facility] at [phone] and ask for the dermatology resident on call

## 2019-02-11 NOTE — PROGRESS NOTES
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    Informed consent was obtained and the patient underwent the procedure as follows:  The patient was placed supine on the operating table.  The cancer was identified, outlined with a marker, and verified by the patient.  The entire surgical field was prepped with Hibiclens.  The surgical site was anesthetized using Lidocaine 1%.    The area of clinically apparent tumor was debulked. The layer of tissue was then surgically excised using a #15 blade and was then transferred onto a specimen sheet maintaining the orientation of the specimen. Hemostasis was obtained using bipolar electrocoagulation. The wound site was then covered with a dressing while the tissue samples were processed for examination.    The excised tissue was transported to the Mohs histology laboratory maintaining the tissue orientation.  The tissue specimen was relaxed so that the entire surgical margin was in a a single horizontal plane for sectioning and inked for precise mapping.  A precise reference map was drawn to reflect the sectioning of the specimen, colored inking of the margins, and orientation on the patient. The tissue was processed using horizontal  sectioning of the base and continuous peripheral margins.  The histopathologic sections were reviewed in conjunction with the reference map.    Total blocks: 1    Total slides:  2    There were no cancer cells visualized on examination, therefore Mohs surgery was complete.    RECONSTRUCTION: COMPLEX CLOSURE    Primary Surgeon: Robert   Assistant Surgeon: None    Case(s) Specific Information:    CASE 1    The patient was taken to the operative suite and placed in a supine position on the operating room table. The defect was identified.  Appropriate markings were made with a marking pen to plan the repair.  The area was infiltrated with Lidocaine 1% 3.0ml and prepped with Hibiclens and draped with sterile towels.     The wound was debeveled and undermined widely. Cones were excised with relaxed skin tension lines on both sides of the defect. Hemostasis was obtained using bipolar electrocoagulation. The wound was narrowed with a plication suture and the dermis and subcutaneous tissue were then approximated using buried vertical mattress sutures.  The wound edges were then approximated and additional buried sutures were placed in a similar fashion where needed.  Percutaneous simple running sutures were carefully placed for maximum eversion and meticulous approximation.    Repair Size: 2.9 cm  Sutures Used:  Deep: monocryl 5-0 Superficial: Fast Absorbing Gut 5-0   Anesthesia Visit Total (ml): 8.5 ml    The wound was cleansed with saline and ointment was applied along the wound surface.     A sterile pressure dressing was applied.  Wound care instructions were given verbally and in writing. The patient left the operating suite in stable condition.  Patient was informed that additional refinement of the resulting surgical scar may be used as a second stage of this reconstruction.     The Attending surgeon was present for the entire procedure and always immediately available.              [facility]  Dermatologic Surgery Procedure Note    Date of Service:  Feb 11, 2019  Surgery: Mohs micrographic surgery    Case 2  Tumor Type: BCC - Basal cell carcinoma  Location: Right lower cutaneous lip  Derm-Path Accession #:   PreOp Size: 0.9 x 0.5 cm  PostOp Size: 1.2 x 0.9 cm  Mohs Accession #: [id number]  Level of Defect: fascia  Repair Type: complex  Repair Size: 2.2 cm  Suture Material: Fast Absorbing Gut 5-0      Procedure:  We discussed the principles of treatment and most likely complications including scarring, bleeding, infection, swelling, pain, crusting, nerve damage, large wound,  incomplete excision, wound dehiscence,  nerve damage, recurrence, and a second procedure may be recommended to obtain the best cosmetic or functional result.    Informed consent was obtained and the patient underwent the procedure as follows:  The patient was placed supine on the operating table.  The cancer was identified, outlined with a marker, and verified by the patient.  The entire surgical field was prepped with Hibiclens.  The surgical site was anesthetized using Lidocaine 1%.      The area of clinically apparent tumor was debulked. The layer of tissue was then surgically excised using a #15 blade and was then transferred onto a specimen sheet maintaining the orientation of the specimen. Hemostasis was obtained using bipolar electrocoagulation. The wound site was then covered with a dressing while the tissue samples were processed for examination.    The excised tissue was transported to the Mohs histology laboratory maintaining the tissue orientation.  The tissue specimen was relaxed so that the entire surgical margin was in a a single horizontal plane for sectioning and inked for precise mapping.  A precise reference map was drawn to reflect the sectioning of the specimen, colored inking of the margins, and orientation on the patient.  The tissue was processed using horizontal sectioning of the base and continuous  peripheral margins.  The histopathologic sections were reviewed in conjunction with the reference map.    Total blocks: 1    Total slides:  2    There were no cancer cells visualized on examination, therefore Mohs surgery was complete.    RECONSTRUCTION: COMPLEX CLOSURE    Primary Surgeon: Robert   Assistant Surgeon: None    Case(s) Specific Information:    CASE 2    The patient was taken to the operative suite and placed in a  supine position on the operating room table. The defect was identified.  Appropriate markings were made with a marking pen to plan the repair.  The area was infiltrated with  Lidocaine 1% 1.0ml and prepped with  Hibiclens and draped with sterile towels.     The wound was debeveled and undermined widely. Cones were excised with relaxed skin tension lines on both sides of the defect. Hemostasis was obtained using bipolar electrocoagulation. The wound was narrowed with deep plication sutures and the dermis and subcutaneous tissue were then approximated using buried vertical mattress sutures.  The wound edges were then approximated and additional buried sutures were placed in a similar fashion where needed. Percutaneous simple running sutures were carefully placed for maximum eversion and meticulous approximation.    Repair Size: 2.2cm  Sutures Used:  Deep: monocryl 5-0 Superficial: Fast Absorbing Gut 5-0   Anesthesia Visit Total (ml): 8.5 ml    The wound was cleansed with saline and ointment was applied along the wound surface.     A sterile pressure dressing was applied.  Wound care instructions were given verbally and in writing.  The patient left the operating suite in stable condition.  Patient was informed that additional refinement of the resulting surgical scar may be used as a second stage of this reconstruction.     The Attending surgeon was present for the entire procedure and always immediately available.              Dermatology Procedure Note: Electrodesiccation and  Curettage    PREOPERATIVE DIAGNOSIS: Squamous cell carcinoma in situ    POSTOPERATIVE DIAGNOSIS: same    LOCATION: right forearm    SIZE: 0.8 x 0.6 cm     Treatment options including electrodessiccation and curettage (ED and C), excision and topicals were reviewed.  The expected cure rates, healing times and anticipated scars of each option were discussed and the patient elects to proceed with ED and C.     The risks and benefits of the procedure were described to the patient.  These include but are not limited to bleeding, infection, scar, incomplete removal, and recurrence. Written informed consent was obtained. Time-out was performed. The above site was cleansed with and injected with 2.5 mL 1% lidocaine with epinephrine. Once anesthesia was obtained, the site was prepped with Chlorhexidine and rinsed with sterile saline. The lesion was curetted with a 3mm curette in 3 directions with a 3 mm margin and this was followed by electrodessication.  This process was repeated three times. The defect measured 1.4 x 1.2 cm. Vaseline and a bandage were applied to the wound. The patient tolerated the procedure well and was given post care instructions.    Follow up for wound check as needed.       Staff Involved:    Scribe Disclosure  I, Bhanu Sweeney, am serving as a scribe to document services personally performed by Dr. Audie Jones DO, based on data collection and the provider's statements to me.     Provider Disclosure:   The documentation recorded by the scribe accurately reflects the services I personally performed and the decisions made by me.  I personally performed the procedures today.    Audie Jones DO    Department of Dermatology  [facility]: Phone: [phone], Fax:[phone]  [facility] [place]: Phone: [phone], Fax: [phone]

## 2019-02-18 ENCOUNTER — OFFICE VISIT (OUTPATIENT)
Dept: FAMILY MEDICINE | Facility: CLINIC | Age: 84
End: 2019-02-18
Payer: MEDICARE

## 2019-02-18 VITALS
TEMPERATURE: 97.6 F | RESPIRATION RATE: 16 BRPM | BODY MASS INDEX: 26.95 KG/M2 | DIASTOLIC BLOOD PRESSURE: 68 MMHG | WEIGHT: 177.8 LBS | OXYGEN SATURATION: 98 % | HEIGHT: 68 IN | SYSTOLIC BLOOD PRESSURE: 132 MMHG | HEART RATE: 76 BPM

## 2019-02-18 DIAGNOSIS — R09.81 NASAL CONGESTION: Primary | ICD-10-CM

## 2019-02-18 PROCEDURE — 99213 OFFICE O/P EST LOW 20 MIN: CPT | Performed by: FAMILY MEDICINE

## 2019-02-18 RX ORDER — FLUTICASONE PROPIONATE 50 MCG
2 SPRAY, SUSPENSION (ML) NASAL DAILY
Qty: 1 G | Refills: 6 | Status: SHIPPED | OUTPATIENT
Start: 2019-02-18

## 2019-02-18 RX ORDER — CETIRIZINE HYDROCHLORIDE 10 MG/1
10 TABLET ORAL DAILY PRN
Qty: 30 TABLET | Refills: 0 | Status: SHIPPED | OUTPATIENT
Start: 2019-02-18 | End: 2021-02-04

## 2019-02-18 ASSESSMENT — PAIN SCALES - GENERAL: PAINLEVEL: NO PAIN (0)

## 2019-02-18 ASSESSMENT — MIFFLIN-ST. JEOR: SCORE: 1487.59

## 2019-02-18 NOTE — NURSING NOTE
Chief Complaint   Patient presents with     Sinus Problem     x2 months     Health Maintenance Due   Topic Date Due     ZOSTER IMMUNIZATION (1 of 2) 05/01/1987     MEDICARE ANNUAL WELLNESS VISIT  03/27/2018     FALL RISK ASSESSMENT  06/30/2018     Terrence Marcus, [facility]

## 2019-02-18 NOTE — PROGRESS NOTES
SUBJECTIVE:   Ulices Shah Jr. is a 83 year old male who presents to clinic today for the following health issues:    Acute Illness   Acute illness concerns: sinus  Onset: 2 months    Fever: no    Chills/Sweats: no    Headache (location?): YES- above left eye    Sinus Pressure:no    Conjunctivitis:  no    Ear Pain: no    Rhinorrhea: plugged up    Congestion: YES    Sore Throat: no     Cough: no    Wheeze: no    Decreased Appetite: no    Nausea: no    Vomiting: no    Diarrhea:  no    Dysuria/Freq.: no    Fatigue/Achiness: no    Sick/Strep Exposure: no     Therapies Tried and outcome: tylenol sinus    Ulices is here today for 2-month history of on and off nasal congestion without runny nose, sneezing or sinus pain.  Usually it lasts for several days and then goes away on its own for several days.  No headache, sore throat or coughing.  No fever or chills.  No chest pain or shortness of breath.  No teeth sensitivity.  No sinus pain or pressure.  Stated that he has same symptoms once a year during the winter months; go away with antibiotics treatment.  Been using OTC nasal congestion twice a day which helped for about 1/2 a day.  His eyes are watery sometimes.  No nausea or vomiting.  No other concerns.    Problem list and histories reviewed & adjusted, as indicated.  Additional history: as documented    Current Outpatient Medications   Medication Sig Dispense Refill     aspirin 81 MG tablet Take 1 tablet (81 mg) by mouth daily 100 tablet 0     atorvastatin (LIPITOR) 40 MG tablet Take 40 mg by mouth daily       carvedilol (COREG) 25 MG tablet Take 25 mg by mouth 2 times daily (with meals).       cetirizine (ZYRTEC) 10 MG tablet Take 1 tablet (10 mg) by mouth daily as needed for allergies (nasal congestion) 30 tablet 0     clopidogrel (PLAVIX) 75 MG tablet Take 1 tablet by mouth daily. 90 tablet 3     fluticasone (FLONASE) 50 MCG/ACT nasal spray Spray 2 sprays into both nostrils daily 1 g 6     lisinopril  "(PRINIVIL,ZESTRIL) 5 MG tablet Take 2.5 mg by mouth 2 times daily        mirtazapine (REMERON) 15 MG tablet Take 1 tablet (15 mg) by mouth At Bedtime 30 tablet 5     nitroglycerin (NITROSTAT) 0.4 MG SL tablet Place 1 tablet (0.4 mg) under the tongue every 5 minutes as needed 25 tablet 1     Omega-3 Fatty Acids (FISH OIL) 1000 MG CPDR Take 1 capsule by mouth 2 times daily.       Allergies   Allergen Reactions     No Known Drug Allergies        Reviewed and updated as needed this visit by clinical staff  Tobacco  Allergies  Meds  Soc Hx      Reviewed and updated as needed this visit by Provider         ROS:  Constitutional, HEENT, cardiovascular, pulmonary, gi and gu systems are negative, except as otherwise noted.    OBJECTIVE:     /68 (BP Location: Right arm, Patient Position: Sitting, Cuff Size: Adult Regular)   Pulse 76   Temp 97.6  F (36.4  C) (Temporal)   Resp 16   Ht 1.73 m (5' 8.1\")   Wt 80.6 kg (177 lb 12.8 oz)   SpO2 98%   BMI 26.96 kg/m    Body mass index is 26.96 kg/m .   GENERAL: healthy, alert and no distress.  Speak in full sentences.  HENT: ear canals and TM's normal.  Nares are congested with clear drainage.  Oropharynx is pink and moist.  No tonsilar redness, exudate or hypertrophy.  No tender with palpation to the sinuses.  NECK: no adenopathy.  RESP: lungs clear to auscultation - no rales, rhonchi or wheezes  CV: regular rate and rhythm, no murmur.  ABDOMEN: soft, non-tender and bowel sounds normal    Diagnostic Test Results:  No results found for this or any previous visit (from the past 24 hour(s)).    ASSESSMENT/PLAN:       ICD-10-CM    1. Nasal congestion R09.81 fluticasone (FLONASE) 50 MCG/ACT nasal spray     cetirizine (ZYRTEC) 10 MG tablet     He was informed that based on the  clinical presentation and physical exam, unlikely that he has sinus infection.  I encouraged him not to use OTC nasal spray decongestant.  Started him on Flonase and Zyrtec daily as needed.  Call in if " the symptom is not improved or resolved by next week.  Hold off on antibiotic for now.  He feels comfortable with the plan.    Gonzalez Bañuelos Mai, MD  [facility]

## 2019-02-27 ENCOUNTER — OFFICE VISIT (OUTPATIENT)
Dept: FAMILY MEDICINE | Facility: CLINIC | Age: 84
End: 2019-02-27
Payer: MEDICARE

## 2019-02-27 VITALS
WEIGHT: 176.2 LBS | DIASTOLIC BLOOD PRESSURE: 78 MMHG | RESPIRATION RATE: 18 BRPM | OXYGEN SATURATION: 99 % | SYSTOLIC BLOOD PRESSURE: 138 MMHG | BODY MASS INDEX: 26.71 KG/M2 | TEMPERATURE: 97.4 F | HEART RATE: 80 BPM

## 2019-02-27 DIAGNOSIS — I10 HYPERTENSION GOAL BP (BLOOD PRESSURE) < 140/90: ICD-10-CM

## 2019-02-27 DIAGNOSIS — E78.5 HYPERLIPIDEMIA LDL GOAL <100: ICD-10-CM

## 2019-02-27 DIAGNOSIS — R73.09 ELEVATED GLUCOSE: ICD-10-CM

## 2019-02-27 DIAGNOSIS — R19.4 RECENT CHANGE IN FREQUENCY OF BOWEL MOVEMENTS: Primary | ICD-10-CM

## 2019-02-27 DIAGNOSIS — Z86.0100 PERSONAL HISTORY OF COLONIC POLYPS: ICD-10-CM

## 2019-02-27 DIAGNOSIS — Z80.0 FAMILY HISTORY OF COLON CANCER: ICD-10-CM

## 2019-02-27 PROCEDURE — 99214 OFFICE O/P EST MOD 30 MIN: CPT | Performed by: FAMILY MEDICINE

## 2019-02-27 ASSESSMENT — PAIN SCALES - GENERAL: PAINLEVEL: NO PAIN (0)

## 2019-02-27 NOTE — PROGRESS NOTES
SUBJECTIVE:   Ulices Shah Jr. is a 83 year old male who presents to clinic today for the following health issues:      Had polpys removed a few years ago.   Couple days ago he didn't have his normal BM and he was worried because his dad  around his age from colon cancer.        He has indicated above with change in bowel habits.  This is happened over the last couple weeks and he has had longer episodes between bowel movements that he has ever had before.  Given family history he is concerned.  Previous colonoscopies have indicated polyps.  He denies fever, chills, weight loss, loss of appetite    Problem list and histories reviewed & adjusted, as indicated.  Additional history: as documented    BP Readings from Last 3 Encounters:   19 138/78   19 132/68   19 132/71    Wt Readings from Last 3 Encounters:   19 79.9 kg (176 lb 3.2 oz)   19 80.6 kg (177 lb 12.8 oz)   18 80.3 kg (177 lb)                  Labs reviewed in EPIC    Reviewed and updated as needed this visit by clinical staff       Reviewed and updated as needed this visit by Provider         ROS:  Constitutional, HEENT, cardiovascular, pulmonary, gi and gu systems are negative, except as otherwise noted.    OBJECTIVE:     /78   Pulse 80   Temp 97.4  F (36.3  C) (Temporal)   Resp 18   Wt 79.9 kg (176 lb 3.2 oz)   SpO2 99%   BMI 26.71 kg/m    Body mass index is 26.71 kg/m .  GENERAL: healthy, alert and no distress  EYES: Eyes grossly normal to inspection, PERRL and conjunctivae and sclerae normal  NECK: no adenopathy, no asymmetry, masses, or scars and thyroid normal to palpation  RESP: lungs clear to auscultation - no rales, rhonchi or wheezes  CV: regular rate and rhythm, normal S1 S2, no S3 or S4, no murmur, click or rub, no peripheral edema and peripheral pulses strong  ABDOMEN: soft, nontender, no hepatosplenomegaly, no masses and bowel sounds normal  MS: no gross musculoskeletal defects noted,  "no edema  SKIN: no suspicious lesions or rashes  NEURO: Grossly negative  PSYCH: mentation appears normal, affect normal/bright    Diagnostic Test Results:  Lab will be done fasting when he comes back for his colonoscopy    ASSESSMENT/PLAN:             Tobacco Cessation:   reports that  has never smoked. he has never used smokeless tobacco.      BMI:   Estimated body mass index is 26.71 kg/m  as calculated from the following:    Height as of 2/18/19: 1.73 m (5' 8.1\").    Weight as of this encounter: 79.9 kg (176 lb 3.2 oz).       1. Recent change in frequency of bowel movements  Given these recent bowel movement changes and a strong family history for colon cancer along with previous polyps, we will do a diagnostic colonoscopy.  Lab work will be done at the same time because he will come back fasting.  - **ESR FUTURE anytime; Future    2. Hypertension goal BP (blood pressure) < 140/90  See above  - **A1C FUTURE anytime; Future  - **CBC with platelets FUTURE anytime; Future  - **Comprehensive metabolic panel FUTURE anytime; Future  - **ESR FUTURE anytime; Future  - Lipid panel reflex to direct LDL Fasting; Future    3. Hyperlipidemia LDL goal <100  See above  - Lipid panel reflex to direct LDL Fasting; Future    4. Elevated glucose  At one point he may have been considered diabetic and at this point we have taken his from his active problem list because his hemoglobin A1c remains good and he is not on any medication and controls it with diet and exercise  - **A1C FUTURE anytime; Future    5. Family history of colon cancer  As above  - GASTROENTEROLOGY ADULT REF PROCEDURE ONLY [facility] [phone]; [place] Provider    6. Personal history of colonic polyps  As above  - GASTROENTEROLOGY ADULT REF PROCEDURE ONLY [facility] [phone]; [place] Provider    Patient Instructions   have colonoscopy as planned  The day you come for it, come early enough to have blood work      Kevin Brayden Vaughn, " MD  [facility]

## 2019-02-27 NOTE — H&P (VIEW-ONLY)
SUBJECTIVE:   Ulices Shah Jr. is a 83 year old male who presents to clinic today for the following health issues:      Had polpys removed a few years ago.   Couple days ago he didn't have his normal BM and he was worried because his dad  around his age from colon cancer.        He has indicated above with change in bowel habits.  This is happened over the last couple weeks and he has had longer episodes between bowel movements that he has ever had before.  Given family history he is concerned.  Previous colonoscopies have indicated polyps.  He denies fever, chills, weight loss, loss of appetite    Problem list and histories reviewed & adjusted, as indicated.  Additional history: as documented    BP Readings from Last 3 Encounters:   19 138/78   19 132/68   19 132/71    Wt Readings from Last 3 Encounters:   19 79.9 kg (176 lb 3.2 oz)   19 80.6 kg (177 lb 12.8 oz)   18 80.3 kg (177 lb)                  Labs reviewed in EPIC    Reviewed and updated as needed this visit by clinical staff       Reviewed and updated as needed this visit by Provider         ROS:  Constitutional, HEENT, cardiovascular, pulmonary, gi and gu systems are negative, except as otherwise noted.    OBJECTIVE:     /78   Pulse 80   Temp 97.4  F (36.3  C) (Temporal)   Resp 18   Wt 79.9 kg (176 lb 3.2 oz)   SpO2 99%   BMI 26.71 kg/m    Body mass index is 26.71 kg/m .  GENERAL: healthy, alert and no distress  EYES: Eyes grossly normal to inspection, PERRL and conjunctivae and sclerae normal  NECK: no adenopathy, no asymmetry, masses, or scars and thyroid normal to palpation  RESP: lungs clear to auscultation - no rales, rhonchi or wheezes  CV: regular rate and rhythm, normal S1 S2, no S3 or S4, no murmur, click or rub, no peripheral edema and peripheral pulses strong  ABDOMEN: soft, nontender, no hepatosplenomegaly, no masses and bowel sounds normal  MS: no gross musculoskeletal defects noted,  "no edema  SKIN: no suspicious lesions or rashes  NEURO: Grossly negative  PSYCH: mentation appears normal, affect normal/bright    Diagnostic Test Results:  Lab will be done fasting when he comes back for his colonoscopy    ASSESSMENT/PLAN:             Tobacco Cessation:   reports that  has never smoked. he has never used smokeless tobacco.      BMI:   Estimated body mass index is 26.71 kg/m  as calculated from the following:    Height as of 2/18/19: 1.73 m (5' 8.1\").    Weight as of this encounter: 79.9 kg (176 lb 3.2 oz).       1. Recent change in frequency of bowel movements  Given these recent bowel movement changes and a strong family history for colon cancer along with previous polyps, we will do a diagnostic colonoscopy.  Lab work will be done at the same time because he will come back fasting.  - **ESR FUTURE anytime; Future    2. Hypertension goal BP (blood pressure) < 140/90  See above  - **A1C FUTURE anytime; Future  - **CBC with platelets FUTURE anytime; Future  - **Comprehensive metabolic panel FUTURE anytime; Future  - **ESR FUTURE anytime; Future  - Lipid panel reflex to direct LDL Fasting; Future    3. Hyperlipidemia LDL goal <100  See above  - Lipid panel reflex to direct LDL Fasting; Future    4. Elevated glucose  At one point he may have been considered diabetic and at this point we have taken his from his active problem list because his hemoglobin A1c remains good and he is not on any medication and controls it with diet and exercise  - **A1C FUTURE anytime; Future    5. Family history of colon cancer  As above  - GASTROENTEROLOGY ADULT REF PROCEDURE ONLY [facility] [phone]; [place] Provider    6. Personal history of colonic polyps  As above  - GASTROENTEROLOGY ADULT REF PROCEDURE ONLY [facility] [phone]; [place] Provider    Patient Instructions   have colonoscopy as planned  The day you come for it, come early enough to have blood work      Kevin Brayden Vaughn, " MD  [facility]

## 2019-02-28 ENCOUNTER — TELEPHONE (OUTPATIENT)
Dept: FAMILY MEDICINE | Facility: CLINIC | Age: 84
End: 2019-02-28

## 2019-02-28 DIAGNOSIS — Z12.11 SPECIAL SCREENING FOR MALIGNANT NEOPLASMS, COLON: Primary | ICD-10-CM

## 2019-02-28 NOTE — TELEPHONE ENCOUNTER
Left message for patient to return call to schedule EGD/colonoscopy. If Annika or Miya are not available, please transfer to same day surgery

## 2019-02-28 NOTE — LETTER
COLONOSCOPY INSTRUCTIONS   For patients with CHF, cardiomyopathy,   kidney and/or renal disease or over the age of 75    (with GoLytely/NuLytely/CoLyte)    Patient Name   Ulices Shah   Procedure Date 3/20/2019 Arrival Time 1:30pm Procedure Time 2:30pm   Physician Dr. Valdes   Location   [facility]   Other Instructions       Review the preparation schedule below for the five days preceding your procedure.     If you have any questions, please call [phone].  YOU WILL NEED TO PURCHASE   - Bisacodyl/Dulcolax tablets  5 mg (4 ORAL tablets) (No prescription needed)  - Fill your prescription for GoLytely/NuLytely/Colyte  - A packet of non-red or non-purple Crystal Light (Optional--to improve taste of prep solution)   BEFORE THE PROCEDURE   - You will receive a phone call 1 to 2 days prior to your procedure. Information will be collected to pre-admit you, which will assist us in giving you the best care possible or you can call [phone].  - If you are diabetic, consult your physician for additional instruction.  - Check with your insurance carrier about coverage (phone number on the back of your insurance card).  - You will need to make arrangements to have someone drive you home. You will not be able to drive the day of your examination. You can expect to be here approximately 2 hours; your  needs to be at [facility] 2 hours after your arrival time.  - You will not be able to return to work the day of your procedure.  - If using iron supplements, stop taking those five days before your procedure.  - Three days before your procedure, begin a low-fiber diet. Avoid raw fruits, vegetables, whole wheat, nuts, popcorn, Metamucil, Fibercon, bran or bulking agents. Meats and cooked vegetables are fine.  - Two days before your procedure, increase your water intake (8 glasses of water is recommended).   DAY BEFORE PROCEDURE   - IF YOU ARE SCHEDULED TO ARRIVE AT 11 AM  OR BEFORE FOLLOW THE INSTRUCTIONS BELOW  - You will need to be on a clear liquid diet the entire day. No solid foods.  - In the morning, mix the GoLytely/NuLytely/Colyte powder with water until completely dissolved and then refrigerate.  - At 9 am take four tablets of Bisacodyl.  -  At 3 pm start drinking the prep solution. You will need to have access to the bathroom once you start drinking the prep solution. Some people prefer to drink the solution at room temperature. You may take it out of the refrigerator 1-2 hours prior to drinking it. You may also add a packet of non-red or non-purple Crystal Light to improve the taste. It is best to drink an 8 oz glassful every 15 minutes.    - It will take about 4-6 hours to drink the solution.  Continue drinking clear liquids after you have finished the prep solution.   - If you experience bloating, cramping, nausea, or vomiting, take a 15-30 minute break and then start drinking prep solution again.     IF YOU ARE SCHEDULED TO ARRIVE 11:00 AM OR LATER:  DAY BEFORE PROCEDURE   - You will need to be on a clear liquid diet the entire day (SEE BELOW). No solid foods.  - In the morning, mix the GoLytely/NuLytely/Colyte powder with water until completely dissolved and then refrigerate.  - At 9 am take four tablets of Bisacodyl.  - At 3 pm you will start drinking your prep solution. You will only be drinking half of the solution today, it should take you about 2 - 3 hours to drink half. Some people prefer to drink the solution at room temperature. You may take it out of the refrigerator 1-2 hours prior to drinking it. You may also add a packet of non-red or non-purple Crystal Light to improve the taste. It is best to drink an 8 oz glassful every 15 minutes.    -  Put the remainder of the prep solution back in the refrigerator for procedure morning.   - If you experience bloating, cramping, nausea, or vomiting, take a 15-30 minute break and then start drinking the prep solution  again.   PROCEDURE DAY    - THIS STEP ONLY FOR THOSE WHO DRANK HALF OF PREP DAY BEFORE  - Start drinking your last half of prep solution at 6 am.  Drink an 8 oz glass every 15 minutes until prep solution is gone.  It will take about 2 to 3 hours to finish solution.   - No eating or drinking 3 hours prior to your procedure.  - If you experience bloating, cramping, nausea, or vomiting take a 15 to 30 minute break and then start drinking prep solution again.   Dress comfortably. Short sleeves are allowed under your patient gown. You may have clear liquids until three hours before your procedure. Please do not wear any perfume or cologne.    Please check with your provider regarding holding any medications. Please bring a list of your current medications with you. If you have any questions regarding these instructions, please call us at [phone].        CLEAR LIQUID DIET  The clear liquid diet are foods that are free of fat and fiber. They will liquefy to clear liquid at room temperature. No red or purple colored juices or Jell-O s, dairy products, or alcoholic beverages.  TYPE OF FOOD USE ONLY THESE FOODS   Beverages Coffee      Tea      Decaffeinated coffee  Carbonated beverages (pop)  Water  Sport drinks (except red or purple)   Desserts Jell-O (except red or purple)  Fruit ice  Popsicle   Fruit and Fruit Juices Citrus juices, strained  Fruit nectars, strained  Apple juice  Fruit drinks (except red or purple)   Soups Broth  Bouillon  Consommé  Meat stock, strained  Vegetable broth, strained   Sweets Hard candy, non-red or non-purple  Sugar   Miscellaneous Flavorings  Salt           [place] [facility] [place]:   Take the [address] exit off of [address] (on the south side of [place]).  Turn left on [address]  Turn left at the first stoplight (at [facility]).  The hospital and clinic is the third building on the left.     From the South:  Follow [address] north  to the first [place] exit.  Exit on [address] following it to the right.  Turn left at the first stoplight.  The hospital and clinic is the third building on the left.    From the East:     Following [address] west, turn left at the stoplight onto [address] Follow [address] through [place].  Take a right at the light on [address] (at [facility]).  The hospital and clinic is the third building on the left.    From the West:    Following [address] going east, turn south on [address].  Take the [address] exit off of [address] (on the south side of [place]).  Follow [address] through [place] to the stoplight on [address] (at [facility]). Take a left.  The hospital and clinic is the third building on the left.    Colonoscopy  What you should know    What is a colonoscopy?  A colonoscopy lets the doctor look inside your large intestine (colon). The doctor guides a long, flexible, lighted tube through the entire colon. The exam is used to look for early signs of cancer. It is also used to find the cause of a change in bowel habits. The doctor is able to see any inflamed tissue, polyps, ulcers, bleeding or muscle spasms.    How do I prepare for the exam?  See the guidelines for cleaning out your colon. The steps to prepare your colon begin four days before the exam.    Please arrive with someone who can drive you home after the exam. The medicines used in the exam will make you sleepy.  You will not be able to drive. You cannot take a bus or taxi by yourself.  You cannot walk home.    How do I prepare the day of the exam?    *Dress in comfortable, loose clothes.  *Leave your purse, billfold, credit cards, etc. at home.  *Bring your insurance card.  *Bring a list of your medications.  *Plan to arrive on time.  *We do our best to stay on time, but there may be a delay. Please bring something to pass the time, such as a newspaper, book or magazine.    What happens when I arrive?    *You will  do some paper work.  *We will take you to the admission area. Your family may stay with you during this time.  *A nurse will check your records and ask you questions.  *You will change into a hospital gown without underwear.   *You will sign a consent form.  *We will start an IV (intravenous). We will use it to give you medicines during the exam.    What happens during the exam?    *We will take you on a cart to the exam room.   *You can ask questions of the doctor who is doing your exam.  *You will lie on your left side on a table.    *You will receive medicines through your IV to relax you and for pain.    *The doctor will insert a thin, lighted tube (scope) into your rectum. Then the doctor will slowly guide it into your colon. The scope bends, so he or she can move it around the curves of your colon.    *The scope sends an image of the inside of the colon. The image allows the doctor to examine the lining of the colon.    *We may ask you to change positions to help the doctor move the scope.    *The scope also blows air into your colon. This enlarges the colon and helps the doctor see the lining.  *You should feel little pain during the exam.   *You may feel full and have cramps. Breathe slowly and evenly to help your body relax. Tell your doctor or nurse if you have any pain.    If we see a polyp, we will remove a piece of it (polypectomy). That tissue (biopsy) will be tested in the lab. Most polyps are benign (not cancer). We remove most polyps because they can cause bleeding or turn into cancer.     Risks of the exam are bleeding and piercing or tearing the colon. But this is rare.    What happens after the exam?    *We will return you to your room. We will watch you for one hour or until most of the pain medicine has worn off.  *You may feel bloated or have cramping for a short time because of the air injected during the exam. You will pass the rest of the air from your colon in the next couple hours.  *We  will remove the IV.   *Your first meal should be light. Slowly return to normal meals, unless your doctor gives you other orders.

## 2019-02-28 NOTE — LETTER
[address] [address]  [phone]        March 1, 2019    Ulices Shah Jr.  [address] [place] [id number]

## 2019-03-01 NOTE — TELEPHONE ENCOUNTER
Date of colonoscopy/EGD: 3/20  Surgeon: Dr. Valdes  Prep:GoLytely, meds ordered, routed to provider  Packet:Colonoscopy/EGD instructions mailed to patient's home address.   Date: 3/1/2019      Surgery Scheduler

## 2019-03-04 NOTE — TELEPHONE ENCOUNTER
Pharmacy needs clarification on the [employer] Rx. Please call [phone]. Needs call ASAP. Pt is waiting.   Thank you,  Sarah Harkins- Pt Rep.

## 2019-03-04 NOTE — TELEPHONE ENCOUNTER
Patient called requesting to have procedure sooner,  Rescheduled to 3/7/19 arrive 0695 proc [id number] with Dr. Sharma.  Patient will  prep instructions today at the Specialty clinic Desk.

## 2019-03-05 NOTE — TELEPHONE ENCOUNTER
Spoke to pharmacy here in clinic, they are going to fill rx for patient. I called [facility] and told them to cancel.

## 2019-03-07 ENCOUNTER — ANESTHESIA (OUTPATIENT)
Dept: GASTROENTEROLOGY | Facility: CLINIC | Age: 84
End: 2019-03-07
Payer: MEDICARE

## 2019-03-07 ENCOUNTER — ANESTHESIA EVENT (OUTPATIENT)
Dept: GASTROENTEROLOGY | Facility: CLINIC | Age: 84
End: 2019-03-07
Payer: MEDICARE

## 2019-03-07 ENCOUNTER — HOSPITAL ENCOUNTER (OUTPATIENT)
Facility: CLINIC | Age: 84
Discharge: HOME | End: 2019-03-07
Attending: SURGERY | Admitting: SURGERY
Payer: MEDICARE

## 2019-03-07 VITALS
OXYGEN SATURATION: 98 % | RESPIRATION RATE: 16 BRPM | DIASTOLIC BLOOD PRESSURE: 79 MMHG | SYSTOLIC BLOOD PRESSURE: 142 MMHG | HEART RATE: 71 BPM | TEMPERATURE: 98.1 F

## 2019-03-07 LAB — COLONOSCOPY: NORMAL

## 2019-03-07 PROCEDURE — 37000008 ZZH ANESTHESIA TECHNICAL FEE, 1ST 30 MIN: Performed by: SURGERY

## 2019-03-07 PROCEDURE — 25000125 ZZHC RX 250: Performed by: SURGERY

## 2019-03-07 PROCEDURE — 45380 COLONOSCOPY AND BIOPSY: CPT | Mod: PT | Performed by: SURGERY

## 2019-03-07 PROCEDURE — 45380 COLONOSCOPY AND BIOPSY: CPT | Performed by: SURGERY

## 2019-03-07 PROCEDURE — 25800030 ZZH RX IP 258 OP 636: Performed by: SURGERY

## 2019-03-07 PROCEDURE — 25000128 H RX IP 250 OP 636: Performed by: NURSE ANESTHETIST, CERTIFIED REGISTERED

## 2019-03-07 PROCEDURE — 88305 TISSUE EXAM BY PATHOLOGIST: CPT | Mod: 26 | Performed by: SURGERY

## 2019-03-07 PROCEDURE — 25000125 ZZHC RX 250: Performed by: NURSE ANESTHETIST, CERTIFIED REGISTERED

## 2019-03-07 PROCEDURE — 40000296 ZZH STATISTIC ENDO RECOVERY CLASS 1:2 FIRST HOUR: Performed by: SURGERY

## 2019-03-07 PROCEDURE — 88305 TISSUE EXAM BY PATHOLOGIST: CPT | Performed by: SURGERY

## 2019-03-07 RX ORDER — ONDANSETRON 2 MG/ML
4 INJECTION INTRAMUSCULAR; INTRAVENOUS EVERY 30 MIN PRN
Status: DISCONTINUED | OUTPATIENT
Start: 2019-03-07 | End: 2019-03-07 | Stop reason: HOSPADM

## 2019-03-07 RX ORDER — SODIUM CHLORIDE, SODIUM LACTATE, POTASSIUM CHLORIDE, CALCIUM CHLORIDE 600; 310; 30; 20 MG/100ML; MG/100ML; MG/100ML; MG/100ML
INJECTION, SOLUTION INTRAVENOUS CONTINUOUS
Status: DISCONTINUED | OUTPATIENT
Start: 2019-03-07 | End: 2019-03-07 | Stop reason: HOSPADM

## 2019-03-07 RX ORDER — ONDANSETRON 4 MG/1
4 TABLET, ORALLY DISINTEGRATING ORAL EVERY 6 HOURS PRN
Status: DISCONTINUED | OUTPATIENT
Start: 2019-03-07 | End: 2019-03-07 | Stop reason: HOSPADM

## 2019-03-07 RX ORDER — PROPOFOL 10 MG/ML
INJECTION, EMULSION INTRAVENOUS CONTINUOUS PRN
Status: DISCONTINUED | OUTPATIENT
Start: 2019-03-07 | End: 2019-03-07

## 2019-03-07 RX ORDER — ONDANSETRON 2 MG/ML
4 INJECTION INTRAMUSCULAR; INTRAVENOUS EVERY 6 HOURS PRN
Status: DISCONTINUED | OUTPATIENT
Start: 2019-03-07 | End: 2019-03-07 | Stop reason: HOSPADM

## 2019-03-07 RX ORDER — ONDANSETRON 2 MG/ML
4 INJECTION INTRAMUSCULAR; INTRAVENOUS
Status: DISCONTINUED | OUTPATIENT
Start: 2019-03-07 | End: 2019-03-07 | Stop reason: HOSPADM

## 2019-03-07 RX ORDER — LIDOCAINE 40 MG/G
CREAM TOPICAL
Status: DISCONTINUED | OUTPATIENT
Start: 2019-03-07 | End: 2019-03-07 | Stop reason: HOSPADM

## 2019-03-07 RX ORDER — NALOXONE HYDROCHLORIDE 0.4 MG/ML
.1-.4 INJECTION, SOLUTION INTRAMUSCULAR; INTRAVENOUS; SUBCUTANEOUS
Status: DISCONTINUED | OUTPATIENT
Start: 2019-03-07 | End: 2019-03-07

## 2019-03-07 RX ORDER — FLUMAZENIL 0.1 MG/ML
0.2 INJECTION, SOLUTION INTRAVENOUS
Status: DISCONTINUED | OUTPATIENT
Start: 2019-03-07 | End: 2019-03-07 | Stop reason: HOSPADM

## 2019-03-07 RX ORDER — PROPOFOL 10 MG/ML
INJECTION, EMULSION INTRAVENOUS PRN
Status: DISCONTINUED | OUTPATIENT
Start: 2019-03-07 | End: 2019-03-07

## 2019-03-07 RX ORDER — ONDANSETRON 4 MG/1
4 TABLET, ORALLY DISINTEGRATING ORAL EVERY 30 MIN PRN
Status: DISCONTINUED | OUTPATIENT
Start: 2019-03-07 | End: 2019-03-07 | Stop reason: HOSPADM

## 2019-03-07 RX ORDER — MEPERIDINE HYDROCHLORIDE 25 MG/ML
12.5 INJECTION INTRAMUSCULAR; INTRAVENOUS; SUBCUTANEOUS
Status: DISCONTINUED | OUTPATIENT
Start: 2019-03-07 | End: 2019-03-07 | Stop reason: HOSPADM

## 2019-03-07 RX ORDER — NALOXONE HYDROCHLORIDE 0.4 MG/ML
.1-.4 INJECTION, SOLUTION INTRAMUSCULAR; INTRAVENOUS; SUBCUTANEOUS
Status: DISCONTINUED | OUTPATIENT
Start: 2019-03-07 | End: 2019-03-07 | Stop reason: HOSPADM

## 2019-03-07 RX ORDER — LIDOCAINE HYDROCHLORIDE 20 MG/ML
INJECTION, SOLUTION INFILTRATION; PERINEURAL PRN
Status: DISCONTINUED | OUTPATIENT
Start: 2019-03-07 | End: 2019-03-07

## 2019-03-07 RX ADMIN — PROPOFOL 10 MG: 10 INJECTION, EMULSION INTRAVENOUS at 08:01

## 2019-03-07 RX ADMIN — SODIUM CHLORIDE, POTASSIUM CHLORIDE, SODIUM LACTATE AND CALCIUM CHLORIDE: 600; 310; 30; 20 INJECTION, SOLUTION INTRAVENOUS at 07:22

## 2019-03-07 RX ADMIN — LIDOCAINE HYDROCHLORIDE 40 MG: 20 INJECTION, SOLUTION INFILTRATION; PERINEURAL at 07:58

## 2019-03-07 RX ADMIN — SODIUM CHLORIDE, POTASSIUM CHLORIDE, SODIUM LACTATE AND CALCIUM CHLORIDE: 600; 310; 30; 20 INJECTION, SOLUTION INTRAVENOUS at 07:51

## 2019-03-07 RX ADMIN — PROPOFOL 40 MG: 10 INJECTION, EMULSION INTRAVENOUS at 07:58

## 2019-03-07 RX ADMIN — PROPOFOL 150 MCG/KG/MIN: 10 INJECTION, EMULSION INTRAVENOUS at 07:58

## 2019-03-07 RX ADMIN — LIDOCAINE HYDROCHLORIDE 1 ML: 10 INJECTION, SOLUTION EPIDURAL; INFILTRATION; INTRACAUDAL; PERINEURAL at 07:12

## 2019-03-07 NOTE — INTERVAL H&P NOTE
I have verified the history with the patient and following examination, there are no changes to the history and physical examination. The patient is cleared for anesthesia for proposed surgical procedure.

## 2019-03-07 NOTE — ANESTHESIA POSTPROCEDURE EVALUATION
Patient: Ulices Shah Jr.    Procedure(s):  COMBINED COLONOSCOPY,  MULTIPLE POLYPECTOMY BY BIOPSY    Diagnosis:Family history of colon cancer, Personal history of colonic polyps  Diagnosis Additional Information: No value filed.    Anesthesia Type:  MAC    Note:  Anesthesia Post Evaluation    Patient location during evaluation: Phase 2  Patient participation: Able to fully participate in evaluation  Level of consciousness: awake  Pain management: adequate  Airway patency: patent  Cardiovascular status: acceptable and hemodynamically stable  Respiratory status: acceptable, room air and nonlabored ventilation  Hydration status: stable  PONV: none     Anesthetic complications: None    Comments: Patient was happy with the anesthesia care received and no anesthesia related complications were noted.  I will follow up with the patient again if it is needed.        Last vitals:  Vitals:    03/07/19 0822 03/07/19 0830 03/07/19 0845   BP: 107/61 103/65 120/75   Pulse: 65 62 70   Resp: 16 16 16   Temp:      SpO2: 99% 99% 96%         Electronically Signed By: PATRICIA Weston CRNA  March 7, 2019  9:04 AM

## 2019-03-07 NOTE — LETTER
Ulices Shah Jr.  [address] [place] [address]   [facility] [address]    March 15, 2019      Dear Ulices,  This letter is to inform you of the results of your pathology report from your endoscopy.  Your pathology report was:  FINAL DIAGNOSIS:   Rectum, polyps, excision:   - One fragment with focal change, cannot exclude focal adenomatous change   - Hyperplastic polyp.   - No evidence of high grade dysplasia or malignancy  These are benign, non-concerning findings. As we previously discussed, you could consider colonoscopy in 5 years depending on your overall health and a discussion with your primary care provider about the benefits and risks of a colonoscopy.  If you have further questions please don t hesitate to call our clinic at [phone].   Sincerely,     Brayden Sharma, DO

## 2019-03-07 NOTE — ANESTHESIA PREPROCEDURE EVALUATION
Anesthesia Evaluation     . Pt has had prior anesthetic. 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 : . . . :. . Previous cardiac testing date:results:date: results:ECG reviewed date:6/12/13 results:Right BBB date: results:        METS/Exercise Tolerance:     Hematologic:  - neg hematologic  ROS     Musculoskeletal:  - neg musculoskeletal ROS     GI/Hepatic: Comment: duodenal ulcer - neg GI/hepatic ROS     Renal:  - ROS Renal section negative     Endo:  - neg endo ROS       Psychiatric:  - neg psychiatric ROS     Infectious Disease:  - neg infectious disease ROS     Other:  - neg other ROS          Physical Exam  Normal systems: cardiovascular and pulmonary    Airway   Mallampati: II  TM distance: >3 FB  Neck ROM: full    Dental   (+) missing  Comment: Poor repair    Cardiovascular   Rhythm and rate: regular and normal      Pulmonary                     Anesthesia Plan      History & Physical Review  History and physical reviewed and following examination; no interval change.    ASA Status:  3 .    NPO Status:  > 6 hours    Plan for MAC with Other induction. Reason for MAC:  Deep or markedly invasive procedure (G8)         Postoperative Care      Consents  Anesthetic plan, risks, benefits and alternatives discussed with:  Patient or representative..            .    Anesthesia Evaluation     . Pt has had prior anesthetic. 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 : . . . :. . Previous cardiac testing date:results:date: results:ECG reviewed date:6/12/13 results:Right BBB date: results:          METS/Exercise Tolerance:      Hematologic:  - neg hematologic  ROS       Musculoskeletal:  - neg musculoskeletal ROS       GI/Hepatic: Comment: duodenal ulcer - neg GI/hepatic ROS       Renal/Genitourinary:  - ROS Renal section negative       Endo:  - neg endo ROS       Psychiatric:  - neg psychiatric ROS       Infectious Disease:  - neg infectious disease ROS       Malignancy:   (+) Malignancy History of Skin          Other:    - neg other ROS                 Anesthesia Plan      History & Physical Review  History and physical reviewed and following examination; no interval change.    ASA Status:  3 .    NPO Status:  > 6 hours    Plan for MAC with Other induction. Reason for MAC:  Deep or markedly invasive procedure (G8)         Postoperative Care      Consents  Anesthetic plan, risks, benefits and alternatives discussed with:  Patient or representative..

## 2019-03-07 NOTE — DISCHARGE INSTRUCTIONS
[facility]    Home Care Following Endoscopy          Activity:    You have just undergone an endoscopic procedure usually performed with conscious sedation.  Do not work or operate machinery (including a car) for at least 12 hours.      I encourage you to walk and attempt to pass this air as soon as possible.    Diet:    Return to the diet you were on before your procedure but eat lightly for the first 12-24 hours.    Drink plenty of water.    Resume any regular medications unless otherwise advised by your physician.  Please begin any new medication prescribed as a result of your procedure as directed by your physician.     If you had any biopsy or polyp removed please refrain from aspirin or aspirin products for 2 days.  If on Coumadin please restart as instructed by your physician.   Pain:    You may take Tylenol as needed for pain.  Expected Recovery:    You can expect some mild abdominal fullness and/or discomfort due to the air used to inflate your intestinal tract. It is also normal to have a mild sore throat after upper endoscopy.    Call Your Physician if You Have:    After Colonoscopy:  o Worsening persisting abdominal pain which is worse with activity.  o Fevers (>101 degrees F), chills or shakes.  o Passage of continued blood with bowel movements.   Any questions or concerns about your recovery, please call [phone] or after hours [phone] Nurse Advice Line.    Follow-up Care:    You should receive a call or letter with your results within 1 week. Please call if you have not received a notification of your results.  If asked to return to clinic please make an appointment 1 week after your procedure.  Call [phone].

## 2019-03-07 NOTE — ANESTHESIA CARE TRANSFER NOTE
Patient: Ulices Shah Jr.    Procedure(s):  COMBINED COLONOSCOPY,  MULTIPLE POLYPECTOMY BY BIOPSY    Diagnosis: Family history of colon cancer, Personal history of colonic polyps  Diagnosis Additional Information: No value filed.    Anesthesia Type:   MAC     Note:  Airway :Face Mask  Patient transferred to:Phase II  Handoff Report: Identifed the Patient, Identified the Reponsible Provider, Reviewed the pertinent medical history, Discussed the surgical course, Reviewed Intra-OP anesthesia mangement and issues during anesthesia, Set expectations for post-procedure period and Allowed opportunity for questions and acknowledgement of understanding      Vitals: (Last set prior to Anesthesia Care Transfer)    CRNA VITALS  3/7/2019 0751 - 3/7/2019 0821      3/7/2019             SpO2:  97 %                Electronically Signed By: PATRICIA Weston CRNA  March 7, 2019  8:21 AM

## 2019-03-11 LAB — COPATH REPORT: NORMAL

## 2019-08-20 ENCOUNTER — OFFICE VISIT (OUTPATIENT)
Dept: DERMATOLOGY | Facility: CLINIC | Age: 84
End: 2019-08-20
Payer: MEDICARE

## 2019-08-20 DIAGNOSIS — L30.9 DERMATITIS: ICD-10-CM

## 2019-08-20 DIAGNOSIS — Z85.828 HISTORY OF NONMELANOMA SKIN CANCER: Primary | ICD-10-CM

## 2019-08-20 PROCEDURE — 99213 OFFICE O/P EST LOW 20 MIN: CPT | Performed by: DERMATOLOGY

## 2019-08-20 RX ORDER — TRIAMCINOLONE ACETONIDE 1 MG/G
CREAM TOPICAL
Qty: 80 G | Refills: 0 | Status: SHIPPED | OUTPATIENT
Start: 2019-08-20 | End: 2020-09-10

## 2019-08-20 ASSESSMENT — PAIN SCALES - GENERAL: PAINLEVEL: NO PAIN (0)

## 2019-08-20 NOTE — NURSING NOTE
Ulices Shah Jr.'s goals for this visit include:   Chief Complaint   Patient presents with     CHRISTIAN De Los Santos is returning with no areas of concern      He requests these members of his care team be copied on today's visit information:     PCP: Kevin Vaughn    Referring Provider:  No referring provider defined for this encounter.    There were no vitals taken for this visit.    Do you need any medication refills at today's visit? No    Maria D Lara LPN

## 2019-08-20 NOTE — LETTER
8/20/2019         RE: Ulices Shah Jr.  [address]  [facility] [address]        Dear Colleague,    Thank you for referring your patient, Ulices Shah Jr., to the [facility]. Please see a copy of my visit note below.    [facility] Dermatology Note      Dermatology Problem List:  1. Family history of melanoma  2. 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 Actinic keratoses:   -s/p cryotherapy  -s/p Efudex cream     Encounter Date: Aug 20, 2019    CC:  Chief Complaint   Patient presents with     RECHECK     Filiberto is returning with no areas of concern        History of Present Illness:  Mr. Ulices Shah Jr. is a 84 year old male who presents as a follow-up for history of NMSC. He was last seen in dermatology on 2/11/19 by Dr. Jones for Mohs surgery of a BCC on the right lower cutaneous lip and an SCC on the left dorsal hand. Also underwent ED&C of a SCCIS on the right forearm. Today he reports no areas of concern.      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     History of heart bypass surgery     Atherosclerosis of coronary artery     Persistent insomnia     Elevated blood sugar     Heart murmur, systolic     History of nonmelanoma skin cancer     Cardiomyopathy in diseases classified elsewhere (H)     Past Medical History:   Diagnosis Date     Actinic keratosis      Cancer (H)      Heart disease      History of blood transfusion      History of nonmelanoma skin cancer      Unspecified essential hypertension      Past Surgical History:   Procedure Laterality Date     C APPENDECTOMY  1951     C EXPLOR HEART SURG WND W CP BYPASS  10/25/01    4 way heart bypass     COLONOSCOPY  12/10/08     COLONOSCOPY N/A 3/7/2019    Procedure: COMBINED COLONOSCOPY,  MULTIPLE POLYPECTOMY BY BIOPSY;  Surgeon: Brayden Sharma DO;  Location: [facility]     EXCISE MASS HAND Right 02/2017    \"skin cancer\" removal     HC REMV CATARACT EXTRACAP,INSERT LENS  10/21/2004    left     HC REMV CATARACT EXTRACAP,INSERT LENS  11/18/2004    right     HC YAG LASER CAPSULOTOMY  06/18/09    right eye     HEART CATH, ANGIOPLASTY  4/30/12    3 stents     HEMILAMINECTOMY, DISCECTOMY LUMBAR THREE LEVELS, COMBINED  6/26/2013    Procedure: COMBINED HEMILAMINECTOMY, DISCECTOMY LUMBAR THREE LEVELS;  Bilateral L3, L4 and L5 Guille-Laminectomies;  Surgeon: Ollie Dodson MD;  Location:  OR     Social History:  Reviewed and unchanged but kept in chart for clinician convenience  The patient is retired. He denies use of tanning beds. He has never been .  He has no children. He lives in [place]. Has a girlfriend.     Family History:  Kept in chart for clinician convenience  The patient reports a family history of melanoma in his father.  The patient denies family history of asthma, psoriasis, eczema or seasonal allergies.    Medications:  Current Outpatient Medications   Medication Sig Dispense " Refill     aspirin 81 MG tablet Take 1 tablet (81 mg) by mouth daily 100 tablet 0     atorvastatin (LIPITOR) 40 MG tablet Take 40 mg by mouth daily       carvedilol (COREG) 25 MG tablet Take 25 mg by mouth 2 times daily (with meals).       cetirizine (ZYRTEC) 10 MG tablet Take 1 tablet (10 mg) by mouth daily as needed for allergies (nasal congestion) 30 tablet 0     clopidogrel (PLAVIX) 75 MG tablet Take 1 tablet by mouth daily. 90 tablet 3     fluticasone (FLONASE) 50 MCG/ACT nasal spray Spray 2 sprays into both nostrils daily 1 g 6     lisinopril (PRINIVIL,ZESTRIL) 5 MG tablet Take 2.5 mg by mouth 2 times daily        mirtazapine (REMERON) 15 MG tablet Take 1 tablet (15 mg) by mouth At Bedtime 30 tablet 5     nitroglycerin (NITROSTAT) 0.4 MG SL tablet Place 1 tablet (0.4 mg) under the tongue every 5 minutes as needed 25 tablet 1     Omega-3 Fatty Acids (FISH OIL) 1000 MG CPDR Take 1 capsule by mouth 2 times daily.       Allergies   Allergen Reactions     No Known Drug Allergies      Review of Systems:   -Skin: Denies bleeding, crusting or changing lesions.   -Const: The patient is generally feeling well today. Denies fevers, chills or night sweats.     Physical exam:  There were no vitals taken for this visit.  GEN: This is a well-nourished, well developed male in no acute distress  SKIN: Total skin excluding the undergarment areas was performed. The exam included the head/face, neck, both arms, chest, back, abdomen, both legs, digits and/or nails.   - There are pink scaly patches and plaques on the back  - There are waxy stuck on tan to brown papules on the trunk and extremities   -No other lesions of concern on areas examined.     Impression/Plan:  1. Family history of melanoma    2. History of nonmelanoma skin cancer, no clincial evidence of recurrence:  Sun precaution was advised including the use of sun screens of SPF 30 or higher, and sun protective clothing.    3. History of Actinic keratoses:     4.    Eczema - back, mild    Start triamcinolone - apply BID for 1 week    Recommend moisturizers     5. SKs     No further intervention needed       Follow up in 6 months, earlier for new or changing lesions.     Staff Involved:  Scribe/Staff    Scribe Disclosure  I, Ladonna Joe, am serving as a scribe to document services personally performed by Dr. Joceline Stone MD, based on data collection and the provider's statements to me.     Provider Disclosure:   The documentation recorded by the scribe accurately reflects the services I personally performed and the decisions made by me.    Joceline Stone MD    Department of Dermatology  [facility]: Phone: [phone], Fax:[phone]  [facility]: Phone: [phone], Fax: [phone]            Again, thank you for allowing me to participate in the care of your patient.        Sincerely,        Joceline Stone MD

## 2019-11-15 ENCOUNTER — TELEPHONE (OUTPATIENT)
Dept: FAMILY MEDICINE | Facility: CLINIC | Age: 84
End: 2019-11-15

## 2019-11-15 NOTE — TELEPHONE ENCOUNTER
Reason for Call:  Same Day Appointment, Requested Provider:  Kevin Vaughn MD    PCP: Kevin Vaughn    Reason for visit: constipation x 1-2 wks/sinus inf -pt stated he has had issues on and off    Duration of symptoms:     Have you been treated for this in the past? No    Additional comments:     Can we leave a detailed message on this number? YES    Phone number patient can be reached at: [phone]    Best Time:     Call taken on 11/15/2019 at 8:45 AM by Jenny Sheikh

## 2019-11-18 ENCOUNTER — OFFICE VISIT (OUTPATIENT)
Dept: FAMILY MEDICINE | Facility: CLINIC | Age: 84
End: 2019-11-18
Payer: MEDICARE

## 2019-11-18 VITALS
BODY MASS INDEX: 25.94 KG/M2 | HEART RATE: 68 BPM | RESPIRATION RATE: 16 BRPM | TEMPERATURE: 96.9 F | WEIGHT: 171.1 LBS | OXYGEN SATURATION: 99 % | DIASTOLIC BLOOD PRESSURE: 82 MMHG | SYSTOLIC BLOOD PRESSURE: 138 MMHG

## 2019-11-18 DIAGNOSIS — I43 CARDIOMYOPATHY IN DISEASES CLASSIFIED ELSEWHERE (H): ICD-10-CM

## 2019-11-18 DIAGNOSIS — J01.90 SUBACUTE SINUSITIS, UNSPECIFIED LOCATION: Primary | ICD-10-CM

## 2019-11-18 DIAGNOSIS — I25.9 CHRONIC ISCHEMIC HEART DISEASE: ICD-10-CM

## 2019-11-18 DIAGNOSIS — E78.5 HYPERLIPIDEMIA LDL GOAL <100: ICD-10-CM

## 2019-11-18 DIAGNOSIS — I10 HYPERTENSION GOAL BP (BLOOD PRESSURE) < 140/90: ICD-10-CM

## 2019-11-18 DIAGNOSIS — R73.9 ELEVATED BLOOD SUGAR: ICD-10-CM

## 2019-11-18 LAB
ALBUMIN SERPL-MCNC: 3.7 G/DL (ref 3.4–5)
ALP SERPL-CCNC: 82 U/L (ref 40–150)
ALT SERPL W P-5'-P-CCNC: 18 U/L (ref 0–70)
ANION GAP SERPL CALCULATED.3IONS-SCNC: 5 MMOL/L (ref 3–14)
AST SERPL W P-5'-P-CCNC: 11 U/L (ref 0–45)
BILIRUB SERPL-MCNC: 0.5 MG/DL (ref 0.2–1.3)
BUN SERPL-MCNC: 11 MG/DL (ref 7–30)
CALCIUM SERPL-MCNC: 9.1 MG/DL (ref 8.5–10.1)
CHLORIDE SERPL-SCNC: 106 MMOL/L (ref 94–109)
CHOLEST SERPL-MCNC: 181 MG/DL
CO2 SERPL-SCNC: 29 MMOL/L (ref 20–32)
CREAT SERPL-MCNC: 0.95 MG/DL (ref 0.66–1.25)
ERYTHROCYTE [DISTWIDTH] IN BLOOD BY AUTOMATED COUNT: 11.7 % (ref 10–15)
GFR SERPL CREATININE-BSD FRML MDRD: 74 ML/MIN/{1.73_M2}
GLUCOSE SERPL-MCNC: 194 MG/DL (ref 70–99)
HCT VFR BLD AUTO: 43.2 % (ref 40–53)
HDLC SERPL-MCNC: 50 MG/DL
HGB BLD-MCNC: 14.9 G/DL (ref 13.3–17.7)
LDLC SERPL CALC-MCNC: 107 MG/DL
MCH RBC QN AUTO: 31.9 PG (ref 26.5–33)
MCHC RBC AUTO-ENTMCNC: 34.5 G/DL (ref 31.5–36.5)
MCV RBC AUTO: 93 FL (ref 78–100)
NONHDLC SERPL-MCNC: 131 MG/DL
PLATELET # BLD AUTO: 220 10E9/L (ref 150–450)
POTASSIUM SERPL-SCNC: 4 MMOL/L (ref 3.4–5.3)
PROT SERPL-MCNC: 7.5 G/DL (ref 6.8–8.8)
RBC # BLD AUTO: 4.67 10E12/L (ref 4.4–5.9)
SODIUM SERPL-SCNC: 140 MMOL/L (ref 133–144)
TRIGL SERPL-MCNC: 118 MG/DL
WBC # BLD AUTO: 6.1 10E9/L (ref 4–11)

## 2019-11-18 PROCEDURE — 85027 COMPLETE CBC AUTOMATED: CPT | Performed by: FAMILY MEDICINE

## 2019-11-18 PROCEDURE — 99214 OFFICE O/P EST MOD 30 MIN: CPT | Performed by: FAMILY MEDICINE

## 2019-11-18 PROCEDURE — 80061 LIPID PANEL: CPT | Performed by: FAMILY MEDICINE

## 2019-11-18 PROCEDURE — 83036 HEMOGLOBIN GLYCOSYLATED A1C: CPT | Performed by: FAMILY MEDICINE

## 2019-11-18 PROCEDURE — 36415 COLL VENOUS BLD VENIPUNCTURE: CPT | Performed by: FAMILY MEDICINE

## 2019-11-18 PROCEDURE — 80053 COMPREHEN METABOLIC PANEL: CPT | Performed by: FAMILY MEDICINE

## 2019-11-18 RX ORDER — CEPHALEXIN 500 MG/1
500 CAPSULE ORAL 2 TIMES DAILY
Qty: 20 CAPSULE | Refills: 0 | Status: SHIPPED | OUTPATIENT
Start: 2019-11-18 | End: 2020-01-27

## 2019-11-18 ASSESSMENT — PAIN SCALES - GENERAL: PAINLEVEL: NO PAIN (0)

## 2019-11-18 NOTE — PATIENT INSTRUCTIONS
We will fax labs to your doctor  Stay active  Take antibiotics as ordered   Let me know if sinus does not clear.  Stool softener is ok every day if needed.  Stimulants are not.  Milk of magnesia is a stimulant. Every 3rd for this  Prune juice is a safe stimulant

## 2019-11-18 NOTE — PROGRESS NOTES
"Subjective     Ulices Shah Jr. is a 84 year old male who presents to clinic today for the following health issues:    HPI   Acute Illness   Acute illness concerns: sinus congestion  Onset: \"a long time\"    Fever: no    Chills/Sweats: no    Headache (location?): no    Sinus Pressure:YES    Conjunctivitis:  no    Ear Pain: no    Rhinorrhea: YES    Congestion: YES    Sore Throat: no     Cough: no    Wheeze: no    Decreased Appetite: no    Nausea: no    Vomiting: no    Diarrhea:  no    Dysuria/Freq.: no    Fatigue/Achiness: no    Sick/Strep Exposure: no     Therapies Tried and outcome: none    Patient describes a lot of green mucus coming from the left this with drainage down the back of his throat lasting for weeks.  Started with typical type cold.  He has history of sinus issues.  Otherwise feeling well and doing well.  He walks briskly and daily through the halls of his apartment.  He will walk on a treadmill this winter as well.  Patient tries to watch his weight and actually has been losing weight.  He has known cardiac issues but since bypass almost 20 years ago he had stenting a few years ago he has been doing well.  His legs do not swell.  Takes medications as prescribed.    Recent Labs   Lab Test 11/18/19  1617 06/27/17  0846 03/22/17  0852 02/15/16  1349 01/11/16  0951  03/19/13  0810   A1C  --  6.9* 8.3* 6.9*  --   --   --    *  --  83  --  78   < > 73   HDL 50  --  43  --  43   < > 39*   TRIG 118  --  97  --  124   < > 131   ALT 18  --   --   --   --   --  23   CR 0.95  --  0.90  --  0.90   < >  --    GFRESTIMATED 74  --  81  --  81   < >  --    GFRESTBLACK 86  --  >90   GFR Calc    --  >90   GFR Calc     < >  --    POTASSIUM 4.0  --  4.2  --  4.4   < >  --     < > = values in this interval not displayed.      BP Readings from Last 3 Encounters:   11/18/19 138/82   03/07/19 142/79   02/27/19 138/78    Wt Readings from Last 3 Encounters:   11/18/19 77.6 kg (171 lb " 1.6 oz)   02/27/19 79.9 kg (176 lb 3.2 oz)   02/18/19 80.6 kg (177 lb 12.8 oz)                      Reviewed and updated as needed this visit by Provider         Review of Systems   ROS COMP: Constitutional, HEENT, cardiovascular, pulmonary, gi and gu systems are negative, except as otherwise noted.      Objective    /82   Pulse 68   Temp 96.9  F (36.1  C) (Temporal)   Resp 16   Wt 77.6 kg (171 lb 1.6 oz)   SpO2 99%   BMI 25.94 kg/m    Body mass index is 25.94 kg/m .  Physical Exam   GENERAL: healthy, alert and no distress  EYES: Eyes grossly normal to inspection, PERRL and conjunctivae and sclerae normal  HENT: normal cephalic/atraumatic, oral mucous membranes moist and injection with posterior pharyngeal drainage and with moderate nasal redness and mucosal swelling left nostril.  Right is less red.  Septum made deviated to the  left.  NECK: no adenopathy, no asymmetry, masses, or scars and thyroid normal to palpation  RESP: lungs clear to auscultation - no rales, rhonchi or wheezes  CV: regular rate and rhythm, normal S1 S2, no S3 or S4, no murmur, click or rub, no peripheral edema and peripheral pulses strong  ABDOMEN: soft, nontender, no hepatosplenomegaly, no masses and bowel sounds normal  MS: no gross musculoskeletal defects noted, no edema  SKIN: no suspicious lesions or rashes  NEURO: Normal strength and tone, mentation intact and speech normal  PSYCH: mentation appears normal, affect normal/bright    Diagnostic Test Results:  Labs reviewed in Epic  Results for orders placed or performed in visit on 11/18/19   Lipid panel reflex to direct LDL Fasting     Status: Abnormal   Result Value Ref Range    Cholesterol 181 <200 mg/dL    Triglycerides 118 <150 mg/dL    HDL Cholesterol 50 >39 mg/dL    LDL Cholesterol Calculated 107 (H) <100 mg/dL    Non HDL Cholesterol 131 (H) <130 mg/dL   Comprehensive metabolic panel (BMP + Alb, Alk Phos, ALT, AST, Total. Bili, TP)     Status: Abnormal   Result Value Ref  Range    Sodium 140 133 - 144 mmol/L    Potassium 4.0 3.4 - 5.3 mmol/L    Chloride 106 94 - 109 mmol/L    Carbon Dioxide 29 20 - 32 mmol/L    Anion Gap 5 3 - 14 mmol/L    Glucose 194 (H) 70 - 99 mg/dL    Urea Nitrogen 11 7 - 30 mg/dL    Creatinine 0.95 0.66 - 1.25 mg/dL    GFR Estimate 74 >60 mL/min/[1.73_m2]    GFR Estimate If Black 86 >60 mL/min/[1.73_m2]    Calcium 9.1 8.5 - 10.1 mg/dL    Bilirubin Total 0.5 0.2 - 1.3 mg/dL    Albumin 3.7 3.4 - 5.0 g/dL    Protein Total 7.5 6.8 - 8.8 g/dL    Alkaline Phosphatase 82 40 - 150 U/L    ALT 18 0 - 70 U/L    AST 11 0 - 45 U/L   CBC with platelets     Status: None   Result Value Ref Range    WBC 6.1 4.0 - 11.0 10e9/L    RBC Count 4.67 4.4 - 5.9 10e12/L    Hemoglobin 14.9 13.3 - 17.7 g/dL    Hematocrit 43.2 40.0 - 53.0 %    MCV 93 78 - 100 fl    MCH 31.9 26.5 - 33.0 pg    MCHC 34.5 31.5 - 36.5 g/dL    RDW 11.7 10.0 - 15.0 %    Platelet Count 220 150 - 450 10e9/L         Results for orders placed or performed in visit on 11/18/19   Lipid panel reflex to direct LDL Fasting     Status: Abnormal   Result Value Ref Range    Cholesterol 181 <200 mg/dL    Triglycerides 118 <150 mg/dL    HDL Cholesterol 50 >39 mg/dL    LDL Cholesterol Calculated 107 (H) <100 mg/dL    Non HDL Cholesterol 131 (H) <130 mg/dL   Comprehensive metabolic panel (BMP + Alb, Alk Phos, ALT, AST, Total. Bili, TP)     Status: Abnormal   Result Value Ref Range    Sodium 140 133 - 144 mmol/L    Potassium 4.0 3.4 - 5.3 mmol/L    Chloride 106 94 - 109 mmol/L    Carbon Dioxide 29 20 - 32 mmol/L    Anion Gap 5 3 - 14 mmol/L    Glucose 194 (H) 70 - 99 mg/dL    Urea Nitrogen 11 7 - 30 mg/dL    Creatinine 0.95 0.66 - 1.25 mg/dL    GFR Estimate 74 >60 mL/min/[1.73_m2]    GFR Estimate If Black 86 >60 mL/min/[1.73_m2]    Calcium 9.1 8.5 - 10.1 mg/dL    Bilirubin Total 0.5 0.2 - 1.3 mg/dL    Albumin 3.7 3.4 - 5.0 g/dL    Protein Total 7.5 6.8 - 8.8 g/dL    Alkaline Phosphatase 82 40 - 150 U/L    ALT 18 0 - 70 U/L    AST  11 0 - 45 U/L   CBC with platelets     Status: None   Result Value Ref Range    WBC 6.1 4.0 - 11.0 10e9/L    RBC Count 4.67 4.4 - 5.9 10e12/L    Hemoglobin 14.9 13.3 - 17.7 g/dL    Hematocrit 43.2 40.0 - 53.0 %    MCV 93 78 - 100 fl    MCH 31.9 26.5 - 33.0 pg    MCHC 34.5 31.5 - 36.5 g/dL    RDW 11.7 10.0 - 15.0 %    Platelet Count 220 150 - 450 10e9/L   Hemoglobin A1c     Status: Abnormal   Result Value Ref Range    Hemoglobin A1C 11.0 (H) 0 - 5.6 %   .  Fax lab   Assessment & Plan     1. Subacute sinusitis, unspecified location  Patient most probably has persistent subacute sinus infection.  However with blood sugar and hemoglobin A1c elevated, diabetes may be contributing as he may have trouble clearing infections.  - cephALEXin (KEFLEX) 500 MG capsule; Take 1 capsule (500 mg) by mouth 2 times daily  Dispense: 20 capsule; Refill: 0    2. Cardiomyopathy in diseases classified elsewhere (H)  Cardiac status is doing very well.  No changes planned.  - Lipid panel reflex to direct LDL Fasting  - Hemoglobin A1c    3. Chronic ischemic heart disease  Currently stable with no symptoms and very active gentleman    4. Elevated blood sugar  At this stage we will let him know his hemoglobin A1c is elevated and he may have diabetes.  Medication will be offered with follow-up in about 2 months.  - Hemoglobin A1c    5. Hypertension goal BP (blood pressure) < 140/90  Controlled adequately.  Laboratory is good.  - Comprehensive metabolic panel (BMP + Alb, Alk Phos, ALT, AST, Total. Bili, TP)  - CBC with platelets    6. Hyperlipidemia LDL goal <100  Laboratory is good and will continue current medication  - Lipid panel reflex to direct LDL Fasting  - Hemoglobin A1c       Patient Instructions   We will fax labs to your doctor  Stay active  Take antibiotics as ordered   Let me know if sinus does not clear.  Stool softener is ok every day if needed.  Stimulants are not.  Milk of magnesia is a stimulant. Every 3rd for  this  Prune juice is a safe stimulant      With elevated blood sugar and hemoglobin A1c plans and changed.  I will suggest metformin once daily, close attention to diet and follow-up in 2 to 3 months instead of the 6 months as noted below.  Dictated on November 20, 2019    Return in about 6 months (around 5/18/2020) for BP Recheck.    Kevin Vaughn MD  [facility]

## 2019-11-19 LAB — HBA1C MFR BLD: 11 % (ref 0–5.6)

## 2019-11-20 ENCOUNTER — TELEPHONE (OUTPATIENT)
Dept: FAMILY MEDICINE | Facility: CLINIC | Age: 84
End: 2019-11-20

## 2019-11-20 DIAGNOSIS — R73.9 ELEVATED BLOOD SUGAR: Primary | ICD-10-CM

## 2019-11-20 NOTE — TELEPHONE ENCOUNTER
Please call patient and tell him most of his labs were unremarkable except his blood sugar is high and and based on a more definitive test, it has been high for at least 3 months.  This test is hemoglobin A1c which is measured at 11 and would need to be under 7.  This may indicate he has diabetes.  My suggestion is to  start a medication called metformin 500 mg once daily in the a.m. and follow-up with lab work in about 2 months.  I believe at his visit he indicated he already has lost weight.  He should continue to watch his diet, be active and moderate sugary treats.  There is no need to test blood sugars if he would ask.  Kevin Vaughn MD

## 2019-11-20 NOTE — TELEPHONE ENCOUNTER
Patient was informed that most of his labs were unremarkable except his blood sugar is high and based on a more definitive test, it has been high for at least 3 months. This test is hemoglobin A1C which is measured at 11 and would need to be under 7. This may indicate he has diabetes. My suggestion is to start a medication called metformin 500 mg once daily in the am and follow up with lab work in about 2 months. Dr. Vaughn believes that you had indicated he already has lost weight. He should continue to watch his diet, be active and moderate sugary treats. Patient states he would like the medication sent to [facility] in [place]. He is scheduled on 1/20/20 for his lab work.    Lab orders are placed and medication is teed up. Please review and approve.    Terrence Marcus, CMA

## 2020-01-20 DIAGNOSIS — E78.5 HYPERLIPIDEMIA LDL GOAL <100: ICD-10-CM

## 2020-01-20 DIAGNOSIS — I10 HYPERTENSION GOAL BP (BLOOD PRESSURE) < 140/90: ICD-10-CM

## 2020-01-20 DIAGNOSIS — R73.9 ELEVATED BLOOD SUGAR: ICD-10-CM

## 2020-01-20 LAB
CHOLEST SERPL-MCNC: 135 MG/DL
ERYTHROCYTE [DISTWIDTH] IN BLOOD BY AUTOMATED COUNT: 12.7 % (ref 10–15)
HBA1C MFR BLD: 7.8 % (ref 0–5.6)
HCT VFR BLD AUTO: 41.1 % (ref 40–53)
HDLC SERPL-MCNC: 47 MG/DL
HGB BLD-MCNC: 13.7 G/DL (ref 13.3–17.7)
LDLC SERPL CALC-MCNC: 71 MG/DL
MCH RBC QN AUTO: 31.6 PG (ref 26.5–33)
MCHC RBC AUTO-ENTMCNC: 33.3 G/DL (ref 31.5–36.5)
MCV RBC AUTO: 95 FL (ref 78–100)
NONHDLC SERPL-MCNC: 88 MG/DL
PLATELET # BLD AUTO: 195 10E9/L (ref 150–450)
RBC # BLD AUTO: 4.34 10E12/L (ref 4.4–5.9)
TRIGL SERPL-MCNC: 87 MG/DL
WBC # BLD AUTO: 6.3 10E9/L (ref 4–11)

## 2020-01-20 PROCEDURE — 85027 COMPLETE CBC AUTOMATED: CPT | Performed by: FAMILY MEDICINE

## 2020-01-20 PROCEDURE — 80061 LIPID PANEL: CPT | Performed by: FAMILY MEDICINE

## 2020-01-20 PROCEDURE — 36415 COLL VENOUS BLD VENIPUNCTURE: CPT | Performed by: FAMILY MEDICINE

## 2020-01-20 PROCEDURE — 83036 HEMOGLOBIN GLYCOSYLATED A1C: CPT | Mod: QW | Performed by: FAMILY MEDICINE

## 2020-01-21 ENCOUNTER — TELEPHONE (OUTPATIENT)
Dept: FAMILY MEDICINE | Facility: CLINIC | Age: 85
End: 2020-01-21

## 2020-01-21 DIAGNOSIS — R73.9 ELEVATED BLOOD SUGAR: Primary | ICD-10-CM

## 2020-01-21 NOTE — TELEPHONE ENCOUNTER
----- Message from Kevin Vaughn MD sent at 1/21/2020 11:18 AM CST -----  Letter will be mailed to patient indicating labs are normal or the values out of normal are insignificant, lab values will be included. He has done well. I should see him in 3 months with repeat HGBA1C for lab.

## 2020-01-21 NOTE — TELEPHONE ENCOUNTER
Letter mailed.     Lab orders placed for future. Please approve and then close encounter.    Terrence Marcus, CMA

## 2020-01-21 NOTE — LETTER
January 21, 2020      Ulices J Alyssa Jr.  [address]   [place] [place] [address]        Dear ,    We are writing to inform you of your test results.    Your results indicate labs are normal or the values out of normal are insignificant, lab values will be included. You have done well. You should return in 3 months with repeat HGBA1C for lab.     Resulted Orders   **A1C FUTURE anytime   Result Value Ref Range    Hemoglobin A1C 7.8 (H) 0 - 5.6 %      Comment:      Normal <5.7% Prediabetes 5.7-6.4%  Diabetes 6.5% or higher - adopted from ADA   consensus guidelines.     Lipid panel reflex to direct LDL Fasting   Result Value Ref Range    Cholesterol 135 <200 mg/dL    Triglycerides 87 <150 mg/dL      Comment:      Fasting specimen    HDL Cholesterol 47 >39 mg/dL    LDL Cholesterol Calculated 71 <100 mg/dL      Comment:      Desirable:       <100 mg/dl    Non HDL Cholesterol 88 <130 mg/dL   **CBC with platelets FUTURE anytime   Result Value Ref Range    WBC 6.3 4.0 - 11.0 10e9/L    RBC Count 4.34 (L) 4.4 - 5.9 10e12/L    Hemoglobin 13.7 13.3 - 17.7 g/dL    Hematocrit 41.1 40.0 - 53.0 %    MCV 95 78 - 100 fl    MCH 31.6 26.5 - 33.0 pg    MCHC 33.3 31.5 - 36.5 g/dL    RDW 12.7 10.0 - 15.0 %    Platelet Count 195 150 - 450 10e9/L       If you have any questions or concerns, please call the clinic at the number listed above.       Sincerely,        Kevin Brayden Vaughn MD

## 2020-01-27 ENCOUNTER — TELEPHONE (OUTPATIENT)
Dept: FAMILY MEDICINE | Facility: CLINIC | Age: 85
End: 2020-01-27

## 2020-01-27 ENCOUNTER — OFFICE VISIT (OUTPATIENT)
Dept: FAMILY MEDICINE | Facility: CLINIC | Age: 85
End: 2020-01-27
Payer: MEDICARE

## 2020-01-27 VITALS
HEIGHT: 68 IN | TEMPERATURE: 97.3 F | RESPIRATION RATE: 16 BRPM | BODY MASS INDEX: 26.19 KG/M2 | WEIGHT: 172.8 LBS | DIASTOLIC BLOOD PRESSURE: 72 MMHG | OXYGEN SATURATION: 100 % | HEART RATE: 68 BPM | SYSTOLIC BLOOD PRESSURE: 130 MMHG

## 2020-01-27 DIAGNOSIS — I25.9 CHRONIC ISCHEMIC HEART DISEASE: ICD-10-CM

## 2020-01-27 DIAGNOSIS — J01.00 ACUTE NON-RECURRENT MAXILLARY SINUSITIS: Primary | ICD-10-CM

## 2020-01-27 DIAGNOSIS — R73.9 ELEVATED BLOOD SUGAR: ICD-10-CM

## 2020-01-27 PROCEDURE — 99214 OFFICE O/P EST MOD 30 MIN: CPT | Performed by: FAMILY MEDICINE

## 2020-01-27 RX ORDER — CEPHALEXIN 500 MG/1
500 CAPSULE ORAL 3 TIMES DAILY
Qty: 30 CAPSULE | Refills: 0 | Status: SHIPPED | OUTPATIENT
Start: 2020-01-27 | End: 2020-09-10

## 2020-01-27 ASSESSMENT — MIFFLIN-ST. JEOR: SCORE: 1459.91

## 2020-01-27 NOTE — PROGRESS NOTES
"Subjective     Ulices Shah Jr. is a 84 year old male who presents to clinic today for the following health issues:    HPI     Patient c/o being \"stuffed up\" a couple of times a day. He blows his nose and then he's better. He has been treated for this in November and patient states that it went away and then came back. He's been struggling with this for about 6 weeks now.         Acute Illness   Acute illness concerns: Sinus problem   Onset: 6 weeks    Fever: no    Chills/Sweats: no    Headache (location?): no    Sinus Pressure:no    Conjunctivitis:  no    Ear Pain: no    Rhinorrhea: YES    Congestion: no    Sore Throat: no     Cough: no    Wheeze: no    Decreased Appetite: no    Nausea: no    Vomiting: no    Diarrhea:  no    Dysuria/Freq.: no    Fatigue/Achiness: no    Sick/Strep Exposure: no     Therapies Tried and outcome: Patient has been treated with an antibiotic     SUBJECTIVE:  Ulices  is a 84 year old male who presents for: Symptoms as noted above.  Drainage is yellow and green from his nose.  Using some form of Afrin nasal spray at night to open him up his nose just swells shut.  History of heart disease and diabetes.  Has to be careful when he takes for medication.    Past Medical History:   Diagnosis Date     Actinic keratosis      Cancer (H)      Heart disease      History of blood transfusion      History of nonmelanoma skin cancer      Unspecified essential hypertension      Past Surgical History:   Procedure Laterality Date     C APPENDECTOMY  1951     C EXPLOR HEART SURG WND W CP BYPASS  10/25/01    4 way heart bypass     COLONOSCOPY  12/10/08     COLONOSCOPY N/A 3/7/2019    Procedure: COMBINED COLONOSCOPY,  MULTIPLE POLYPECTOMY BY BIOPSY;  Surgeon: Brayden Sharma DO;  Location: [facility]     EXCISE MASS HAND Right 02/2017    \"skin cancer\" removal     HC REMV CATARACT EXTRACAP,INSERT LENS, W/O ECP  10/21/2004    left     HC REMV CATARACT EXTRACAP,INSERT LENS, W/O ECP  11/18/2004    right "     HC YAG LASER CAPSULOTOMY  06/18/09    right eye     HEART CATH, ANGIOPLASTY  4/30/12    3 stents     HEMILAMINECTOMY, DISCECTOMY LUMBAR THREE LEVELS, COMBINED  6/26/2013    Procedure: COMBINED HEMILAMINECTOMY, DISCECTOMY LUMBAR THREE LEVELS;  Bilateral L3, L4 and L5 Guille-Laminectomies;  Surgeon: Ollie Dodson MD;  Location: [facility]     Social History     Tobacco Use     Smoking status: Never Smoker     Smokeless tobacco: Never Used   Substance Use Topics     Alcohol use: Yes     Alcohol/week: 0.0 standard drinks     Comment: rare, maybe 3 beers all year.     Current Outpatient Medications   Medication Sig Dispense Refill     aspirin 81 MG tablet Take 1 tablet (81 mg) by mouth daily 100 tablet 0     atorvastatin (LIPITOR) 40 MG tablet Take 40 mg by mouth daily       carvedilol (COREG) 25 MG tablet Take 25 mg by mouth 2 times daily (with meals).       cephALEXin (KEFLEX) 500 MG capsule Take 1 capsule (500 mg) by mouth 3 times daily 30 capsule 0     cetirizine (ZYRTEC) 10 MG tablet Take 1 tablet (10 mg) by mouth daily as needed for allergies (nasal congestion) 30 tablet 0     clopidogrel (PLAVIX) 75 MG tablet Take 1 tablet by mouth daily. 90 tablet 3     fluticasone (FLONASE) 50 MCG/ACT nasal spray Spray 2 sprays into both nostrils daily 1 g 6     lisinopril (PRINIVIL,ZESTRIL) 5 MG tablet Take 2.5 mg by mouth 2 times daily        metFORMIN (GLUCOPHAGE) 500 MG tablet Take 1 tablet (500 mg) by mouth daily (with breakfast) 90 tablet 0     mirtazapine (REMERON) 15 MG tablet Take 1 tablet (15 mg) by mouth At Bedtime 30 tablet 5     Omega-3 Fatty Acids (FISH OIL) 1000 MG CPDR Take 1 capsule by mouth 2 times daily.       nitroglycerin (NITROSTAT) 0.4 MG SL tablet Place 1 tablet (0.4 mg) under the tongue every 5 minutes as needed (Patient not taking: Reported on 11/18/2019) 25 tablet 1     triamcinolone (KENALOG) 0.1 % external cream Apply twice daily for 1 week to rash on back (Patient not taking: Reported on  "11/18/2019) 80 g 0       REVIEW OF SYSTEMS:   5 point ROS negative except as noted above in HPI, including Gen., Resp, CV, GI &  system review.     OBJECTIVE:  Vitals: /72 (BP Location: Right arm, Patient Position: Sitting, Cuff Size: Adult Regular)   Pulse 68   Temp 97.3  F (36.3  C) (Temporal)   Resp 16   Ht 1.73 m (5' 8.1\")   Wt 78.4 kg (172 lb 12.8 oz)   SpO2 100%   BMI 26.20 kg/m    BMI= Body mass index is 26.2 kg/m .  There is no distress.  Eyes are noted to be watering.  Nose is very congested.  Slightly tender to percussion over the upper maxillary sinuses.  Ears are clear.  Throat with some drainage.  Neck supple no adenopathy.  Lungs are clear.  Heart regular rhythm 2/6 systolic murmur heard.  Skin clear.    ASSESSMENT:  #1 acute sinusitis #2 history of heart disease    PLAN:  We will put him back on Keflex 500 mg now 3 times a day.  He states this helped him last fall.  His blood pressure is fine and his pulse is fine but he probably should avoid systemic decongestants.  Warned him and he knows not to use that Afrin nasal spray very often recommended he uses Nasacort spray.  Follow-up if not improving.        Cricket Lamar MD  [facility]            "

## 2020-02-25 ENCOUNTER — OFFICE VISIT (OUTPATIENT)
Dept: DERMATOLOGY | Facility: CLINIC | Age: 85
End: 2020-02-25
Payer: MEDICARE

## 2020-02-25 DIAGNOSIS — Z85.828 HISTORY OF NONMELANOMA SKIN CANCER: Primary | ICD-10-CM

## 2020-02-25 DIAGNOSIS — D48.5 NEOPLASM OF UNCERTAIN BEHAVIOR OF SKIN: ICD-10-CM

## 2020-02-25 PROCEDURE — 99213 OFFICE O/P EST LOW 20 MIN: CPT | Mod: 25 | Performed by: DERMATOLOGY

## 2020-02-25 PROCEDURE — 88305 TISSUE EXAM BY PATHOLOGIST: CPT | Mod: TC | Performed by: DERMATOLOGY

## 2020-02-25 PROCEDURE — 11102 TANGNTL BX SKIN SINGLE LES: CPT | Performed by: DERMATOLOGY

## 2020-02-25 ASSESSMENT — PAIN SCALES - GENERAL: PAINLEVEL: NO PAIN (0)

## 2020-02-25 NOTE — NURSING NOTE
The following medication was given:     MEDICATION:  Lidocaine with epinephrine 1% [id number]  ROUTE: SQ  SITE: see procedure note  DOSE: 0.5cc  LOT #: -[id number]  : Daniel  EXPIRATION DATE: 1-nov-2020  NDC#: 4650-0161-46   Was there drug waste? 1.5cc  Multi-dose vial: Yes    Sabina Kothari LPN  February 25, 2020

## 2020-02-25 NOTE — PROGRESS NOTES
[facility] Dermatology Note      Dermatology Problem List:  1. Family history of melanoma  2. 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 Actinic keratoses:   -s/p cryotherapy  -s/p Efudex cream   4. Mild eczema  -s/p triamcinolone  5. NUB, L sideburn  -s/p biopsy 02/25/20      Encounter Date: Feb 25, 2020    CC:  Chief Complaint   Patient presents with     Skin Check     no areas of concern hx NMSC       History of Present Illness:  Mr. Ulices Shah Jr. is a 84 year old male with a history of NMSC who presents as a follow-up for history of NMSC. He was last seen in dermatology on 8/20/2019 when he started triamcinolone for mild eczema on the back.     Today patient reports no new spots of concern. Feeling well overall. No changes in medical issues.     No other concerns.    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     History of heart bypass surgery     Atherosclerosis of coronary artery  "    Persistent insomnia     Elevated blood sugar     Heart murmur, systolic     History of nonmelanoma skin cancer     Cardiomyopathy in diseases classified elsewhere (H)     Past Medical History:   Diagnosis Date     Actinic keratosis      Cancer (H)      Heart disease      History of blood transfusion      History of nonmelanoma skin cancer      Unspecified essential hypertension      Past Surgical History:   Procedure Laterality Date     C APPENDECTOMY  1951     C EXPLOR HEART SURG WND W CP BYPASS  10/25/01    4 way heart bypass     COLONOSCOPY  12/10/08     COLONOSCOPY N/A 3/7/2019    Procedure: COMBINED COLONOSCOPY,  MULTIPLE POLYPECTOMY BY BIOPSY;  Surgeon: Brayden Sharma DO;  Location:  GI     EXCISE MASS HAND Right 02/2017    \"skin cancer\" removal     HC REMV CATARACT EXTRACAP,INSERT LENS, W/O ECP  10/21/2004    left     HC REMV CATARACT EXTRACAP,INSERT LENS, W/O ECP  11/18/2004    right     HC YAG LASER CAPSULOTOMY  06/18/09    right eye     HEART CATH, ANGIOPLASTY  4/30/12    3 stents     HEMILAMINECTOMY, DISCECTOMY LUMBAR THREE LEVELS, COMBINED  6/26/2013    Procedure: COMBINED HEMILAMINECTOMY, DISCECTOMY LUMBAR THREE LEVELS;  Bilateral L3, L4 and L5 Guille-Laminectomies;  Surgeon: Ollie Dodson MD;  Location:  OR     Social History:  Reviewed and kept  The patient is retired. He denies use of tanning beds. He has never been .  He has no children. He lives in [place]. Has a girlfriend. . Lives in a [facility]    Family History:  Kept in chart for clinician convenience  The patient reports a family history of melanoma in his father.  The patient denies family history of asthma, psoriasis, eczema or seasonal allergies.    Medications:  Current Outpatient Medications   Medication Sig Dispense Refill     aspirin 81 MG tablet Take 1 tablet (81 mg) by mouth daily 100 tablet 0     atorvastatin (LIPITOR) 40 MG tablet Take 40 mg by mouth daily       carvedilol (COREG) 25 MG " tablet Take 25 mg by mouth 2 times daily (with meals).       cetirizine (ZYRTEC) 10 MG tablet Take 1 tablet (10 mg) by mouth daily as needed for allergies (nasal congestion) 30 tablet 0     clopidogrel (PLAVIX) 75 MG tablet Take 1 tablet by mouth daily. 90 tablet 3     fluticasone (FLONASE) 50 MCG/ACT nasal spray Spray 2 sprays into both nostrils daily 1 g 6     lisinopril (PRINIVIL,ZESTRIL) 5 MG tablet Take 2.5 mg by mouth 2 times daily        metFORMIN (GLUCOPHAGE) 500 MG tablet Take 1 tablet (500 mg) by mouth daily (with breakfast) 90 tablet 0     mirtazapine (REMERON) 15 MG tablet Take 1 tablet (15 mg) by mouth At Bedtime 30 tablet 5     Omega-3 Fatty Acids (FISH OIL) 1000 MG CPDR Take 1 capsule by mouth 2 times daily.       cephALEXin (KEFLEX) 500 MG capsule Take 1 capsule (500 mg) by mouth 3 times daily (Patient not taking: Reported on 2/25/2020) 30 capsule 0     nitroglycerin (NITROSTAT) 0.4 MG SL tablet Place 1 tablet (0.4 mg) under the tongue every 5 minutes as needed (Patient not taking: Reported on 11/18/2019) 25 tablet 1     triamcinolone (KENALOG) 0.1 % external cream Apply twice daily for 1 week to rash on back (Patient not taking: Reported on 11/18/2019) 80 g 0     Allergies   Allergen Reactions     No Known Drug Allergies      Review of Systems:   -Skin: Denies bleeding, crusting or changing lesions.   -Const: The patient is generally feeling well today. Denies fevers, chills or night sweats.     Physical exam:  There were no vitals taken for this visit.  GEN: This is a well-nourished, well developed male in no acute distress  SKIN: Full skin, which includes the head/face, both arms, chest, back, abdomen,both legs, genitalia and/or groin buttocks, digits and/or nails, was examined.   -There is a well healed surgical scar without erythema, nodularity or telangiectasias on the sites of previous skin cancer.   -1 cm red patch with telangiectasias, L sideburn  -No other lesions of concern on areas  examined.     Impression/Plan:  1. Family history of melanoma  -Continue regular skin checks    2. History of nonmelanoma skin cancer  -Sun precaution was advised including the use of sun screens of SPF 30 or higher, and sun protective clothing.    3. History of Actinic keratoses  -no evidence of recurrence today    4.   Eczema - back, mild - resolved    5. NUB, 1 cm red patch with telangiectasias, L sideburn Ddx: BCC vs SK  -Shave biopsy:  After discussion of benefits and risks including but not limited to bleeding/bruising, pain/swelling, infection, scar, incomplete removal, nerve damage/numbness, recurrence, and non-diagnostic biopsy, written consent, verbal consent and photographs were obtained. Time-out was performed. The area was cleaned with isopropyl alcohol. 0.5mL of 1% lidocaine with epinephrine was injected to obtain adequate anesthesia of the lesion on the left sideburn. A shave biopsy was performed. Hemostasis was achieved with aluminium chloride. Vaseline and a sterile dressing were applied. The patient tolerated the procedure and no complications were noted. The patient was provided with verbal and written post care instructions.         Follow up in 6 months , earlier for new or changing lesions.     Staff Involved:  Scribe/Staff    Scribe Disclosure  I, Jarrod Combs, am serving as a scribe to document services personally performed by Dr. Joceline Stone MD, based on data collection and the provider's statements to me.    Provider Disclosure:   The documentation recorded by the scribe accurately reflects the services I personally performed and the decisions made by me.    Joceline Stone MD    Department of Dermatology  [facility]: Phone: [phone], Fax:[phone]  [facility]: Phone: [phone], Fax: [phone]

## 2020-02-25 NOTE — NURSING NOTE
Ulices Shah Jr.'s goals for this visit include:   Chief Complaint   Patient presents with     Skin Check     no areas of concern hx NMSC       He requests these members of his care team be copied on today's visit information:     PCP: Kevin Vaughn    Referring Provider:  No referring provider defined for this encounter.    There were no vitals taken for this visit.    Do you need any medication refills at today's visit? Debbi Kothari LPN

## 2020-02-25 NOTE — PATIENT INSTRUCTIONS

## 2020-02-25 NOTE — LETTER
2/25/2020         RE: Ulices Shah Jr.  [address]  [facility] [address]        Dear Colleague,    Thank you for referring your patient, Ulices Shah Jr., to the [facility]. Please see a copy of my visit note below.    [facility] Dermatology Note      Dermatology Problem List:  1. Family history of melanoma  2. 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 Actinic keratoses:   -s/p cryotherapy  -s/p Efudex cream   4. Mild eczema  -s/p triamcinolone  5. NUB, L sideburn  -s/p biopsy 02/25/20      Encounter Date: Feb 25, 2020    CC:  Chief Complaint   Patient presents with     Skin Check     no areas of concern hx NMSC       History of Present Illness:  Mr. Ulices Shah Jr. is a 84 year old male with a history of NMSC who presents as a follow-up for history of NMSC. He was last seen in dermatology on 8/20/2019 when he started triamcinolone for mild eczema on the back.     Today patient reports no new spots of concern. Feeling well overall. No changes in medical issues.     No other concerns.    Past Medical History:   Patient Active Problem List   Diagnosis     Chronic ischemic heart disease     DDD (degenerative disc disease), cervical     Hypertension goal BP (blood pressure) < 140/90     Hyperlipidemia LDL goal <100     Advanced directives, counseling/discussion     Spinal stenosis, lumbar region, without neurogenic claudication     History of basal cell carcinoma     Basal cell carcinoma of left ear (superior helix) s/p [facility] 9-2-14     Basal cell  "carcinoma of left nasolabial fold s/p mms 9-2-14     Basal cell carcinoma of right mid cheek s/p mms 9-2-14     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     History of heart bypass surgery     Atherosclerosis of coronary artery     Persistent insomnia     Elevated blood sugar     Heart murmur, systolic     History of nonmelanoma skin cancer     Cardiomyopathy in diseases classified elsewhere (H)     Past Medical History:   Diagnosis Date     Actinic keratosis      Cancer (H)      Heart disease      History of blood transfusion      History of nonmelanoma skin cancer      Unspecified essential hypertension      Past Surgical History:   Procedure Laterality Date     C APPENDECTOMY  1951     C EXPLOR HEART SURG WND W CP BYPASS  10/25/01    4 way heart bypass     COLONOSCOPY  12/10/08     COLONOSCOPY N/A 3/7/2019    Procedure: COMBINED COLONOSCOPY,  MULTIPLE POLYPECTOMY BY BIOPSY;  Surgeon: Brayden Sharma DO;  Location:  GI     EXCISE MASS HAND Right 02/2017    \"skin cancer\" removal     HC REMV CATARACT EXTRACAP,INSERT LENS, W/O ECP  10/21/2004    left     HC REMV CATARACT EXTRACAP,INSERT LENS, W/O ECP  11/18/2004    right     HC YAG LASER CAPSULOTOMY  06/18/09    right eye     HEART CATH, ANGIOPLASTY  4/30/12    3 stents     HEMILAMINECTOMY, DISCECTOMY LUMBAR THREE LEVELS, COMBINED  6/26/2013    Procedure: COMBINED HEMILAMINECTOMY, DISCECTOMY LUMBAR THREE LEVELS;  Bilateral L3, L4 and L5 Guille-Laminectomies;  Surgeon: Ollie Dodson MD;  Location:  OR     Social History:  Reviewed and kept  The patient is retired. He denies use of tanning beds. He has never been .  He has no children. He lives in [place]. Has a girlfriend. . Lives in a [employer] [facility]    Family History:  Kept in chart for clinician convenience  The patient reports a family history of melanoma in his father.  The patient denies family history of asthma, psoriasis, eczema or seasonal " allergies.    Medications:  Current Outpatient Medications   Medication Sig Dispense Refill     aspirin 81 MG tablet Take 1 tablet (81 mg) by mouth daily 100 tablet 0     atorvastatin (LIPITOR) 40 MG tablet Take 40 mg by mouth daily       carvedilol (COREG) 25 MG tablet Take 25 mg by mouth 2 times daily (with meals).       cetirizine (ZYRTEC) 10 MG tablet Take 1 tablet (10 mg) by mouth daily as needed for allergies (nasal congestion) 30 tablet 0     clopidogrel (PLAVIX) 75 MG tablet Take 1 tablet by mouth daily. 90 tablet 3     fluticasone (FLONASE) 50 MCG/ACT nasal spray Spray 2 sprays into both nostrils daily 1 g 6     lisinopril (PRINIVIL,ZESTRIL) 5 MG tablet Take 2.5 mg by mouth 2 times daily        metFORMIN (GLUCOPHAGE) 500 MG tablet Take 1 tablet (500 mg) by mouth daily (with breakfast) 90 tablet 0     mirtazapine (REMERON) 15 MG tablet Take 1 tablet (15 mg) by mouth At Bedtime 30 tablet 5     Omega-3 Fatty Acids (FISH OIL) 1000 MG CPDR Take 1 capsule by mouth 2 times daily.       cephALEXin (KEFLEX) 500 MG capsule Take 1 capsule (500 mg) by mouth 3 times daily (Patient not taking: Reported on 2/25/2020) 30 capsule 0     nitroglycerin (NITROSTAT) 0.4 MG SL tablet Place 1 tablet (0.4 mg) under the tongue every 5 minutes as needed (Patient not taking: Reported on 11/18/2019) 25 tablet 1     triamcinolone (KENALOG) 0.1 % external cream Apply twice daily for 1 week to rash on back (Patient not taking: Reported on 11/18/2019) 80 g 0     Allergies   Allergen Reactions     No Known Drug Allergies      Review of Systems:   -Skin: Denies bleeding, crusting or changing lesions.   -Const: The patient is generally feeling well today. Denies fevers, chills or night sweats.     Physical exam:  There were no vitals taken for this visit.  GEN: This is a well-nourished, well developed male in no acute distress  SKIN: Full skin, which includes the head/face, both arms, chest, back, abdomen,both legs, genitalia and/or groin  buttocks, digits and/or nails, was examined.   -There is a well healed surgical scar without erythema, nodularity or telangiectasias on the sites of previous skin cancer.   -1 cm red patch with telangiectasias, L sideburn  -No other lesions of concern on areas examined.     Impression/Plan:  1. Family history of melanoma  -Continue regular skin checks    2. History of nonmelanoma skin cancer  -Sun precaution was advised including the use of sun screens of SPF 30 or higher, and sun protective clothing.    3. History of Actinic keratoses  -no evidence of recurrence today    4.   Eczema - back, mild - resolved    5. NUB, 1 cm red patch with telangiectasias, L sideburn Ddx: BCC vs SK  -Shave biopsy:  After discussion of benefits and risks including but not limited to bleeding/bruising, pain/swelling, infection, scar, incomplete removal, nerve damage/numbness, recurrence, and non-diagnostic biopsy, written consent, verbal consent and photographs were obtained. Time-out was performed. The area was cleaned with isopropyl alcohol. 0.5mL of 1% lidocaine with epinephrine was injected to obtain adequate anesthesia of the lesion on the left sideburn. A shave biopsy was performed. Hemostasis was achieved with aluminium chloride. Vaseline and a sterile dressing were applied. The patient tolerated the procedure and no complications were noted. The patient was provided with verbal and written post care instructions.         Follow up in 6 months , earlier for new or changing lesions.     Staff Involved:  Scribe/Staff    Scribe Disclosure  I, Jarrod Combs, am serving as a scribe to document services personally performed by Dr. Joceline Stone MD, based on data collection and the provider's statements to me.    Provider Disclosure:   The documentation recorded by the scribe accurately reflects the services I personally performed and the decisions made by me.    Joceline Stone MD    Department of  Dermatology  [facility]: Phone: [phone], Fax:[phone]  [facility]: Phone: [phone], Fax: [phone]                Again, thank you for allowing me to participate in the care of your patient.        Sincerely,        Joceline Stone MD

## 2020-02-28 LAB — COPATH REPORT: NORMAL

## 2020-03-02 ENCOUNTER — TELEPHONE (OUTPATIENT)
Dept: DERMATOLOGY | Facility: CLINIC | Age: 85
End: 2020-03-02

## 2020-03-02 NOTE — TELEPHONE ENCOUNTER
Notes recorded by Ninfa Lee LPN on 3/2/2020 at 10:25 AM CST  Called pt and made aware of results. Pt verbalized understanding and had no questions or concerns. Appt scheduled for June 2nd to recheck spot.    Ninfa Lee LPN    ------    Notes recorded by Ninfa Lee LPN on 2/28/2020 at 2:15 PM CST  Attempted to call pt to make aware of results but no answer. No voice mail set up.    Ninfa Lee LPN    ------    Notes recorded by Ligia Vaca MD on 2/28/2020 at 2:08 PM CST  Precancer, recheck at 3 months   Dermatological path order and indications   Order: [id number]   Status:  Final result   Visible to patient:  No (Not Released) Dx:  Neoplasm of uncertain behavior of skin   Component 6d ago   Copath Report Patient Name: GENA CABELLO   MR#: [id number]   Specimen #: [id number]   Collected: 2/25/2020   Received: 2/26/2020   Reported: 2/28/2020 14:06   Ordering Phy(s): LIGIA VACA     For improved result formatting, select 'View Enhanced Report Format' under    Linked Documents section.     SPECIMEN(S):   Skin, left sideburn shave     FINAL DIAGNOSIS:   Skin, left sideburn shave:   - Hypertrophic actinic keratosis - (see comment)

## 2020-09-09 DIAGNOSIS — I10 HYPERTENSION GOAL BP (BLOOD PRESSURE) < 140/90: ICD-10-CM

## 2020-09-09 DIAGNOSIS — R73.9 ELEVATED BLOOD SUGAR: ICD-10-CM

## 2020-09-09 LAB
ALBUMIN SERPL-MCNC: 3.6 G/DL (ref 3.4–5)
ALP SERPL-CCNC: 67 U/L (ref 40–150)
ALT SERPL W P-5'-P-CCNC: 20 U/L (ref 0–70)
ANION GAP SERPL CALCULATED.3IONS-SCNC: 3 MMOL/L (ref 3–14)
AST SERPL W P-5'-P-CCNC: 15 U/L (ref 0–45)
BILIRUB SERPL-MCNC: 0.5 MG/DL (ref 0.2–1.3)
BUN SERPL-MCNC: 14 MG/DL (ref 7–30)
CALCIUM SERPL-MCNC: 8.7 MG/DL (ref 8.5–10.1)
CHLORIDE SERPL-SCNC: 107 MMOL/L (ref 94–109)
CO2 SERPL-SCNC: 29 MMOL/L (ref 20–32)
CREAT SERPL-MCNC: 0.89 MG/DL (ref 0.66–1.25)
GFR SERPL CREATININE-BSD FRML MDRD: 79 ML/MIN/{1.73_M2}
GLUCOSE SERPL-MCNC: 136 MG/DL (ref 70–99)
HBA1C MFR BLD: 6.8 % (ref 0–5.6)
POTASSIUM SERPL-SCNC: 4.2 MMOL/L (ref 3.4–5.3)
PROT SERPL-MCNC: 7.5 G/DL (ref 6.8–8.8)
SODIUM SERPL-SCNC: 139 MMOL/L (ref 133–144)

## 2020-09-09 PROCEDURE — 80053 COMPREHEN METABOLIC PANEL: CPT | Performed by: FAMILY MEDICINE

## 2020-09-09 PROCEDURE — 36415 COLL VENOUS BLD VENIPUNCTURE: CPT | Performed by: FAMILY MEDICINE

## 2020-09-09 PROCEDURE — 83036 HEMOGLOBIN GLYCOSYLATED A1C: CPT | Mod: QW | Performed by: FAMILY MEDICINE

## 2020-09-10 ENCOUNTER — OFFICE VISIT (OUTPATIENT)
Dept: FAMILY MEDICINE | Facility: CLINIC | Age: 85
End: 2020-09-10
Payer: MEDICARE

## 2020-09-10 VITALS
DIASTOLIC BLOOD PRESSURE: 64 MMHG | WEIGHT: 169.8 LBS | TEMPERATURE: 97.6 F | BODY MASS INDEX: 25.74 KG/M2 | SYSTOLIC BLOOD PRESSURE: 114 MMHG | HEART RATE: 64 BPM | RESPIRATION RATE: 8 BRPM | OXYGEN SATURATION: 100 %

## 2020-09-10 DIAGNOSIS — M47.892 OTHER OSTEOARTHRITIS OF SPINE, CERVICAL REGION: ICD-10-CM

## 2020-09-10 DIAGNOSIS — R73.9 ELEVATED BLOOD SUGAR: Primary | ICD-10-CM

## 2020-09-10 PROCEDURE — 99214 OFFICE O/P EST MOD 30 MIN: CPT | Performed by: FAMILY MEDICINE

## 2020-09-10 ASSESSMENT — PAIN SCALES - GENERAL: PAINLEVEL: NO PAIN (0)

## 2020-09-10 NOTE — PROGRESS NOTES
Subjective     Ulices Shah Jr. is a 85 year old male who presents to clinic today for the following health issues:    HPI       Diabetes Follow-up      How often are you checking your blood sugar? Not at all    What concerns do you have today about your diabetes? None     Do you have any of these symptoms? (Select all that apply)  No numbness or tingling in feet.  No redness, sores or blisters on feet.  No complaints of excessive thirst.  No reports of blurry vision.  No significant changes to weight.      BP Readings from Last 2 Encounters:   01/27/20 130/72   11/18/19 138/82     Hemoglobin A1C (%)   Date Value   09/09/2020 6.8 (H)   01/20/2020 7.8 (H)     LDL Cholesterol Calculated (mg/dL)   Date Value   01/20/2020 71   11/18/2019 107 (H)           How many servings of fruits and vegetables do you eat daily?  2-3    On average, how many sweetened beverages do you drink each day (Examples: soda, juice, sweet tea, etc.  Do NOT count diet or artificially sweetened beverages)?   1    How many days per week do you exercise enough to make your heart beat faster? 6    How many minutes a day do you exercise enough to make your heart beat faster? 30 - 60    How many days per week do you miss taking your medication? 0    Neck Pain  Onset/Duration: 1 month  Description:   Location: back of neck/spine  Radiation: none  Intensity: moderate  Progression of Symptoms:  worsening and intermittent  Accompanying Signs & Symptoms:  Burning, tingling, prickly sensation in arm(s): YES- numbness of face  Numbness in arm(s): no  Weakness in arm(s):  no  Fever: no  Headache: no  Nausea and/or vomiting: no  History:   Trauma: no  Previous neck pain: no  Previous surgery or injections: no  Previous Imaging (MRI,X ray): no  Precipitating or alleviating factors: patient heard cracking noise when moving next, this has now go away and patient thinks that means the bones have smoothed out  Does movement impact the pain:  YES  Therapies  "tried and outcome: rest/inactivity    SUBJECTIVE:  Ulices  is a 85 year old male who presents for: Discussion of his lab work that he recently had done.  He had elevated glucose and elevated globin A1c in the past and his primary care provider wanted him to come in and have these checked again.  He has been taking metformin.  Treated for hypertension elevated lipids and sees cardiology who is following what he describes as a leaky valve.  He is very active.  He has bicycled 1200 miles so far this year.  Every day.  Feels great after this.  In the winter he goes up and down 30 flights of steps at his senior living building.  No complaints right now regarding energy.  Only complaint he has is his neck.  He knows he has arthritis here.  He was getting loud clicking noise earlier this summer when he would rotate.  This is gotten better now.  Denies any numbness or tingling or weakness down his arms.    Past Medical History:   Diagnosis Date     Actinic keratosis      Cancer (H)      Heart disease      History of blood transfusion      History of nonmelanoma skin cancer      Unspecified essential hypertension      Past Surgical History:   Procedure Laterality Date     C APPENDECTOMY  1951     C EXPLOR HEART SURG WND W CP BYPASS  10/25/01    4 way heart bypass     COLONOSCOPY  12/10/08     COLONOSCOPY N/A 3/7/2019    Procedure: COMBINED COLONOSCOPY,  MULTIPLE POLYPECTOMY BY BIOPSY;  Surgeon: Brayden Sharma DO;  Location: [facility]     EXCISE MASS HAND Right 02/2017    \"skin cancer\" removal     HC REMV CATARACT EXTRACAP,INSERT LENS, W/O ECP  10/21/2004    left     HC REMV CATARACT EXTRACAP,INSERT LENS, W/O ECP  11/18/2004    right     HC YAG LASER CAPSULOTOMY  06/18/09    right eye     HEART CATH, ANGIOPLASTY  4/30/12    3 stents     HEMILAMINECTOMY, DISCECTOMY LUMBAR THREE LEVELS, COMBINED  6/26/2013    Procedure: COMBINED HEMILAMINECTOMY, DISCECTOMY LUMBAR THREE LEVELS;  Bilateral L3, L4 and L5 " Guille-Laminectomies;  Surgeon: Ollie Dodson MD;  Location: [facility]     Social History     Tobacco Use     Smoking status: Never Smoker     Smokeless tobacco: Never Used   Substance Use Topics     Alcohol use: Yes     Alcohol/week: 0.0 standard drinks     Comment: rare, maybe 3 beers all year.     Current Outpatient Medications   Medication Sig Dispense Refill     aspirin 81 MG tablet Take 1 tablet (81 mg) by mouth daily 100 tablet 0     atorvastatin (LIPITOR) 40 MG tablet Take 40 mg by mouth daily       carvedilol (COREG) 25 MG tablet Take 25 mg by mouth 2 times daily (with meals).       cetirizine (ZYRTEC) 10 MG tablet Take 1 tablet (10 mg) by mouth daily as needed for allergies (nasal congestion) 30 tablet 0     clopidogrel (PLAVIX) 75 MG tablet Take 1 tablet by mouth daily. 90 tablet 3     fluticasone (FLONASE) 50 MCG/ACT nasal spray Spray 2 sprays into both nostrils daily 1 g 6     lisinopril (PRINIVIL,ZESTRIL) 5 MG tablet Take 2.5 mg by mouth 2 times daily        metFORMIN (GLUCOPHAGE) 500 MG tablet Take 1 tablet (500 mg) by mouth daily (with breakfast) 90 tablet 0     mirtazapine (REMERON) 15 MG tablet Take 1 tablet (15 mg) by mouth At Bedtime 30 tablet 5     Omega-3 Fatty Acids (FISH OIL) 1000 MG CPDR Take 1 capsule by mouth 2 times daily.       nitroglycerin (NITROSTAT) 0.4 MG SL tablet Place 1 tablet (0.4 mg) under the tongue every 5 minutes as needed (Patient not taking: Reported on 11/18/2019) 25 tablet 1       REVIEW OF SYSTEMS:   5 point ROS negative except as noted above in HPI, including Gen., Resp, CV, GI &  system review.     OBJECTIVE:  Vitals: /64   Pulse 64   Temp 97.6  F (36.4  C) (Temporal)   Resp 8   Wt 77 kg (169 lb 12.8 oz)   SpO2 100%   BMI 25.74 kg/m    BMI= Body mass index is 25.74 kg/m .  He is alert and talkative.  His neck with good range of motion some crepitance noted no real tenderness.   strength is normal.  Lungs are clear.  Heart with a regular rhythm  S1-S2 a 2/3 high-pitched murmur systolic is heard.  Extremities with no edema.  Hemoglobin A1c is down to 6.8 from 7 months ago down from 11 9 months ago.  His blood sugar was 136 fasting.    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  Doing very well.  He does not want to pursue anything with the neck at this time.  Did offer him physical therapy or further evaluation with imaging if he develops any kind of radiculopathy and he understands that.  Follow-up with him in about 6 months.        Cricket Lamar MD  [employer]             [phone]   CELL         [phone]   [facility]         [phone]

## 2020-09-18 ENCOUNTER — OFFICE VISIT (OUTPATIENT)
Dept: DERMATOLOGY | Facility: CLINIC | Age: 85
End: 2020-09-18
Payer: MEDICARE

## 2020-09-18 DIAGNOSIS — Z85.828 HISTORY OF NONMELANOMA SKIN CANCER: ICD-10-CM

## 2020-09-18 DIAGNOSIS — D48.5 NEOPLASM OF UNCERTAIN BEHAVIOR OF SKIN: Primary | ICD-10-CM

## 2020-09-18 PROCEDURE — 11103 TANGNTL BX SKIN EA SEP/ADDL: CPT | Performed by: DERMATOLOGY

## 2020-09-18 PROCEDURE — 17000 DESTRUCT PREMALG LESION: CPT | Mod: 59 | Performed by: DERMATOLOGY

## 2020-09-18 PROCEDURE — 99213 OFFICE O/P EST LOW 20 MIN: CPT | Mod: 25 | Performed by: DERMATOLOGY

## 2020-09-18 PROCEDURE — 11102 TANGNTL BX SKIN SINGLE LES: CPT | Performed by: DERMATOLOGY

## 2020-09-18 PROCEDURE — 17003 DESTRUCT PREMALG LES 2-14: CPT | Performed by: DERMATOLOGY

## 2020-09-18 PROCEDURE — 88305 TISSUE EXAM BY PATHOLOGIST: CPT | Mod: TC | Performed by: DERMATOLOGY

## 2020-09-18 ASSESSMENT — PAIN SCALES - GENERAL: PAINLEVEL: NO PAIN (0)

## 2020-09-18 NOTE — PROGRESS NOTES
Ulices Shah Jr.'s goals for this visit include:   Chief Complaint   Patient presents with     Skin Check     Personal hx NMSC, no family hx SC. Not immunosuppressed. Areas of concern: none       He requests these members of his care team be copied on today's visit information: no    PCP: Kevin Vaughn    Referring Provider:  No referring provider defined for this encounter.    There were no vitals taken for this visit.    Do you need any medication refills at today's visit? No  Farrah Hooker LPN

## 2020-09-18 NOTE — LETTER
9/18/2020         RE: Ulices Shah Jr.  [address]  [place] [address]        Dear Colleague,    Thank you for referring your patient, Ulices Shah Jr., to the [facility]. Please see a copy of my visit note below.    Ulices Shah Jr.'s goals for this visit include:   Chief Complaint   Patient presents with     Skin Check     Personal hx NMSC, no family hx SC. Not immunosuppressed. Areas of concern: none       He requests these members of his care team be copied on today's visit information: no    PCP: Kevin Vaughn    Referring Provider:  No referring provider defined for this encounter.    There were no vitals taken for this visit.    Do you need any medication refills at today's visit? No  Farrah Hooker LPN        [facility] Dermatology Note      Dermatology Problem List:  1. Family history of melanoma  2. 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 Actinic keratoses:   -HAK, left sideburn s/p biopsy 2/25/2020  -s/p cryotherapy  -s/p Efudex cream   4. Mild eczema, back  -s/p triamcinolone      Encounter Date: Sep 18, 2020    CC:  Chief Complaint   Patient presents with     Skin Check     Personal hx NMSC, no family hx SC. Not immunosuppressed. Areas of concern: none       History of Present Illness:  Mr. Ulices Shah Jr. is a 85 year old male who presents as a follow-up for history of NMSC. The patient was last seen on 2/25/2020 when a biopsy was taken on the left sideburn, results showed a HAK.     Today patient reports no specific  "areas of concern.      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     History of heart bypass surgery     Atherosclerosis of coronary artery     Persistent insomnia     Elevated blood sugar     Heart murmur, systolic     History of nonmelanoma skin cancer     Cardiomyopathy in diseases classified elsewhere (H)     Other osteoarthritis of spine, cervical region     Past Medical History:   Diagnosis Date     Actinic keratosis      Cancer (H)      Heart disease      History of blood transfusion      History of nonmelanoma skin cancer      Unspecified essential hypertension      Past Surgical History:   Procedure Laterality Date     C APPENDECTOMY  1951     C EXPLOR HEART SURG WND W CP BYPASS  10/25/01    4 way heart bypass     COLONOSCOPY  12/10/08     COLONOSCOPY N/A 3/7/2019    Procedure: COMBINED COLONOSCOPY,  MULTIPLE POLYPECTOMY BY BIOPSY;  Surgeon: Brayden Sharma, DO;  Location: [facility]     EXCISE MASS HAND Right 02/2017    \"skin cancer\" removal     HC REMV CATARACT EXTRACAP,INSERT LENS, W/O ECP  10/21/2004    left     HC REMV CATARACT EXTRACAP,INSERT LENS, W/O ECP  11/18/2004    right     HC YAG LASER CAPSULOTOMY  06/18/09    right eye     HEART CATH, ANGIOPLASTY  4/30/12    3 stents     HEMILAMINECTOMY, DISCECTOMY LUMBAR THREE LEVELS, COMBINED  6/26/2013    Procedure: COMBINED HEMILAMINECTOMY, DISCECTOMY LUMBAR " THREE LEVELS;  Bilateral L3, L4 and L5 Guille-Laminectomies;  Surgeon: Ollie Dodson MD;  Location: [facility]     Social History:  Reviewed and kept  The patient is retired. He denies use of tanning beds. He has never been .  He has no children. He lives in [place]. Has a girlfriend. . [facility] in a [facility]    Family History:  Kept in chart for clinician convenience  The patient reports a family history of melanoma in his father.  The patient denies family history of asthma, psoriasis, eczema or seasonal allergies.    Medications:  Current Outpatient Medications   Medication Sig Dispense Refill     aspirin 81 MG tablet Take 1 tablet (81 mg) by mouth daily 100 tablet 0     atorvastatin (LIPITOR) 40 MG tablet Take 40 mg by mouth daily       carvedilol (COREG) 25 MG tablet Take 25 mg by mouth 2 times daily (with meals).       cetirizine (ZYRTEC) 10 MG tablet Take 1 tablet (10 mg) by mouth daily as needed for allergies (nasal congestion) 30 tablet 0     clopidogrel (PLAVIX) 75 MG tablet Take 1 tablet by mouth daily. 90 tablet 3     fluticasone (FLONASE) 50 MCG/ACT nasal spray Spray 2 sprays into both nostrils daily 1 g 6     lisinopril (PRINIVIL,ZESTRIL) 5 MG tablet Take 2.5 mg by mouth 2 times daily        metFORMIN (GLUCOPHAGE) 500 MG tablet Take 1 tablet (500 mg) by mouth daily (with breakfast) 90 tablet 0     mirtazapine (REMERON) 15 MG tablet Take 1 tablet (15 mg) by mouth At Bedtime 30 tablet 5     nitroglycerin (NITROSTAT) 0.4 MG SL tablet Place 1 tablet (0.4 mg) under the tongue every 5 minutes as needed 25 tablet 1     Omega-3 Fatty Acids (FISH OIL) 1000 MG CPDR Take 1 capsule by mouth 2 times daily.       Allergies   Allergen Reactions     No Known Drug Allergies      Review of Systems:   -Skin: Denies bleeding, crusting or changing lesions.   -Const: The patient is generally feeling well today. Denies fevers, chills or night sweats.     Physical exam:  There were no vitals taken for this  visit.  GEN: This is a well-nourished, well developed male in no acute distress  SKIN: Full skin, which includes the head/face, both arms, chest, back, abdomen,both legs, genitalia and/or groin  External buttocks, digits and/or nails, was examined.   -NUB-red 3mm tender papule- ak or scc, left postauricular  -stuck on papule, left shoulder- 1cm stuck on  -right upper back 1cm waxy papule, but irregular shape and 1cm  There is/are 4 erythematous macules with overlying adherent scale on the right antitragus, left superior helix, left cheek  -scar on right side burn  -No other lesions of concern on areas examined.     Impression/Plan:  1. Family history of melanoma    Recommend sunscreens SPF #30 or greater, protective clothing and avoidance of tanning beds.    ABCD's of melanoma were reviewed with patient         2. History of nonmelanoma skin cancer    Sun precaution was advised including the use of sun screens of SPF 30 or higher, and sun protective clothing.    3. Aks- right antitragus, left superior helix, left cheek  Cryotherapy procedure note: After verbal consent and discussion of risks and benefits including but no limited to dyspigmentation/scar, blister, and pain, 4was(were) treated with 1-2mm freeze border for 2 cycles with liquid nitrogen. Post cryotherapy instructions were provided.     4. Biopsy proven HAK, left sideburn  -resolved    5. NUB-left shoulder-Sk other  -Shave biopsy:  After discussion of benefits and risks including but not limited to bleeding/bruising, pain/swelling, infection, scar, incomplete removal, nerve damage/numbness, recurrence, and non-diagnostic biopsy, written consent, verbal consent and photographs were obtained. Time-out was performed. The area was cleaned with isopropyl alcohol. 0.5mL of 1% lidocaine with epinephrine was injected to obtain adequate anesthesia of the lesion on the left shoulder. A shave biopsy was performed. Hemostasis was achieved with aluminium chloride.  Vaseline and a sterile dressing were applied. The patient tolerated the procedure and no complications were noted. The patient was provided with verbal and written post care instructions.       6. NUB- right upper back -sk irina ther    7.NUB-red 3mm tender papule- ak or scc, left postauricular   -Shave biopsy:  After discussion of benefits and risks including but not limited to bleeding/bruising, pain/swelling, infection, scar, incomplete removal, nerve damage/numbness, recurrence, and non-diagnostic biopsy, written consent, verbal consent and photographs were obtained. Time-out was performed. The area was cleaned with isopropyl alcohol. 0.5mL of 1% lidocaine with epinephrine was injected to obtain adequate anesthesia of the lesion on the left post auricular. A shave biopsy was performed. Hemostasis was achieved with aluminium chloride. Vaseline and a sterile dressing were applied. The patient tolerated the procedure and no complications were noted. The patient was provided with verbal and written post care instructions.         Follow up in 1 year in person earlier for new or changing lesions.     Staff Involved:  Scribe/Staff    Scribe Disclosure  I, Ladonna Daiz, am serving as a scribe to document services personally performed by Dr. Joceline Stone MD, based on data collection and the provider's statements to me.     Provider Disclosure:   The documentation recorded by the scribe accurately reflects the services I personally performed and the decisions made by me.    Joceline Stone MD    Department of Dermatology  [facility]: Phone: [phone], Fax:[phone]  [facility]: Phone: [phone], Fax: [phone]              Again, thank you for allowing me to participate in the care of your patient.        Sincerely,        Joceline Stone MD

## 2020-09-18 NOTE — PATIENT INSTRUCTIONS
Cryotherapy    What is it?    Use of a very cold liquid, such as liquid nitrogen, to freeze and destroy abnormal skin cells that need to be removed    What should I expect?    Tenderness and redness    A small blister that might grow and fill with dark purple blood. There may be crusting.    More than one treatment may be needed if the lesions do not go away.    How do I care for the treated area?    Gently wash the area with your hands when bathing.    Use a thin layer of Vaseline to help with healing. You may use a Band-Aid.     The area should heal within 7-10 days and may leave behind a pink or lighter color.     Do not use an antibiotic or Neosporin ointment.     You may take acetaminophen (Tylenol) for pain.     Call your Doctor if you have:    Severe pain    Signs of infection (warmth, redness, cloudy yellow drainage, and or a bad smell)    Questions or concerns    Who should I call with questions?       [facility]: [phone]       [facility]: [phone]       For urgent needs outside of business hours call the [facility] at [phone]        and ask for the dermatology resident on call  Wound Care After a Biopsy    What is a skin biopsy?  A skin biopsy allows the doctor to examine a very small piece of tissue under the microscope to determine the diagnosis and the best treatment for the skin condition. A local anesthetic (numbing medicine)  is injected with a very small needle into the skin area to be tested. A small piece of skin is taken from the area. Sometimes a suture (stitch) is used.     What are the risks of a skin biopsy?  I will experience scar, bleeding, swelling, pain, crusting and redness. I may experience incomplete removal or recurrence. Risks of this procedure are excessive bleeding, bruising, infection, nerve damage, numbness, thick (hypertrophic or keloidal) scar and non-diagnostic biopsy.    How should I care for  my wound for the first 24 hours?    Keep the wound dry and covered for 24 hours    If it bleeds, hold direct pressure on the area for 15 minutes. If bleeding does not stop then go to the emergency room    Avoid strenuous exercise the first 1-2 days or as your doctor instructs you    How should I care for the wound after 24 hours?    After 24 hours, remove the bandage    You may bathe or shower as normal    If you had a scalp biopsy, you can shampoo as usual and can use shower water to clean the biopsy site daily    Clean the wound twice a day with gentle soap and water    Do not scrub, be gentle    Apply white petroleum/Vaseline after cleaning the wound with a cotton swab or a clean finger, and keep the site covered with a Bandaid /bandage. Bandages are not necessary with a scalp biopsy    If you are unable to cover the site with a Bandaid /bandage, re-apply ointment 2-3 times a day to keep the site moist. Moisture will help with healing    Avoid strenuous activity for first 1-2 days    Avoid lakes, rivers, pools, and oceans until the stitches are removed or the site is healed    How do I clean my wound?    Wash hands thoroughly with soap or use hand  before all wound care    Clean the wound with gentle soap and water    Apply white petroleum/Vaseline  to wound after it is clean    Replace the Bandaid /bandage to keep the wound covered for the first few days or as instructed by your doctor    If you had a scalp biopsy, warm shower water to the area on a daily basis should suffice    What should I use to clean my wound?     Cotton-tipped applicators (Qtips )    White petroleum jelly (Vaseline ). Use a clean new container and use Q-tips to apply.    Bandaids   as needed    Gentle soap     How should I care for my wound long term?    Do not get your wound dirty    Keep up with wound care for one week or until the area is healed.    A small scab will form and fall off by itself when the area is completely  healed. The area will be red and will become pink in color as it heals. Sun protection is very important for how your scar will turn out. Sunscreen with an SPF 30 or greater is recommended once the area is healed.      You should have some soreness but it should be mild and slowly go away over several days. Talk to your doctor about using tylenol for pain,    When should I call my doctor?  If you have increased:     Pain or swelling    Pus or drainage (clear or slightly yellow drainage is ok)    Temperature over 100F    Spreading redness or warmth around wound    When will I hear about my results?  The biopsy results can take 2-3 weeks to come back. The clinic will call you with the results, send you a [employer] message, or have you schedule a follow-up clinic or phone time to discuss the results. Contact our clinics if you do not hear from us in 3 weeks.     Who should I call with questions?    [facility]: [phone]     [facility]: [phone]    For urgent needs outside of business hours call the [facility] at [phone] and ask for the dermatology resident on call

## 2020-09-18 NOTE — PROGRESS NOTES
[facility] Dermatology Note      Dermatology Problem List:  1. Family history of melanoma  2. 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 Actinic keratoses:   -HAK, left sideburn s/p biopsy 2/25/2020  -s/p cryotherapy  -s/p Efudex cream   4. Mild eczema, back  -s/p triamcinolone      Encounter Date: Sep 18, 2020    CC:  Chief Complaint   Patient presents with     Skin Check     Personal hx NMSC, no family hx SC. Not immunosuppressed. Areas of concern: none       History of Present Illness:  Mr. Ulices Shah . is a 85 year old male who presents as a follow-up for history of NMSC. The patient was last seen on 2/25/2020 when a biopsy was taken on the left sideburn, results showed a HAK.     Today patient reports no specific areas of concern.      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     History of heart bypass surgery     Atherosclerosis of coronary artery     Persistent insomnia      "Elevated blood sugar     Heart murmur, systolic     History of nonmelanoma skin cancer     Cardiomyopathy in diseases classified elsewhere (H)     Other osteoarthritis of spine, cervical region     Past Medical History:   Diagnosis Date     Actinic keratosis      Cancer (H)      Heart disease      History of blood transfusion      History of nonmelanoma skin cancer      Unspecified essential hypertension      Past Surgical History:   Procedure Laterality Date     C APPENDECTOMY  1951     C EXPLOR HEART SURG WND W CP BYPASS  10/25/01    4 way heart bypass     COLONOSCOPY  12/10/08     COLONOSCOPY N/A 3/7/2019    Procedure: COMBINED COLONOSCOPY,  MULTIPLE POLYPECTOMY BY BIOPSY;  Surgeon: Brayden Sharma DO;  Location:  GI     EXCISE MASS HAND Right 02/2017    \"skin cancer\" removal     HC REMV CATARACT EXTRACAP,INSERT LENS, W/O ECP  10/21/2004    left     HC REMV CATARACT EXTRACAP,INSERT LENS, W/O ECP  11/18/2004    right     HC YAG LASER CAPSULOTOMY  06/18/09    right eye     HEART CATH, ANGIOPLASTY  4/30/12    3 stents     HEMILAMINECTOMY, DISCECTOMY LUMBAR THREE LEVELS, COMBINED  6/26/2013    Procedure: COMBINED HEMILAMINECTOMY, DISCECTOMY LUMBAR THREE LEVELS;  Bilateral L3, L4 and L5 Guille-Laminectomies;  Surgeon: Ollie Dodson MD;  Location:  OR     Social History:  Reviewed and kept  The patient is retired. He denies use of tanning beds. He has never been .  He has no children. He lives in [place]. Has a girlfriend. . Lives in a [facility]    Family History:  Kept in chart for clinician convenience  The patient reports a family history of melanoma in his father.  The patient denies family history of asthma, psoriasis, eczema or seasonal allergies.    Medications:  Current Outpatient Medications   Medication Sig Dispense Refill     aspirin 81 MG tablet Take 1 tablet (81 mg) by mouth daily 100 tablet 0     atorvastatin (LIPITOR) 40 MG tablet Take 40 mg by mouth daily       " carvedilol (COREG) 25 MG tablet Take 25 mg by mouth 2 times daily (with meals).       cetirizine (ZYRTEC) 10 MG tablet Take 1 tablet (10 mg) by mouth daily as needed for allergies (nasal congestion) 30 tablet 0     clopidogrel (PLAVIX) 75 MG tablet Take 1 tablet by mouth daily. 90 tablet 3     fluticasone (FLONASE) 50 MCG/ACT nasal spray Spray 2 sprays into both nostrils daily 1 g 6     lisinopril (PRINIVIL,ZESTRIL) 5 MG tablet Take 2.5 mg by mouth 2 times daily        metFORMIN (GLUCOPHAGE) 500 MG tablet Take 1 tablet (500 mg) by mouth daily (with breakfast) 90 tablet 0     mirtazapine (REMERON) 15 MG tablet Take 1 tablet (15 mg) by mouth At Bedtime 30 tablet 5     nitroglycerin (NITROSTAT) 0.4 MG SL tablet Place 1 tablet (0.4 mg) under the tongue every 5 minutes as needed 25 tablet 1     Omega-3 Fatty Acids (FISH OIL) 1000 MG CPDR Take 1 capsule by mouth 2 times daily.       Allergies   Allergen Reactions     No Known Drug Allergies      Review of Systems:   -Skin: Denies bleeding, crusting or changing lesions.   -Const: The patient is generally feeling well today. Denies fevers, chills or night sweats.     Physical exam:  There were no vitals taken for this visit.  GEN: This is a well-nourished, well developed male in no acute distress  SKIN: Full skin, which includes the head/face, both arms, chest, back, abdomen,both legs, genitalia and/or groin  External buttocks, digits and/or nails, was examined.   -NUB-red 3mm tender papule- ak or scc, left postauricular  -stuck on papule, left shoulder- 1cm stuck on  -right upper back 1cm waxy papule, but irregular shape and 1cm  There is/are 4 erythematous macules with overlying adherent scale on the right antitragus, left superior helix, left cheek  -scar on right side burn  -No other lesions of concern on areas examined.     Impression/Plan:  1. Family history of melanoma    Recommend sunscreens SPF #30 or greater, protective clothing and avoidance of tanning  beds.    ABCD's of melanoma were reviewed with patient         2. History of nonmelanoma skin cancer    Sun precaution was advised including the use of sun screens of SPF 30 or higher, and sun protective clothing.    3. Aks- right antitragus, left superior helix, left cheek  Cryotherapy procedure note: After verbal consent and discussion of risks and benefits including but no limited to dyspigmentation/scar, blister, and pain, 4was(were) treated with 1-2mm freeze border for 2 cycles with liquid nitrogen. Post cryotherapy instructions were provided.     4. Biopsy proven HAK, left sideburn  -resolved    5. NUB-left shoulder-Sk other  -Shave biopsy:  After discussion of benefits and risks including but not limited to bleeding/bruising, pain/swelling, infection, scar, incomplete removal, nerve damage/numbness, recurrence, and non-diagnostic biopsy, written consent, verbal consent and photographs were obtained. Time-out was performed. The area was cleaned with isopropyl alcohol. 0.5mL of 1% lidocaine with epinephrine was injected to obtain adequate anesthesia of the lesion on the left shoulder. A shave biopsy was performed. Hemostasis was achieved with aluminium chloride. Vaseline and a sterile dressing were applied. The patient tolerated the procedure and no complications were noted. The patient was provided with verbal and written post care instructions.       6. NUB- right upper back -sk or other  Shave biopsy: Location  After discussion of benefits and risks including but not limited to bleeding/bruising, pain/swelling, infection, scar, incomplete removal, nerve damage/numbness, recurrence, and non-diagnostic biopsy, written consent, verbal consent and photographs were obtained. Time-out was performed. The area was cleaned with isopropyl alcohol. 0.5mL of 1% lidocaine with epinephrine was injected to obtain adequate anesthesia of each lesion. Shave biopsy was performed. Hemostasis was achieved with aluminium  chloride. Vaseline and a sterile dressing were applied. The patient tolerated the procedure and no complications were noted. The patient was provided with verbal and written post care instructions.         7.NUB-red 3mm tender papule- ak or scc, left postauricular   -Shave biopsy:  After discussion of benefits and risks including but not limited to bleeding/bruising, pain/swelling, infection, scar, incomplete removal, nerve damage/numbness, recurrence, and non-diagnostic biopsy, written consent, verbal consent and photographs were obtained. Time-out was performed. The area was cleaned with isopropyl alcohol. 0.5mL of 1% lidocaine with epinephrine was injected to obtain adequate anesthesia of the lesion on the left post auricular. A shave biopsy was performed. Hemostasis was achieved with aluminium chloride. Vaseline and a sterile dressing were applied. The patient tolerated the procedure and no complications were noted. The patient was provided with verbal and written post care instructions.         Follow up in 1 year in person earlier for new or changing lesions.     Staff Involved:  Scribe/Staff    Scribe Disclosure  I, Ladonna Diaz, мария serving as a scribe to document services personally performed by Dr. Joceline Stone MD, based on data collection and the provider's statements to me.     Provider Disclosure:   The documentation recorded by the scribe accurately reflects the services I personally performed and the decisions made by me.    Joceline Stone MD    Department of Dermatology  [facility]: Phone: [phone], Fax:[phone]  [facility]: Phone: [phone], Fax: [phone]    ADDENDUM: procedure note added

## 2020-09-18 NOTE — NURSING NOTE
The following medication was given:     MEDICATION:  Lidocaine with epinephrine 1% 1:[id number]  ROUTE: SQ  SITE: see procedure note  DOSE: 1.5mL  LOT #: -EV  : Daniel  EXPIRATION DATE: 2/1/21  NDC#: 0985-0460-01   Was there drug waste? 1.5 ml  Multi-dose vial: Yes    Shyla Wetzel RN  September 18, 2020

## 2020-09-22 LAB — COPATH REPORT: NORMAL

## 2020-09-28 ENCOUNTER — TELEPHONE (OUTPATIENT)
Dept: DERMATOLOGY | Facility: CLINIC | Age: 85
End: 2020-09-28

## 2020-09-28 DIAGNOSIS — L57.0 ACTINIC KERATOSIS: Primary | ICD-10-CM

## 2020-09-28 RX ORDER — FLUOROURACIL 50 MG/G
CREAM TOPICAL
Qty: 40 G | Refills: 0 | Status: ON HOLD | OUTPATIENT
Start: 2020-09-28 | End: 2020-11-15

## 2020-09-28 NOTE — TELEPHONE ENCOUNTER
Provide message relayed to patient. Patient verbalized understanding.   Patient would like to try Efudex and follow up in 4 weeks after the last application.   Please send prescription to:           [facility] [address] [place] [address] [address]           [id number], MEDICAL ASSISTANT     Ligia Vaca MD sent to [facility] [place]               Parts A and B are okay.     Part C is a precancer. IF someone is at home and could help him. Efudex would be good. IF not, follow up in 3-6 months to rechekc site     After biopsy site has healed, start Efudex twice daily for 2-3 weeks or until the onset of irritation. Discuss that we would expect irritation form this medications. Recommend the patient wash hands after use or use gloves to apply. Keep medication away from pets. Avoid sun exposure to treated area. Do not occlude treated area with bandages. Follow up 4 weeks after the last application.    Associated Results     Dermatological path order and indications   Order: [id number]   Status:  Final result   Visible to patient:  No (not released) Dx:  Neoplasm of uncertain behavior of skin   Component  10d ago   Copath Report  Patient Name: GENA CABELLO   MR#: [id number]   Specimen #: [id number]   Collected: 9/18/2020   Received: 9/21/2020   Reported: 9/22/2020 17:42   Ordering Phy(s): LIGIA VACA     For improved result formatting, select 'View Enhanced Report Format' under    Linked Documents section.     SPECIMEN(S):   A: Skin, right upper back, shave   B: Skin, left shoulder, shave   C: Skin, left postauricular, shave     FINAL DIAGNOSIS:   A. Skin, right upper back, shave:   - Seborrheic keratosis - (see description)     B. Skin, left shoulder, shave:   - Seborrheic keratosis - (see description)     C. Skin, left postauricular, shave:   - Actinic keratosis

## 2020-10-13 ENCOUNTER — TELEPHONE (OUTPATIENT)
Dept: DERMATOLOGY | Facility: CLINIC | Age: 85
End: 2020-10-13

## 2020-10-13 NOTE — TELEPHONE ENCOUNTER
[facility] Call Center    Phone Message    May a detailed message be left on voicemail: yes     Reason for Call: Patient called to schedule removal of spot on skin. He says he did not apply the cream that was sent to the pharmacy because he had another health issue he was dealing with, numbness of his head and one side of his face. He went to Chiropractor and it has since resolved. He does not want to try the cream and only wants to schedule removal. Please advise. Thank you.    Action Taken: Message routed to:  Adult Clinics: Dermatology p [id number]    Travel Screening: Not Applicable

## 2020-10-13 NOTE — TELEPHONE ENCOUNTER
Pt called and notified that a 3-6 month follow up is needed if pt did not want to try the Efudex. Pt states understanding and agrees with this plan. Pt scheduled to see  in 3 months...Joceline Perea RN, MD   9/24/2020  5:49 PM      Parts A and B are okay.      Part C is a precancer. IF someone is at home and could help him. Efudex would be good. IF not, follow up in 3-6 months to rechekc site     After biopsy site has healed, start Efudex twice daily for 2-3 weeks or until the onset of irritation. Discuss that we would expect irritation form this medications. Recommend the patient wash hands after use or use gloves to apply. Keep medication away from pets. Avoid sun exposure to treated area. Do not occlude treated area with bandages. Follow up 4 weeks after the last application.

## 2020-11-11 ENCOUNTER — HOSPITAL ENCOUNTER (EMERGENCY)
Facility: CLINIC | Age: 85
Discharge: HOME | DRG: 177 | End: 2020-11-11
Attending: FAMILY MEDICINE | Admitting: FAMILY MEDICINE
Payer: MEDICARE

## 2020-11-11 ENCOUNTER — APPOINTMENT (OUTPATIENT)
Dept: GENERAL RADIOLOGY | Facility: CLINIC | Age: 85
DRG: 177 | End: 2020-11-11
Attending: FAMILY MEDICINE
Payer: MEDICARE

## 2020-11-11 VITALS
TEMPERATURE: 97.9 F | WEIGHT: 160 LBS | HEART RATE: 82 BPM | SYSTOLIC BLOOD PRESSURE: 113 MMHG | RESPIRATION RATE: 18 BRPM | BODY MASS INDEX: 24.26 KG/M2 | DIASTOLIC BLOOD PRESSURE: 72 MMHG | OXYGEN SATURATION: 95 %

## 2020-11-11 DIAGNOSIS — B99.9 FEVER DUE TO INFECTION: ICD-10-CM

## 2020-11-11 DIAGNOSIS — R06.02 SHORTNESS OF BREATH: ICD-10-CM

## 2020-11-11 LAB
ALBUMIN SERPL-MCNC: 3.3 G/DL (ref 3.4–5)
ALP SERPL-CCNC: 64 U/L (ref 40–150)
ALT SERPL W P-5'-P-CCNC: 25 U/L (ref 0–70)
ANION GAP SERPL CALCULATED.3IONS-SCNC: 10 MMOL/L (ref 3–14)
AST SERPL W P-5'-P-CCNC: 27 U/L (ref 0–45)
BASE EXCESS BLDV CALC-SCNC: 3.4 MMOL/L
BASOPHILS # BLD AUTO: 0 10E9/L (ref 0–0.2)
BASOPHILS NFR BLD AUTO: 0.2 %
BILIRUB SERPL-MCNC: 0.6 MG/DL (ref 0.2–1.3)
BUN SERPL-MCNC: 15 MG/DL (ref 7–30)
CALCIUM SERPL-MCNC: 8.7 MG/DL (ref 8.5–10.1)
CHLORIDE SERPL-SCNC: 99 MMOL/L (ref 94–109)
CO2 SERPL-SCNC: 24 MMOL/L (ref 20–32)
CREAT SERPL-MCNC: 0.98 MG/DL (ref 0.66–1.25)
CRP SERPL-MCNC: 101 MG/L (ref 0–8)
D DIMER PPP FEU-MCNC: 0.8 UG/ML FEU (ref 0–0.5)
DIFFERENTIAL METHOD BLD: NORMAL
EOSINOPHIL NFR BLD AUTO: 2.4 %
ERYTHROCYTE [DISTWIDTH] IN BLOOD BY AUTOMATED COUNT: 11.7 % (ref 10–15)
GFR SERPL CREATININE-BSD FRML MDRD: 70 ML/MIN/{1.73_M2}
GLUCOSE SERPL-MCNC: 155 MG/DL (ref 70–99)
HCO3 BLDV-SCNC: 28 MMOL/L (ref 21–28)
HCT VFR BLD AUTO: 43.7 % (ref 40–53)
HGB BLD-MCNC: 15.3 G/DL (ref 13.3–17.7)
IMM GRANULOCYTES # BLD: 0 10E9/L (ref 0–0.4)
IMM GRANULOCYTES NFR BLD: 0.4 %
LYMPHOCYTES # BLD AUTO: 1.2 10E9/L (ref 0.8–5.3)
LYMPHOCYTES NFR BLD AUTO: 14 %
MCH RBC QN AUTO: 31.7 PG (ref 26.5–33)
MCHC RBC AUTO-ENTMCNC: 35 G/DL (ref 31.5–36.5)
MCV RBC AUTO: 91 FL (ref 78–100)
MONOCYTES # BLD AUTO: 0.6 10E9/L (ref 0–1.3)
MONOCYTES NFR BLD AUTO: 7.1 %
NEUTROPHILS # BLD AUTO: 6.2 10E9/L (ref 1.6–8.3)
NEUTROPHILS NFR BLD AUTO: 75.9 %
NRBC # BLD AUTO: 0 10*3/UL
NRBC BLD AUTO-RTO: 0 /100
O2/TOTAL GAS SETTING VFR VENT: 21 %
PCO2 BLDV: 39 MM HG (ref 40–50)
PH BLDV: 7.45 PH (ref 7.32–7.43)
PLATELET # BLD AUTO: 173 10E9/L (ref 150–450)
PO2 BLDV: 29 MM HG (ref 25–47)
POTASSIUM SERPL-SCNC: 3.9 MMOL/L (ref 3.4–5.3)
PROT SERPL-MCNC: 7.9 G/DL (ref 6.8–8.8)
RBC # BLD AUTO: 4.83 10E12/L (ref 4.4–5.9)
SARS-COV-2 RNA SPEC QL NAA+PROBE: ABNORMAL
SODIUM SERPL-SCNC: 133 MMOL/L (ref 133–144)
SPECIMEN SOURCE: ABNORMAL
WBC # BLD AUTO: 8.2 10E9/L (ref 4–11)

## 2020-11-11 PROCEDURE — 86140 C-REACTIVE PROTEIN: CPT | Performed by: FAMILY MEDICINE

## 2020-11-11 PROCEDURE — 82803 BLOOD GASES ANY COMBINATION: CPT | Performed by: FAMILY MEDICINE

## 2020-11-11 PROCEDURE — 80053 COMPREHEN METABOLIC PANEL: CPT | Performed by: FAMILY MEDICINE

## 2020-11-11 PROCEDURE — 71045 X-RAY EXAM CHEST 1 VIEW: CPT

## 2020-11-11 PROCEDURE — 85379 FIBRIN DEGRADATION QUANT: CPT | Performed by: FAMILY MEDICINE

## 2020-11-11 PROCEDURE — 85025 COMPLETE CBC W/AUTO DIFF WBC: CPT | Performed by: FAMILY MEDICINE

## 2020-11-11 PROCEDURE — 99284 EMERGENCY DEPT VISIT MOD MDM: CPT | Performed by: FAMILY MEDICINE

## 2020-11-11 PROCEDURE — C9803 HOPD COVID-19 SPEC COLLECT: HCPCS | Performed by: FAMILY MEDICINE

## 2020-11-11 PROCEDURE — U0003 INFECTIOUS AGENT DETECTION BY NUCLEIC ACID (DNA OR RNA); SEVERE ACUTE RESPIRATORY SYNDROME CORONAVIRUS 2 (SARS-COV-2) (CORONAVIRUS DISEASE [COVID-19]), AMPLIFIED PROBE TECHNIQUE, MAKING USE OF HIGH THROUGHPUT TECHNOLOGIES AS DESCRIBED BY CMS-2020-01-R: HCPCS | Performed by: FAMILY MEDICINE

## 2020-11-11 PROCEDURE — 99283 EMERGENCY DEPT VISIT LOW MDM: CPT | Performed by: FAMILY MEDICINE

## 2020-11-11 NOTE — DISCHARGE INSTRUCTIONS
Your chest x-ray did not show an obvious pneumonia.  We are waiting on your COVID-19 test which should be back within the next couple of days.  Check on [employer] to get the results the quickest.  See the handout on Coronavirus.  Try to purchase an oximeter so you can check your oxygen levels at home.  Return if your level drops below 90% or you are feeling much worse.  Return to the emergency department at any time if your symptoms worsen.

## 2020-11-11 NOTE — ED AVS SNAPSHOT
[facility] Emergency Dept  [address] [place] [address]  Phone: [phone]  Fax: [phone]                                    Ulices Shah Jr.   MRN: [id number]    Department: [facility] Emergency [facility]   Date of Visit: 11/11/2020           After Visit Summary Signature Page    I have received my discharge instructions, and my questions have been answered. I have discussed any challenges I see with this plan with the nurse or doctor.    ..........................................................................................................................................  Patient/Patient Representative Signature      ..........................................................................................................................................  Patient Representative Print Name and Relationship to Patient    ..................................................               ................................................  Date                                   Time    ..........................................................................................................................................  Reviewed by Signature/Title    ...................................................              ..............................................  Date                                               Time          22EPIC Rev 08/18

## 2020-11-11 NOTE — ED TRIAGE NOTES
Patient with cough and shortness of breath since yesterday. States someone in his apartment complex has tested positive for Covid.

## 2020-11-11 NOTE — ED PROVIDER NOTES
[facility] ED Provider Note   Patient: Ulices Shah Jr.  MRN #:  [id number]  Date of Visit: November 11, 2020    CC:     Chief Complaint   Patient presents with     Cough     HPI:  Ulices Shah Jr. is a 85 year old male who presented to the emergency department with 1 day history of low-grade fever, nonproductive cough, and shortness of breath.  Patient states that he usually walks up and down 26 steps each day 6 times.  He also does a lot of walking and put on 1500 miles of biking this past summer.  He noticed some shortness of breath with activity yesterday.  He came into the emergency department this morning as he became more concerned about the possibility of COVID-19.  There was another resident at his apartment that was positive.  Patient states he has quarantined himself and has not been in any direct exposure to other people.  He has a history of cardiomyopathy, hypertension, and diabetes.  He was able to lower his blood sugar and come off of diabetes medication by staying on a low sugar diet.  Patient denies headache, sore throat, chest pain, nausea, vomiting, diarrhea, body aches, loss of taste or smell.  He has no previous history of pneumonia or asthma.  He does not smoke.    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 09/03/2015     Priority: Medium      Basal cell carcinoma of left ear (superior helix) s/p [facility] 9-2-14 09/02/2014     Priority: Medium     Basal cell carcinoma of left nasolabial fold s/p [facility] 9-2-14 09/02/2014     Priority: Medium     Basal cell carcinoma of right mid cheek s/p [facility] 9-2-14 09/02/2014     Priority: Medium     History of basal cell carcinoma 07/29/2014     Priority: Medium     Atherosclerosis of coronary artery 10/15/2012     Priority: Medium     Cardiomyopathy in diseases classified elsewhere (H) 06/14/2012     Priority: Medium     Overview:   LVEF 50% per LVG 4/30/2012       Advanced directives, counseling/discussion 02/14/2012     Priority: Medium     Advance Directive Problem List Overview:   Name Relationship Phone    Primary Health Care Agent            Alternative Health Care Agent          Discussed advance care planning with patient; information given to patient to review. 2/14/2012          Hypertension goal BP (blood pressure) < 140/90 09/25/2011     Priority: Medium     Hyperlipidemia LDL goal <100 09/25/2011     Priority: Medium     DDD (degenerative disc disease), cervical 12/24/2008     Priority: Medium     Chronic ischemic heart disease 12/21/2002     Priority: Medium     Problem list name updated by automated process. Provider to review         Past Medical History:   Diagnosis Date     Actinic keratosis      Cancer (H)      Heart disease      History of blood transfusion      History of nonmelanoma skin cancer      Unspecified essential hypertension        MEDS:      aspirin 81 MG tablet       atorvastatin (LIPITOR) 40 MG tablet       carvedilol (COREG) 25 MG tablet       cetirizine (ZYRTEC) 10 MG tablet       clopidogrel (PLAVIX) 75 MG tablet       fluorouracil (EFUDEX) 5 % external cream       fluticasone (FLONASE) 50 MCG/ACT nasal spray       lisinopril (PRINIVIL,ZESTRIL) 5 MG tablet       metFORMIN (GLUCOPHAGE) 500 MG tablet       mirtazapine (REMERON) 15 MG tablet       nitroglycerin (NITROSTAT) 0.4 MG SL tablet       " Omega-3 Fatty Acids (FISH OIL) 1000 MG CPDR        ALLERGIES:    Allergies   Allergen Reactions     No Known Drug Allergies        Past Surgical History:   Procedure Laterality Date     C APPENDECTOMY  1951     C EXPLOR HEART SURG WND W CP BYPASS  10/25/01    4 way heart bypass     COLONOSCOPY  12/10/08     COLONOSCOPY N/A 3/7/2019    Procedure: COMBINED COLONOSCOPY,  MULTIPLE POLYPECTOMY BY BIOPSY;  Surgeon: Brayden Sharma DO;  Location:  GI     EXCISE MASS HAND Right 02/2017    \"skin cancer\" removal     HC REMV CATARACT EXTRACAP,INSERT LENS, W/O ECP  10/21/2004    left     HC REMV CATARACT EXTRACAP,INSERT LENS, W/O ECP  11/18/2004    right     HC YAG LASER CAPSULOTOMY  06/18/09    right eye     HEART CATH, ANGIOPLASTY  4/30/12    3 stents     HEMILAMINECTOMY, DISCECTOMY LUMBAR THREE LEVELS, COMBINED  6/26/2013    Procedure: COMBINED HEMILAMINECTOMY, DISCECTOMY LUMBAR THREE LEVELS;  Bilateral L3, L4 and L5 Guille-Laminectomies;  Surgeon: Ollie Dodson MD;  Location:  OR       Social History     Tobacco Use     Smoking status: Never Smoker     Smokeless tobacco: Never Used   Substance Use Topics     Alcohol use: Yes     Alcohol/week: 0.0 standard drinks     Comment: rare, maybe 3 beers all year.     Drug use: No         Review of Systems   Except as noted in HPI, all other systems were reviewed and are negative    Physical Exam     Vitals were reviewed  Patient Vitals for the past 12 hrs:   BP Temp Temp src Pulse Resp SpO2 Weight   11/11/20 0243 (!) 138/94 100.2  F (37.9  C) Oral 85 20 96 % 72.6 kg (160 lb)     GENERAL APPEARANCE: Alert and oriented x3, no respiratory distress at rest  FACE: normal facies  EYES: Pupils are equal  HENT: normal external exam: The oropharynx is nonerythematous.  Mucous membranes are dry  NECK: no adenopathy or asymmetry  RESP: normal respiratory effort; clear breath sounds bilaterally  CV: regular rate and rhythm; grade 2/6 murmur  ABD: soft, no tenderness; no " rebound or guarding; bowel sounds are normal  MS: no gross deformities noted; normal muscle tone.  EXT: No calf tenderness or pitting edema  SKIN: no worrisome rash  NEURO: no facial droop; no focal deficits, speech is normal  PSYCH: normal mood and affect      Available Lab/Imaging Results     Results for orders placed or performed during the hospital encounter of 11/11/20 (from the past 24 hour(s))   CBC with platelets differential   Result Value Ref Range    WBC 8.2 4.0 - 11.0 10e9/L    RBC Count 4.83 4.4 - 5.9 10e12/L    Hemoglobin 15.3 13.3 - 17.7 g/dL    Hematocrit 43.7 40.0 - 53.0 %    MCV 91 78 - 100 fl    MCH 31.7 26.5 - 33.0 pg    MCHC 35.0 31.5 - 36.5 g/dL    RDW 11.7 10.0 - 15.0 %    Platelet Count 173 150 - 450 10e9/L    Diff Method Automated Method     % Neutrophils 75.9 %    % Lymphocytes 14.0 %    % Monocytes 7.1 %    % Eosinophils 2.4 %    % Basophils 0.2 %    % Immature Granulocytes 0.4 %    Nucleated RBCs 0 0 /100    Absolute Neutrophil 6.2 1.6 - 8.3 10e9/L    Absolute Lymphocytes 1.2 0.8 - 5.3 10e9/L    Absolute Monocytes 0.6 0.0 - 1.3 10e9/L    Absolute Basophils 0.0 0.0 - 0.2 10e9/L    Abs Immature Granulocytes 0.0 0 - 0.4 10e9/L    Absolute Nucleated RBC 0.0    Comprehensive metabolic panel   Result Value Ref Range    Sodium 133 133 - 144 mmol/L    Potassium 3.9 3.4 - 5.3 mmol/L    Chloride 99 94 - 109 mmol/L    Carbon Dioxide 24 20 - 32 mmol/L    Anion Gap 10 3 - 14 mmol/L    Glucose 155 (H) 70 - 99 mg/dL    Urea Nitrogen 15 7 - 30 mg/dL    Creatinine 0.98 0.66 - 1.25 mg/dL    GFR Estimate 70 >60 mL/min/[1.73_m2]    GFR Estimate If Black 81 >60 mL/min/[1.73_m2]    Calcium 8.7 8.5 - 10.1 mg/dL    Bilirubin Total 0.6 0.2 - 1.3 mg/dL    Albumin 3.3 (L) 3.4 - 5.0 g/dL    Protein Total 7.9 6.8 - 8.8 g/dL    Alkaline Phosphatase 64 40 - 150 U/L    ALT 25 0 - 70 U/L    AST 27 0 - 45 U/L   D dimer quantitative   Result Value Ref Range    D Dimer 0.8 (H) 0.0 - 0.50 ug/ml FEU   CRP inflammation    Result Value Ref Range    CRP Inflammation 101.0 (H) 0.0 - 8.0 mg/L   Blood gas venous   Result Value Ref Range    Ph Venous 7.45 (H) 7.32 - 7.43 pH    PCO2 Venous 39 (L) 40 - 50 mm Hg    PO2 Venous 29 25 - 47 mm Hg    Bicarbonate Venous 28 21 - 28 mmol/L    Base Excess Venous 3.4 mmol/L    FIO2 21    XR Chest Port 1 View    Narrative    EXAM: XR CHEST PORT 1 VW  LOCATION: [facility]  DATE/TIME: 11/11/2020 3:08 AM    INDICATION: Fever, cough  COMPARISON: 06/12/2013      Impression    IMPRESSION: Heart size within normal limits for portable technique, status post sternotomy. No evidence of pneumonia or heart failure. No visible pneumothorax or pleural effusion.                Impression     Final diagnoses:   Shortness of breath   Fever due to infection         ED Course & Medical Decision Making   Ulices Shah Jr. is a 85 year old male who presented to the emergency department with 1 day history of low-grade fever with shortness of breath.  Patient lives alone but is in an apartment with another resident who had known COVID-19 infection.  Patient noticed that he was having difficulty walking up and down stairs that he was able to perform daily up until yesterday.  Patient was seen shortly after arrival.  Temperature is 100.2, blood pressure 138/94, respiratory rate of 20 with 96% oxygen saturation.  Exam revealed clear breath sounds.  Patient has a grade 2/6 systolic murmur.  CBC reveals a normal white blood count, hemoglobin and differential.  Comprehensive metabolic panel is normal except for a new nonfasting glucose of 155.  D-dimer 0.8, CRP is 101, venous blood gas reveals a pH of 7.45 with PCO2 of 39.  Chest x-ray reveals no acute infiltrate.  COVID-19 swab is pending.  Given the elevated CRP level, normal white blood count, there is suspicion that the patient has SARS-CoV-2 infection.  I asked patient to purchase an oximeter if possible and to check his oxygen levels at home.  Patient is  medically stable for discharge at this time.  Continue supportive cares.  Return to the emergency department if oxygen saturations drop below 90% or his symptoms worsen.            Written after-visit summary and instructions were given at the time of discharge.    Follow up Plan:   [facility] Emergency Dept  [address] [place] [id number] [phone]    If symptoms worsen      Discharge Instructions:   Your chest x-ray did not show an obvious pneumonia.  We are waiting on your COVID-19 test which should be back within the next couple of days.  Check on [employer] to get the results the quickest.  See the handout on Coronavirus.  Try to purchase an oximeter so you can check your oxygen levels at home.  Return if your level drops below 90% or you are feeling much worse.  Return to the emergency department at any time if your symptoms worsen.       Disclaimer: This note consists of words and symbols derived from keyboarding and dictation using voice recognition software.  As a result, there may be errors that have gone undetected.  Please consider this when interpreting information found in this note.       Brittaney Nettles MD  11/11/20 1758

## 2020-11-12 ENCOUNTER — HOSPITAL ENCOUNTER (INPATIENT)
Facility: CLINIC | Age: 85
LOS: 2 days | Discharge: HOME | DRG: 177 | End: 2020-11-15
Attending: FAMILY MEDICINE | Admitting: INTERNAL MEDICINE
Payer: MEDICARE

## 2020-11-12 ENCOUNTER — PATIENT OUTREACH (OUTPATIENT)
Dept: CARE COORDINATION | Facility: CLINIC | Age: 85
End: 2020-11-12

## 2020-11-12 ENCOUNTER — APPOINTMENT (OUTPATIENT)
Dept: GENERAL RADIOLOGY | Facility: CLINIC | Age: 85
DRG: 177 | End: 2020-11-12
Attending: FAMILY MEDICINE
Payer: MEDICARE

## 2020-11-12 DIAGNOSIS — U07.1 2019 NOVEL CORONAVIRUS DISEASE (COVID-19): ICD-10-CM

## 2020-11-12 DIAGNOSIS — U07.1 2019 NOVEL CORONAVIRUS DISEASE (COVID-19): Primary | ICD-10-CM

## 2020-11-12 LAB
ALBUMIN SERPL-MCNC: 2.8 G/DL (ref 3.4–5)
ALBUMIN UR-MCNC: 100 MG/DL
ALP SERPL-CCNC: 62 U/L (ref 40–150)
ALT SERPL W P-5'-P-CCNC: 28 U/L (ref 0–70)
ANION GAP SERPL CALCULATED.3IONS-SCNC: 9 MMOL/L (ref 3–14)
APPEARANCE UR: ABNORMAL
AST SERPL W P-5'-P-CCNC: 32 U/L (ref 0–45)
BASE EXCESS BLDV CALC-SCNC: 2.8 MMOL/L
BASOPHILS # BLD AUTO: 0 10E9/L (ref 0–0.2)
BASOPHILS NFR BLD AUTO: 0.1 %
BILIRUB SERPL-MCNC: 0.6 MG/DL (ref 0.2–1.3)
BILIRUB UR QL STRIP: NEGATIVE
BUN SERPL-MCNC: 20 MG/DL (ref 7–30)
CALCIUM SERPL-MCNC: 8.6 MG/DL (ref 8.5–10.1)
CHLORIDE SERPL-SCNC: 100 MMOL/L (ref 94–109)
CO2 SERPL-SCNC: 25 MMOL/L (ref 20–32)
COLOR UR AUTO: YELLOW
CREAT SERPL-MCNC: 1.07 MG/DL (ref 0.66–1.25)
CRP SERPL-MCNC: 153 MG/L (ref 0–8)
DIFFERENTIAL METHOD BLD: NORMAL
EOSINOPHIL NFR BLD AUTO: 0 %
ERYTHROCYTE [DISTWIDTH] IN BLOOD BY AUTOMATED COUNT: 11.9 % (ref 10–15)
FERRITIN SERPL-MCNC: 310 NG/ML (ref 26–388)
GFR SERPL CREATININE-BSD FRML MDRD: 64 ML/MIN/{1.73_M2}
GLUCOSE BLDC GLUCOMTR-MCNC: 228 MG/DL (ref 70–99)
GLUCOSE BLDC GLUCOMTR-MCNC: 248 MG/DL (ref 70–99)
GLUCOSE SERPL-MCNC: 264 MG/DL (ref 70–99)
GLUCOSE UR STRIP-MCNC: >499 MG/DL
HCO3 BLDV-SCNC: 27 MMOL/L (ref 21–28)
HCT VFR BLD AUTO: 43 % (ref 40–53)
HGB BLD-MCNC: 14.9 G/DL (ref 13.3–17.7)
HGB UR QL STRIP: ABNORMAL
HYALINE CASTS #/AREA URNS LPF: 3 /LPF (ref 0–2)
IMM GRANULOCYTES # BLD: 0 10E9/L (ref 0–0.4)
IMM GRANULOCYTES NFR BLD: 0.5 %
KETONES UR STRIP-MCNC: 5 MG/DL
LACTATE BLD-SCNC: 2.3 MMOL/L (ref 0.7–2)
LACTATE BLD-SCNC: 2.7 MMOL/L (ref 0.7–2)
LDH SERPL L TO P-CCNC: 256 U/L (ref 85–227)
LEUKOCYTE ESTERASE UR QL STRIP: NEGATIVE
LYMPHOCYTES # BLD AUTO: 0.9 10E9/L (ref 0.8–5.3)
LYMPHOCYTES NFR BLD AUTO: 12.3 %
MAGNESIUM SERPL-MCNC: 2 MG/DL (ref 1.6–2.3)
MCH RBC QN AUTO: 31.4 PG (ref 26.5–33)
MCHC RBC AUTO-ENTMCNC: 34.7 G/DL (ref 31.5–36.5)
MCV RBC AUTO: 91 FL (ref 78–100)
MONOCYTES # BLD AUTO: 0.4 10E9/L (ref 0–1.3)
MONOCYTES NFR BLD AUTO: 5.6 %
MUCOUS THREADS #/AREA URNS LPF: PRESENT /LPF
NEUTROPHILS # BLD AUTO: 6.1 10E9/L (ref 1.6–8.3)
NEUTROPHILS NFR BLD AUTO: 81.5 %
NITRATE UR QL: NEGATIVE
NRBC # BLD AUTO: 0 10*3/UL
NRBC BLD AUTO-RTO: 0 /100
O2/TOTAL GAS SETTING VFR VENT: 21 %
PCO2 BLDV: 41 MM HG (ref 40–50)
PH BLDV: 7.43 PH (ref 7.32–7.43)
PH UR STRIP: 5 PH (ref 5–7)
PHOSPHATE SERPL-MCNC: 3.4 MG/DL (ref 2.5–4.5)
PLATELET # BLD AUTO: 200 10E9/L (ref 150–450)
PO2 BLDV: 34 MM HG (ref 25–47)
POTASSIUM SERPL-SCNC: 3.9 MMOL/L (ref 3.4–5.3)
PROCALCITONIN SERPL-MCNC: 0.08 NG/ML
PROT SERPL-MCNC: 7.5 G/DL (ref 6.8–8.8)
RBC # BLD AUTO: 4.74 10E12/L (ref 4.4–5.9)
RBC #/AREA URNS AUTO: 1 /HPF (ref 0–2)
SODIUM SERPL-SCNC: 134 MMOL/L (ref 133–144)
SOURCE: ABNORMAL
SP GR UR STRIP: 1.02 (ref 1–1.03)
TROPONIN I SERPL-MCNC: 0.02 UG/L (ref 0–0.04)
UROBILINOGEN UR STRIP-MCNC: 4 MG/DL (ref 0–2)
WBC # BLD AUTO: 7.5 10E9/L (ref 4–11)
WBC #/AREA URNS AUTO: 1 /HPF (ref 0–5)

## 2020-11-12 PROCEDURE — G0378 HOSPITAL OBSERVATION PER HR: HCPCS

## 2020-11-12 PROCEDURE — 83615 LACTATE (LD) (LDH) ENZYME: CPT | Performed by: FAMILY MEDICINE

## 2020-11-12 PROCEDURE — 87040 BLOOD CULTURE FOR BACTERIA: CPT | Performed by: FAMILY MEDICINE

## 2020-11-12 PROCEDURE — 99285 EMERGENCY DEPT VISIT HI MDM: CPT | Performed by: FAMILY MEDICINE

## 2020-11-12 PROCEDURE — 96360 HYDRATION IV INFUSION INIT: CPT | Performed by: FAMILY MEDICINE

## 2020-11-12 PROCEDURE — XW033E5 INTRODUCTION OF REMDESIVIR ANTI-INFECTIVE INTO PERIPHERAL VEIN, PERCUTANEOUS APPROACH, NEW TECHNOLOGY GROUP 5: ICD-10-PCS | Performed by: FAMILY MEDICINE

## 2020-11-12 PROCEDURE — 99219 PR INITIAL OBSERVATION CARE,LEVEL II: CPT | Performed by: INTERNAL MEDICINE

## 2020-11-12 PROCEDURE — 84100 ASSAY OF PHOSPHORUS: CPT | Performed by: FAMILY MEDICINE

## 2020-11-12 PROCEDURE — 82803 BLOOD GASES ANY COMBINATION: CPT | Performed by: FAMILY MEDICINE

## 2020-11-12 PROCEDURE — 96372 THER/PROPH/DIAG INJ SC/IM: CPT

## 2020-11-12 PROCEDURE — 99291 CRITICAL CARE FIRST HOUR: CPT | Mod: 25 | Performed by: FAMILY MEDICINE

## 2020-11-12 PROCEDURE — 87086 URINE CULTURE/COLONY COUNT: CPT | Performed by: FAMILY MEDICINE

## 2020-11-12 PROCEDURE — 71045 X-RAY EXAM CHEST 1 VIEW: CPT

## 2020-11-12 PROCEDURE — 999N001017 HC STATISTIC GLUCOSE BY METER IP

## 2020-11-12 PROCEDURE — 250N000013 HC RX MED GY IP 250 OP 250 PS 637: Performed by: FAMILY MEDICINE

## 2020-11-12 PROCEDURE — 84145 PROCALCITONIN (PCT): CPT | Performed by: FAMILY MEDICINE

## 2020-11-12 PROCEDURE — 81001 URINALYSIS AUTO W/SCOPE: CPT | Performed by: FAMILY MEDICINE

## 2020-11-12 PROCEDURE — 85025 COMPLETE CBC W/AUTO DIFF WBC: CPT | Performed by: FAMILY MEDICINE

## 2020-11-12 PROCEDURE — 83605 ASSAY OF LACTIC ACID: CPT | Performed by: FAMILY MEDICINE

## 2020-11-12 PROCEDURE — 96361 HYDRATE IV INFUSION ADD-ON: CPT | Performed by: FAMILY MEDICINE

## 2020-11-12 PROCEDURE — 82728 ASSAY OF FERRITIN: CPT | Performed by: FAMILY MEDICINE

## 2020-11-12 PROCEDURE — 80053 COMPREHEN METABOLIC PANEL: CPT | Performed by: FAMILY MEDICINE

## 2020-11-12 PROCEDURE — 86140 C-REACTIVE PROTEIN: CPT | Performed by: FAMILY MEDICINE

## 2020-11-12 PROCEDURE — 84484 ASSAY OF TROPONIN QUANT: CPT | Performed by: FAMILY MEDICINE

## 2020-11-12 PROCEDURE — 83735 ASSAY OF MAGNESIUM: CPT | Performed by: FAMILY MEDICINE

## 2020-11-12 PROCEDURE — 250N000013 HC RX MED GY IP 250 OP 250 PS 637: Performed by: INTERNAL MEDICINE

## 2020-11-12 PROCEDURE — 250N000012 HC RX MED GY IP 250 OP 636 PS 637: Performed by: INTERNAL MEDICINE

## 2020-11-12 PROCEDURE — 258N000003 HC RX IP 258 OP 636: Performed by: FAMILY MEDICINE

## 2020-11-12 RX ORDER — ACETAMINOPHEN 325 MG/1
650 TABLET ORAL EVERY 4 HOURS PRN
Status: DISCONTINUED | OUTPATIENT
Start: 2020-11-12 | End: 2020-11-12

## 2020-11-12 RX ORDER — ALBUTEROL SULFATE 90 UG/1
2 AEROSOL, METERED RESPIRATORY (INHALATION) EVERY 4 HOURS PRN
Status: DISCONTINUED | OUTPATIENT
Start: 2020-11-12 | End: 2020-11-15 | Stop reason: HOSPADM

## 2020-11-12 RX ORDER — SIMVASTATIN 40 MG
40 TABLET ORAL AT BEDTIME
COMMUNITY
Start: 2020-09-04 | End: 2023-05-30

## 2020-11-12 RX ORDER — POLYETHYLENE GLYCOL 3350 17 G/17G
17 POWDER, FOR SOLUTION ORAL DAILY
Status: DISCONTINUED | OUTPATIENT
Start: 2020-11-12 | End: 2020-11-13

## 2020-11-12 RX ORDER — ONDANSETRON 2 MG/ML
4 INJECTION INTRAMUSCULAR; INTRAVENOUS EVERY 6 HOURS PRN
Status: DISCONTINUED | OUTPATIENT
Start: 2020-11-12 | End: 2020-11-15 | Stop reason: HOSPADM

## 2020-11-12 RX ORDER — ATORVASTATIN CALCIUM 40 MG/1
40 TABLET, FILM COATED ORAL DAILY
Status: DISCONTINUED | OUTPATIENT
Start: 2020-11-13 | End: 2020-11-12 | Stop reason: ALTCHOICE

## 2020-11-12 RX ORDER — CARVEDILOL 25 MG/1
25 TABLET ORAL 2 TIMES DAILY WITH MEALS
Status: DISCONTINUED | OUTPATIENT
Start: 2020-11-12 | End: 2020-11-15 | Stop reason: HOSPADM

## 2020-11-12 RX ORDER — NICOTINE POLACRILEX 4 MG
15-30 LOZENGE BUCCAL
Status: DISCONTINUED | OUTPATIENT
Start: 2020-11-12 | End: 2020-11-15 | Stop reason: HOSPADM

## 2020-11-12 RX ORDER — ASPIRIN 81 MG/1
81 TABLET, CHEWABLE ORAL DAILY
Status: DISCONTINUED | OUTPATIENT
Start: 2020-11-13 | End: 2020-11-15 | Stop reason: HOSPADM

## 2020-11-12 RX ORDER — ONDANSETRON 2 MG/ML
4 INJECTION INTRAMUSCULAR; INTRAVENOUS EVERY 6 HOURS PRN
Status: DISCONTINUED | OUTPATIENT
Start: 2020-11-12 | End: 2020-11-12

## 2020-11-12 RX ORDER — LISINOPRIL 2.5 MG/1
2.5 TABLET ORAL 2 TIMES DAILY
Status: DISCONTINUED | OUTPATIENT
Start: 2020-11-12 | End: 2020-11-15 | Stop reason: HOSPADM

## 2020-11-12 RX ORDER — ACETAMINOPHEN 325 MG/1
975 TABLET ORAL ONCE
Status: COMPLETED | OUTPATIENT
Start: 2020-11-12 | End: 2020-11-12

## 2020-11-12 RX ORDER — AMOXICILLIN 250 MG
2 CAPSULE ORAL 2 TIMES DAILY
Status: DISCONTINUED | OUTPATIENT
Start: 2020-11-12 | End: 2020-11-13

## 2020-11-12 RX ORDER — BENZONATATE 100 MG/1
100 CAPSULE ORAL 3 TIMES DAILY PRN
Status: DISCONTINUED | OUTPATIENT
Start: 2020-11-12 | End: 2020-11-15 | Stop reason: HOSPADM

## 2020-11-12 RX ORDER — GUAIFENESIN/DEXTROMETHORPHAN 100-10MG/5
5 SYRUP ORAL EVERY 4 HOURS PRN
Status: DISCONTINUED | OUTPATIENT
Start: 2020-11-12 | End: 2020-11-15 | Stop reason: HOSPADM

## 2020-11-12 RX ORDER — SODIUM CHLORIDE, SODIUM LACTATE, POTASSIUM CHLORIDE, CALCIUM CHLORIDE 600; 310; 30; 20 MG/100ML; MG/100ML; MG/100ML; MG/100ML
INJECTION, SOLUTION INTRAVENOUS CONTINUOUS
Status: DISCONTINUED | OUTPATIENT
Start: 2020-11-12 | End: 2020-11-12

## 2020-11-12 RX ORDER — AMOXICILLIN 250 MG
1 CAPSULE ORAL 2 TIMES DAILY
Status: DISCONTINUED | OUTPATIENT
Start: 2020-11-12 | End: 2020-11-13

## 2020-11-12 RX ORDER — DEXTROSE MONOHYDRATE 25 G/50ML
25-50 INJECTION, SOLUTION INTRAVENOUS
Status: DISCONTINUED | OUTPATIENT
Start: 2020-11-12 | End: 2020-11-15 | Stop reason: HOSPADM

## 2020-11-12 RX ORDER — SIMVASTATIN 40 MG
40 TABLET ORAL DAILY
Status: DISCONTINUED | OUTPATIENT
Start: 2020-11-13 | End: 2020-11-15 | Stop reason: HOSPADM

## 2020-11-12 RX ORDER — ACETAMINOPHEN 650 MG/1
650 SUPPOSITORY RECTAL EVERY 4 HOURS PRN
Status: DISCONTINUED | OUTPATIENT
Start: 2020-11-12 | End: 2020-11-13

## 2020-11-12 RX ORDER — CLOPIDOGREL BISULFATE 75 MG/1
75 TABLET ORAL DAILY
Status: DISCONTINUED | OUTPATIENT
Start: 2020-11-13 | End: 2020-11-15 | Stop reason: HOSPADM

## 2020-11-12 RX ORDER — ONDANSETRON 4 MG/1
4 TABLET, ORALLY DISINTEGRATING ORAL EVERY 6 HOURS PRN
Status: DISCONTINUED | OUTPATIENT
Start: 2020-11-12 | End: 2020-11-15 | Stop reason: HOSPADM

## 2020-11-12 RX ORDER — ACETAMINOPHEN 325 MG/1
650 TABLET ORAL EVERY 4 HOURS PRN
Status: DISCONTINUED | OUTPATIENT
Start: 2020-11-12 | End: 2020-11-13

## 2020-11-12 RX ORDER — ONDANSETRON 4 MG/1
4 TABLET, ORALLY DISINTEGRATING ORAL EVERY 6 HOURS PRN
Status: DISCONTINUED | OUTPATIENT
Start: 2020-11-12 | End: 2020-11-12

## 2020-11-12 RX ADMIN — LISINOPRIL 2.5 MG: 2.5 TABLET ORAL at 20:34

## 2020-11-12 RX ADMIN — POLYETHYLENE GLYCOL 3350 17 G: 17 POWDER, FOR SOLUTION ORAL at 20:32

## 2020-11-12 RX ADMIN — DOCUSATE SODIUM AND SENNOSIDES 1 TABLET: 8.6; 5 TABLET ORAL at 20:34

## 2020-11-12 RX ADMIN — CARVEDILOL 25 MG: 25 TABLET, FILM COATED ORAL at 20:34

## 2020-11-12 RX ADMIN — SODIUM CHLORIDE, POTASSIUM CHLORIDE, SODIUM LACTATE AND CALCIUM CHLORIDE 250 ML: 600; 310; 30; 20 INJECTION, SOLUTION INTRAVENOUS at 17:53

## 2020-11-12 RX ADMIN — DEXAMETHASONE 6 MG: 2 TABLET ORAL at 20:34

## 2020-11-12 RX ADMIN — SODIUM CHLORIDE, POTASSIUM CHLORIDE, SODIUM LACTATE AND CALCIUM CHLORIDE: 600; 310; 30; 20 INJECTION, SOLUTION INTRAVENOUS at 16:37

## 2020-11-12 RX ADMIN — ACETAMINOPHEN 975 MG: 325 TABLET, FILM COATED ORAL at 18:54

## 2020-11-12 ASSESSMENT — MIFFLIN-ST. JEOR: SCORE: 1405.69

## 2020-11-12 NOTE — ED TRIAGE NOTES
He has had a cough and shortness of breath for a few and has a positive covid test from yesterday.

## 2020-11-12 NOTE — PROGRESS NOTES
Clinic Care Coordination Contact  Community Health Worker Initial Outreach    Patient accepts CC: No, no needs at this time.

## 2020-11-13 ENCOUNTER — APPOINTMENT (OUTPATIENT)
Dept: GENERAL RADIOLOGY | Facility: CLINIC | Age: 85
DRG: 177 | End: 2020-11-13
Attending: PEDIATRICS
Payer: MEDICARE

## 2020-11-13 PROBLEM — J96.01 ACUTE RESPIRATORY FAILURE WITH HYPOXIA (H): Status: ACTIVE | Noted: 2020-11-13

## 2020-11-13 PROBLEM — E11.9 TYPE 2 DIABETES MELLITUS WITHOUT COMPLICATION, WITHOUT LONG-TERM CURRENT USE OF INSULIN (H): Status: ACTIVE | Noted: 2020-11-13

## 2020-11-13 PROBLEM — R65.10 SIRS (SYSTEMIC INFLAMMATORY RESPONSE SYNDROME) (H): Status: ACTIVE | Noted: 2020-11-13

## 2020-11-13 LAB
ANION GAP SERPL CALCULATED.3IONS-SCNC: 6 MMOL/L (ref 3–14)
BASE EXCESS BLDA CALC-SCNC: 0.5 MMOL/L
BASOPHILS # BLD AUTO: 0 10E9/L (ref 0–0.2)
BASOPHILS NFR BLD AUTO: 0.2 %
BUN SERPL-MCNC: 20 MG/DL (ref 7–30)
CALCIUM SERPL-MCNC: 8.8 MG/DL (ref 8.5–10.1)
CHLORIDE SERPL-SCNC: 102 MMOL/L (ref 94–109)
CO2 SERPL-SCNC: 27 MMOL/L (ref 20–32)
CREAT SERPL-MCNC: 0.85 MG/DL (ref 0.66–1.25)
CRP SERPL-MCNC: 150 MG/L (ref 0–8)
D DIMER PPP FEU-MCNC: 2.3 UG/ML FEU (ref 0–0.5)
DIFFERENTIAL METHOD BLD: ABNORMAL
EOSINOPHIL NFR BLD AUTO: 0 %
ERYTHROCYTE [DISTWIDTH] IN BLOOD BY AUTOMATED COUNT: 11.9 % (ref 10–15)
FIBRINOGEN PPP-MCNC: 696 MG/DL (ref 200–420)
GFR SERPL CREATININE-BSD FRML MDRD: 80 ML/MIN/{1.73_M2}
GLUCOSE BLDC GLUCOMTR-MCNC: 198 MG/DL (ref 70–99)
GLUCOSE BLDC GLUCOMTR-MCNC: 233 MG/DL (ref 70–99)
GLUCOSE BLDC GLUCOMTR-MCNC: 252 MG/DL (ref 70–99)
GLUCOSE BLDC GLUCOMTR-MCNC: 291 MG/DL (ref 70–99)
GLUCOSE SERPL-MCNC: 256 MG/DL (ref 70–99)
HCO3 BLD-SCNC: 23 MMOL/L (ref 21–28)
HCT VFR BLD AUTO: 42.3 % (ref 40–53)
HGB BLD-MCNC: 14.8 G/DL (ref 13.3–17.7)
IMM GRANULOCYTES # BLD: 0.1 10E9/L (ref 0–0.4)
IMM GRANULOCYTES NFR BLD: 1.4 %
INR PPP: 1.13 (ref 0.86–1.14)
LACTATE BLD-SCNC: 2.1 MMOL/L (ref 0.7–2)
LACTATE BLD-SCNC: 2.8 MMOL/L (ref 0.7–2)
LDH SERPL L TO P-CCNC: 252 U/L (ref 85–227)
LYMPHOCYTES # BLD AUTO: 0.6 10E9/L (ref 0.8–5.3)
LYMPHOCYTES NFR BLD AUTO: 8.4 %
MCH RBC QN AUTO: 32.2 PG (ref 26.5–33)
MCHC RBC AUTO-ENTMCNC: 35 G/DL (ref 31.5–36.5)
MCV RBC AUTO: 92 FL (ref 78–100)
MONOCYTES # BLD AUTO: 0.2 10E9/L (ref 0–1.3)
MONOCYTES NFR BLD AUTO: 2.3 %
NEUTROPHILS # BLD AUTO: 5.9 10E9/L (ref 1.6–8.3)
NEUTROPHILS NFR BLD AUTO: 87.7 %
NRBC # BLD AUTO: 0 10*3/UL
NRBC BLD AUTO-RTO: 0 /100
NT-PROBNP SERPL-MCNC: 456 PG/ML (ref 0–1800)
O2/TOTAL GAS SETTING VFR VENT: 21 %
PCO2 BLD: 32 MM HG (ref 35–45)
PH BLD: 7.47 PH (ref 7.35–7.45)
PLATELET # BLD AUTO: 198 10E9/L (ref 150–450)
PO2 BLD: 66 MM HG (ref 80–105)
POTASSIUM SERPL-SCNC: 4.5 MMOL/L (ref 3.4–5.3)
RBC # BLD AUTO: 4.6 10E12/L (ref 4.4–5.9)
RETICS # AUTO: 29 10E9/L (ref 25–95)
RETICS/RBC NFR AUTO: 0.6 % (ref 0.5–2)
SODIUM SERPL-SCNC: 135 MMOL/L (ref 133–144)
WBC # BLD AUTO: 6.7 10E9/L (ref 4–11)

## 2020-11-13 PROCEDURE — 36600 WITHDRAWAL OF ARTERIAL BLOOD: CPT

## 2020-11-13 PROCEDURE — 999N000123 HC STATISTIC OXYGEN O2DAILY TECH TIME

## 2020-11-13 PROCEDURE — 999N000156 HC STATISTIC RCP CONSULT EA 30 MIN

## 2020-11-13 PROCEDURE — 86140 C-REACTIVE PROTEIN: CPT | Performed by: INTERNAL MEDICINE

## 2020-11-13 PROCEDURE — 96372 THER/PROPH/DIAG INJ SC/IM: CPT

## 2020-11-13 PROCEDURE — 93005 ELECTROCARDIOGRAM TRACING: CPT

## 2020-11-13 PROCEDURE — 82803 BLOOD GASES ANY COMBINATION: CPT | Performed by: PEDIATRICS

## 2020-11-13 PROCEDURE — 250N000012 HC RX MED GY IP 250 OP 636 PS 637: Performed by: INTERNAL MEDICINE

## 2020-11-13 PROCEDURE — 250N000009 HC RX 250: Performed by: PEDIATRICS

## 2020-11-13 PROCEDURE — 85025 COMPLETE CBC W/AUTO DIFF WBC: CPT | Performed by: INTERNAL MEDICINE

## 2020-11-13 PROCEDURE — G0378 HOSPITAL OBSERVATION PER HR: HCPCS

## 2020-11-13 PROCEDURE — 80048 BASIC METABOLIC PNL TOTAL CA: CPT | Performed by: INTERNAL MEDICINE

## 2020-11-13 PROCEDURE — 250N000013 HC RX MED GY IP 250 OP 250 PS 637: Performed by: INTERNAL MEDICINE

## 2020-11-13 PROCEDURE — 36415 COLL VENOUS BLD VENIPUNCTURE: CPT | Performed by: PEDIATRICS

## 2020-11-13 PROCEDURE — 71045 X-RAY EXAM CHEST 1 VIEW: CPT

## 2020-11-13 PROCEDURE — 83880 ASSAY OF NATRIURETIC PEPTIDE: CPT | Performed by: PEDIATRICS

## 2020-11-13 PROCEDURE — 85384 FIBRINOGEN ACTIVITY: CPT | Performed by: INTERNAL MEDICINE

## 2020-11-13 PROCEDURE — 999N001017 HC STATISTIC GLUCOSE BY METER IP

## 2020-11-13 PROCEDURE — 250N000013 HC RX MED GY IP 250 OP 250 PS 637: Performed by: PEDIATRICS

## 2020-11-13 PROCEDURE — 85610 PROTHROMBIN TIME: CPT | Performed by: INTERNAL MEDICINE

## 2020-11-13 PROCEDURE — 250N000011 HC RX IP 250 OP 636: Performed by: INTERNAL MEDICINE

## 2020-11-13 PROCEDURE — 258N000003 HC RX IP 258 OP 636: Performed by: PEDIATRICS

## 2020-11-13 PROCEDURE — 36415 COLL VENOUS BLD VENIPUNCTURE: CPT | Performed by: INTERNAL MEDICINE

## 2020-11-13 PROCEDURE — 83615 LACTATE (LD) (LDH) ENZYME: CPT | Performed by: INTERNAL MEDICINE

## 2020-11-13 PROCEDURE — 120N000001 HC R&B MED SURG/OB

## 2020-11-13 PROCEDURE — 83605 ASSAY OF LACTIC ACID: CPT | Performed by: PEDIATRICS

## 2020-11-13 PROCEDURE — 250N000012 HC RX MED GY IP 250 OP 636 PS 637: Performed by: PEDIATRICS

## 2020-11-13 PROCEDURE — 85379 FIBRIN DEGRADATION QUANT: CPT | Performed by: INTERNAL MEDICINE

## 2020-11-13 PROCEDURE — 999N000157 HC STATISTIC RCP TIME EA 10 MIN

## 2020-11-13 PROCEDURE — 99223 1ST HOSP IP/OBS HIGH 75: CPT | Mod: AI | Performed by: PEDIATRICS

## 2020-11-13 PROCEDURE — 85045 AUTOMATED RETICULOCYTE COUNT: CPT | Performed by: INTERNAL MEDICINE

## 2020-11-13 PROCEDURE — 96372 THER/PROPH/DIAG INJ SC/IM: CPT | Performed by: INTERNAL MEDICINE

## 2020-11-13 RX ORDER — AMOXICILLIN 250 MG
1 CAPSULE ORAL 2 TIMES DAILY
Status: DISCONTINUED | OUTPATIENT
Start: 2020-11-13 | End: 2020-11-15 | Stop reason: HOSPADM

## 2020-11-13 RX ORDER — MIRTAZAPINE 15 MG/1
15 TABLET, FILM COATED ORAL AT BEDTIME
Status: DISCONTINUED | OUTPATIENT
Start: 2020-11-13 | End: 2020-11-15 | Stop reason: HOSPADM

## 2020-11-13 RX ORDER — ACETAMINOPHEN 500 MG
1000 TABLET ORAL EVERY 6 HOURS PRN
Status: DISCONTINUED | OUTPATIENT
Start: 2020-11-13 | End: 2020-11-15 | Stop reason: HOSPADM

## 2020-11-13 RX ORDER — POLYETHYLENE GLYCOL 3350 17 G/17G
17 POWDER, FOR SOLUTION ORAL DAILY
Status: DISCONTINUED | OUTPATIENT
Start: 2020-11-14 | End: 2020-11-15 | Stop reason: HOSPADM

## 2020-11-13 RX ORDER — AMOXICILLIN 250 MG
2 CAPSULE ORAL 2 TIMES DAILY
Status: DISCONTINUED | OUTPATIENT
Start: 2020-11-13 | End: 2020-11-15 | Stop reason: HOSPADM

## 2020-11-13 RX ADMIN — SIMVASTATIN 40 MG: 40 TABLET, FILM COATED ORAL at 08:58

## 2020-11-13 RX ADMIN — LISINOPRIL 2.5 MG: 2.5 TABLET ORAL at 20:17

## 2020-11-13 RX ADMIN — SODIUM CHLORIDE, POTASSIUM CHLORIDE, SODIUM LACTATE AND CALCIUM CHLORIDE 1000 ML: 600; 310; 30; 20 INJECTION, SOLUTION INTRAVENOUS at 17:16

## 2020-11-13 RX ADMIN — ENOXAPARIN SODIUM 40 MG: 40 INJECTION SUBCUTANEOUS at 06:19

## 2020-11-13 RX ADMIN — METFORMIN HYDROCHLORIDE 500 MG: 500 TABLET ORAL at 08:58

## 2020-11-13 RX ADMIN — REMDESIVIR 200 MG: 100 INJECTION, POWDER, LYOPHILIZED, FOR SOLUTION INTRAVENOUS at 18:55

## 2020-11-13 RX ADMIN — CARVEDILOL 25 MG: 25 TABLET, FILM COATED ORAL at 17:19

## 2020-11-13 RX ADMIN — ACETAMINOPHEN 1000 MG: 500 TABLET, FILM COATED ORAL at 20:17

## 2020-11-13 RX ADMIN — INSULIN GLARGINE 10 UNITS: 100 INJECTION, SOLUTION SUBCUTANEOUS at 22:43

## 2020-11-13 RX ADMIN — CARVEDILOL 25 MG: 25 TABLET, FILM COATED ORAL at 08:58

## 2020-11-13 RX ADMIN — CLOPIDOGREL BISULFATE 75 MG: 75 TABLET ORAL at 08:58

## 2020-11-13 RX ADMIN — INSULIN ASPART 2 UNITS: 100 INJECTION, SOLUTION INTRAVENOUS; SUBCUTANEOUS at 08:59

## 2020-11-13 RX ADMIN — ASPIRIN 81 MG 81 MG: 81 TABLET ORAL at 08:58

## 2020-11-13 RX ADMIN — INSULIN ASPART 4 UNITS: 100 INJECTION, SOLUTION INTRAVENOUS; SUBCUTANEOUS at 12:32

## 2020-11-13 RX ADMIN — DEXAMETHASONE 6 MG: 2 TABLET ORAL at 08:58

## 2020-11-13 RX ADMIN — MIRTAZAPINE 15 MG: 15 TABLET, FILM COATED ORAL at 20:17

## 2020-11-13 RX ADMIN — LISINOPRIL 2.5 MG: 2.5 TABLET ORAL at 08:58

## 2020-11-13 ASSESSMENT — ACTIVITIES OF DAILY LIVING (ADL)
WEAR_GLASSES_OR_BLIND: YES
WALKING_OR_CLIMBING_STAIRS_DIFFICULTY: NO
TOILETING_ISSUES: NO
CONCENTRATING,_REMEMBERING_OR_MAKING_DECISIONS_DIFFICULTY: NO
HEARING_DIFFICULTY_OR_DEAF: NO
DIFFICULTY_EATING/SWALLOWING: NO
DIFFICULTY_COMMUNICATING: NO
DRESSING/BATHING_DIFFICULTY: NO
ADLS_ACUITY_SCORE: 13
DOING_ERRANDS_INDEPENDENTLY_DIFFICULTY: NO
FALL_HISTORY_WITHIN_LAST_SIX_MONTHS: NO

## 2020-11-13 NOTE — H&P
[facility]    History and Physical  Hospitalist       Date of Admission:  11/12/2020    Assessment & Plan   Ulices Shah Jr. is a 85 year old male with PMH CAD, HTN, NIDDM who presents with progressive SOB and cough. The patient was seen in the ED yesterday for similar symptoms, COVID test was sent, patient was discharged from the ED. That COVID test has returned positive. The patient reports his symptoms began 3-4 days ago. He lives in an apartment building alone. At least one other apartment tenet has COVID, but he denies any close sick contacts. He endorses mild fever/chills, non-productive cough, SOB/WARREN. He denies chest pain/pressure, palpitations, n/v, diarrhea, change in taste/smell, urinary symptoms.    # COVID-19 Infection  Febrile to 100.4 in ED  Tachy to 's  BP stable  O2 SAT 94-96% on room air  WBC 7.5  Lactate 2.7 (2.3)  Procalcitonin pending  D-dimer 0.8  CXR shows no acute disease  - no antibiotics for now  - start Dexamethasone 6mg PO daily  - hold off on Remdesivir for now unless patient requires supplemental oxygen  - Albuterol inhaler prn  - symptom management  - maintain special precautions  - oxygen prn to maintain O2 SAT > 92%  - hold IVF  - continuous pulse ox monitoring    # CAD, HTN  - copnt ASA, statin, Plavix, Coreg, Lisinopril    # NIDDM  09/2020 Hgb A1c 6.8%  - cont Metformin  - Novolog s/s    # FEN  - low Na, diabetic diet    # Dispo  - admit to observation    DVT Prophylaxis: Enoxaparin (Lovenox) SQ  Code Status: Full Code  Expected discharge: Tomorrow, recommended to prior living arrangement once symptoms improve.    Melvin Jaeger MD    Primary Care Physician   Kevin Brayden Vaughn    Chief Complaint   SOB    History is obtained from the patient    History of Present Illness   Ulices Shah Jr. is a 85 year old male with PMH CAD, HTN, NIDDM who presents with progressive SOB and cough. The patient was seen in the ED yesterday for similar  "symptoms, COVID test was sent, patient was discharged from the ED. That COVID test has returned positive. The patient reports his symptoms began 3-4 days ago. He lives in an apartment building alone. At least one other apartment tenet has COVID, but he denies any close sick contacts. He endorses mild fever/chills, non-productive cough, SOB/WARREN. He denies chest pain/pressure, palpitations, n/v, diarrhea, change in taste/smell, urinary symptoms.    Past Medical History    I have reviewed this patient's medical history and updated it with pertinent information if needed.   Past Medical History:   Diagnosis Date     Actinic keratosis      Cancer (H)      Heart disease      History of blood transfusion      History of nonmelanoma skin cancer      Unspecified essential hypertension        Past Surgical History   I have reviewed this patient's surgical history and updated it with pertinent information if needed.  Past Surgical History:   Procedure Laterality Date     C APPENDECTOMY  1951     C EXPLOR HEART SURG WND W CP BYPASS  10/25/01    4 way heart bypass     COLONOSCOPY  12/10/08     COLONOSCOPY N/A 3/7/2019    Procedure: COMBINED COLONOSCOPY,  MULTIPLE POLYPECTOMY BY BIOPSY;  Surgeon: Brayden Sharma DO;  Location:  GI     EXCISE MASS HAND Right 02/2017    \"skin cancer\" removal     HC REMV CATARACT EXTRACAP,INSERT LENS, W/O ECP  10/21/2004    left     HC REMV CATARACT EXTRACAP,INSERT LENS, W/O ECP  11/18/2004    right     HC YAG LASER CAPSULOTOMY  06/18/09    right eye     HEART CATH, ANGIOPLASTY  4/30/12    3 stents     HEMILAMINECTOMY, DISCECTOMY LUMBAR THREE LEVELS, COMBINED  6/26/2013    Procedure: COMBINED HEMILAMINECTOMY, DISCECTOMY LUMBAR THREE LEVELS;  Bilateral L3, L4 and L5 Guille-Laminectomies;  Surgeon: Ollie Dodson MD;  Location:  OR       Prior to Admission Medications   Prior to Admission Medications   Prescriptions Last Dose Informant Patient Reported? Taking?   Omega-3 Fatty Acids " (FISH OIL) 1000 MG CPDR 11/12/2020 at 0800  Yes Yes   Sig: Take 1 capsule by mouth 2 times daily.   aspirin 81 MG tablet 11/12/2020 at 0800  Yes Yes   Sig: Take 1 tablet (81 mg) by mouth daily   atorvastatin (LIPITOR) 40 MG tablet 11/12/2020 at 0800  Yes Yes   Sig: Take 40 mg by mouth daily   carvedilol (COREG) 25 MG tablet 11/12/2020 at 0800  Yes Yes   Sig: Take 25 mg by mouth 2 times daily (with meals).   cetirizine (ZYRTEC) 10 MG tablet 11/12/2020 at 0800  No Yes   Sig: Take 1 tablet (10 mg) by mouth daily as needed for allergies (nasal congestion)   clopidogrel (PLAVIX) 75 MG tablet 11/12/2020 at 0800  No Yes   Sig: Take 1 tablet by mouth daily.   fluorouracil (EFUDEX) 5 % external cream   No No   Sig: After biopsy site has healed behind left ear, apply twice daily for 3 weeks.  Wash hands after applying. Do not cover with a bandage. Keep medication away from pets.  Avoid sun exposure to treated area.   fluticasone (FLONASE) 50 MCG/ACT nasal spray More than a month at Unknown time  No No   Sig: Spray 2 sprays into both nostrils daily   lisinopril (PRINIVIL,ZESTRIL) 5 MG tablet 11/12/2020 at 0800  Yes Yes   Sig: Take 2.5 mg by mouth 2 times daily    metFORMIN (GLUCOPHAGE) 500 MG tablet 11/12/2020 at 0800  No Yes   Sig: Take 1 tablet (500 mg) by mouth daily (with breakfast)   mirtazapine (REMERON) 15 MG tablet 11/11/2020 at 2100  No Yes   Sig: Take 1 tablet (15 mg) by mouth At Bedtime   nitroglycerin (NITROSTAT) 0.4 MG SL tablet   No No   Sig: Place 1 tablet (0.4 mg) under the tongue every 5 minutes as needed   simvastatin (ZOCOR) 40 MG tablet 11/11/2020 at 2100  Yes Yes   Sig: Take 40 mg by mouth At Bedtime      Facility-Administered Medications: None     Allergies   Allergies   Allergen Reactions     No Known Drug Allergies        Social History   I have reviewed this patient's social history and updated it with pertinent information if needed. Ulices Shah Jr.  reports that he has never smoked. He has never  used smokeless tobacco. He reports current alcohol use. He reports that he does not use drugs.    Family History   I have reviewed this patient's family history and updated it with pertinent information if needed.   Family History   Problem Relation Age of Onset     C.A.D. Mother      Alzheimer Disease Mother      Neurologic Disorder Mother 96        dementia     Cancer Father         colon     Cancer - colorectal Father        Review of Systems   The 10 point Review of Systems is negative other than noted in the HPI.     Physical Exam   Temp: 100.4  F (38  C) Temp src: Oral BP: (!) 162/93 Pulse: 88   Resp: 12 SpO2: 96 % O2 Device: None (Room air)    Vital Signs with Ranges  Temp:  [100.4  F (38  C)] 100.4  F (38  C)  Pulse:  [82-94] 88  Resp:  [12-28] 12  BP: (137-162)/(73-93) 162/93  SpO2:  [93 %-96 %] 96 %  159 lbs 1.6 oz    Constitutional: Awake, alert, cooperative, no apparent distress.  Respiratory: CTA on limited exam 2/2 poor inspiratory effort and persistent cough  Cardiovascular: Regular rate and rhythm, normal S1 and S2, and no murmur noted.  GI: Soft, non-distended, non-tender, normal bowel sounds.  Skin: No rashes, no cyanosis, no edema.  Musculoskeletal: No joint swelling, erythema or tenderness.  Neurologic: Cranial nerves 2-12 intact, normal strength and sensation.  Psychiatric: Alert, oriented to person, place and time, no obvious anxiety or depression.    Data   Data reviewed today:  I personally reviewed the chest x-ray image(s) showing no acute disease.  Recent Labs   Lab 11/12/20  1601 11/11/20  0303   WBC 7.5 8.2   HGB 14.9 15.3   MCV 91 91    173    133   POTASSIUM 3.9 3.9   CHLORIDE 100 99   CO2 25 24   BUN 20 15   CR 1.07 0.98   ANIONGAP 9 10   VIVIEN 8.6 8.7   * 155*   ALBUMIN 2.8* 3.3*   PROTTOTAL 7.5 7.9   BILITOTAL 0.6 0.6   ALKPHOS 62 64   ALT 28 25   AST 32 27   TROPI 0.023  --        Recent Results (from the past 24 hour(s))   XR Chest 1 View    Narrative    CHEST ONE  VIEW  11/12/2020 5:47 PM     HISTORY: Cough, elevated d-dimer.    COMPARISON: November 11, 2020      Impression    IMPRESSION: No acute disease.    JESSENIA RIVERA MD

## 2020-11-13 NOTE — PROGRESS NOTES
S-(situation): Patient registered to Observation. Patient arrived to room 254 via cart from ED @ 2010.    B-(background): COVID-19/SOB    A-(assessment): Vital signs:  Temp: 98.1  F (36.7  C) Temp src: Oral BP: 127/73 Pulse: 93   Resp: 18 SpO2: 95 % O2 Device: None (Room air)   Pt denies pain. Lungs diminished. Dry infrequent nonproductive cough. SBA. Will continue to monitor.    R-(recommendations): Orders and observation goals reviewed with patient.    Nursing Observation criteria listed below was met:    Skin issues/needs documented:Yes  Isolation needs addressed and Signage up: Yes  Fall Prevention: Education given and documented: Yes  Education Assessment documented:Yes  Education Documented: Yes  OBS video/handout Reviewed & Documented: Yes  Allergies Reviewed: Yes  Medication Reconciliation Complete: Yes  New medication patient education completed and documented (Possible Side Effects of Common Medications handout): Yes  Home medications if not able to send immediately home with family stored here: NA  Reminder note placed in discharge instructions: NA  Patient has discharge needs (If yes, please explain): No

## 2020-11-13 NOTE — ED NOTES
Pt talking on phone. Pt comfortable. C/o dry cough but sob improved. Blood drawn and sent to lab. Provided cup of ice water.

## 2020-11-13 NOTE — H&P
[facility]    History and Physical - Hospitalist Service       Date of Admission:  11/12/2020    Assessment & Plan   Ulices Shah Jr. is a 85 year old male with diabetes, hypertension, and CAD with previous CABG and recently diagnosed novel 2019 coronavirus infection hospitalized on 11/12/2020 for observation. He has deteriorated with new hypoxemia concerning for acute hypoxic respiratory failure due to coronavirus infection.  He is at high risk for an adverse outcome including worsening respiratory failure and death, so hospitalization is considered medically necessary.  Based on the presently available information, hospitalization for at least 2 midnights is anticipated.    Principal Problem:    2019 novel coronavirus disease (COVID-19)  Assessment: Positive test in the ER on November 11, symptom onset approximately 5 days ago and now with progressively worsening respiratory status  Plan: Admit to inpatient, continue dexamethasone and start remdesivir due to respiratory failure, continue anticoagulation according to guidelines    Active Problems:    Acute respiratory failure with hypoxia (H)  Assessment: Severely hypoxemic this afternoon, hypoxic with acute respiratory alkalosis on ABG, and persistent and worsening elevated lactic acid concerning for tissue hypoxia all of which is suspicious for evolving acute hypoxic respiratory failure  Plan: Start oxygen supplementation and titrate to keep saturations 90 to 96%, transition to high flow nasal cannula and/or assisted ventilation if necessary or reconsider intubation for mechanical ventilation if severely deteriorating      Hypertension goal BP (blood pressure) < 140/90  Assessment: Persistent hypertension  Plan: Continue chronic antihypertensive medications, add IV hydralazine as needed      Type 2 diabetes mellitus without complication, without long-term current use of insulin (H)  Assessment: Worsening hyperglycemia after  starting corticosteroids, normally well controlled diabetes with A1c 6.8 in September  Plan: Continue Metformin, increase sliding scale NovoLog and add Lantus for now while he is taking dexamethasone      SIRS (systemic inflammatory response syndrome) (H)-fever, tachypnea  Assessment: Presented with fever and tachypnea in the ER and probably relatively tachycardic with heart rate near 100 but he is chronically taking a beta-blocker such that he may not have had full tachycardic response, no leukocytosis, procalcitonin was low, and bacterial infection is not suspected clinically at this time, suspect SIRS due to COVID-19 infection  Plan: Follow clinically, follow-up on blood and urine culture results, reconsider additional evaluation such as serial procalcitonin and/or repeat radiographs if there is increasing concern for superimposed bacterial infection, not treating with empiric antibiotics at this time, avoiding aggressive IV fluid resuscitation because sepsis is not suspected and current recommendations are to limit IV fluid therapy in patients with COVID-19 infection      History of heart bypass surgery  Assessment: Previous history for treatment of CAD, at risk for ischemic cardiomyopathy although has not been diagnosed with it previously  Plan: Check BNP, follow troponins in accordance with usual guidelines for coronavirus infection although could escalate if there is increasing clinical concern for an acute coronary syndrome       History of nonmelanoma skin cancer  Assessment: old history  Plan: No acute intervention       Diet: Combination Diet 9539-5387 Calories: Moderate Consistent CHO (4-6 CHO units/meal); 2 gm NA Diet    DVT Prophylaxis: Enoxaparin (Lovenox) SQ  Randall Catheter: not present  Code Status: Full Code           Disposition Plan   Expected discharge: 4 - 7 days, recommended to Be determined once medically stable.  Entered: Josué Nash MD 11/13/2020, 4:20 PM     The patient's care was  discussed with the Bedside Nurse, Care Coordinator/ and Patient.    Josué Nash MD  [facility]  Contact information available via [facility] Paging/Directory      ______________________________________________________________________    Chief Complaint   Cough and shortness of breath    History is obtained from the patient and electronic health record    History of Present Illness   Ulices Shah Jr. is a 85 year old male who was in his usual health until about 5 days ago when he began to cough.  He has had persistent and worsening cough since that time.  He also has become short of breath over the last few days.  He has exertional fatigue.  Aside from resting, there is nothing specific he has tried to alleviate his symptoms.  Symptoms are worsened with activity.  Coughing is worse with taking deep breaths.  He presented to the ER on November 11 for the symptoms and underwent testing for COVID-19 which subsequently was positive.  He had been released home from the ER that day but then again presented to the ER yesterday due to worsening symptoms.  He was hospitalized for observation overnight.  Today he generally feels good although cough persists.  He has not noticed any worsening shortness of breath.  However, over the course of the day, he has developed worsening hypoxemia while on continuous oximetry monitoring.  This afternoon oxygen saturations are now ranging 89 to 91%.  He is now seen in his hospital room for evaluation.    Review of Systems    The 10 point Review of Systems is negative other than noted in the HPI or here.     Past Medical History    I have reviewed this patient's medical history and updated it with pertinent information if needed.   Past Medical History:   Diagnosis Date     Actinic keratosis      Cancer (H)      Heart disease      History of blood transfusion      History of nonmelanoma skin cancer      Type 2 diabetes mellitus without  "complication, without long-term current use of insulin (H) 11/13/2020     Unspecified essential hypertension        Past Surgical History   I have reviewed this patient's surgical history and updated it with pertinent information if needed.  Past Surgical History:   Procedure Laterality Date     C APPENDECTOMY  1949     C EXPLOR HEART SURG WND W CP BYPASS  10/25/01    4 way heart bypass     COLONOSCOPY  12/10/08     COLONOSCOPY N/A 3/7/2019    Procedure: COMBINED COLONOSCOPY,  MULTIPLE POLYPECTOMY BY BIOPSY;  Surgeon: Bryaden Sharma DO;  Location: [facility]     EXCISE MASS HAND Right 02/2017    \"skin cancer\" removal     HC REMV CATARACT EXTRACAP,INSERT LENS, W/O ECP  10/21/2004    left     HC REMV CATARACT EXTRACAP,INSERT LENS, W/O ECP  11/18/2004    right     HC YAG LASER CAPSULOTOMY  06/18/09    right eye     HEART CATH, ANGIOPLASTY  4/30/12    3 stents     HEMILAMINECTOMY, DISCECTOMY LUMBAR THREE LEVELS, COMBINED  6/26/2013    Procedure: COMBINED HEMILAMINECTOMY, DISCECTOMY LUMBAR THREE LEVELS;  Bilateral L3, L4 and L5 Guille-Laminectomies;  Surgeon: Ollie Dodson MD;  Location:  OR       Social History   I have reviewed this patient's social history and updated it with pertinent information if needed.  Social History     Tobacco Use     Smoking status: Never Smoker     Smokeless tobacco: Never Used   Substance Use Topics     Alcohol use: Yes     Alcohol/week: 0.0 standard drinks     Comment: rare, maybe 3 beers all year.     Drug use: No       Family History   I have reviewed this patient's family history and updated it with pertinent information if needed.  Family History   Problem Relation Age of Onset     C.A.D. Mother      Alzheimer Disease Mother      Neurologic Disorder Mother 96        dementia     Cancer Father         colon     Cancer - colorectal Father        Prior to Admission Medications   Prior to Admission Medications   Prescriptions Last Dose Informant Patient Reported? Taking? "   Omega-3 Fatty Acids (FISH OIL) 1000 MG CPDR 11/12/2020 at 0800  Yes Yes   Sig: Take 1 capsule by mouth 2 times daily.   aspirin 81 MG tablet 11/12/2020 at 0800  Yes Yes   Sig: Take 1 tablet (81 mg) by mouth daily   carvedilol (COREG) 25 MG tablet 11/12/2020 at 0800  Yes Yes   Sig: Take 25 mg by mouth 2 times daily (with meals).   cetirizine (ZYRTEC) 10 MG tablet 11/12/2020 at 0800  No Yes   Sig: Take 1 tablet (10 mg) by mouth daily as needed for allergies (nasal congestion)   clopidogrel (PLAVIX) 75 MG tablet 11/12/2020 at 0800  No Yes   Sig: Take 1 tablet by mouth daily.   fluorouracil (EFUDEX) 5 % external cream   No No   Sig: After biopsy site has healed behind left ear, apply twice daily for 3 weeks.  Wash hands after applying. Do not cover with a bandage. Keep medication away from pets.  Avoid sun exposure to treated area.   fluticasone (FLONASE) 50 MCG/ACT nasal spray More than a month at Unknown time  No No   Sig: Spray 2 sprays into both nostrils daily   lisinopril (PRINIVIL,ZESTRIL) 5 MG tablet 11/12/2020 at 0800  Yes Yes   Sig: Take 2.5 mg by mouth 2 times daily    metFORMIN (GLUCOPHAGE) 500 MG tablet 11/12/2020 at 0800  No Yes   Sig: Take 1 tablet (500 mg) by mouth daily (with breakfast)   mirtazapine (REMERON) 15 MG tablet 11/11/2020 at 2100  No Yes   Sig: Take 1 tablet (15 mg) by mouth At Bedtime   nitroglycerin (NITROSTAT) 0.4 MG SL tablet   No No   Sig: Place 1 tablet (0.4 mg) under the tongue every 5 minutes as needed   simvastatin (ZOCOR) 40 MG tablet 11/11/2020 at 2100  Yes Yes   Sig: Take 40 mg by mouth At Bedtime      Facility-Administered Medications: None     Allergies   Allergies   Allergen Reactions     No Known Drug Allergies        Physical Exam   Vital Signs: Temp: 96.2  F (35.7  C) Temp src: Oral BP: 114/66 Pulse: 69   Resp: 18 SpO2: (!) 89 % O2 Device: None (Room air)     Patient Vitals for the past 24 hrs:   BP Temp Temp src Pulse Resp SpO2 Height Weight   11/13/20 1552 -- -- -- -- --  "(!) 89 % -- --   11/13/20 1551 -- -- -- -- -- 91 % -- --   11/13/20 1550 -- -- -- -- -- (!) 88 % -- --   11/13/20 1549 -- -- -- -- -- 91 % -- --   11/13/20 1429 114/66 -- -- 69 18 95 % -- --   11/13/20 1145 95/53 96.2  F (35.7  C) Oral 70 18 95 % -- --   11/13/20 0700 -- -- -- -- -- 93 % -- --   11/13/20 0616 109/55 96.2  F (35.7  C) Temporal 65 18 96 % -- --   11/13/20 0332 137/84 95.9  F (35.5  C) Oral 62 18 94 % -- --   11/12/20 2253 132/72 95.9  F (35.5  C) Oral 68 18 96 % -- --   11/12/20 2019 127/73 98.1  F (36.7  C) Oral 93 18 95 % 1.753 m (5' 9\") 72 kg (158 lb 12.8 oz)   11/12/20 1920 (!) 150/80 -- -- 98 14 94 % -- --   11/12/20 1910 -- -- -- 93 26 95 % -- --   11/12/20 1850 (!) 148/96 -- -- 93 21 95 % -- --   11/12/20 1840 -- -- -- 93 27 94 % -- --   11/12/20 1830 (!) 146/90 -- -- 89 15 95 % -- --   11/12/20 1820 (!) 148/92 -- -- 91 15 95 % -- --   11/12/20 1810 -- -- -- 101 27 93 % -- --   11/12/20 1750 (!) 162/93 -- -- 88 12 96 % -- --   11/12/20 1740 -- -- -- 87 24 96 % -- --   11/12/20 1730 (!) 152/85 -- -- 89 24 96 % -- --   11/12/20 1720 (!) 144/79 -- -- 89 24 94 % -- --   11/12/20 1710 -- -- -- 89 24 96 % -- --   11/12/20 1700 (!) 151/80 -- -- 87 23 96 % -- --   11/12/20 1650 (!) 150/79 -- -- 88 19 93 % -- --   11/12/20 1640 -- -- -- 86 23 93 % -- --   11/12/20 1630 (!) 137/90 -- -- 89 16 93 % -- --     Weight: 158 lbs 12.8 oz   Wt Readings from Last 4 Encounters:   11/12/20 72 kg (158 lb 12.8 oz)   11/11/20 72.6 kg (160 lb)   09/10/20 77 kg (169 lb 12.8 oz)   01/27/20 78.4 kg (172 lb 12.8 oz)     General Appearance: Ill-appearing elderly man, frequently coughing, able to speak full sentences  Eyes: Anicteric sclerae, clear conjunctivae  HEENT: No ear or nasal drainage, oropharynx appears normal, lips appear normal  Neck: Trachea midline, symmetric, no stridor  Chest: No gross anomalies, symmetric excursion  Respiratory: Tachypneic with more prolonged coughing episodes, good air movement, inspiratory " crackles present bilaterally, no wheezing  Cardiovascular: Regular rate and rhythm, no gallop or murmur appreciated, good radial pulse, brisk capillary refill, no peripheral edema  GI: Normal bowel sounds, nondistended abdomen, soft, no tenderness  Lymph/Hematologic: No cervical or supraclavicular lymphadenopathy  Skin: Some flushing of the cheeks, no rashes  Musculoskeletal: No acutely inflamed joints, no gross limb anomalies  Neurologic: Alert and oriented, no confusion evident, purposefully and symmetrically moving limbs  Psychiatric: Normal mood and affect    Data   Data reviewed today: I reviewed all medications, new labs and imaging results over the last 24 hours. I personally reviewed the chest x-ray image(s) showing No infiltrates, pleural effusions, or pneumothorax, normal cardiac silhouette and pulmonary vasculature.    Recent Labs   Lab 11/13/20  0547 11/12/20  1601 11/11/20  0303   WBC 6.7 7.5 8.2   HGB 14.8 14.9 15.3   MCV 92 91 91    200 173   INR 1.13  --   --     134 133   POTASSIUM 4.5 3.9 3.9   CHLORIDE 102 100 99   CO2 27 25 24   BUN 20 20 15   CR 0.85 1.07 0.98   ANIONGAP 6 9 10   VIVIEN 8.8 8.6 8.7   * 264* 155*   ALBUMIN  --  2.8* 3.3*   PROTTOTAL  --  7.5 7.9   BILITOTAL  --  0.6 0.6   ALKPHOS  --  62 64   ALT  --  28 25   AST  --  32 27   TROPI  --  0.023  --      Most Recent D-dimer:  Recent Labs   Lab Test 11/13/20  0547   DD 2.3*     Most Recent Hemoglobin A1c:  Recent Labs   Lab Test 09/09/20  0947   A1C 6.8*     Most Recent 6 glucoses:  Recent Labs   Lab Test 11/13/20  0547 11/12/20  1601 11/11/20  0303 09/09/20  0947 11/18/19  1617 06/27/17  0846   * 264* 155* 136* 194* 128*     Most Recent ABG performed in room air:  Recent Labs   Lab Test 11/13/20  1445   PH 7.47*   PO2 66*   PCO2 32*   HCO3 23   DARREN 0.5     Most Recent ESR & CRP:  Recent Labs   Lab Test 11/13/20  0547 04/29/13  1018 04/29/13  1018   SED  --   --  9   .0*   < >  --     < > = values in  this interval not displayed.     Recent Results (from the past 24 hour(s))   XR Chest 1 View    Narrative    CHEST ONE VIEW  11/12/2020 5:47 PM     HISTORY: Cough, elevated d-dimer.    COMPARISON: November 11, 2020      Impression    IMPRESSION: No acute disease.    JESSENIA RIVERA MD   XR Chest Port 1 View    Narrative    CHEST ONE VIEW PORTABLE   11/13/2020 3:44 PM     HISTORY: COVID-19, worsening cough and hypoxemia, question new  infiltrates.    COMPARISON: 11/12/2020.      Impression    IMPRESSION: Cardiac silhouette is within normal limits. Sternotomy  wires and mediastinal clips are unchanged. No evidence for infiltrate  or effusion. Degenerative changes are noted through the spine.     JODIE BALLARD MD

## 2020-11-13 NOTE — ED NOTES
Provided pt with bag lunch, eating and drinking. Pt is sitting upright in bed, call light in reach. VS monitoring. Pt cont to deny pain.

## 2020-11-13 NOTE — ED NOTES
ED Nursing criteria listed below was addressed during verbal handoff:     Abnormal vitals: Yes  Abnormal results: Yes  Med Reconciliation completed: Yes  Meds given in ED: Yes  Any Overdue Meds: No  Core Measures: N/A  Isolation: Yes  Special needs: N/A  Skin assessment: Yes    Observation Patient  Education provided: Yes

## 2020-11-13 NOTE — ED PROVIDER NOTES
History     Chief Complaint   Patient presents with     Shortness of Breath     HPI  Ulices Shah Jr. is a 85 year old male who presents with concerns of worsening shortness of breath and worsening cough.  Patient lives alone.  Patient was diagnosed with COVID-19 based on a swab 2 days ago.  Patient was here in the emergency department for the same complaints and had a negative work-up.  Patient comes in today because he has felt more short of breath and has had a worsening cough.  He also has not been eating or drinking well.  States he still feeling somewhat feverish and chilled.  Denies any vomiting or diarrhea.  Patient states he also has a little bit of a sore throat.  Denies any belly pain denies any dysuria or hematuria.    Allergies:  Allergies   Allergen Reactions     No Known Drug Allergies        Problem List:    Patient Active Problem List    Diagnosis Date Noted     Spinal stenosis, lumbar region, without neurogenic claudication 06/25/2013     Priority: High     2019 novel coronavirus disease (COVID-19) 11/12/2020     Priority: Medium     Other osteoarthritis of spine, cervical region 09/10/2020     Priority: Medium     History of nonmelanoma skin cancer 01/22/2019     Priority: Medium     Persistent insomnia 03/27/2017     Priority: Medium     Elevated blood sugar 03/27/2017     Priority: Medium     Heart murmur, systolic 03/27/2017     Priority: Medium     History of heart bypass surgery 12/07/2015     Priority: Medium     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 s/p [facility] 9/3/15 09/03/2015     Priority: Medium     Basal cell carcinoma of left ear (superior helix) s/p [facility] 9-2-14 09/02/2014     Priority: Medium     Basal cell carcinoma of left nasolabial fold s/p [facility] 9-2-14 09/02/2014     Priority: Medium     Basal cell carcinoma of right mid cheek s/p [facility] 9-2-14 09/02/2014     Priority: Medium     History of basal cell carcinoma 07/29/2014     Priority: Medium     Atherosclerosis of  "coronary artery 10/15/2012     Priority: Medium     Cardiomyopathy in diseases classified elsewhere (H) 06/14/2012     Priority: Medium     Overview:   LVEF 50% per LVG 4/30/2012       Advanced directives, counseling/discussion 02/14/2012     Priority: Medium     Advance Directive Problem List Overview:   Name Relationship Phone    Primary Health Care Agent            Alternative Health Care Agent          Discussed advance care planning with patient; information given to patient to review. 2/14/2012          Hypertension goal BP (blood pressure) < 140/90 09/25/2011     Priority: Medium     Hyperlipidemia LDL goal <100 09/25/2011     Priority: Medium     DDD (degenerative disc disease), cervical 12/24/2008     Priority: Medium     Chronic ischemic heart disease 12/21/2002     Priority: Medium     Problem list name updated by automated process. Provider to review          Past Medical History:    Past Medical History:   Diagnosis Date     Actinic keratosis      Cancer (H)      Heart disease      History of blood transfusion      History of nonmelanoma skin cancer      Unspecified essential hypertension        Past Surgical History:    Past Surgical History:   Procedure Laterality Date     C APPENDECTOMY  1951     C EXPLOR HEART SURG WND W CP BYPASS  10/25/01    4 way heart bypass     COLONOSCOPY  12/10/08     COLONOSCOPY N/A 3/7/2019    Procedure: COMBINED COLONOSCOPY,  MULTIPLE POLYPECTOMY BY BIOPSY;  Surgeon: Brayden Sharma DO;  Location: [facility]     EXCISE MASS HAND Right 02/2017    \"skin cancer\" removal     HC REMV CATARACT EXTRACAP,INSERT LENS, W/O ECP  10/21/2004    left     HC REMV CATARACT EXTRACAP,INSERT LENS, W/O ECP  11/18/2004    right     HC YAG LASER CAPSULOTOMY  06/18/09    right eye     HEART CATH, ANGIOPLASTY  4/30/12    3 stents     HEMILAMINECTOMY, DISCECTOMY LUMBAR THREE LEVELS, COMBINED  6/26/2013    Procedure: COMBINED HEMILAMINECTOMY, DISCECTOMY LUMBAR THREE LEVELS;  Bilateral L3, L4 " and L5 Guille-Laminectomies;  Surgeon: Ollie Dodson MD;  Location: [facility] OR       Family History:    Family History   Problem Relation Age of Onset     C.A.D. Mother      Alzheimer Disease Mother      Neurologic Disorder Mother 96        dementia     Cancer Father         colon     Cancer - colorectal Father        Social History:  Marital Status:  Single [1]  Social History     Tobacco Use     Smoking status: Never Smoker     Smokeless tobacco: Never Used   Substance Use Topics     Alcohol use: Yes     Alcohol/week: 0.0 standard drinks     Comment: rare, maybe 3 beers all year.     Drug use: No        Medications:         aspirin 81 MG tablet       atorvastatin (LIPITOR) 40 MG tablet       carvedilol (COREG) 25 MG tablet       cetirizine (ZYRTEC) 10 MG tablet       clopidogrel (PLAVIX) 75 MG tablet       lisinopril (PRINIVIL,ZESTRIL) 5 MG tablet       metFORMIN (GLUCOPHAGE) 500 MG tablet       mirtazapine (REMERON) 15 MG tablet       Omega-3 Fatty Acids (FISH OIL) 1000 MG CPDR       simvastatin (ZOCOR) 40 MG tablet       fluorouracil (EFUDEX) 5 % external cream       fluticasone (FLONASE) 50 MCG/ACT nasal spray       nitroglycerin (NITROSTAT) 0.4 MG SL tablet          Review of Systems   All other systems reviewed and are negative.      Physical Exam   BP: (!) 140/89  Pulse: 94  Temp: 100.4  F (38  C)  Resp: 20  Weight: 72.2 kg (159 lb 1.6 oz)  SpO2: 94 %      Physical Exam  Vitals signs and nursing note reviewed.   Constitutional:       General: He is not in acute distress.     Appearance: He is well-developed. He is not diaphoretic.   HENT:      Head: Normocephalic and atraumatic.      Nose: Nose normal.      Mouth/Throat:      Pharynx: No oropharyngeal exudate.   Eyes:      General: No scleral icterus.     Conjunctiva/sclera: Conjunctivae normal.      Pupils: Pupils are equal, round, and reactive to light.   Neck:      Musculoskeletal: Normal range of motion.   Cardiovascular:      Rate and Rhythm: Normal  rate and regular rhythm.      Heart sounds: Normal heart sounds. No murmur. No friction rub.   Pulmonary:      Effort: Pulmonary effort is normal. No respiratory distress.      Breath sounds: Normal breath sounds. No wheezing or rales.   Abdominal:      General: Bowel sounds are normal. There is no distension.      Palpations: Abdomen is soft. There is no mass.      Tenderness: There is no abdominal tenderness. There is no guarding or rebound.   Musculoskeletal: Normal range of motion.         General: No tenderness.   Skin:     General: Skin is warm.      Findings: No rash.   Neurological:      Mental Status: He is alert and oriented to person, place, and time.   Psychiatric:         Judgment: Judgment normal.         ED Course        Procedures       The Lactic acid level is elevated due to COVID-19 and decreased p.o. intake, at this time there is no sign of severe sepsis or septic shock.         Results for orders placed or performed during the hospital encounter of 11/12/20 (from the past 24 hour(s))   CBC with platelets differential   Result Value Ref Range    WBC 7.5 4.0 - 11.0 10e9/L    RBC Count 4.74 4.4 - 5.9 10e12/L    Hemoglobin 14.9 13.3 - 17.7 g/dL    Hematocrit 43.0 40.0 - 53.0 %    MCV 91 78 - 100 fl    MCH 31.4 26.5 - 33.0 pg    MCHC 34.7 31.5 - 36.5 g/dL    RDW 11.9 10.0 - 15.0 %    Platelet Count 200 150 - 450 10e9/L    Diff Method Automated Method     % Neutrophils 81.5 %    % Lymphocytes 12.3 %    % Monocytes 5.6 %    % Eosinophils 0.0 %    % Basophils 0.1 %    % Immature Granulocytes 0.5 %    Nucleated RBCs 0 0 /100    Absolute Neutrophil 6.1 1.6 - 8.3 10e9/L    Absolute Lymphocytes 0.9 0.8 - 5.3 10e9/L    Absolute Monocytes 0.4 0.0 - 1.3 10e9/L    Absolute Basophils 0.0 0.0 - 0.2 10e9/L    Abs Immature Granulocytes 0.0 0 - 0.4 10e9/L    Absolute Nucleated RBC 0.0    Comprehensive metabolic panel   Result Value Ref Range    Sodium 134 133 - 144 mmol/L    Potassium 3.9 3.4 - 5.3 mmol/L     Chloride 100 94 - 109 mmol/L    Carbon Dioxide 25 20 - 32 mmol/L    Anion Gap 9 3 - 14 mmol/L    Glucose 264 (H) 70 - 99 mg/dL    Urea Nitrogen 20 7 - 30 mg/dL    Creatinine 1.07 0.66 - 1.25 mg/dL    GFR Estimate 64 >60 mL/min/[1.73_m2]    GFR Estimate If Black 74 >60 mL/min/[1.73_m2]    Calcium 8.6 8.5 - 10.1 mg/dL    Bilirubin Total 0.6 0.2 - 1.3 mg/dL    Albumin 2.8 (L) 3.4 - 5.0 g/dL    Protein Total 7.5 6.8 - 8.8 g/dL    Alkaline Phosphatase 62 40 - 150 U/L    ALT 28 0 - 70 U/L    AST 32 0 - 45 U/L   Magnesium   Result Value Ref Range    Magnesium 2.0 1.6 - 2.3 mg/dL   Lactic acid whole blood   Result Value Ref Range    Lactic Acid 2.3 (H) 0.7 - 2.0 mmol/L   Blood gas venous   Result Value Ref Range    Ph Venous 7.43 7.32 - 7.43 pH    PCO2 Venous 41 40 - 50 mm Hg    PO2 Venous 34 25 - 47 mm Hg    Bicarbonate Venous 27 21 - 28 mmol/L    Base Excess Venous 2.8 mmol/L    FIO2 21    Troponin I   Result Value Ref Range    Troponin I ES 0.023 0.000 - 0.045 ug/L   Lactate Dehydrogenase   Result Value Ref Range    Lactate Dehydrogenase 256 (H) 85 - 227 U/L   Phosphorus   Result Value Ref Range    Phosphorus 3.4 2.5 - 4.5 mg/dL   CRP inflammation   Result Value Ref Range    CRP Inflammation 153.0 (H) 0.0 - 8.0 mg/L   Ferritin   Result Value Ref Range    Ferritin 310 26 - 388 ng/mL   XR Chest 1 View    Narrative    CHEST ONE VIEW  11/12/2020 5:47 PM     HISTORY: Cough, elevated d-dimer.    COMPARISON: November 11, 2020      Impression    IMPRESSION: No acute disease.    JESSENIA RIVERA MD       Medications   sodium chloride (PF) 0.9% PF flush 3 mL (has no administration in time range)   sodium chloride (PF) 0.9% PF flush 3 mL (has no administration in time range)   lactated ringers infusion ( Intravenous New Bag 11/12/20 6815)   acetaminophen (TYLENOL) tablet 975 mg (has no administration in time range)   lactated ringers BOLUS 250 mL (0 mLs Intravenous Stopped 11/12/20 7249)     Labs are reviewed and were mostly  unremarkable.  Patient's lactic was slightly elevated.  Patient though has no clinical signs of sepsis.  I suspect that this is from COVID-19 and decreased p.o. intake.  I did consult one of the Covid specialist at [facility] and spoke to Dr Rossi who recommended adding on a chest x-ray which was normal.  Because the patient lives alone and this mildly elevated lactic and sats right at 94%, she would recommend at least an observation stay overnight to make sure he does not decompensate.  Did not recommend any other changes or medications at this time though.  I discussed the case with our hospitalist, Dr. Nash who agrees with the plan and will bring the patient in for observation.    Assessments & Plan (with Medical Decision Making)  COVID-19 infection     I have reviewed the nursing notes.    I have reviewed the findings, diagnosis, plan and need for follow up with the patient.          Final diagnoses:   2019 novel coronavirus disease (COVID-19)       11/12/2020   [facility] EMERGENCY DEPT     Kevin Sargent MD  11/12/20 6942

## 2020-11-13 NOTE — ED NOTES
Called and updated pt friend Enedina per pt request. Pt was requesting to run out to vehicle to obtain his cell phone and bag, informed that due to covid and pt sx that he will not be leaving the dept/hospital. RN went out to car to obtain belongings. Pt has been updated. Denies pain. 96% on RA, sob has improved. Med rec completed with pt and pt belongings completed.

## 2020-11-13 NOTE — PLAN OF CARE
VSS. Afebrile. A&Ox4. Pt remains on room air with sats maintaining mid/upper 90s. Denies any pain. Lungs remain diminished with a slight nonproductive dry cough. Blood sugars of 228 and 248. 1 unit of insulin given. Trace edema in BLE. SBA to bathroom with good UOP. Lovenox given for VTE. No complaints at this time. Will continue to monitor.

## 2020-11-14 LAB
ANION GAP SERPL CALCULATED.3IONS-SCNC: 7 MMOL/L (ref 3–14)
BACTERIA SPEC CULT: NO GROWTH
BASOPHILS # BLD AUTO: 0 10E9/L (ref 0–0.2)
BASOPHILS NFR BLD AUTO: 0.1 %
BUN SERPL-MCNC: 22 MG/DL (ref 7–30)
CALCIUM SERPL-MCNC: 8.6 MG/DL (ref 8.5–10.1)
CHLORIDE SERPL-SCNC: 104 MMOL/L (ref 94–109)
CO2 SERPL-SCNC: 27 MMOL/L (ref 20–32)
CREAT SERPL-MCNC: 0.8 MG/DL (ref 0.66–1.25)
CRP SERPL-MCNC: 101 MG/L (ref 0–8)
D DIMER PPP FEU-MCNC: 0.9 UG/ML FEU (ref 0–0.5)
DIFFERENTIAL METHOD BLD: ABNORMAL
EOSINOPHIL NFR BLD AUTO: 0 %
ERYTHROCYTE [DISTWIDTH] IN BLOOD BY AUTOMATED COUNT: 11.9 % (ref 10–15)
FIBRINOGEN PPP-MCNC: 696 MG/DL (ref 200–420)
GFR SERPL CREATININE-BSD FRML MDRD: 82 ML/MIN/{1.73_M2}
GLUCOSE BLDC GLUCOMTR-MCNC: 147 MG/DL (ref 70–99)
GLUCOSE BLDC GLUCOMTR-MCNC: 153 MG/DL (ref 70–99)
GLUCOSE BLDC GLUCOMTR-MCNC: 157 MG/DL (ref 70–99)
GLUCOSE BLDC GLUCOMTR-MCNC: 158 MG/DL (ref 70–99)
GLUCOSE BLDC GLUCOMTR-MCNC: 174 MG/DL (ref 70–99)
GLUCOSE BLDC GLUCOMTR-MCNC: 212 MG/DL (ref 70–99)
GLUCOSE SERPL-MCNC: 161 MG/DL (ref 70–99)
HCT VFR BLD AUTO: 40.9 % (ref 40–53)
HGB BLD-MCNC: 14.1 G/DL (ref 13.3–17.7)
IMM GRANULOCYTES # BLD: 0.1 10E9/L (ref 0–0.4)
IMM GRANULOCYTES NFR BLD: 0.7 %
INR PPP: 1.13 (ref 0.86–1.14)
LACTATE BLD-SCNC: 1.1 MMOL/L (ref 0.7–2)
LDH SERPL L TO P-CCNC: 256 U/L (ref 85–227)
LYMPHOCYTES # BLD AUTO: 0.7 10E9/L (ref 0.8–5.3)
LYMPHOCYTES NFR BLD AUTO: 4.7 %
Lab: NORMAL
MCH RBC QN AUTO: 31.9 PG (ref 26.5–33)
MCHC RBC AUTO-ENTMCNC: 34.5 G/DL (ref 31.5–36.5)
MCV RBC AUTO: 93 FL (ref 78–100)
MONOCYTES # BLD AUTO: 0.3 10E9/L (ref 0–1.3)
MONOCYTES NFR BLD AUTO: 2.3 %
NEUTROPHILS # BLD AUTO: 13.8 10E9/L (ref 1.6–8.3)
NEUTROPHILS NFR BLD AUTO: 92.2 %
NRBC # BLD AUTO: 0 10*3/UL
NRBC BLD AUTO-RTO: 0 /100
PLATELET # BLD AUTO: 223 10E9/L (ref 150–450)
POTASSIUM SERPL-SCNC: 4.1 MMOL/L (ref 3.4–5.3)
RBC # BLD AUTO: 4.42 10E12/L (ref 4.4–5.9)
RETICS # AUTO: 25.6 10E9/L (ref 25–95)
RETICS/RBC NFR AUTO: 0.6 % (ref 0.5–2)
SODIUM SERPL-SCNC: 138 MMOL/L (ref 133–144)
SPECIMEN SOURCE: NORMAL
TROPONIN I SERPL-MCNC: <0.015 UG/L (ref 0–0.04)
WBC # BLD AUTO: 15 10E9/L (ref 4–11)

## 2020-11-14 PROCEDURE — 250N000012 HC RX MED GY IP 250 OP 636 PS 637: Performed by: PEDIATRICS

## 2020-11-14 PROCEDURE — 85025 COMPLETE CBC W/AUTO DIFF WBC: CPT | Performed by: PEDIATRICS

## 2020-11-14 PROCEDURE — 83615 LACTATE (LD) (LDH) ENZYME: CPT | Performed by: PEDIATRICS

## 2020-11-14 PROCEDURE — 999N001017 HC STATISTIC GLUCOSE BY METER IP

## 2020-11-14 PROCEDURE — 85384 FIBRINOGEN ACTIVITY: CPT | Performed by: PEDIATRICS

## 2020-11-14 PROCEDURE — 99232 SBSQ HOSP IP/OBS MODERATE 35: CPT | Performed by: PEDIATRICS

## 2020-11-14 PROCEDURE — 85045 AUTOMATED RETICULOCYTE COUNT: CPT | Performed by: PEDIATRICS

## 2020-11-14 PROCEDURE — 36415 COLL VENOUS BLD VENIPUNCTURE: CPT | Performed by: PEDIATRICS

## 2020-11-14 PROCEDURE — 258N000003 HC RX IP 258 OP 636: Performed by: PEDIATRICS

## 2020-11-14 PROCEDURE — 85610 PROTHROMBIN TIME: CPT | Performed by: PEDIATRICS

## 2020-11-14 PROCEDURE — 86140 C-REACTIVE PROTEIN: CPT | Performed by: PEDIATRICS

## 2020-11-14 PROCEDURE — 250N000011 HC RX IP 250 OP 636: Performed by: PEDIATRICS

## 2020-11-14 PROCEDURE — 84484 ASSAY OF TROPONIN QUANT: CPT | Performed by: PEDIATRICS

## 2020-11-14 PROCEDURE — 250N000013 HC RX MED GY IP 250 OP 250 PS 637: Performed by: PEDIATRICS

## 2020-11-14 PROCEDURE — 80048 BASIC METABOLIC PNL TOTAL CA: CPT | Performed by: PEDIATRICS

## 2020-11-14 PROCEDURE — 250N000009 HC RX 250: Performed by: PEDIATRICS

## 2020-11-14 PROCEDURE — 85379 FIBRIN DEGRADATION QUANT: CPT | Performed by: PEDIATRICS

## 2020-11-14 PROCEDURE — 83605 ASSAY OF LACTIC ACID: CPT | Performed by: PEDIATRICS

## 2020-11-14 PROCEDURE — 120N000001 HC R&B MED SURG/OB

## 2020-11-14 RX ADMIN — SENNOSIDES AND DOCUSATE SODIUM 1 TABLET: 8.6; 5 TABLET ORAL at 20:55

## 2020-11-14 RX ADMIN — METFORMIN HYDROCHLORIDE 500 MG: 500 TABLET ORAL at 07:56

## 2020-11-14 RX ADMIN — REMDESIVIR 100 MG: 100 INJECTION, POWDER, LYOPHILIZED, FOR SOLUTION INTRAVENOUS at 17:21

## 2020-11-14 RX ADMIN — MIRTAZAPINE 15 MG: 15 TABLET, FILM COATED ORAL at 20:55

## 2020-11-14 RX ADMIN — SIMVASTATIN 40 MG: 40 TABLET, FILM COATED ORAL at 08:34

## 2020-11-14 RX ADMIN — INSULIN GLARGINE 10 UNITS: 100 INJECTION, SOLUTION SUBCUTANEOUS at 20:58

## 2020-11-14 RX ADMIN — LISINOPRIL 2.5 MG: 2.5 TABLET ORAL at 20:55

## 2020-11-14 RX ADMIN — CARVEDILOL 25 MG: 25 TABLET, FILM COATED ORAL at 17:39

## 2020-11-14 RX ADMIN — ASPIRIN 81 MG 81 MG: 81 TABLET ORAL at 08:34

## 2020-11-14 RX ADMIN — LISINOPRIL 2.5 MG: 2.5 TABLET ORAL at 08:34

## 2020-11-14 RX ADMIN — CARVEDILOL 25 MG: 25 TABLET, FILM COATED ORAL at 07:56

## 2020-11-14 RX ADMIN — CLOPIDOGREL BISULFATE 75 MG: 75 TABLET ORAL at 08:34

## 2020-11-14 RX ADMIN — POLYETHYLENE GLYCOL 3350 17 G: 17 POWDER, FOR SOLUTION ORAL at 08:38

## 2020-11-14 RX ADMIN — DEXAMETHASONE 6 MG: 2 TABLET ORAL at 08:34

## 2020-11-14 RX ADMIN — SENNOSIDES AND DOCUSATE SODIUM 1 TABLET: 8.6; 5 TABLET ORAL at 09:07

## 2020-11-14 RX ADMIN — ENOXAPARIN SODIUM 40 MG: 40 INJECTION SUBCUTANEOUS at 05:00

## 2020-11-14 ASSESSMENT — ACTIVITIES OF DAILY LIVING (ADL)
ADLS_ACUITY_SCORE: 12
ADLS_ACUITY_SCORE: 13
ADLS_ACUITY_SCORE: 12
ADLS_ACUITY_SCORE: 13

## 2020-11-14 NOTE — PLAN OF CARE
S-(situation): shift note    B-(background): Covid positive    A-(assessment)  Pt is A&O.  VSS.  Afebrile.  Sats are 97% on 2 liters.  Will try to decrease O2 at this time.  Denies discomfort.  Lung sounds diminished but clear.  At 0510 pt on R/A and sats are 95%. Has a congested sounding cough.      R-(recommendations): Will cont to monitor the above.

## 2020-11-14 NOTE — PROGRESS NOTES
DATE: 11/13/2020    TIME OF RECEIPT FROM LAB:  1943  LAB TEST:  Lactic  LAB VALUE:  2.1  RESULTS GIVEN WITH READ-BACK TO (PROVIDER):  Pink slip left for Dr De Leon  TIME LAB VALUE REPORTED TO PROVIDER:   1944  NEW ORDERS: None at this time

## 2020-11-14 NOTE — PLAN OF CARE
Pt is A & O x 4, pleasant mood, VSS, afebrile, on room air maintaining sats > 92 %. Denies any pain. Blood sugars today 158, 157. Lung sounds clear and diminished. Infrequent, congested cough. Up with SBA in room, steady gait. Good appetite. Will continue to monitor POC until discharge plan for tomorrow.

## 2020-11-14 NOTE — PROGRESS NOTES
S-(situation): Patient changed to inpatient status    B-(background): Patient status change due to observation goals not being met.     A-(assessment): O2 sats 89% RA, slight dyspneic with exertion. Lungs diminished. Oxygen supplement on 2 L NC to keep sats greater than 90%.    R-(recommendations): . Will monitor patient per MD orders.       Inpatient nursing criteria listed below were met:    Adult Profile completedYes  Health care directives status obtained and documented: Yes  VTE prophylaxis orders:   (FYI: Asprin is not an approved anticoagulation for DVT prophylaxis)  SCD's Documented: No  Vaccine assessment done and vaccine ordered if needed. Yes  My Chart patient sign up addressed and documented: Yes  Care Plan initiated: Yes  Discharge planning review completed (admission navigator) Yes

## 2020-11-14 NOTE — PROGRESS NOTES
[facility]    Medicine Progress Note - Hospitalist Service       Date of Admission:  11/12/2020  Assessment & Plan       85-year-old man admitted with 2019 coronavirus infection with acute hypoxic respiratory failure and systemic inflammatory response syndrome.  Signs of respiratory failure appear to be resolving.  Chronic has been stable although he has had worsening hyperglycemia with underlying diabetes after starting dexamethasone.  Chronic ischemic heart disease after remote history of CABG also has been stable clinically.    Principal Problem:    2019 novel coronavirus disease (COVID-19)  Active Problems:    Acute respiratory failure with hypoxia (H)    Hypertension goal BP (blood pressure) < 140/90    Type 2 diabetes mellitus without complication, without long-term current use of insulin (H)    SIRS (systemic inflammatory response syndrome) (H)-fever, tachypnea    History of heart bypass surgery    History of nonmelanoma skin cancer    Continue dexamethasone and remdesivir  Wean oxygen supplementation for saturations 90% and higher and advance activity today as tolerated  Continue Lovenox  Continue present doses of Lantus and NovoLog insulin that were added during this hospitalization but do not anticipate that they will be prescribed at discharge as his diabetes is usually well controlled         Diet: Combination Diet 9272-6283 Calories: Moderate Consistent CHO (4-6 CHO units/meal); 2 gm NA Diet    DVT Prophylaxis: Enoxaparin (Lovenox) SQ  Randall Catheter: not present  Code Status: Full Code           Disposition Plan   Expected discharge: 1 to 2 days, recommended to prior living arrangement once Medically stable and not requiring oxygen supplementation.  Entered: Josué Nash MD 11/14/2020, 10:33 AM       The patient's care was discussed with the Care Coordinator/ and Patient.    Josué Nash MD  Hospitalist Service  [facility]  Center  Contact information available via [facility] Paging/Directory    ______________________________________________________________________    Interval History   Overall he is feeling better now.  He did have an episode of severe shortness of breath for about 30 minutes this morning.  He says he did not have any chest pain, palpitations, or dizziness at that time.  There was nothing specific that he attempted to alleviate the symptoms aside from taking deep breaths and continuing to use his oxygen.  The episode resolved without any other intervention and since that time he has not required any oxygen supplementation.  He has not had fever and has been hemodynamically stable.  He is tolerating good oral intake.  He is voiding spontaneously.    Data reviewed today: I reviewed all medications, new labs and imaging results over the last 24 hours. I personally reviewed no images or EKG's today.    Physical Exam   Vital Signs: Temp: 97.7  F (36.5  C) Temp src: Oral BP: 128/67 Pulse: 87   Resp: 18 SpO2: 95 % O2 Device: None (Room air) Oxygen Delivery: 2 LPM  Weight: 158 lbs 12.8 oz  General Appearance: Appears to be resting comfortably in bed, easily speaking full sentences  Respiratory: Normal respiratory effort, few inspiratory crackles  Cardiovascular: Regular rate and rhythm, good radial pulse, normal capillary refill  Other: Alert and oriented, maintains wakefulness and attention    Data   Recent Labs   Lab 11/14/20  0633 11/13/20  0547 11/12/20  1601 11/11/20  0303   WBC 15.0* 6.7 7.5 8.2   HGB 14.1 14.8 14.9 15.3   MCV 93 92 91 91    198 200 173   INR 1.13 1.13  --   --     135 134 133   POTASSIUM 4.1 4.5 3.9 3.9   CHLORIDE 104 102 100 99   CO2 27 27 25 24   BUN 22 20 20 15   CR 0.80 0.85 1.07 0.98   ANIONGAP 7 6 9 10   VIVIEN 8.6 8.8 8.6 8.7   * 256* 264* 155*   ALBUMIN  --   --  2.8* 3.3*   PROTTOTAL  --   --  7.5 7.9   BILITOTAL  --   --  0.6 0.6   ALKPHOS  --   --  62 64   ALT  --   --  28 25    AST  --   --  32 27   TROPI <0.015  --  0.023  --      Blood sugars range 153-198 overnight   today, decreased from 150 yesterday  Lactic acid 1.1 this morning, improved from 2.1 late yesterday and 2.8 earlier in the day yesterday  D-dimer 0.9 today, improved from 2.3 yesterday    Medications     - MEDICATION INSTRUCTIONS -         aspirin  81 mg Oral Daily     carvedilol  25 mg Oral BID w/meals     clopidogrel  75 mg Oral Daily     dexamethasone  6 mg Oral Daily     enoxaparin ANTICOAGULANT  40 mg Subcutaneous Q24H JASON     insulin aspart  1-10 Units Subcutaneous TID AC     insulin aspart  1-7 Units Subcutaneous At Bedtime     insulin aspart   Subcutaneous TID w/meals     insulin glargine  10 Units Subcutaneous At Bedtime     lisinopril  2.5 mg Oral BID     metFORMIN  500 mg Oral Daily with breakfast     mirtazapine  15 mg Oral At Bedtime     polyethylene glycol  17 g Oral Daily     remdesivir  100 mg Intravenous Q24H     senna-docusate  1 tablet Oral BID    Or     senna-docusate  2 tablet Oral BID     simvastatin  40 mg Oral Daily     sodium chloride (PF)  3 mL Intracatheter Q8H

## 2020-11-15 VITALS
DIASTOLIC BLOOD PRESSURE: 69 MMHG | RESPIRATION RATE: 16 BRPM | HEIGHT: 69 IN | WEIGHT: 158.8 LBS | HEART RATE: 62 BPM | BODY MASS INDEX: 23.52 KG/M2 | TEMPERATURE: 98 F | OXYGEN SATURATION: 94 % | SYSTOLIC BLOOD PRESSURE: 130 MMHG

## 2020-11-15 LAB
ANION GAP SERPL CALCULATED.3IONS-SCNC: 7 MMOL/L (ref 3–14)
BASOPHILS # BLD AUTO: 0 10E9/L (ref 0–0.2)
BASOPHILS NFR BLD AUTO: 0.2 %
BUN SERPL-MCNC: 19 MG/DL (ref 7–30)
CALCIUM SERPL-MCNC: 8.5 MG/DL (ref 8.5–10.1)
CHLORIDE SERPL-SCNC: 106 MMOL/L (ref 94–109)
CO2 SERPL-SCNC: 25 MMOL/L (ref 20–32)
CREAT SERPL-MCNC: 0.8 MG/DL (ref 0.66–1.25)
CRP SERPL-MCNC: 53 MG/L (ref 0–8)
D DIMER PPP FEU-MCNC: 0.8 UG/ML FEU (ref 0–0.5)
DIFFERENTIAL METHOD BLD: ABNORMAL
EOSINOPHIL NFR BLD AUTO: 0 %
ERYTHROCYTE [DISTWIDTH] IN BLOOD BY AUTOMATED COUNT: 12 % (ref 10–15)
FIBRINOGEN PPP-MCNC: 581 MG/DL (ref 200–420)
GFR SERPL CREATININE-BSD FRML MDRD: 82 ML/MIN/{1.73_M2}
GLUCOSE BLDC GLUCOMTR-MCNC: 151 MG/DL (ref 70–99)
GLUCOSE BLDC GLUCOMTR-MCNC: 186 MG/DL (ref 70–99)
GLUCOSE SERPL-MCNC: 183 MG/DL (ref 70–99)
HCT VFR BLD AUTO: 39.9 % (ref 40–53)
HGB BLD-MCNC: 13.5 G/DL (ref 13.3–17.7)
IMM GRANULOCYTES # BLD: 0.1 10E9/L (ref 0–0.4)
IMM GRANULOCYTES NFR BLD: 0.7 %
INR PPP: 1.13 (ref 0.86–1.14)
LDH SERPL L TO P-CCNC: 206 U/L (ref 85–227)
LYMPHOCYTES # BLD AUTO: 0.8 10E9/L (ref 0.8–5.3)
LYMPHOCYTES NFR BLD AUTO: 7.2 %
MCH RBC QN AUTO: 31.2 PG (ref 26.5–33)
MCHC RBC AUTO-ENTMCNC: 33.8 G/DL (ref 31.5–36.5)
MCV RBC AUTO: 92 FL (ref 78–100)
MONOCYTES # BLD AUTO: 0.5 10E9/L (ref 0–1.3)
MONOCYTES NFR BLD AUTO: 5.2 %
NEUTROPHILS # BLD AUTO: 9 10E9/L (ref 1.6–8.3)
NEUTROPHILS NFR BLD AUTO: 86.7 %
NRBC # BLD AUTO: 0 10*3/UL
NRBC BLD AUTO-RTO: 0 /100
PLATELET # BLD AUTO: 260 10E9/L (ref 150–450)
POTASSIUM SERPL-SCNC: 4.2 MMOL/L (ref 3.4–5.3)
RBC # BLD AUTO: 4.33 10E12/L (ref 4.4–5.9)
RETICS # AUTO: 23.4 10E9/L (ref 25–95)
RETICS/RBC NFR AUTO: 0.5 % (ref 0.5–2)
SODIUM SERPL-SCNC: 138 MMOL/L (ref 133–144)
WBC # BLD AUTO: 10.4 10E9/L (ref 4–11)

## 2020-11-15 PROCEDURE — 85384 FIBRINOGEN ACTIVITY: CPT | Performed by: PEDIATRICS

## 2020-11-15 PROCEDURE — 250N000011 HC RX IP 250 OP 636: Performed by: PEDIATRICS

## 2020-11-15 PROCEDURE — 85610 PROTHROMBIN TIME: CPT | Performed by: PEDIATRICS

## 2020-11-15 PROCEDURE — 83615 LACTATE (LD) (LDH) ENZYME: CPT | Performed by: PEDIATRICS

## 2020-11-15 PROCEDURE — 250N000012 HC RX MED GY IP 250 OP 636 PS 637: Performed by: PEDIATRICS

## 2020-11-15 PROCEDURE — 86140 C-REACTIVE PROTEIN: CPT | Performed by: PEDIATRICS

## 2020-11-15 PROCEDURE — 36415 COLL VENOUS BLD VENIPUNCTURE: CPT | Performed by: PEDIATRICS

## 2020-11-15 PROCEDURE — 85025 COMPLETE CBC W/AUTO DIFF WBC: CPT | Performed by: PEDIATRICS

## 2020-11-15 PROCEDURE — 85045 AUTOMATED RETICULOCYTE COUNT: CPT | Performed by: PEDIATRICS

## 2020-11-15 PROCEDURE — 80048 BASIC METABOLIC PNL TOTAL CA: CPT | Performed by: PEDIATRICS

## 2020-11-15 PROCEDURE — 85379 FIBRIN DEGRADATION QUANT: CPT | Performed by: PEDIATRICS

## 2020-11-15 PROCEDURE — 250N000013 HC RX MED GY IP 250 OP 250 PS 637: Performed by: PEDIATRICS

## 2020-11-15 PROCEDURE — 999N001017 HC STATISTIC GLUCOSE BY METER IP

## 2020-11-15 PROCEDURE — 99238 HOSP IP/OBS DSCHRG MGMT 30/<: CPT | Performed by: PEDIATRICS

## 2020-11-15 RX ORDER — ALBUTEROL SULFATE 90 UG/1
2 AEROSOL, METERED RESPIRATORY (INHALATION) EVERY 4 HOURS PRN
COMMUNITY
Start: 2020-11-15 | End: 2020-11-18

## 2020-11-15 RX ORDER — DEXAMETHASONE 6 MG/1
6 TABLET ORAL DAILY
Qty: 7 TABLET | Refills: 0 | Status: SHIPPED | OUTPATIENT
Start: 2020-11-15 | End: 2023-02-10

## 2020-11-15 RX ORDER — CODEINE PHOSPHATE AND GUAIFENESIN 10; 100 MG/5ML; MG/5ML
1-2 SOLUTION ORAL EVERY 4 HOURS PRN
Qty: 118 ML | Refills: 0 | Status: SHIPPED | OUTPATIENT
Start: 2020-11-15 | End: 2023-02-10

## 2020-11-15 RX ADMIN — ASPIRIN 81 MG 81 MG: 81 TABLET ORAL at 08:35

## 2020-11-15 RX ADMIN — SENNOSIDES AND DOCUSATE SODIUM 1 TABLET: 8.6; 5 TABLET ORAL at 08:35

## 2020-11-15 RX ADMIN — CLOPIDOGREL BISULFATE 75 MG: 75 TABLET ORAL at 08:35

## 2020-11-15 RX ADMIN — ENOXAPARIN SODIUM 40 MG: 40 INJECTION SUBCUTANEOUS at 06:53

## 2020-11-15 RX ADMIN — METFORMIN HYDROCHLORIDE 500 MG: 500 TABLET ORAL at 08:35

## 2020-11-15 RX ADMIN — LISINOPRIL 2.5 MG: 2.5 TABLET ORAL at 08:36

## 2020-11-15 RX ADMIN — DEXAMETHASONE 6 MG: 2 TABLET ORAL at 08:35

## 2020-11-15 RX ADMIN — CARVEDILOL 25 MG: 25 TABLET, FILM COATED ORAL at 08:36

## 2020-11-15 RX ADMIN — SIMVASTATIN 40 MG: 40 TABLET, FILM COATED ORAL at 08:35

## 2020-11-15 RX ADMIN — POLYETHYLENE GLYCOL 3350 17 G: 17 POWDER, FOR SOLUTION ORAL at 08:35

## 2020-11-15 ASSESSMENT — ACTIVITIES OF DAILY LIVING (ADL)
ADLS_ACUITY_SCORE: 12

## 2020-11-15 NOTE — DISCHARGE SUMMARY
[facility]  Hospitalist Discharge Summary      Date of Admission:  11/12/2020  Date of Discharge:  11/15/2020  Discharging Provider: Josué Nash MD    Discharge Diagnoses   Principal Problem:    2019 novel coronavirus disease (COVID-19)  Active Problems:    Acute respiratory failure with hypoxia (H)    Hypertension goal BP (blood pressure) < 140/90    Type 2 diabetes mellitus without complication, without long-term current use of insulin (H)    SIRS (systemic inflammatory response syndrome) (H)-fever, tachypnea    History of heart bypass surgery    History of nonmelanoma skin cancer      Follow-ups Needed After Discharge   Follow-up Appointments     Follow-up and recommended labs and tests       Follow up with primary care provider, Kevin Vaughn, within 5 days   for hospital follow- up.             Unresulted Labs Ordered in the Past 30 Days of this Admission     Date and Time Order Name Status Description    11/12/2020 1831 Blood culture Preliminary     11/12/2020 1831 Blood culture Preliminary       These results will be followed up by PCP    Discharge Disposition   Discharged to home  Condition at discharge: Stable    Hospital Course   85-year-old man with diabetes, hypertension, and previous CABG presented with several day history of worsening cough and shortness of breath after recent diagnosis of COVID-19.  He was initially hospitalized for observation and subsequently deteriorated with severe hypoxemia and was therefore admitted.    Problem #1 COVID-19 infection with acute hypoxic respiratory failure and systemic inflammatory response syndrome.  No infiltrates were identified radiographically, but he developed worsening hypoxemia and inspiratory crackles on lung exam.  He was treated with dexamethasone and remdesivir according to protocol for COVID-19 infection with severe hypoxemia.  He was also treated with Lovenox anticoagulation.  Blood cultures and urine culture  were negative.  He improved with resolution of fever and severe hypoxemia and had not required oxygen supplementation while awake for about 24 hours prior to discharge.  He was discharged to complete a 10-day treatment course of dexamethasone.  Anticoagulation was not prescribed because he was quite ambulatory.    Problem #2 type II diabetes with hyperglycemia.  His diabetes was usually well controlled.  He developed worsening hyperglycemia after starting dexamethasone.  He was treated with insulin during hospitalization but advised to continue monitoring his blood sugars at home after hospital discharge with expectation that his moderate hyperglycemia would resolve after he completed a treatment course of dexamethasone.    Consultations This Hospital Stay   None    Code Status   Full Code    Time Spent on this Encounter   I, Josué Nash MD, personally saw the patient today and spent less than or equal to 30 minutes discharging this patient.       Josué Nash MD  [address] SURGICAL  [address] [place] [address]  Phone: [phone]  ______________________________________________________________________    Physical Exam   Vital Signs: Temp: 98  F (36.7  C) Temp src: Oral BP: 130/69 Pulse: 62   Resp: 16 SpO2: 94 % O2 Device: None (Room air)   Weight: 158 lbs 12.8 oz  General Appearance: Appears comfortable resting in bed, easily speaks full sentences  Respiratory: Normal respiratory effort, few scattered inspiratory crackles, no wheezes  Cardiovascular: Regular rate and rhythm, good radial pulse, normal capillary refill  Other: Maintains wakefulness and attention, alert        Primary Care Physician   Kevin Vaughn    Discharge Orders      Reason for your hospital stay    Hospitalized due to breathing problems from COVID-19 infection and improved     Follow-up and recommended labs and tests     Follow up with primary care provider, Kevin Vaughn, within 5  days for hospital follow- up.     Discharge - Quarantine/Isolation Instruction    Date of symptom onset:     Date of first positive test:  11/11/20  If you tested positive COVID-19 and show symptoms (fever, cough, body aches or trouble breathing):        Stay home and away from others (self-isolate) until:        At least 10 days have passed since your symptoms started. AND...        You've had no fever-and no medicine that reduces fever-for 3 full days (72 hours). AND...         Your other symptoms have resolved (gotten better).  If you tested positive for COVID-19 but don't show symptoms:       Stay home and away from others (self-isolate) until at least 10 days have passed since the date of your first positive COVID-19 test.     Contact provider    Contact your primary care provider if If increased trouble breathing, new arm/leg swelling, dizziness/passing out, falls, bleeding that doesn't stop, or uncontrolled pain.     Activity    Your activity upon discharge: activity as tolerated     Monitor and record    blood glucose according to your usual home routine. Your blood sugars will probably run higher than your normal readings while you are taking dexamethasone but should improve after you finish that medicine.     Diet    Follow this diet upon discharge: Orders Placed This Encounter      Combination Diet [id number] Calories: Moderate Consistent CHO (4-6 CHO units/meal); 2 gm NA Diet       Significant Results and Procedures   Most Recent 3 CBC's:  Recent Labs   Lab Test 11/15/20  0605 11/14/20  0633 11/13/20  0547   WBC 10.4 15.0* 6.7   HGB 13.5 14.1 14.8   MCV 92 93 92    223 198     Most Recent 3 BMP's:  Recent Labs   Lab Test 11/15/20  0605 11/14/20  0633 11/13/20  0547    138 135   POTASSIUM 4.2 4.1 4.5   CHLORIDE 106 104 102   CO2 25 27 27   BUN 19 22 20   CR 0.80 0.80 0.85   ANIONGAP 7 7 6   VIVIEN 8.5 8.6 8.8   * 161* 256*     Most Recent 2 LFT's:  Recent Labs   Lab Test 11/12/20  1601  11/11/20  0303   AST 32 27   ALT 28 25   ALKPHOS 62 64   BILITOTAL 0.6 0.6     Most Recent 3 INR's:  Recent Labs   Lab Test 11/15/20  0605 11/14/20  0633 11/13/20  0547   INR 1.13 1.13 1.13     Most Recent 3 Troponin's:  Recent Labs   Lab Test 11/14/20  0633 11/12/20  1601   TROPI <0.015 0.023     Most Recent 3 BNP's:  Recent Labs   Lab Test 11/13/20  0548   NTBNPI 456     Most Recent 6 Bacteria Isolates From Any Culture (See EPIC Reports for Culture Details):  Recent Labs   Lab Test 11/12/20  1904 11/12/20  1601 06/26/13  2200 06/26/13 2050   CULT No growth after 2 days  No growth No growth after 3 days No MRSA isolated Specimen improperly labeled     Most Recent Hemoglobin A1c:  Recent Labs   Lab Test 09/09/20  0947   A1C 6.8*     Most Recent 6 glucoses:  Recent Labs   Lab Test 11/15/20  0605 11/14/20  0633 11/13/20  0547 11/12/20  1601 11/11/20  0303 09/09/20  0947   * 161* 256* 264* 155* 136*     Most Recent ABG:  Recent Labs   Lab Test 11/13/20  1445   PH 7.47*   PO2 66*   PCO2 32*   HCO3 23   DARREN 0.5     Most Recent ESR & CRP:  Recent Labs   Lab Test 11/15/20  0605 04/29/13  1018 04/29/13  1018   SED  --   --  9   CRP 53.0*   < >  --     < > = values in this interval not displayed.   ,   Results for orders placed or performed during the hospital encounter of 11/12/20   XR Chest 1 View    Narrative    CHEST ONE VIEW  11/12/2020 5:47 PM     HISTORY: Cough, elevated d-dimer.    COMPARISON: November 11, 2020      Impression    IMPRESSION: No acute disease.    JESSENIA RIVERA MD   XR Chest Port 1 View    Narrative    CHEST ONE VIEW PORTABLE   11/13/2020 3:44 PM     HISTORY: COVID-19, worsening cough and hypoxemia, question new  infiltrates.    COMPARISON: 11/12/2020.      Impression    IMPRESSION: Cardiac silhouette is within normal limits. Sternotomy  wires and mediastinal clips are unchanged. No evidence for infiltrate  or effusion. Degenerative changes are noted through the spine.     JODIE BALLARD,  MD       Discharge Medications   Current Discharge Medication List      START taking these medications    Details   albuterol (PROAIR HFA/PROVENTIL HFA/VENTOLIN HFA) 108 (90 Base) MCG/ACT inhaler Inhale 2 puffs into the lungs every 4 hours as needed for shortness of breath / dyspnea or wheezing  Qty:      Comments: Pharmacy may dispense brand covered by insurance (Proair, or proventil or ventolin or generic albuterol inhaler)      dexamethasone (DECADRON) 6 MG tablet Take 1 tablet (6 mg) by mouth daily for 7 days  Qty: 7 tablet, Refills: 0    Associated Diagnoses: 2019 novel coronavirus disease (COVID-19)      guaiFENesin-codeine (ROBITUSSIN AC) 100-10 MG/5ML solution Take 5-10 mLs by mouth every 4 hours as needed for cough  Qty: 118 mL, Refills: 0    Associated Diagnoses: 2019 novel coronavirus disease (COVID-19)         CONTINUE these medications which have NOT CHANGED    Details   aspirin 81 MG tablet Take 1 tablet (81 mg) by mouth daily  Qty: 100 tablet, Refills: 0    Associated Diagnoses: Chronic ischemic heart disease, unspecified; Hypertension goal BP (blood pressure) < 140/90; Hyperlipidemia LDL goal <100      carvedilol (COREG) 25 MG tablet Take 25 mg by mouth 2 times daily (with meals).      cetirizine (ZYRTEC) 10 MG tablet Take 1 tablet (10 mg) by mouth daily as needed for allergies (nasal congestion)  Qty: 30 tablet, Refills: 0    Associated Diagnoses: Nasal congestion      clopidogrel (PLAVIX) 75 MG tablet Take 1 tablet by mouth daily.  Qty: 90 tablet, Refills: 3    Associated Diagnoses: Chronic ischemic heart disease, unspecified      lisinopril (PRINIVIL,ZESTRIL) 5 MG tablet Take 2.5 mg by mouth 2 times daily       metFORMIN (GLUCOPHAGE) 500 MG tablet Take 1 tablet (500 mg) by mouth daily (with breakfast)  Qty: 90 tablet, Refills: 0    Associated Diagnoses: Elevated blood sugar      mirtazapine (REMERON) 15 MG tablet Take 1 tablet (15 mg) by mouth At Bedtime  Qty: 30 tablet, Refills: 5    Associated  Diagnoses: Persistent insomnia      Omega-3 Fatty Acids (FISH OIL) 1000 MG CPDR Take 1 capsule by mouth 2 times daily.      simvastatin (ZOCOR) 40 MG tablet Take 40 mg by mouth At Bedtime      fluticasone (FLONASE) 50 MCG/ACT nasal spray Spray 2 sprays into both nostrils daily  Qty: 1 g, Refills: 6    Associated Diagnoses: Nasal congestion      nitroglycerin (NITROSTAT) 0.4 MG SL tablet Place 1 tablet (0.4 mg) under the tongue every 5 minutes as needed  Qty: 25 tablet, Refills: 1    Associated Diagnoses: Chronic ischemic heart disease, unspecified         STOP taking these medications       fluorouracil (EFUDEX) 5 % external cream Comments:   Reason for Stopping:             Allergies   Allergies   Allergen Reactions     No Known Drug Allergies

## 2020-11-15 NOTE — PLAN OF CARE
S-(situation): Patient discharged to pharmacy via ambulatory with nursing assistant    B-(background): COVID-19    A-(assessment): Pt is A & O x 4, very pleasant, VSS, afebrile, maintaining sats around 93 % - 94 % on room air, denies pain. Lung sounds CTA, pt has very infrequent cough. Up ad yuridia in room, ambulating well. Good appetite, blood sugar of 151, insulin given per sliding scale and carbohydrate intake.    R-(recommendations): Discharge instructions reviewed with patient. Listed belongings gathered and returned to patient.          Discharge Nursing Criteria:     Care Plan and Patient education resolved: Yes    New Medications- pt has been educated about purpose and side effects: Yes    Vaccines  Influenza status verified at discharge:  Yes    MISC  Prescriptions if needed, hard copies sent with patient  Yes  Home and hospital aquired medications returned to patient: NA  Medication Bin checked and emptied on discharge Yes  Patient reports post-discharge pain management plan is effective: Yes

## 2020-11-15 NOTE — PLAN OF CARE
Up independently in room and states he is almost at baseline strength-wise and breathing-wise. Diminished breath sounds and some dyspnea with activity. Sats of 93% on room air. Anxious to get home and back to exercising. Ate well.  Blood sugars were 147 and 212. CRP was 101, WBC was 15.0, and D dimer was 0.9.VSS. Hoping to go home tomorrow. Will continue to monitor resp status, blood sugars, labs.

## 2020-11-15 NOTE — PROGRESS NOTES
Sleeping with easy respirations. Lungs were clear and diminished at 1530. Sats were 96% on room air. Received Remdesivir.  Ate well for supper. Has denied pain. Blood sugar was 147 before supper. Will continue to monitor lung sounds, sats, blood sugars.

## 2020-11-15 NOTE — PLAN OF CARE
Lungs are clear, RA O2 sats 88% on RA at beginning of night, had pt use incentive spirometer to 1500 which stimulated cough, O2 sats increased to 94% for a minute then back to 88%. 1/2L nc now at this time, O2 sats 93%, orders are to keep O2 sats >90%, <96%. Lungs are diminished but clear after coughing. Pt has been up to void in room. Denies pain.  overnight. Will continue to monitor respiratory status, wean O2 to RA as able.

## 2020-11-16 ENCOUNTER — PATIENT OUTREACH (OUTPATIENT)
Dept: CARE COORDINATION | Facility: CLINIC | Age: 85
End: 2020-11-16

## 2020-11-16 ENCOUNTER — TELEPHONE (OUTPATIENT)
Dept: FAMILY MEDICINE | Facility: CLINIC | Age: 85
End: 2020-11-16

## 2020-11-16 DIAGNOSIS — U07.1 INFECTION DUE TO 2019 NOVEL CORONAVIRUS: ICD-10-CM

## 2020-11-16 NOTE — TELEPHONE ENCOUNTER
"ED/Discharge Protocol    \"Hi, my name is Ann Gilbert RN, a registered nurse, and I am calling on behalf of Dr. Vaughn's office at [facility].  I am calling to follow up and see how things are going for you after your recent visit.\"    \"I see that you were in the (ER/UC/IP) on 11/12/2020.    How are you doing now that you are home?\" real good    Is patient experiencing symptoms that may require a hospital visit?  No      Discharge Instructions    \"Let's review your discharge instructions.  What is/are the follow-up recommendations?  Pt. Response: I understand    \"Were you instructed to make a follow-up appointment?\"  Pt. Response: Yes.  Has appointment been made?   Yes      \"When you see the provider, I would recommend that you bring your discharge instructions with you.    Medications    \"How many new medications are you on since your hospitalization/ED visit?\"    0-1  \"How many of your current medicines changed (dose, timing, name, etc.) while you were in the hospital/ED visit?\"   0-1  \"Do you have questions about your medications?\"   No  \"Were you newly diagnosed with heart failure, COPD, diabetes or did you have a heart attack?\"   No  For patients on insulin: \"Did you start on insulin in the hospital or did you have your insulin dose changed?\"   No  Post Discharge Medication Reconciliation Status: unable to reconcile discharge medications due to patient time limit.    Was MTM referral placed (*Make sure to put transitions as reason for referral)?   No    Call Summary    \"Do you have any questions or concerns about your condition or care plan at the moment?\"    No  Triage nurse advice given: please give us a call with any further questions or concerns    Patient was in ER 2 times in the past year (assess appropriateness of ER visits.)      \"If you have questions or things don't continue to improve, we encourage you contact us through the main clinic number,  [phone].  Even if the clinic is not open, triage " "nurses are available 24/7 to help you.     We would like you to know that our clinic has extended hours (provide information).  We also have urgent care (provide details on closest location and hours/contact info)\"      \"Thank you for your time and take care!\"    Ann Gilbert RN        "

## 2020-11-16 NOTE — PROGRESS NOTES
Clinic Care Coordination Contact  UT/Voicemail       Clinical Data: Care Coordinator Outreach  Outreach attempted x 1.  Left message on patient's voicemail with call back information and requested return call.  Plan: Care Coordinator will try to reach patient again in 1-2 business days.    Request Summary [[id number]]   Procedure: Referal to CC - CHW - COVID Positive Status: Needs Scheduling   Requested appt date:   Authorizing: Kevin Vaughn MD in  CARE COORDINATION   Referral: [id number] (Pending Review)           Priority: Routine   Diagnosis: Infection due to 2019 novel coronavirus [U07.1]   Order Details   Proc category: Referral Class: Local Print   Standing status: Normal Order status: Sent   Enc provider: Kevin Vaughn MD Enc department:  Care Coordination   Order date: 11/16/2020 Order user: Viktoriya Oliver RN   Visit type: CCC PHONE VISIT Appointment Window:     Lead Care Coordinator:

## 2020-11-16 NOTE — TELEPHONE ENCOUNTER
Patient called to schedule an appointment for a hospital follow-up or appeared on a report showing that they were recently discharged from the hospital.    Patient was admitted to [facility]:    Discharged date: 11/15/20  Reason for hospital admission:  2019 Novel Coronavirus Disease (Covid-19)  Does patient have future appointment scheduled with provider? Yes Dr VANN  Date of future appointment:  11/18/20      This information will be used to help the care team plan for the patients upcoming visit.  The triage RN may determine that a follow up call is necessary and reach out to the patient via the phone number listed in the chart.     Please route this message on routine priority to the Triage RN pool.

## 2020-11-17 NOTE — PROGRESS NOTES
Clinic Care Coordination Contact  Community Health Worker Initial Outreach    Patient accepts CC: No, Not interested.

## 2020-11-18 ENCOUNTER — VIRTUAL VISIT (OUTPATIENT)
Dept: FAMILY MEDICINE | Facility: CLINIC | Age: 85
End: 2020-11-18
Payer: MEDICARE

## 2020-11-18 DIAGNOSIS — I25.9 CHRONIC ISCHEMIC HEART DISEASE: ICD-10-CM

## 2020-11-18 DIAGNOSIS — E11.9 TYPE 2 DIABETES MELLITUS WITHOUT COMPLICATION, WITHOUT LONG-TERM CURRENT USE OF INSULIN (H): ICD-10-CM

## 2020-11-18 DIAGNOSIS — U07.1 2019 NOVEL CORONAVIRUS DISEASE (COVID-19): Primary | ICD-10-CM

## 2020-11-18 DIAGNOSIS — I43 CARDIOMYOPATHY IN DISEASES CLASSIFIED ELSEWHERE (H): ICD-10-CM

## 2020-11-18 PROBLEM — R73.9 ELEVATED BLOOD SUGAR: Status: RESOLVED | Noted: 2017-03-27 | Resolved: 2020-11-18

## 2020-11-18 LAB
BACTERIA SPEC CULT: NO GROWTH
SPECIMEN SOURCE: NORMAL

## 2020-11-18 PROCEDURE — 99442 PR PHYSICIAN TELEPHONE EVALUATION 11-20 MIN: CPT | Mod: 95 | Performed by: FAMILY MEDICINE

## 2020-11-18 NOTE — PATIENT INSTRUCTIONS
1.  Follow-up testing to see if still has active Covid virus could be done perhaps next week.  Antibody testing must be long enough after infection to be appropriate.  We may set you up for both testing next week.  2.  In the meantime continue isolating to at least November 25 to be safe unless you hear otherwise  3.  We should have follow-up in 3 months for long-term consideration of blood pressure, diabetes, other.  Lab work should be done at that time.

## 2020-11-18 NOTE — PROGRESS NOTES
"Ulices Shah Jr. is a 85 year old male who is being evaluated via a billable telephone visit.      The patient has been notified of following:     \"This telephone visit will be conducted via a call between you and your physician/provider. We have found that certain health care needs can be provided without the need for a physical exam.  This service lets us provide the care you need with a short phone conversation.  If a prescription is necessary we can send it directly to your pharmacy.  If lab work is needed we can place an order for that and you can then stop by our lab to have the test done at a later time.    Telephone visits are billed at different rates depending on your insurance coverage. During this emergency period, for some insurers they may be billed the same as an in-person visit.  Please reach out to your insurance provider with any questions.    If during the course of the call the physician/provider feels a telephone visit is not appropriate, you will not be charged for this service.\"    Patient has given verbal consent for Telephone visit?  Yes    What phone number would you like to be contacted at? [phone]    How would you like to obtain your AVS? Mail a copy    Subjective     Ulices Shah Jr. is a 85 year old male who presents via phone visit today for the following health issues:    HPI       Hospital Follow-up Visit:    Hospital/Nursing Home/IP Rehab Facility: [facility]  Date of Admission: 11/11/20  Date of Discharge: 11/16/20  Reason(s) for Admission: covid      Was your hospitalization related to COVID-19? YES   How are you feeling today? Much better  In the past 24 hours have you had shortness of breath when speaking, walking, or climbing stairs? My breathing issues have improved  Do you have a cough? I don't have a cough  When is the last time you had a fever greater than 100? While in the hospital  Are you having any other symptoms? None   Do you have any " other stressors you would like to discuss with your provider? No                 Was the patient in the ICU or did the patient experience delirium during hospitalization?  No          Problems taking medications regularly:  None  Medication changes since discharge: None  Problems adhering to non-medication therapy:  None    Summary of hospitalization:  [facility] discharge summary reviewed  Diagnostic Tests/Treatments reviewed.  Follow up needed: none  Other Healthcare Providers Involved in Patient s Care:         None  Update since discharge: improved.       Post Discharge Medication Reconciliation: discharge medications reconciled and changed, per note/orders.  Plan of care communicated with patient              Basically patient states he is feeling well and not short of breath.  He leaves his apartment to get male wearing a mask but otherwise is confined to his apartment.  His mailbox is down a flight of stairs.  He can go to the plucked mailbox again and come back cannot feel short of breath at all.  He denies fever nor chills.  Most recent temp that he checked was 96.  He is wondering when he might be able to be back out and about in public without being concerned.    Review of Systems   Constitutional, HEENT, cardiovascular, pulmonary, gi and gu systems are negative, except as otherwise noted.       Objective          Vitals:  No vitals were obtained today due to virtual visit.    healthy, alert and no distress  PSYCH: Alert and oriented times 3; coherent speech, normal   rate and volume, able to articulate logical thoughts, able   to abstract reason, no tangential thoughts, no hallucinations   or delusions  His affect is normal  RESP: No cough, no audible wheezing, able to talk in full sentences  Remainder of exam unable to be completed due to telephone visits    Reviewed some labs from hospitalization and indeed sugars were up        Assessment/Plan:    Assessment & Plan     2019 novel coronavirus  disease (COVID-19)  Patient seems to be recovering well from his acute illness.  I told him it is my understanding that we need to wait approximately 2 weeks after testing and/or some days after fever.  It is anticipated he will remain isolated until at least November 25.  He wonders about a follow-up test.  I will check to see what tests might be appropriate and when this should be done and let him know.    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  No changes planned other than routine follow-up    Type 2 diabetes mellitus without complication, without long-term current use of insulin (H)  Diabetes is been higher because of steroids.  Would anticipate with previous excellent hemoglobin A1c things will resolve.  He should have lab work sometime in the first few months next year    Chronic ischemic heart disease  No symptoms despite this very serious illness.          MEDICATIONS:  Continue current medications without change  FURTHER TESTING:       -Consideration for laboratory including antibody testing to be arranged.  See Patient Instructions    Return for BP Recheck, Cardiac disease, Lab Work.  Based on review at [facility] site, he would be ok to have antibody testing 2 weeks after symptoms. Order is generated.   Kevin Vaughn MD  [facility]    Phone call duration: 11 minutes 11 AM to 11:11 AM.

## 2020-11-19 LAB
BACTERIA SPEC CULT: NO GROWTH
SPECIMEN SOURCE: NORMAL

## 2020-12-01 DIAGNOSIS — U07.1 2019 NOVEL CORONAVIRUS DISEASE (COVID-19): ICD-10-CM

## 2020-12-01 PROCEDURE — 86769 SARS-COV-2 COVID-19 ANTIBODY: CPT | Performed by: FAMILY MEDICINE

## 2020-12-01 PROCEDURE — 36415 COLL VENOUS BLD VENIPUNCTURE: CPT | Performed by: FAMILY MEDICINE

## 2020-12-01 PROCEDURE — 86769 SARS-COV-2 COVID-19 ANTIBODY: CPT | Mod: 59 | Performed by: FAMILY MEDICINE

## 2020-12-02 LAB
COVID-19 SPIKE RBD ABY TITER: NORMAL
COVID-19 SPIKE RBD ABY: POSITIVE

## 2020-12-14 NOTE — PROGRESS NOTES
"Ulices Shah Jr.  Gender: male  : 1935  [address]   [facility] [address]  [phone] (home)     Medical Record: [id number]  Pharmacy:    [facility] - [place]  [id number] - [place], [place] - [address]  [facility] - [place], [place] - [address]  Primary Care Provider: Kevin Vaughn    Parent's names are: Data Unavailable (mother) and Data Unavailable (father).      [facility]  2020     Discharge Phone Call:  Key Words/Key Times      Introduction - AIDET (Acknowledge, Introduce, Duration, Explanation)      Empathy-   We are calling to see how you are since your recent stay in the hospital?     Call back COMMENTS:       Clinical Questions -  (f/u appts, medication side effects/purpose, ability to care for self at home) \"For your safety, it is important to us that you understand the purpose and side effects of your medications, can you tell me what your new medications are?\"     Call back COMMENTS:       Staff Recognition -  We like to recognize staff and physicians who have done an excellent job.  Do you remember any people from your care team that you would like recognize?     Call back COMMENTS:       Very Good Care -  We want to provide very good care to all patients.  How was your care?     Call back COMMENTS:       Opportunities for Improvement -  Our goal is to be the best.  Do you have any suggestions for things that we could improve upon?     Call back COMMENTS:       Thank You     1st attempt  no answer left message on machine. Jessi Preston RN      "

## 2020-12-17 NOTE — PROGRESS NOTES
"Ulices Shah Jr.  Gender: male  : 1935  [address]   [facility] [address]  [phone] (home)     Medical Record: [id number]  Pharmacy:    [facility] - [place]  [id number] - [place], [place] - [address]  [facility] - [place], [place] - [address]  Primary Care Provider: Kevin Vaughn    Parent's names are: Data Unavailable (mother) and Data Unavailable (father).      [facility]  2020     Discharge Phone Call:  Key Words/Key Times      Introduction - AIDET (Acknowledge, Introduce, Duration, Explanation)      Empathy-   We are calling to see how you are since your recent stay in the hospital?     Call back COMMENTS: \"Doing well, fully recovered from Covid\"      Clinical Questions -  (f/u appts, medication side effects/purpose, ability to care for self at home) \"For your safety, it is important to us that you understand the purpose and side effects of your medications, can you tell me what your new medications are?\"     Call back COMMENTS:       Staff Recognition -  We like to recognize staff and physicians who have done an excellent job.  Do you remember any people from your care team that you would like recognize?     Call back COMMENTS: \"All those girls were so wonderful\"      Very Good Care -  We want to provide very good care to all patients.  How was your care?     Call back COMMENTS: \"Everyone was so good to me, I didn't want to go home.\"      Opportunities for Improvement -  Our goal is to be the best.  Do you have any suggestions for things that we could improve upon?     Call back COMMENTS: Nothing at all      Thank You   Meliton Coe RN              "

## 2021-01-15 ENCOUNTER — OFFICE VISIT (OUTPATIENT)
Dept: DERMATOLOGY | Facility: CLINIC | Age: 86
End: 2021-01-15
Payer: MEDICARE

## 2021-01-15 ENCOUNTER — ALLIED HEALTH/NURSE VISIT (OUTPATIENT)
Dept: NURSING | Facility: CLINIC | Age: 86
End: 2021-01-15
Payer: MEDICARE

## 2021-01-15 DIAGNOSIS — L57.0 ACTINIC KERATOSES: Primary | ICD-10-CM

## 2021-01-15 DIAGNOSIS — Z53.9 ERRONEOUS ENCOUNTER--DISREGARD: Primary | ICD-10-CM

## 2021-01-15 DIAGNOSIS — Z85.828 HISTORY OF NONMELANOMA SKIN CANCER: ICD-10-CM

## 2021-01-15 DIAGNOSIS — D48.5 NEOPLASM OF UNCERTAIN BEHAVIOR OF SKIN: ICD-10-CM

## 2021-01-15 PROCEDURE — 11102 TANGNTL BX SKIN SINGLE LES: CPT | Performed by: DERMATOLOGY

## 2021-01-15 PROCEDURE — 17000 DESTRUCT PREMALG LESION: CPT | Mod: 59 | Performed by: DERMATOLOGY

## 2021-01-15 PROCEDURE — 17003 DESTRUCT PREMALG LES 2-14: CPT | Performed by: DERMATOLOGY

## 2021-01-15 PROCEDURE — 11103 TANGNTL BX SKIN EA SEP/ADDL: CPT | Performed by: DERMATOLOGY

## 2021-01-15 PROCEDURE — 88305 TISSUE EXAM BY PATHOLOGIST: CPT | Performed by: PATHOLOGY

## 2021-01-15 ASSESSMENT — PAIN SCALES - GENERAL: PAINLEVEL: NO PAIN (0)

## 2021-01-15 NOTE — NURSING NOTE
Ulices Shah Jr.'s goals for this visit include:   Chief Complaint   Patient presents with     Derm Problem     Hx of AK, NMSC, Family of hx Melanoma; 9/18/2020 Last FBSE. Patient reported concerns: crusty spots on left brow area and behind the left ear.        He requests these members of his care team be copied on today's visit information: Yes     PCP: Cricket Lamar    Referring Provider:  No referring provider defined for this encounter.    There were no vitals taken for this visit.    Do you need any medication refills at today's visit? No   [id number], MEDICAL ASSISTANT

## 2021-01-15 NOTE — LETTER
1/15/2021         RE: Ulices Shah .  [address]  [facility] [address]        Dear Colleague,    Thank you for referring your patient, Ulices Shah Jr., to the [facility]. Please see a copy of my visit note below.    [facility] Dermatology Note  Encounter Date: Eduardo 15, 2021  Office Visit     Dermatology Problem List:  1. Family history of melanoma  2. 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 Actinic keratoses:   -HAK, left sideburn s/p biopsy 2/25/2020  -s/p cryotherapy  -s/p Efudex cream   4. Mild eczema, back  -s/p triamcinolone  5. NUB- right antitragus and left crows feet  -bx 1/15/21    ____________________________________________    Assessment & Plan:  # History of nonmelanoma skin cancer and family history of melanoma, no clincial evidence of recurrence.   - ABCDEs: Counseled ABCDEs of melanoma: Asymmetry, Border (irregularity), Color (not uniform, changes in color), Diameter (greater than 6 mm which is about the size of a pencil eraser), and Evolving (any changes in preexisting moles).  - Sun protection: Counseled SPF30+ sunscreen, UPF clothing, sun avoidance, tanning bed avoidance.  - Skin check due 09/2021    # Actinic keratosis x 3   - See procedure note.      # Neoplasm of uncertain behavior on the left crows feet. The differential diagnosis includes cutaneous horn vs SK vs other.   - See procedure note.     # Neoplasm of uncertain behavior on the right antitragus. The differential diagnosis includes SCC vs HAK vs AK.   -  See procedure note     Procedures Performed:   Cryotherapy procedure note: After verbal consent and discussion of risks and benefits including but no limited to dyspigmentation/scar, blister, and pain, 2 was(were) treated with 1-2mm freeze border for 2 cycles with liquid nitrogen. Post cryotherapy instructions were provided.   Shave biopsy: Location(s) Left Crows Feet, right antitragus.  After discussion of benefits and risks including but not limited to bleeding/bruising, pain/swelling, infection, scar, incomplete removal, nerve damage/numbness, recurrence, and non-diagnostic biopsy, written consent, verbal consent and photographs were obtained. Time-out was performed. The area was cleaned with isopropyl alcohol. 0.5mL of 1% lidocaine with epinephrine was injected to obtain adequate anesthesia of each lesion. Shave biopsy was performed. Hemostasis was achieved with aluminium chloride. Vaseline and a sterile dressing were applied. The patient tolerated the procedure and no complications were noted. The patient was provided with verbal and written post care instructions.     Follow-up: 8 month(s) in-person, or earlier for new or changing lesions    Staff and Scribe:     Scribe Disclosure:   I, Juwan Adams, am serving as a scribe to document services personally performed by this physician, Dr. Joceline Stone, based on data collection and the provider's statements to me.     Provider Disclosure:   The documentation recorded by the scribe accurately reflects the services I personally performed and the decisions made by me.    Joceline Stone MD    Department of Dermatology  [facility]: Phone: [phone], Fax:[phone]  [facility]: Phone: [phone], Fax: [phone]      ____________________________________________    CC: Derm Problem (Hx of AK, NMSC, Family of hx Melanoma; 9/18/2020 Last  "FBSE. Patient reported concerns: crusty spots on left brow area and behind the left ear. )      HPI:  Mr. Ulices Shah Jr. is a(n) 85 year old male who presents today as a return patient for evaluation of AKs.    Last seen on 09/18/20 for a skin check when bx of the left postauricular showed an AK. 4 other AKs were also treated on the right antitragus, left superior helix and left cheek.    Today, he presents for evaluation of his AKs. He has concerns about crusty spots on the left brow that \"grew very fast\". He also has concerns about a spot on the right antitragus that he notes has not resolved with cryo. He also has a spot on his left postauricular he'd like examined.    Patient is otherwise feeling well, without additional concerns.    Labs:  NA    Physical Exam:  Vitals: There were no vitals taken for this visit.  SKIN: Focused examination of left post auricular was performed.  - There are erythematous macules with overyling adherent scale on the left postauricular, left antihelix, left lateral cheek,  - Right antitragus: 5 mm keratotic papule, not responding to cryo  - Left Crows Feet: 5 mm keratotic papule  - No other lesions of concern on areas examined.     Medications:  Current Outpatient Medications   Medication     aspirin 81 MG tablet     carvedilol (COREG) 25 MG tablet     cetirizine (ZYRTEC) 10 MG tablet     clopidogrel (PLAVIX) 75 MG tablet     dexamethasone (DECADRON) 6 MG tablet     fluticasone (FLONASE) 50 MCG/ACT nasal spray     guaiFENesin-codeine (ROBITUSSIN AC) 100-10 MG/5ML solution     lisinopril (PRINIVIL,ZESTRIL) 5 MG tablet     metFORMIN (GLUCOPHAGE) 500 MG tablet     mirtazapine (REMERON) 15 MG tablet     Omega-3 Fatty Acids (FISH OIL) 1000 MG CPDR     simvastatin (ZOCOR) 40 MG tablet     No current facility-administered medications for this visit.       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     History of heart bypass surgery     Atherosclerosis of coronary artery     Persistent insomnia     Heart murmur, systolic     History of nonmelanoma skin cancer     Cardiomyopathy in diseases classified elsewhere (H)     Other osteoarthritis of spine, cervical region     2019 novel coronavirus disease (COVID-19)     Type 2 diabetes mellitus without complication, without long-term current use of insulin (H)     Acute respiratory failure with hypoxia (H)     SIRS (systemic inflammatory response syndrome) (H)-fever, tachypnea     Past Medical History:   Diagnosis Date     Actinic keratosis      Cancer (H)      Heart disease      History of blood transfusion      History of nonmelanoma skin cancer      Type 2 diabetes mellitus without complication, without long-term current use of insulin (H) 11/13/2020     Unspecified essential hypertension         CC No referring provider defined for this encounter. on close of this encounter.      Again, thank you for allowing me to participate in the care of your patient.        Sincerely,        Joceline Stone MD

## 2021-01-15 NOTE — PROGRESS NOTES
[facility] Dermatology Note  Encounter Date: Eduardo 15, 2021  Office Visit     Dermatology Problem List:  1. Family history of melanoma  2. 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 Actinic keratoses:   -HAK, left sideburn s/p biopsy 2/25/2020  -s/p cryotherapy  -s/p Efudex cream   4. Mild eczema, back  -s/p triamcinolone  5. NUB- right antitragus and left crows feet  -bx 1/15/21    ____________________________________________    Assessment & Plan:  # History of nonmelanoma skin cancer and family history of melanoma, no clincial evidence of recurrence.   - ABCDEs: Counseled ABCDEs of melanoma: Asymmetry, Border (irregularity), Color (not uniform, changes in color), Diameter (greater than 6 mm which is about the size of a pencil eraser), and Evolving (any changes in preexisting moles).  - Sun protection: Counseled SPF30+ sunscreen, UPF clothing, sun avoidance, tanning bed avoidance.  - Skin check due 09/2021    # Actinic keratosis x 3   - See procedure note.      # Neoplasm of uncertain behavior on the left crows feet. The differential diagnosis includes cutaneous horn vs SK vs other.   - See procedure note.     # Neoplasm of uncertain behavior on the right antitragus. The differential diagnosis includes SCC vs HAK vs AK.   - See procedure note     Procedures Performed:   Cryotherapy procedure note: After verbal consent and discussion of risks and benefits including but no limited to dyspigmentation/scar, blister, and pain, 2 was(were) treated with 1-2mm freeze border for 2 cycles with  liquid nitrogen. Post cryotherapy instructions were provided.   Shave biopsy: Location(s) Left Crows Feet, right antitragus.  After discussion of benefits and risks including but not limited to bleeding/bruising, pain/swelling, infection, scar, incomplete removal, nerve damage/numbness, recurrence, and non-diagnostic biopsy, written consent, verbal consent and photographs were obtained. Time-out was performed. The area was cleaned with isopropyl alcohol. 0.5mL of 1% lidocaine with epinephrine was injected to obtain adequate anesthesia of each lesion. Shave biopsy was performed. Hemostasis was achieved with aluminium chloride. Vaseline and a sterile dressing were applied. The patient tolerated the procedure and no complications were noted. The patient was provided with verbal and written post care instructions.     Follow-up: 8 month(s) in-person, or earlier for new or changing lesions    Staff and Scribe:     Scribe Disclosure:   I, Juwan Adams, am serving as a scribe to document services personally performed by this physician, Dr. Joceline Stone, based on data collection and the provider's statements to me.     Provider Disclosure:   The documentation recorded by the scribe accurately reflects the services I personally performed and the decisions made by me.    Joceline Stone MD    Department of Dermatology  [facility]: Phone: [phone], Fax:[phone]  [facility]: Phone: [phone], Fax: [phone]      ____________________________________________    CC: Derm Problem (Hx of AK, NMSC, Family of hx Melanoma; 9/18/2020 Last FBSE. Patient reported concerns: crusty spots on left brow area and behind the left ear. )      HPI:  Mr. Ulices Shah  is a(n) 85 year old male who presents today as a return patient for evaluation of AKs.    Last seen on 09/18/20 for a skin check when bx  "of the left postauricular showed an AK. 4 other AKs were also treated on the right antitragus, left superior helix and left cheek.    Today, he presents for evaluation of his AKs. He has concerns about crusty spots on the left brow that \"grew very fast\". He also has concerns about a spot on the right antitragus that he notes has not resolved with cryo. He also has a spot on his left postauricular he'd like examined.    Patient is otherwise feeling well, without additional concerns.    Labs:  NA    Physical Exam:  Vitals: There were no vitals taken for this visit.  SKIN: Focused examination of left post auricular was performed.  - There are erythematous macules with overyling adherent scale on the left postauricular, left antihelix, left lateral cheek,  - Right antitragus: 5 mm keratotic papule, not responding to cryo  - Left Crows Feet: 5 mm keratotic papule  - No other lesions of concern on areas examined.     Medications:  Current Outpatient Medications   Medication     aspirin 81 MG tablet     carvedilol (COREG) 25 MG tablet     cetirizine (ZYRTEC) 10 MG tablet     clopidogrel (PLAVIX) 75 MG tablet     dexamethasone (DECADRON) 6 MG tablet     fluticasone (FLONASE) 50 MCG/ACT nasal spray     guaiFENesin-codeine (ROBITUSSIN AC) 100-10 MG/5ML solution     lisinopril (PRINIVIL,ZESTRIL) 5 MG tablet     metFORMIN (GLUCOPHAGE) 500 MG tablet     mirtazapine (REMERON) 15 MG tablet     Omega-3 Fatty Acids (FISH OIL) 1000 MG CPDR     simvastatin (ZOCOR) 40 MG tablet     No current facility-administered medications for this visit.       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     History of heart bypass surgery     Atherosclerosis of coronary artery     Persistent insomnia     Heart murmur, systolic     History of nonmelanoma skin cancer     Cardiomyopathy in diseases classified elsewhere (H)     Other osteoarthritis of spine, cervical region     2019 novel coronavirus disease (COVID-19)     Type 2 diabetes mellitus without complication, without long-term current use of insulin (H)     Acute respiratory failure with hypoxia (H)     SIRS (systemic inflammatory response syndrome) (H)-fever, tachypnea     Past Medical History:   Diagnosis Date     Actinic keratosis      Cancer (H)      Heart disease      History of blood transfusion      History of nonmelanoma skin cancer      Type 2 diabetes mellitus without complication, without long-term current use of insulin (H) 11/13/2020     Unspecified essential hypertension         CC No referring provider defined for this encounter. on close of this encounter.

## 2021-01-15 NOTE — PATIENT INSTRUCTIONS
Cryotherapy    What is it?    Use of a very cold liquid, such as liquid nitrogen, to freeze and destroy abnormal skin cells that need to be removed    What should I expect?    Tenderness and redness    A small blister that might grow and fill with dark purple blood. There may be crusting.    More than one treatment may be needed if the lesions do not go away.    How do I care for the treated area?    Gently wash the area with your hands when bathing.    Use a thin layer of Vaseline to help with healing. You may use a Band-Aid.     The area should heal within 7-10 days and may leave behind a pink or lighter color.     Do not use an antibiotic or Neosporin ointment.     You may take acetaminophen (Tylenol) for pain.     Call your Doctor if you have:    Severe pain    Signs of infection (warmth, redness, cloudy yellow drainage, and or a bad smell)    Questions or concerns    Who should I call with questions?       [facility]: [phone]       [facility]: [phone]       For urgent needs outside of business hours call the [facility] at [phone] Care After a Biopsy    What is a skin biopsy?  A skin biopsy allows the doctor to examine a very small piece of tissue under the microscope to determine the diagnosis and the best treatment for the skin condition. A local anesthetic (numbing medicine)  is injected with a very small needle into the skin area to be tested. A small piece of skin is taken from the area. Sometimes a suture (stitch) is used.     What are the risks of a skin biopsy?  I will experience scar, bleeding, swelling, pain, crusting and redness. I may experience incomplete removal or recurrence. Risks of this procedure are excessive bleeding, bruising, infection, nerve damage, numbness, thick (hypertrophic or keloidal) scar and non-diagnostic biopsy.    How should I care for my wound for the first 24 hours?    Keep the wound dry and  covered for 24 hours    If it bleeds, hold direct pressure on the area for 15 minutes. If bleeding does not stop then go to the emergency room    Avoid strenuous exercise the first 1-2 days or as your doctor instructs you    How should I care for the wound after 24 hours?    After 24 hours, remove the bandage    You may bathe or shower as normal    If you had a scalp biopsy, you can shampoo as usual and can use shower water to clean the biopsy site daily    Clean the wound twice a day with gentle soap and water    Do not scrub, be gentle    Apply white petroleum/Vaseline after cleaning the wound with a cotton swab or a clean finger, and keep the site covered with a Bandaid /bandage. Bandages are not necessary with a scalp biopsy    If you are unable to cover the site with a Bandaid /bandage, re-apply ointment 2-3 times a day to keep the site moist. Moisture will help with healing    Avoid strenuous activity for first 1-2 days    Avoid lakes, rivers, pools, and oceans until the stitches are removed or the site is healed    How do I clean my wound?    Wash hands thoroughly with soap or use hand  before all wound care    Clean the wound with gentle soap and water    Apply white petroleum/Vaseline  to wound after it is clean    Replace the Bandaid /bandage to keep the wound covered for the first few days or as instructed by your doctor    If you had a scalp biopsy, warm shower water to the area on a daily basis should suffice    What should I use to clean my wound?     Cotton-tipped applicators (Qtips )    White petroleum jelly (Vaseline ). Use a clean new container and use Q-tips to apply.    Bandaids   as needed    Gentle soap     How should I care for my wound long term?    Do not get your wound dirty    Keep up with wound care for one week or until the area is healed.    A small scab will form and fall off by itself when the area is completely healed. The area will be red and will become pink in color as it  heals. Sun protection is very important for how your scar will turn out. Sunscreen with an SPF 30 or greater is recommended once the area is healed.        You should have some soreness but it should be mild and slowly go away over several days. Talk to your doctor about using tylenol for pain,    When should I call my doctor?  If you have increased:     Pain or swelling    Pus or drainage (clear or slightly yellow drainage is ok)    Temperature over 100F    Spreading redness or warmth around wound    When will I hear about my results?  The biopsy results can take 2-3 weeks to come back. The clinic will call you with the results, send you a [employer] message, or have you schedule a follow-up clinic or phone time to discuss the results. Contact our clinics if you do not hear from us in 3 weeks.     Who should I call with questions?    [facility]: [phone]     [facility]: [phone]    For urgent needs outside of business hours call the [facility] at [phone] and ask for the dermatology resident on call    [phone]        and ask for the dermatology resident on call

## 2021-01-19 LAB — COPATH REPORT: NORMAL

## 2021-01-26 ENCOUNTER — OFFICE VISIT (OUTPATIENT)
Dept: DERMATOLOGY | Facility: CLINIC | Age: 86
End: 2021-01-26
Payer: MEDICARE

## 2021-01-26 DIAGNOSIS — L57.0 AK (ACTINIC KERATOSIS): Primary | ICD-10-CM

## 2021-01-26 PROCEDURE — 17000 DESTRUCT PREMALG LESION: CPT | Performed by: DERMATOLOGY

## 2021-01-26 PROCEDURE — 17003 DESTRUCT PREMALG LES 2-14: CPT | Performed by: DERMATOLOGY

## 2021-01-26 ASSESSMENT — PAIN SCALES - GENERAL: PAINLEVEL: NO PAIN (0)

## 2021-01-26 NOTE — PATIENT INSTRUCTIONS
Cryotherapy    What is it?    Use of a very cold liquid, such as liquid nitrogen, to freeze and destroy abnormal skin cells that need to be removed    What should I expect?    Tenderness and redness    A small blister that might grow and fill with dark purple blood. There may be crusting.    More than one treatment may be needed if the lesions do not go away.    How do I care for the treated area?    Gently wash the area with your hands when bathing.    Use a thin layer of Vaseline to help with healing. You may use a Band-Aid.     The area should heal within 7-10 days and may leave behind a pink or lighter color.     Do not use an antibiotic or Neosporin ointment.     You may take acetaminophen (Tylenol) for pain.     Call your Doctor if you have:    Severe pain    Signs of infection (warmth, redness, cloudy yellow drainage, and or a bad smell)    Questions or concerns    Who should I call with questions?       [facility]: [phone]       [facility]: [phone]       For urgent needs outside of business hours call the [facility] at [phone]        and ask for the dermatology resident on call

## 2021-01-26 NOTE — NURSING NOTE
Ulices Shah Jr.'s goals for this visit include:   Chief Complaint   Patient presents with     Derm Problem     Spot check - right antitragus recheck - did not use Efudex; hx of AK and NMSC        He requests these members of his care team be copied on today's visit information: Yes     PCP: Cricket Lamar    Referring Provider:  No referring provider defined for this encounter.    There were no vitals taken for this visit.    Do you need any medication refills at today's visit? No   [id number], MEDICAL ASSISTANT

## 2021-01-26 NOTE — PROGRESS NOTES
[facility] Dermatology Note  Encounter Date: Jan 26, 2021  Office Visit     Dermatology Problem List:  1. Family history of melanoma  2. 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 Actinic keratoses:   -HAK, right antitragus s/p bx 1/15/21, cryo 1/26/21  -HAK, left sideburn s/p biopsy 2/25/2020  -s/p cryotherapy  -s/p Efudex cream   4. Mild eczema, back  -s/p triamcinolone    ____________________________________________    Assessment & Plan:  # Actinic keratosis right antitragus, treated with cryo today and right ear lobe   - See procedure note.       Procedures Performed:   -Cryotherapy procedure note: After verbal consent and discussion of risks and benefits including but no limited to dyspigmentation/scar, blister, and pain, 2 AKs was(were) treated with 1-2mm freeze border for 2 cycles with liquid nitrogen. Post cryotherapy instructions were provided.     Follow-up: 6 month(s) in-person, or earlier for new or changing lesions    Staff and Scribe:     Scribe Disclosure:   I, Juwan Adams, am serving as a scribe to document services personally performed by this physician, Dr. Joceline Stone, based on data collection and the provider's statements to me.     Provider Disclosure:   The documentation recorded by the scribe accurately reflects the services I personally performed and the decisions made by me.    Joceline Stone MD    Department of Dermatology  [facility]: Phone: [phone],  Fax:[phone]  [facility]: Phone: [phone], Fax: [phone]      ____________________________________________    CC: Derm Problem (Spot check - right antitragus recheck - did not use Efudex; hx of AK and NMSC )    HPI:  Mr. Ulices Shah Jr. is a(n) 85 year old male who presents today as a return patient for treatment of a HAK.    Last seen on 1/15/21 when a few AKs were treated with cryo. A biopsy was taken from the right antitragus, which came back as a HAK.    Today, he he presents for a recheck of the right antitragus. He has not used Efudex.    Patient is otherwise feeling well, without additional concerns.    Labs:  Derm path from 1/15/21 reviewed.  FINAL DIAGNOSIS:   A. Skin, left crows feet, shave:   - Verrucous keratosis - (see description)     B. Skin, right antitragus, shave:   - Hypertrophic actinic keratosis - (see comment)     Physical Exam:  Vitals: There were no vitals taken for this visit.  SKIN: Focused examination of right ear was performed.  - There is an erythematous macule with overyling adherent scale on the right antitragus and right ear lobe.   - No other lesions of concern on areas examined.     Medications:  Current Outpatient Medications   Medication     aspirin 81 MG tablet     carvedilol (COREG) 25 MG tablet     cetirizine (ZYRTEC) 10 MG tablet     clopidogrel (PLAVIX) 75 MG tablet     dexamethasone (DECADRON) 6 MG tablet     fluticasone (FLONASE) 50 MCG/ACT nasal spray     guaiFENesin-codeine (ROBITUSSIN AC) 100-10 MG/5ML solution     lisinopril (PRINIVIL,ZESTRIL) 5 MG tablet     metFORMIN (GLUCOPHAGE) 500 MG tablet     mirtazapine (REMERON) 15 MG tablet     Omega-3 Fatty Acids (FISH OIL) 1000 MG CPDR     simvastatin (ZOCOR) 40 MG tablet     No current facility-administered medications for this visit.       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     History of heart bypass surgery     Atherosclerosis of coronary artery     Persistent insomnia     Heart murmur, systolic     History of nonmelanoma skin cancer     Cardiomyopathy in diseases classified elsewhere (H)     Other osteoarthritis of spine, cervical region     2019 novel coronavirus disease (COVID-19)     Type 2 diabetes mellitus without complication, without long-term current use of insulin (H)     Acute respiratory failure with hypoxia (H)     SIRS (systemic inflammatory response syndrome) (H)-fever, tachypnea     Past Medical History:   Diagnosis Date     Actinic keratosis      Cancer (H)      Heart disease      History of blood transfusion      History of nonmelanoma skin cancer      Type 2 diabetes mellitus without complication, without long-term current use of insulin (H) 11/13/2020     Unspecified essential hypertension         CC No referring provider defined for this encounter. on close of this encounter.

## 2021-01-26 NOTE — LETTER
1/26/2021         RE: Ulices Shah .  [address]  [facility] [address]        Dear Colleague,    Thank you for referring your patient, Ulices Shah Jr., to the [facility]. Please see a copy of my visit note below.    [facility] Dermatology Note  Encounter Date: Jan 26, 2021  Office Visit     Dermatology Problem List:  1. Family history of melanoma  2. 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 Actinic keratoses:   -HAK, right antitragus s/p bx 1/15/21, cryo 1/26/21  -HAK, left sideburn s/p biopsy 2/25/2020  -s/p cryotherapy  -s/p Efudex cream   4. Mild eczema, back  -s/p triamcinolone    ____________________________________________    Assessment & Plan:  # Actinic keratosis right antitragus, treated with cryo today and right ear lobe   - See procedure note.       Procedures Performed:   -Cryotherapy procedure note: After verbal consent and discussion of risks and benefits including but no limited to dyspigmentation/scar, blister, and pain, 2 AKs was(were) treated with 1-2mm freeze border for 2 cycles with liquid nitrogen. Post cryotherapy instructions were provided.     Follow-up: 6 month(s) in-person, or earlier for new or changing lesions    Staff and Scribe:     Scribe Disclosure:   I, Juwan Adams, am serving as a scribe to document services personally performed by this physician, Dr. Joceline Stone, based on data collection and the provider's statements to me.     Provider Disclosure:   The documentation recorded by the scribe  accurately reflects the services I personally performed and the decisions made by me.    Joceline Stone MD    Department of Dermatology  [facility]: Phone: [phone], Fax:[phone]  [facility]: Phone: [phone], Fax: [phone]      ____________________________________________    CC: Derm Problem (Spot check - right antitragus recheck - did not use Efudex; hx of AK and NMSC )    HPI:  Mr. Ulices Shah Jr. is a(n) 85 year old male who presents today as a return patient for treatment of a HAK.    Last seen on 1/15/21 when a few AKs were treated with cryo. A biopsy was taken from the right antitragus, which came back as a HAK.    Today, he he presents for a recheck of the right antitragus. He has not used Efudex.    Patient is otherwise feeling well, without additional concerns.    Labs:  Derm path from 1/15/21 reviewed.  FINAL DIAGNOSIS:   A. Skin, left crows feet, shave:   - Verrucous keratosis - (see description)     B. Skin, right antitragus, shave:   - Hypertrophic actinic keratosis - (see comment)     Physical Exam:  Vitals: There were no vitals taken for this visit.  SKIN: Focused examination of right ear was performed.  - There is an erythematous macule with overyling adherent scale on the right antitragus and right ear lobe.   - No other lesions of concern on areas examined.     Medications:  Current Outpatient Medications   Medication     aspirin 81 MG tablet     carvedilol (COREG) 25 MG tablet     cetirizine (ZYRTEC) 10 MG tablet     clopidogrel (PLAVIX) 75 MG tablet     dexamethasone (DECADRON) 6 MG tablet     fluticasone (FLONASE) 50 MCG/ACT nasal spray     guaiFENesin-codeine (ROBITUSSIN AC) 100-10 MG/5ML solution     lisinopril (PRINIVIL,ZESTRIL) 5 MG tablet     metFORMIN (GLUCOPHAGE) 500 MG tablet     mirtazapine (REMERON) 15 MG tablet     Omega-3 Fatty Acids (FISH  OIL) 1000 MG CPDR     simvastatin (ZOCOR) 40 MG tablet     No current facility-administered medications for this visit.       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     History of heart bypass surgery     Atherosclerosis of coronary artery     Persistent insomnia     Heart murmur, systolic     History of nonmelanoma skin cancer     Cardiomyopathy in diseases classified elsewhere (H)     Other osteoarthritis of spine, cervical region     2019 novel coronavirus disease (COVID-19)     Type 2 diabetes mellitus without complication, without long-term current use of insulin (H)     Acute respiratory failure with hypoxia (H)     SIRS (systemic inflammatory response syndrome) (H)-fever, tachypnea     Past Medical History:   Diagnosis Date     Actinic keratosis      Cancer (H)      Heart disease      History of blood transfusion      History of nonmelanoma skin cancer      Type 2 diabetes mellitus without complication, without long-term current use of insulin (H) 11/13/2020     Unspecified essential hypertension         CC No referring provider defined for this encounter. on close of this encounter.      Again, thank you for allowing me to participate in the care of your patient.        Sincerely,        Joceline Stone MD

## 2021-02-04 ENCOUNTER — APPOINTMENT (OUTPATIENT)
Dept: GENERAL RADIOLOGY | Facility: CLINIC | Age: 86
End: 2021-02-04
Attending: NURSE PRACTITIONER
Payer: MEDICARE

## 2021-02-04 ENCOUNTER — HOSPITAL ENCOUNTER (EMERGENCY)
Facility: CLINIC | Age: 86
Discharge: HOME | End: 2021-02-04
Attending: NURSE PRACTITIONER | Admitting: NURSE PRACTITIONER
Payer: MEDICARE

## 2021-02-04 VITALS
TEMPERATURE: 98.2 F | BODY MASS INDEX: 25.31 KG/M2 | OXYGEN SATURATION: 99 % | WEIGHT: 171.4 LBS | DIASTOLIC BLOOD PRESSURE: 75 MMHG | RESPIRATION RATE: 20 BRPM | HEART RATE: 55 BPM | SYSTOLIC BLOOD PRESSURE: 135 MMHG

## 2021-02-04 DIAGNOSIS — I48.91 ATRIAL FIBRILLATION WITH CONTROLLED VENTRICULAR RATE (H): ICD-10-CM

## 2021-02-04 LAB
ANION GAP SERPL CALCULATED.3IONS-SCNC: 7 MMOL/L (ref 3–14)
APTT PPP: 29 SEC (ref 22–37)
BASOPHILS # BLD AUTO: 0 10E9/L (ref 0–0.2)
BASOPHILS NFR BLD AUTO: 0.5 %
BUN SERPL-MCNC: 19 MG/DL (ref 7–30)
CALCIUM SERPL-MCNC: 8.5 MG/DL (ref 8.5–10.1)
CHLORIDE SERPL-SCNC: 109 MMOL/L (ref 94–109)
CO2 SERPL-SCNC: 24 MMOL/L (ref 20–32)
CREAT SERPL-MCNC: 0.94 MG/DL (ref 0.66–1.25)
DIFFERENTIAL METHOD BLD: ABNORMAL
EOSINOPHIL NFR BLD AUTO: 1.9 %
ERYTHROCYTE [DISTWIDTH] IN BLOOD BY AUTOMATED COUNT: 13.1 % (ref 10–15)
FLUAV RNA RESP QL NAA+PROBE: NEGATIVE
FLUBV RNA RESP QL NAA+PROBE: NEGATIVE
GFR SERPL CREATININE-BSD FRML MDRD: 74 ML/MIN/{1.73_M2}
GLUCOSE SERPL-MCNC: 145 MG/DL (ref 70–99)
HCT VFR BLD AUTO: 37.8 % (ref 40–53)
HGB BLD-MCNC: 12.8 G/DL (ref 13.3–17.7)
IMM GRANULOCYTES # BLD: 0 10E9/L (ref 0–0.4)
IMM GRANULOCYTES NFR BLD: 0.3 %
INR PPP: 1.16 (ref 0.86–1.14)
LABORATORY COMMENT REPORT: NORMAL
LYMPHOCYTES # BLD AUTO: 2.7 10E9/L (ref 0.8–5.3)
LYMPHOCYTES NFR BLD AUTO: 35.6 %
MCH RBC QN AUTO: 31.3 PG (ref 26.5–33)
MCHC RBC AUTO-ENTMCNC: 33.9 G/DL (ref 31.5–36.5)
MCV RBC AUTO: 92 FL (ref 78–100)
MONOCYTES # BLD AUTO: 0.6 10E9/L (ref 0–1.3)
MONOCYTES NFR BLD AUTO: 7.7 %
NEUTROPHILS # BLD AUTO: 4.2 10E9/L (ref 1.6–8.3)
NEUTROPHILS NFR BLD AUTO: 54 %
NRBC # BLD AUTO: 0 10*3/UL
NRBC BLD AUTO-RTO: 0 /100
PLATELET # BLD AUTO: 200 10E9/L (ref 150–450)
POTASSIUM SERPL-SCNC: 4 MMOL/L (ref 3.4–5.3)
RBC # BLD AUTO: 4.09 10E12/L (ref 4.4–5.9)
RSV RNA SPEC QL NAA+PROBE: NORMAL
SARS-COV-2 RNA RESP QL NAA+PROBE: NEGATIVE
SODIUM SERPL-SCNC: 140 MMOL/L (ref 133–144)
SPECIMEN SOURCE: NORMAL
TROPONIN I SERPL-MCNC: <0.015 UG/L (ref 0–0.04)
TSH SERPL DL<=0.005 MIU/L-ACNC: 2.32 MU/L (ref 0.4–4)
WBC # BLD AUTO: 7.7 10E9/L (ref 4–11)

## 2021-02-04 PROCEDURE — 87636 SARSCOV2 & INF A&B AMP PRB: CPT | Performed by: NURSE PRACTITIONER

## 2021-02-04 PROCEDURE — 93005 ELECTROCARDIOGRAM TRACING: CPT | Performed by: NURSE PRACTITIONER

## 2021-02-04 PROCEDURE — 85610 PROTHROMBIN TIME: CPT | Performed by: NURSE PRACTITIONER

## 2021-02-04 PROCEDURE — 71045 X-RAY EXAM CHEST 1 VIEW: CPT

## 2021-02-04 PROCEDURE — 85025 COMPLETE CBC W/AUTO DIFF WBC: CPT | Performed by: NURSE PRACTITIONER

## 2021-02-04 PROCEDURE — 84443 ASSAY THYROID STIM HORMONE: CPT | Performed by: NURSE PRACTITIONER

## 2021-02-04 PROCEDURE — C9803 HOPD COVID-19 SPEC COLLECT: HCPCS | Performed by: NURSE PRACTITIONER

## 2021-02-04 PROCEDURE — 84484 ASSAY OF TROPONIN QUANT: CPT | Performed by: NURSE PRACTITIONER

## 2021-02-04 PROCEDURE — 99285 EMERGENCY DEPT VISIT HI MDM: CPT | Mod: 25 | Performed by: NURSE PRACTITIONER

## 2021-02-04 PROCEDURE — 80048 BASIC METABOLIC PNL TOTAL CA: CPT | Performed by: NURSE PRACTITIONER

## 2021-02-04 PROCEDURE — 93010 ELECTROCARDIOGRAM REPORT: CPT | Performed by: NURSE PRACTITIONER

## 2021-02-04 PROCEDURE — 99284 EMERGENCY DEPT VISIT MOD MDM: CPT | Mod: 25 | Performed by: NURSE PRACTITIONER

## 2021-02-04 PROCEDURE — 85730 THROMBOPLASTIN TIME PARTIAL: CPT | Performed by: NURSE PRACTITIONER

## 2021-02-04 ASSESSMENT — ENCOUNTER SYMPTOMS
GASTROINTESTINAL NEGATIVE: 1
FATIGUE: 0
EYES NEGATIVE: 1
SHORTNESS OF BREATH: 1
COUGH: 0
CHILLS: 0
NEUROLOGICAL NEGATIVE: 1
PALPITATIONS: 0
APPETITE CHANGE: 0
FEVER: 0

## 2021-02-04 NOTE — ED PROVIDER NOTES
History     Chief Complaint   Patient presents with     Shortness of Breath     HPI  Ulices Shah Jr. is a 85 year old male with history of T2DM, lumbar spinal stenosis (hemilaminectomy 2013), Htn, hyperlipidemia, cardiomyopathy, systolic heart murmur, CAD/NSTEMI 4/2012 (heart bypass 2001, angioplasty 2012), and aortic stenosis who presents to the emergency department for evaluation of shortness of breath.  He noticed this 2-3 days ago occurring during his exercise routine when walking up stairs. Denies chest pain.  Denies fever chills.  Denies nausea or vomiting.  He otherwise feels well.  He has no shortness of breath at rest or with lying down.  Patient is very active, has an exercise routine where he does long walks that include stairs.  Pt had covid-19 infection in November treated with remdisivir and dexamethasone with recovery.        Echo 1/2020, mean aortic gradient is 17. Ejection fraction is 60%.  Saw cardiology 1/14/2021 (Dr. Rubén Oshea, [facility]). Recommended f/u in 1 year and to get repeat echo at that time.      Current Outpatient Medications   Medication Sig Dispense Refill     aspirin chewable 81 mg chewable tablet Take 1 tablet by mouth once daily with a meal. 0     carvediloL (COREG) 25 mg tablet TAKE 1 TABLET BY MOUTH TWICE DAILY WITH MEALS 180 tablet 0     clopidogreL (PLAVIX) 75 mg tablet Take 1 tablet by mouth once daily 90 tablet 0     lisinopriL (PRINIVIL; ZESTRIL) 5 mg tablet Take 1 tablet by mouth twice daily 180 tablet 2     omega-3 fatty acids-vitamin E (FISH OIL) 1,000 mg cap Take 2 capsules by mouth once daily. 0     simvastatin (ZOCOR) 40 mg tablet TAKE 1 TABLET BY MOUTH AT BEDTIME 90 tablet 3     triamcinolone, 55 mcg each actuation, nasal (NASACORT) 55 mcg nasal spray Inhale 1 Spray into both nostrils once daily. 16.5 g 0        Allergies:  Allergies   Allergen Reactions     No Known Drug Allergies        Problem List:    Patient Active Problem List    Diagnosis Date  Noted     Spinal stenosis, lumbar region, without neurogenic claudication 06/25/2013     Priority: High     Type 2 diabetes mellitus without complication, without long-term current use of insulin (H) 11/13/2020     Priority: Medium     Acute respiratory failure with hypoxia (H) 11/13/2020     Priority: Medium     SIRS (systemic inflammatory response syndrome) (H)-fever, tachypnea 11/13/2020     Priority: Medium     2019 novel coronavirus disease (COVID-19) 11/12/2020     Priority: Medium     Other osteoarthritis of spine, cervical region 09/10/2020     Priority: Medium     History of nonmelanoma skin cancer 01/22/2019     Priority: Medium     Persistent insomnia 03/27/2017     Priority: Medium     Heart murmur, systolic 03/27/2017     Priority: Medium     History of heart bypass surgery 12/07/2015     Priority: Medium     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 s/p [facility] 9/3/15 09/03/2015     Priority: Medium     Basal cell carcinoma of left ear (superior helix) s/p [facility] 9-2-14 09/02/2014     Priority: Medium     Basal cell carcinoma of left nasolabial fold s/p [facility] 9-2-14 09/02/2014     Priority: Medium     Basal cell carcinoma of right mid cheek s/p [facility] 9-2-14 09/02/2014     Priority: Medium     History of basal cell carcinoma 07/29/2014     Priority: Medium     Atherosclerosis of coronary artery 10/15/2012     Priority: Medium     Cardiomyopathy in diseases classified elsewhere (H) 06/14/2012     Priority: Medium     Overview:   LVEF 50% per LVG 4/30/2012       Advanced directives, counseling/discussion 02/14/2012     Priority: Medium     Advance Directive Problem List Overview:   Name Relationship Phone    Primary Health Care Agent            Alternative Health Care Agent          Discussed advance care planning with patient; information given to patient to review. 2/14/2012        Lina Brewster APRN CNP  02/04/21 1647      Lina Brewster APRN CNP  02/04/21 1648       Hypertension goal  "BP (blood pressure) < 140/90 09/25/2011     Priority: Medium     Hyperlipidemia LDL goal <100 09/25/2011     Priority: Medium     DDD (degenerative disc disease), cervical 12/24/2008     Priority: Medium     Chronic ischemic heart disease 12/21/2002     Priority: Medium     Problem list name updated by automated process. Provider to review          Past Medical History:    Past Medical History:   Diagnosis Date     Actinic keratosis      Cancer (H)      Heart disease      History of blood transfusion      History of nonmelanoma skin cancer      Type 2 diabetes mellitus without complication, without long-term current use of insulin (H) 11/13/2020     Unspecified essential hypertension        Past Surgical History:    Past Surgical History:   Procedure Laterality Date     C APPENDECTOMY  1951     C EXPLOR HEART SURG WND W CP BYPASS  10/25/01    4 way heart bypass     COLONOSCOPY  12/10/08     COLONOSCOPY N/A 3/7/2019    Procedure: COMBINED COLONOSCOPY,  MULTIPLE POLYPECTOMY BY BIOPSY;  Surgeon: Brayden Sharma DO;  Location: [facility]     EXCISE MASS HAND Right 02/2017    \"skin cancer\" removal     HC REMV CATARACT EXTRACAP,INSERT LENS, W/O ECP  10/21/2004    left     HC REMV CATARACT EXTRACAP,INSERT LENS, W/O ECP  11/18/2004    right     HC YAG LASER CAPSULOTOMY  06/18/09    right eye     HEART CATH, ANGIOPLASTY  4/30/12    3 stents     HEMILAMINECTOMY, DISCECTOMY LUMBAR THREE LEVELS, COMBINED  6/26/2013    Procedure: COMBINED HEMILAMINECTOMY, DISCECTOMY LUMBAR THREE LEVELS;  Bilateral L3, L4 and L5 Guille-Laminectomies;  Surgeon: Ollie Dodson MD;  Location: [facility] OR       Family History:    Family History   Problem Relation Age of Onset     C.A.D. Mother      Alzheimer Disease Mother      Neurologic Disorder Mother 96        dementia     Cancer Father         colon     Cancer - colorectal Father        Social History:  Marital Status:  Single [1]  Social History     Tobacco Use     Smoking status: Never " Smoker     Smokeless tobacco: Never Used   Substance Use Topics     Alcohol use: Yes     Alcohol/week: 0.0 standard drinks     Comment: rare, maybe 3 beers all year.     Drug use: No        Medications:         apixaban ANTICOAGULANT (ELIQUIS ANTICOAGULANT) 5 MG tablet       carvedilol (COREG) 25 MG tablet       clopidogrel (PLAVIX) 75 MG tablet       fluticasone (FLONASE) 50 MCG/ACT nasal spray       lisinopril (PRINIVIL,ZESTRIL) 5 MG tablet       metFORMIN (GLUCOPHAGE) 500 MG tablet       mirtazapine (REMERON) 15 MG tablet       Omega-3 Fatty Acids (FISH OIL) 1000 MG CPDR       simvastatin (ZOCOR) 40 MG tablet       dexamethasone (DECADRON) 6 MG tablet       guaiFENesin-codeine (ROBITUSSIN AC) 100-10 MG/5ML solution          Review of Systems   Constitutional: Negative for appetite change, chills, fatigue and fever.   HENT: Negative for congestion.    Eyes: Negative.    Respiratory: Positive for shortness of breath (with exertion). Negative for cough.    Cardiovascular: Negative for chest pain and palpitations.   Gastrointestinal: Negative.    Genitourinary: Negative.    Skin: Negative.    Neurological: Negative.    All other systems reviewed and are negative.      Physical Exam   BP: (!) 154/84  Pulse: 63  Temp: 98.2  F (36.8  C)  Resp: 20  Weight: 77.7 kg (171 lb 6.4 oz)  SpO2: 100 %      Physical Exam  Constitutional:       General: He is not in acute distress.     Appearance: He is well-developed. He is not ill-appearing.   HENT:      Head: Normocephalic and atraumatic.   Cardiovascular:      Rate and Rhythm: Normal rate. Rhythm irregular.   Pulmonary:      Effort: Pulmonary effort is normal.      Breath sounds: Normal breath sounds.   Musculoskeletal: Normal range of motion.      Right lower leg: No edema.      Left lower leg: No edema.   Skin:     General: Skin is warm and dry.   Neurological:      General: No focal deficit present.      Mental Status: He is alert and oriented to person, place, and time.          ED Course        Procedures               EKG Interpretation:      Interpreted by PATRICIA Shook CNP  Time reviewed:1411   Symptoms at time of EKG: None   Rhythm: Atrial fibrillation - controlled  Rate: Normal  Axis: Normal  Ectopy: None  Conduction: Right bundle branch block (complete)  ST Segments/ T Waves: No ST-T wave changes and No acute ischemic changes  Q Waves: None  Comparison to prior: new A-fib today compared to prior EKG 11/13/2020    Clinical Impression: new onset Atria fibrillation-controlled rate with Right BBB.          Results for orders placed or performed during the hospital encounter of 02/04/21 (from the past 24 hour(s))   CBC with platelets differential   Result Value Ref Range    WBC 7.7 4.0 - 11.0 10e9/L    RBC Count 4.09 (L) 4.4 - 5.9 10e12/L    Hemoglobin 12.8 (L) 13.3 - 17.7 g/dL    Hematocrit 37.8 (L) 40.0 - 53.0 %    MCV 92 78 - 100 fl    MCH 31.3 26.5 - 33.0 pg    MCHC 33.9 31.5 - 36.5 g/dL    RDW 13.1 10.0 - 15.0 %    Platelet Count 200 150 - 450 10e9/L    Diff Method Automated Method     % Neutrophils 54.0 %    % Lymphocytes 35.6 %    % Monocytes 7.7 %    % Eosinophils 1.9 %    % Basophils 0.5 %    % Immature Granulocytes 0.3 %    Nucleated RBCs 0 0 /100    Absolute Neutrophil 4.2 1.6 - 8.3 10e9/L    Absolute Lymphocytes 2.7 0.8 - 5.3 10e9/L    Absolute Monocytes 0.6 0.0 - 1.3 10e9/L    Absolute Basophils 0.0 0.0 - 0.2 10e9/L    Abs Immature Granulocytes 0.0 0 - 0.4 10e9/L    Absolute Nucleated RBC 0.0    Basic metabolic panel   Result Value Ref Range    Sodium 140 133 - 144 mmol/L    Potassium 4.0 3.4 - 5.3 mmol/L    Chloride 109 94 - 109 mmol/L    Carbon Dioxide 24 20 - 32 mmol/L    Anion Gap 7 3 - 14 mmol/L    Glucose 145 (H) 70 - 99 mg/dL    Urea Nitrogen 19 7 - 30 mg/dL    Creatinine 0.94 0.66 - 1.25 mg/dL    GFR Estimate 74 >60 mL/min/[1.73_m2]    GFR Estimate If Black 86 >60 mL/min/[1.73_m2]    Calcium 8.5 8.5 - 10.1 mg/dL   Troponin I   Result Value Ref  Range    Troponin I ES <0.015 0.000 - 0.045 ug/L   INR   Result Value Ref Range    INR 1.16 (H) 0.86 - 1.14   Partial thromboplastin time   Result Value Ref Range    PTT 29 22 - 37 sec   Symptomatic Influenza A/B & SARS-CoV2 (COVID-19) Virus PCR Multiplex    Specimen: Nasopharyngeal   Result Value Ref Range    Flu A/B & SARS-COV-2 PCR Source Nasopharyngeal     SARS-CoV-2 PCR Result NEGATIVE     Influenza A PCR Negative NEG^Negative    Influenza B PCR Negative NEG^Negative    Respiratory Syncytial Virus PCR (Note)     Flu A/B & SARS-CoV-2 PCR Comment (Note)    TSH with free T4 reflex   Result Value Ref Range    TSH 2.32 0.40 - 4.00 mU/L   XR Chest Port 1 View    Narrative    CHEST PORTABLE ONE VIEW   2/4/2021 2:56 PM     HISTORY: Short of breath.    COMPARISON: 11/13/2020.      Impression    IMPRESSION: No acute cardiopulmonary disease.    JENNA TOMAS MD       Medications - No data to display      3:35 PM: call out for consult with patient's cardiologist at [facility] (Dr. Oshea).  4:11 PM: spoke with on-call cardiologist at [facility]. Recommends Stop aspirin. Continue Plavix, start Eliquis 5 mg twice a day. They will contact patient for follow-up within the next week.     Assessments & Plan (with Medical Decision Making)   85 year old male with history of T2DM, lumbar spinal stenosis (hemilaminectomy 2013), Htn, hyperlipidemia, cardiomyopathy, systolic heart murmur, CAD/NSTEMI 4/2012 (heart bypass 2001, angioplasty 2012), and aortic stenosis who presents to the emergency department for evaluation of shortness of breath.  He noticed this 2-3 days ago occurring during his exercise routine when walking up stairs. Otherwise no shortness of breath at rest or with lying down. No chest pain. He feels good otherwise.  EKG today reveals new onset A-fib, controlled rate. No ischemic changes. Labs are unremarkable (normal Troponin). Patient is currently on Baby ASA, Plavix, Coreg and lisinopril. I consulted  with on-call cardiologist with [facility]. He recommends starting patient on Eliquis 5 mg twice a day, stopping aspirin, continuing his Plavix and other medications.  Audiology will contact patient to set up a visit within the next week for follow-up.  Likely plan will be to have a cardioversion if he has persistent atrial fibrillation.  However, patient should return sooner if he has worsening of his symptoms including increased shortness of breath, chest pain, or any other new symptoms.  I discussed the EKG and lab findings with patient as well as the recommendation from cardiology.  Patient is agreeable to the plan.  Patient be discharged home, he has remained stable here with atrial fibrillation, controlled rate.  Patient has been free of chest pain.  He is not short of breath at time of discharge.  No hypoxia.    Plan:  Start Eliquis 5 mg twice a day.  Stop Aspirin.  Continue your other medications.   You will need to see cardiology within the next week and they will contact you to set this up.  Return to the emergency department for chest pain, increased shortness of breath or any new symptoms of concern.      I have reviewed the nursing notes.    I have reviewed the findings, diagnosis, plan and need for follow up with the patient.    AJOXM-8-Wmun Score  (calculator)  Background  Calculates overall risk of atrial fibrillation related CVA based on 7 factors: Age>=65-75, CHF, Htn, CVA/TIA, DM, vascular disease, female  Data  85 year old year old male  has Chronic ischemic heart disease; DDD (degenerative disc disease), cervical; Hypertension goal BP (blood pressure) < 140/90; Hyperlipidemia LDL goal <100; Advanced directives, counseling/discussion; Spinal stenosis, lumbar region, without neurogenic claudication; History of basal cell carcinoma; Basal cell carcinoma of left ear (superior helix) s/p [facility] 9-2-14; Basal cell carcinoma of left nasolabial fold s/p [facility] 9-2-14; Basal cell carcinoma of  right mid cheek s/p [facility] 9-2-14; 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  Criteria   Of possible 6 points for 5 possible items  1 point: Hypertension  2 point: Age over 75 years (1 point over 65 years)  1 point: Diabetes Mellitus  1 point: Vascular Disease    Interpretation  OLBWC-9-Dory Score: 5  CHADS Score 2+ (CVA risk >5% per year): Warfarin with goal INR 2.0 to 3.0           EIODQ-6-Lodf Score  (calculator)  Background  Calculates overall risk of atrial fibrillation related CVA based on 7 factors: Age>=65-75, CHF, Htn, CVA/TIA, DM, vascular disease, female  Data  85 year old year old male  has Chronic ischemic heart disease; DDD (degenerative disc disease), cervical; Hypertension goal BP (blood pressure) < 140/90; Hyperlipidemia LDL goal <100; Advanced directives, counseling/discussion; Spinal stenosis, lumbar region, without neurogenic claudication; History of basal cell carcinoma; Basal cell carcinoma of left ear (superior helix) s/p [facility] 9-2-14; Basal cell carcinoma of left nasolabial fold s/p [facility] 9-2-14; Basal cell carcinoma of right mid cheek s/p [facility] 9-2-14; 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  Criteria   Of possible 6 points for 5 possible items  1 point: Hypertension  2 point: Age over 75 years (1 point over 65 years)  1 point: Vascular Disease    Interpretation  FBIVF-7-Ewjp Score: 4  CHADS Score 2+ (CVA risk >5% per year): Warfarin with goal INR 2.0 to 3.0      New Prescriptions    APIXABAN ANTICOAGULANT (ELIQUIS ANTICOAGULANT) 5 MG TABLET    Take 1 tablet (5 mg) by mouth 2 times daily       Final diagnoses:   Atrial fibrillation with controlled ventricular rate (H) - NEW ONSET       2/4/2021   [facility] EMERGENCY DEPT

## 2021-02-04 NOTE — DISCHARGE INSTRUCTIONS
Start Eliquis (apixaban) 5 mg twice a day. (start tonight).  Stop Aspirin.  Continue your other medications.   You will need to see cardiology within the next week and they will contact you to set this up.  Return to the emergency department for chest pain, increased shortness of breath or any new symptoms of concern.

## 2021-02-04 NOTE — ED TRIAGE NOTES
Pt presents with shortness of breath with activity. Pt usually exercises walking steps. Pt states now short of breath doing this. History of covid in November. Denies chest pain or shortness of breath now.

## 2021-02-05 ENCOUNTER — PATIENT OUTREACH (OUTPATIENT)
Dept: CARE COORDINATION | Facility: CLINIC | Age: 86
End: 2021-02-05

## 2021-02-05 DIAGNOSIS — Z71.89 OTHER SPECIFIED COUNSELING: ICD-10-CM

## 2021-02-05 NOTE — PROGRESS NOTES
Clinic Care Coordination Contact  [facility]/Voicemail       Clinical Data: Care Coordinator Outreach  Outreach attempted x 1.  Left message on patient's voicemail with call back information and requested return call.  Plan: Care Coordinator will try to reach patient again in 1-2 business days.

## 2021-02-08 NOTE — PROGRESS NOTES
Clinic Care Coordination Contact  Community Health Worker Initial Outreach            Patient accepts CC: No, due to the patient stating that at this time he has no needs or concerns. The CHW was able to inform him about CCC and to reach out if there are other concerns. The patient did not want to be sent a Care Coordination introduction letter for future reference.

## 2021-02-12 RX ORDER — APIXABAN 5 MG/1
TABLET, FILM COATED ORAL
Qty: 20 TABLET | Refills: 0 | OUTPATIENT
Start: 2021-02-12

## 2021-02-12 NOTE — TELEPHONE ENCOUNTER
Pt needs to contact his Cardiologist (Rubén Oshea MD) or PCP about refilling apixaban ANTICOAGULANT (ELIQUIS ANTICOAGULANT) 5 MG tablet. It was ordered by ED provider PATRICIA Brewster RN, BSN

## 2021-04-16 DIAGNOSIS — I25.83 CORONARY ARTERY DISEASE DUE TO LIPID RICH PLAQUE: Primary | ICD-10-CM

## 2021-04-16 DIAGNOSIS — I25.10 CORONARY ARTERY DISEASE DUE TO LIPID RICH PLAQUE: Primary | ICD-10-CM

## 2021-04-16 DIAGNOSIS — I25.10 CORONARY ARTERY DISEASE: ICD-10-CM

## 2021-04-16 LAB
CHOLEST SERPL-MCNC: 163 MG/DL
HDLC SERPL-MCNC: 50 MG/DL
LDLC SERPL CALC-MCNC: 88 MG/DL
NONHDLC SERPL-MCNC: 113 MG/DL
TRIGL SERPL-MCNC: 126 MG/DL

## 2021-04-16 PROCEDURE — 80061 LIPID PANEL: CPT | Performed by: INTERNAL MEDICINE

## 2021-04-16 PROCEDURE — 36415 COLL VENOUS BLD VENIPUNCTURE: CPT | Performed by: INTERNAL MEDICINE

## 2021-08-03 ENCOUNTER — OFFICE VISIT (OUTPATIENT)
Dept: DERMATOLOGY | Facility: CLINIC | Age: 86
End: 2021-08-03
Payer: MEDICARE

## 2021-08-03 DIAGNOSIS — L57.0 AK (ACTINIC KERATOSIS): Primary | ICD-10-CM

## 2021-08-03 PROCEDURE — 17003 DESTRUCT PREMALG LES 2-14: CPT | Performed by: DERMATOLOGY

## 2021-08-03 PROCEDURE — 99213 OFFICE O/P EST LOW 20 MIN: CPT | Mod: 25 | Performed by: DERMATOLOGY

## 2021-08-03 PROCEDURE — 17000 DESTRUCT PREMALG LESION: CPT | Performed by: DERMATOLOGY

## 2021-08-03 ASSESSMENT — PAIN SCALES - GENERAL: PAINLEVEL: NO PAIN (0)

## 2021-08-03 NOTE — NURSING NOTE
Ulices Shah Jr.'s goals for this visit include:   Chief Complaint   Patient presents with     Skin Check     no new spots of concern. Hx of NMSC     He requests these members of his care team be copied on today's visit information:     PCP: Cricket Lamar    Referring Provider:  No referring provider defined for this encounter.    There were no vitals taken for this visit.    Do you need any medication refills at today's visit? No    Ninfa Wallace LPN on 8/3/2021 at 2:40 PM

## 2021-08-03 NOTE — LETTER
8/3/2021         RE: Ulices Shah .  [address]  [facility] [address]        Dear Colleague,    Thank you for referring your patient, Ulices Shah Jr., to the [facility]. Please see a copy of my visit note below.    [facility] Dermatology Note  Encounter Date: Aug 3, 2021  Office Visit     Dermatology Problem List:  1. Family history of melanoma  2. 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 Actinic keratoses:   -HAK, right antitragus s/p bx 1/15/21, cryo 1/26/21  -HAK, left sideburn s/p biopsy 2/25/2020  -s/p cryotherapy  -s/p Efudex cream   4. Mild eczema, back  -s/p triamcinolone    ____________________________________________    Assessment & Plan:    # Family history of melanoma in a first degree relative. Last skin cancer 2019  - Recommend sunscreens SPF #30 or greater, protective clothing and avoidance of tanning beds.    # History of NMSC. No evidence of recurrence.   - as above    # AK scalp, forehead and right cheek and forehead  -see procedure note below    Procedures Performed:   - Cryotherapy procedure note, location(s): scalp forehead and right cheek. After verbal consent and discussion of risks and benefits including, but not limited to, dyspigmentation/scar, blister, and pain, 6 lesion(s) was(were) treated with 1-2 mm freeze border for 1-2 cycles with liquid nitrogen. Post cryotherapy instructions were provided.       Follow-up: 1 year(s) in-person skin check, or earlier for new or changing lesions    Staff and Scribe:      Scribe Disclosure:   I, Mary Babb/Sabina Kothari LPN, am serving as a scribe to document services personally performed by this physician, Dr. Joceline Stone, based on data collection and the provider's statements to me.     Provider Disclosure:   The documentation recorded by the scribe accurately reflects the services I personally performed and the decisions made by me.    Joceline Stone MD    Department of Dermatology  [facility]: Phone: [phone], Fax:[phone]  [facility]: Phone: [phone], Fax: [phone]      ____________________________________________    CC: Skin Check (no new spots of concern. Hx of NMSC)    HPI:  Mr. Ulices Shah Jr. is a(n) 86 year old male who presents today as a return patient for skin check.    Last seen 1/26/21. At that time, cryotherapy was performed on AKs on the right anti-tragus and right ear lobe.    Today patient notes Nothing bleeding, crusting, or changing.       Patient is otherwise feeling well, without additional skin concerns.    Labs Reviewed:  None  Physical Exam:  Vitals: There were no vitals taken for this visit.  SKIN: Total skin excluding including the buttocks area was performed. The exam included the head/face, neck, both arms, chest, back, abdomen, both legs, digits and/or nails.      -There is a well healed surgical scar without erythema, nodularity or telangiectasias on the sites of prior skin cancer  - There are erythematous macules with overyling adherent scale on the scalp right cheek and forehead      - No other lesions of concern on areas examined.     Medications:  Current Outpatient Medications   Medication     apixaban ANTICOAGULANT (ELIQUIS ANTICOAGULANT) 5 MG tablet     carvedilol (COREG) 25 MG tablet     clopidogrel (PLAVIX) 75 MG tablet     dexamethasone (DECADRON) 6 MG tablet     fluticasone (FLONASE) 50  MCG/ACT nasal spray     guaiFENesin-codeine (ROBITUSSIN AC) 100-10 MG/5ML solution     lisinopril (PRINIVIL,ZESTRIL) 5 MG tablet     metFORMIN (GLUCOPHAGE) 500 MG tablet     mirtazapine (REMERON) 15 MG tablet     Omega-3 Fatty Acids (FISH OIL) 1000 MG CPDR     simvastatin (ZOCOR) 40 MG tablet     No current facility-administered medications for this visit.      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     History of heart bypass surgery     Atherosclerosis of coronary artery     Persistent insomnia     Heart murmur, systolic     History of nonmelanoma skin cancer     Cardiomyopathy in diseases classified elsewhere (H)     Other osteoarthritis of spine, cervical region     2019 novel coronavirus disease (COVID-19)     Type 2 diabetes mellitus without complication, without long-term current use of insulin (H)     Acute respiratory failure with hypoxia (H)     SIRS (systemic inflammatory response syndrome) (H)-fever, tachypnea     Past Medical History:   Diagnosis Date     Actinic keratosis      Cancer (H)      Heart disease      History of blood transfusion      History of nonmelanoma skin cancer      Type 2 diabetes mellitus without complication, without long-term current use of insulin (H) 11/13/2020     Unspecified essential hypertension         CC No referring provider defined for this encounter. on close of this  encounter.      Again, thank you for allowing me to participate in the care of your patient.        Sincerely,        Joceline Stone MD

## 2021-08-03 NOTE — PATIENT INSTRUCTIONS
Cryotherapy    What is it?    Use of a very cold liquid, such as liquid nitrogen, to freeze and destroy abnormal skin cells that need to be removed    What should I expect?    Tenderness and redness    A small blister that might grow and fill with dark purple blood. There may be crusting.    More than one treatment may be needed if the lesions do not go away.    How do I care for the treated area?    Gently wash the area with your hands when bathing.    Use a thin layer of Vaseline to help with healing. You may use a Band-Aid.     The area should heal within 7-10 days and may leave behind a pink or lighter color.     Do not use an antibiotic or Neosporin ointment.     You may take acetaminophen (Tylenol) for pain.     Call your doctor if you have:    Severe pain    Signs of infection (warmth, redness, cloudy yellow drainage, and or a bad smell)    Questions or concerns    Who should I call with questions?       [facility]: [phone]       [facility]: [phone]       For urgent needs outside of business hours call the [facility] at [phone] and ask for the dermatology resident on call

## 2021-08-03 NOTE — PROGRESS NOTES
[facility] Dermatology Note  Encounter Date: Aug 3, 2021  Office Visit     Dermatology Problem List:  1. Family history of melanoma  2. 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 Actinic keratoses:   -HAK, right antitragus s/p bx 1/15/21, cryo 1/26/21  -HAK, left sideburn s/p biopsy 2/25/2020  -s/p cryotherapy  -s/p Efudex cream   4. Mild eczema, back  -s/p triamcinolone    ____________________________________________    Assessment & Plan:    # Family history of melanoma in a first degree relative. Last skin cancer 2019  - Recommend sunscreens SPF #30 or greater, protective clothing and avoidance of tanning beds.    # History of NMSC. No evidence of recurrence.   - as above    # AK scalp, forehead and right cheek and forehead  -see procedure note below    Procedures Performed:   - Cryotherapy procedure note, location(s): scalp forehead and right cheek. After verbal consent and discussion of risks and benefits including, but not limited to, dyspigmentation/scar, blister, and pain, 6 lesion(s) was(were) treated with 1-2 mm freeze border for 1-2 cycles with liquid nitrogen. Post cryotherapy instructions were provided.       Follow-up: 1 year(s) in-person skin check, or earlier for new or changing lesions    Staff and Scribe:     Scribe Disclosure:   I, Mary Babb/Sabina Kothari LPN, am serving as a scribe to document services personally performed by this physician, Dr. Joceline Stone, based on data collection and the provider's statements to me.     Provider Disclosure:   The  documentation recorded by the scribe accurately reflects the services I personally performed and the decisions made by me.    Joceline Stone MD    Department of Dermatology  [facility]: Phone: [phone], Fax:[phone]  [facility]: Phone: [phone], Fax: [phone]      ____________________________________________    CC: Skin Check (no new spots of concern. Hx of NMSC)    HPI:  Mr. Ulices Shah Jr. is a(n) 86 year old male who presents today as a return patient for skin check.    Last seen 1/26/21. At that time, cryotherapy was performed on AKs on the right anti-tragus and right ear lobe.    Today patient notes Nothing bleeding, crusting, or changing.       Patient is otherwise feeling well, without additional skin concerns.    Labs Reviewed:  None  Physical Exam:  Vitals: There were no vitals taken for this visit.  SKIN: Total skin excluding including the buttocks area was performed. The exam included the head/face, neck, both arms, chest, back, abdomen, both legs, digits and/or nails.      -There is a well healed surgical scar without erythema, nodularity or telangiectasias on the sites of prior skin cancer  - There are erythematous macules with overyling adherent scale on the scalp right cheek and forehead      - No other lesions of concern on areas examined.     Medications:  Current Outpatient Medications   Medication     apixaban ANTICOAGULANT (ELIQUIS ANTICOAGULANT) 5 MG tablet     carvedilol (COREG) 25 MG tablet     clopidogrel (PLAVIX) 75 MG tablet     dexamethasone (DECADRON) 6 MG tablet     fluticasone (FLONASE) 50 MCG/ACT nasal spray     guaiFENesin-codeine (ROBITUSSIN AC) 100-10 MG/5ML solution     lisinopril (PRINIVIL,ZESTRIL) 5 MG tablet     metFORMIN (GLUCOPHAGE) 500 MG tablet     mirtazapine (REMERON) 15 MG tablet     Omega-3 Fatty Acids (FISH OIL) 1000 MG CPDR      simvastatin (ZOCOR) 40 MG tablet     No current facility-administered medications for this visit.      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     History of heart bypass surgery     Atherosclerosis of coronary artery     Persistent insomnia     Heart murmur, systolic     History of nonmelanoma skin cancer     Cardiomyopathy in diseases classified elsewhere (H)     Other osteoarthritis of spine, cervical region     2019 novel coronavirus disease (COVID-19)     Type 2 diabetes mellitus without complication, without long-term current use of insulin (H)     Acute respiratory failure with hypoxia (H)     SIRS (systemic inflammatory response syndrome) (H)-fever, tachypnea     Past Medical History:   Diagnosis Date     Actinic keratosis      Cancer (H)      Heart disease      History of blood transfusion      History of nonmelanoma skin cancer      Type 2 diabetes mellitus without complication, without long-term current use of insulin (H) 11/13/2020     Unspecified essential hypertension         CC No referring provider defined for this encounter. on close of this encounter.

## 2021-09-21 ENCOUNTER — OFFICE VISIT (OUTPATIENT)
Dept: FAMILY MEDICINE | Facility: CLINIC | Age: 86
End: 2021-09-21
Payer: MEDICARE

## 2021-09-21 VITALS
DIASTOLIC BLOOD PRESSURE: 66 MMHG | TEMPERATURE: 97.6 F | WEIGHT: 174.38 LBS | SYSTOLIC BLOOD PRESSURE: 130 MMHG | OXYGEN SATURATION: 99 % | HEART RATE: 69 BPM | HEIGHT: 68 IN | BODY MASS INDEX: 26.43 KG/M2 | RESPIRATION RATE: 14 BRPM

## 2021-09-21 DIAGNOSIS — H10.13 ALLERGIC CONJUNCTIVITIS, BILATERAL: ICD-10-CM

## 2021-09-21 DIAGNOSIS — J30.2 SEASONAL ALLERGIC RHINITIS, UNSPECIFIED TRIGGER: Primary | ICD-10-CM

## 2021-09-21 PROCEDURE — 99213 OFFICE O/P EST LOW 20 MIN: CPT | Performed by: STUDENT IN AN ORGANIZED HEALTH CARE EDUCATION/TRAINING PROGRAM

## 2021-09-21 RX ORDER — CETIRIZINE HYDROCHLORIDE 10 MG/1
10 TABLET ORAL DAILY
Qty: 61 TABLET | Refills: 0 | Status: SHIPPED | OUTPATIENT
Start: 2021-09-21 | End: 2021-11-21

## 2021-09-21 RX ORDER — SOTALOL HYDROCHLORIDE 80 MG/1
TABLET ORAL
COMMUNITY
Start: 2021-08-26

## 2021-09-21 RX ORDER — OLOPATADINE HYDROCHLORIDE 1 MG/ML
1 SOLUTION/ DROPS OPHTHALMIC 2 TIMES DAILY
Qty: 9 ML | Refills: 3 | Status: SHIPPED | OUTPATIENT
Start: 2021-09-21 | End: 2022-09-21

## 2021-09-21 ASSESSMENT — MIFFLIN-ST. JEOR: SCORE: 1451.49

## 2021-09-21 ASSESSMENT — PAIN SCALES - GENERAL: PAINLEVEL: NO PAIN (0)

## 2021-09-21 NOTE — PROGRESS NOTES
"    Assessment & Plan     Seasonal allergic rhinitis, unspecified trigger  Symptoms do seem consistent with allergies and he has wondered about these in the past.  Does look like he was prescribed Flonase previously but he is unsure of what brought this about.  Does have a history of sinus infection years ago.  Symptoms now do not seem consistent with infectious process.  I did  him on the danger of nasal decongestant and recommend he slowly discontinue this and stick with the Flonase and start Zyrtec.If things not improving or any symptoms worsen he will let me know.  - cetirizine (ZYRTEC) 10 MG tablet  Dispense: 61 tablet; Refill: 0    Allergic conjunctivitis, bilateral  We will have him try drops and see if this improves his eye symptoms.  He will stay using Flonase and allergy medication regularly.  - olopatadine (PATANOL) 0.1 % ophthalmic solution  Dispense: 9 mL; Refill: 3        BMI:   Estimated body mass index is 26.71 kg/m  as calculated from the following:    Height as of this encounter: 1.721 m (5' 7.75\").    Weight as of this encounter: 79.1 kg (174 lb 6 oz).     Return if symptoms worsen or fail to improve, for Routine preventive.    Karl Calderon MD  [facility] WILLIAM RINALDI is a 86 year old who presents for the following health issues    HPI     Concern - Nose plugged   Onset: a month ago   Alleviating factors:        Improved by: Nasale decongestant   Therapies tried and outcome: Nasale decongestant  - will loosen secretions and clear up for a few hours, then back to being plugged up again.     Reports using nasal decongestant for the last 3 weeks which works well.  He has not used Flonase in that time.  He has not used any other allergy medications.  No other respiratory symptoms, fevers or facial pain.  No other sick contacts that he knows.  His eyes have been very watery and itchy but the watering has been going on for quite some time.  He tells me that " "ophthalmology told me that he would need to have his eyelids lifted at some point in the future.  No specific eyedrops that he is taking in the past.  He does report trouble with dry after his cataract surgery years ago.  No other recent changes and does have upcoming appointment with cardiology.    Review of Systems   Constitutional, HEENT, cardiovascular, pulmonary, gi and gu systems are negative, except as otherwise noted.      Objective    /66 (BP Location: Left arm, Patient Position: Sitting, Cuff Size: Adult Regular)   Pulse 69   Temp 97.6  F (36.4  C) (Temporal)   Resp 14   Ht 1.721 m (5' 7.75\")   Wt 79.1 kg (174 lb 6 oz)   SpO2 99%   BMI 26.71 kg/m    Body mass index is 26.71 kg/m .  Physical Exam   GENERAL: healthy, alert and no distress  EYES:  PERRL, extraocular motion intact, conjunctiva injected and erythematous with watery discharge noted.  HENT: ear canals obstructed bilateral, nose and mouth without ulcers or lesions,  Moderate nasal congestion with clear discharge.   NECK: no adenopathy, no asymmetry, masses, or scars and thyroid normal to palpation  RESP: lungs clear to auscultation - no rales, rhonchi or wheezes  CV: regular rate and rhythm, systolic murmur, no peripheral edema and peripheral pulses strong  MS: no gross musculoskeletal defects noted, no edema  SKIN: no suspicious lesions or rashes  NEURO: Normal strength and tone, mentation intact and speech normal  PSYCH: mentation appears normal, affect normal/bright          "

## 2021-10-19 DIAGNOSIS — J30.2 SEASONAL ALLERGIC RHINITIS, UNSPECIFIED TRIGGER: ICD-10-CM

## 2021-12-03 RX ORDER — CETIRIZINE HYDROCHLORIDE 10 MG/1
TABLET ORAL
Qty: 61 TABLET | Refills: 0 | OUTPATIENT
Start: 2021-12-03

## 2021-12-06 DIAGNOSIS — J30.2 SEASONAL ALLERGIC RHINITIS, UNSPECIFIED TRIGGER: ICD-10-CM

## 2021-12-09 RX ORDER — CETIRIZINE HYDROCHLORIDE 10 MG/1
TABLET ORAL
Qty: 61 TABLET | Refills: 0 | OUTPATIENT
Start: 2021-12-09

## 2022-01-25 ENCOUNTER — TELEPHONE (OUTPATIENT)
Dept: FAMILY MEDICINE | Facility: CLINIC | Age: 87
End: 2022-01-25

## 2022-01-25 ENCOUNTER — VIRTUAL VISIT (OUTPATIENT)
Dept: FAMILY MEDICINE | Facility: CLINIC | Age: 87
End: 2022-01-25
Payer: MEDICARE

## 2022-01-25 DIAGNOSIS — J30.2 SEASONAL ALLERGIC RHINITIS, UNSPECIFIED TRIGGER: ICD-10-CM

## 2022-01-25 DIAGNOSIS — J32.1 CHRONIC FRONTAL SINUSITIS: Primary | ICD-10-CM

## 2022-01-25 PROCEDURE — 99441 PR PHYSICIAN TELEPHONE EVALUATION 5-10 MIN: CPT | Mod: 95 | Performed by: NURSE PRACTITIONER

## 2022-01-25 RX ORDER — CETIRIZINE HYDROCHLORIDE 10 MG/1
10 TABLET ORAL DAILY
Qty: 60 TABLET | Refills: 1 | Status: SHIPPED | OUTPATIENT
Start: 2022-01-25 | End: 2023-02-10

## 2022-01-25 NOTE — PROGRESS NOTES
NIMCO is a 86 year old who is being evaluated via a billable telephone visit.      What phone number would you like to be contacted at? [phone]  How would you like to obtain your AVS? Mail a copy    Assessment & Plan     Chronic frontal sinusitis    - cetirizine (ZYRTEC) 10 MG tablet; Take 1 tablet (10 mg) by mouth daily    Seasonal allergic rhinitis, unspecified trigger    - cetirizine (ZYRTEC) 10 MG tablet; Take 1 tablet (10 mg) by mouth daily  Home care instructions were reviewed with the patient. The risks, benefits and treatment options of prescribed medications or other treatments have been discussed with the patient. The patient verbalized their understanding and should call or follow up if no improvement or if they develop further problems.    PATRICIA [facility]   NIMCO is a 86 year old who presents for the following health issues stated for    HPI     Acute Illness  Acute illness concerns: congestion  Onset/Duration: 1 month  Symptoms:  Fever: no  Chills/Sweats: no  Headache (location?): no  Sinus Pressure: no  Conjunctivitis:  YES  Ear Pain: no  Rhinorrhea: no  Congestion: YES  Sore Throat: no  Cough: no  Wheeze: no  Decreased Appetite: no  Nausea: no  Vomiting: no  Diarrhea: no  Dysuria/Freq.: no  Dysuria or Hematuria: no  Fatigue/Achiness: no  Sick/Strep Exposure: no  Therapies tried and outcome: nasal spray  Clears up for  A little while but then comes back    Patient states that he has had similar issues in the past possible allergy trigger where he will have chronic sinus congestion he states he has used Zyrtec in the past which is helped quite a bit however he ran out of this was unable to obtain a refill.  He did buy an over-the-counter decongestant however states does not seem to be working well for him.  He is also using Flonase and we discussed you using a [place] pot or saline nasal spray as well.      Review of Systems    Constitutional, HEENT, cardiovascular, pulmonary, gi and gu systems are negative, except as otherwise noted.      Objective           Vitals:  No vitals were obtained today due to virtual visit.    Physical Exam   healthy, alert and no distress  PSYCH: Alert and oriented times 3; coherent speech, normal   rate and volume, able to articulate logical thoughts, able   to abstract reason, no tangential thoughts, no hallucinations   or delusions  His affect is normal  RESP: No cough, no audible wheezing, able to talk in full sentences  Remainder of exam unable to be completed due to telephone visits                Phone call duration: 7 minutes

## 2022-01-25 NOTE — TELEPHONE ENCOUNTER
Pt called in for congestion that is not improving. He tried doing the flonase and zyrtec combo as he did last fall and is having no luck. Assisted in making appt.

## 2022-02-08 ENCOUNTER — OFFICE VISIT (OUTPATIENT)
Dept: FAMILY MEDICINE | Facility: CLINIC | Age: 87
End: 2022-02-08
Payer: MEDICARE

## 2022-02-08 ENCOUNTER — NURSE TRIAGE (OUTPATIENT)
Dept: FAMILY MEDICINE | Facility: CLINIC | Age: 87
End: 2022-02-08

## 2022-02-08 VITALS
WEIGHT: 172.2 LBS | SYSTOLIC BLOOD PRESSURE: 138 MMHG | HEART RATE: 75 BPM | HEIGHT: 70 IN | RESPIRATION RATE: 18 BRPM | OXYGEN SATURATION: 99 % | TEMPERATURE: 97.5 F | BODY MASS INDEX: 24.65 KG/M2 | DIASTOLIC BLOOD PRESSURE: 66 MMHG

## 2022-02-08 DIAGNOSIS — J01.90 SUBACUTE SINUSITIS, UNSPECIFIED LOCATION: Primary | ICD-10-CM

## 2022-02-08 PROCEDURE — 99213 OFFICE O/P EST LOW 20 MIN: CPT | Performed by: PHYSICIAN ASSISTANT

## 2022-02-08 RX ORDER — CEPHALEXIN 500 MG/1
500 CAPSULE ORAL 2 TIMES DAILY
Qty: 20 CAPSULE | Refills: 0 | Status: SHIPPED | OUTPATIENT
Start: 2022-02-08 | End: 2022-02-18

## 2022-02-08 ASSESSMENT — MIFFLIN-ST. JEOR: SCORE: 1477.34

## 2022-02-08 ASSESSMENT — PAIN SCALES - GENERAL: PAINLEVEL: NO PAIN (0)

## 2022-02-08 NOTE — PROGRESS NOTES
Assessment & Plan     Subacute sinusitis, unspecified location  - cephALEXin (KEFLEX) 500 MG capsule; Take 1 capsule (500 mg) by mouth 2 times daily for 10 days    Because of symptoms of been persistent for the last month and Flonase and Zyrtec have not been helpful, will treat with Keflex twice daily for 10 days.  He notes that this has worked well for him in the past.    10 minutes spent on the date of the encounter doing chart review, patient visit and documentation       Return in about 2 weeks (around 2/22/2022) for visit with primary care provider if not improving.    Kyleigh Noriega PA-C  [facility] is a 86 year old who presents for the following health issues     HPI   Chief Complaint   Patient presents with     RECHECK     Sinus congestion, not getting better, Seen on 01/25/22 Avani Perez patient has tried zyrtec and flonase, zyrtec not helpful, flonase seems to help.  Patient states in 2015 he was seen by Dr. Vaughn and whatever he prescribed wiped out his sinus infection. Notes state he was prescribed levaquin.  No other symptomos     Filiberto presents to the clinic today for evaluation of possible sinus infection.  He notes he has had symptoms for the last month or so.  He does not feel pressure in his sinuses but states that his nose is intermittently stuffy.  He is able to blow his nose and states that it clears but it stops right up again.  He has been using Flonase and Zyrtec which not been beneficial.  He notes that he has had sinus infections several times in the past and antibiotics have worked well to clear his infections he is very active and states that he climbs 22 flights of stairs a day.  He is excited for spring to come to he can start biking again.    Review of Systems   ROS negative except as stated above.        Objective    /66 (BP Location: Right arm, Patient Position: Sitting, Cuff Size: Adult Regular)   Pulse 75   Temp 97.5  F  "(36.4  C) (Temporal)   Resp 18   Ht 1.778 m (5' 10\")   Wt 78.1 kg (172 lb 3.2 oz)   SpO2 99%   BMI 24.71 kg/m    Body mass index is 24.71 kg/m .  Physical Exam   GENERAL: healthy, alert and no distress  EYES: Eyes grossly normal to inspection, PERRL and conjunctivae and sclerae normal  HENT: ear canals and TM's normal, nose and mouth without ulcers or lesions  NECK: no adenopathy, no asymmetry, masses, or scars and thyroid normal to palpation    No results found for any visits on 02/08/22.        "

## 2022-02-08 NOTE — TELEPHONE ENCOUNTER
"Patient Called and reports severe congestion ongoing for several weeks. He has tried nasal spray, and zyrtec with no relief. Patient made an appointment today for evaluation.    Zuleyma Ardon RN    Reason for Disposition    Sinus congestion (pressure, fullness) present > 10 days    Nasal discharge present > 10 days    Patient wants to be seen    Additional Information    Negative: Sounds like a life-threatening emergency to the triager    Negative: Difficulty breathing, and not from stuffy nose (e.g., not relieved by cleaning out the nose)    Negative: SEVERE headache and has fever    Negative: Patient sounds very sick or weak to the triager    Negative: SEVERE sinus pain    Negative: Severe headache    Negative: Redness or swelling on the cheek, forehead, or around the eye    Negative: Fever > 103 F (39.4 C)    Negative: Fever > 101 F (38.3 C) and over 60 years of age    Negative: Fever > 100.0 F (37.8 C) and has diabetes mellitus or a weak immune system (e.g., HIV positive, cancer chemotherapy, organ transplant, splenectomy, chronic steroids)    Negative: Fever > 100.0 F (37.8 C) and bedridden (e.g., nursing home patient, stroke, chronic illness, recovering from surgery)    Negative: Fever present > 3 days (72 hours)    Negative: Fever returns after gone for over 24 hours and symptoms worse or not improved    Negative: Sinus pain (not just congestion) and fever    Negative: Earache    Answer Assessment - Initial Assessment Questions  1. LOCATION: \"Where does it hurt?\"       No Pain  2. ONSET: \"When did the sinus pain start?\"  (e.g., hours, days)       Ongoing for several weeks  3. SEVERITY: \"How bad is the pain?\"   (Scale 1-10; mild, moderate or severe)    - MILD (1-3): doesn't interfere with normal activities     - MODERATE (4-7): interferes with normal activities (e.g., work or school) or awakens from sleep    - SEVERE (8-10): excruciating pain and patient unable to do any normal activities         Mild  4. " "RECURRENT SYMPTOM: \"Have you ever had sinus problems before?\" If so, ask: \"When was the last time?\" and \"What happened that time?\"       Yes, a year ago. Took antibiotics and it cleared up  5. NASAL CONGESTION: \"Is the nose blocked?\" If so, ask, \"Can you open it or must you breathe through the mouth?\"      Yes, breathing through mouth. Tried nasal spray, zyrtec  6. NASAL DISCHARGE: \"Do you have discharge from your nose?\" If so ask, \"What color?\"      No  7. FEVER: \"Do you have a fever?\" If so, ask: \"What is it, how was it measured, and when did it start?\"       No  8. OTHER SYMPTOMS: \"Do you have any other symptoms?\" (e.g., sore throat, cough, earache, difficulty breathing)      No  9. PREGNANCY: \"Is there any chance you are pregnant?\" \"When was your last menstrual period?\"      N/A    Protocols used: SINUS PAIN OR CONGESTION-A-OH      "

## 2022-05-09 DIAGNOSIS — I25.83 CORONARY ATHEROSCLEROSIS DUE TO LIPID RICH PLAQUE: ICD-10-CM

## 2022-05-09 DIAGNOSIS — I25.10 CORONARY ATHEROSCLEROSIS OF NATIVE CORONARY ARTERY: Primary | ICD-10-CM

## 2022-05-10 ENCOUNTER — LAB (OUTPATIENT)
Dept: LAB | Facility: CLINIC | Age: 87
End: 2022-05-10
Payer: MEDICARE

## 2022-05-10 DIAGNOSIS — I25.83 CORONARY ATHEROSCLEROSIS DUE TO LIPID RICH PLAQUE: ICD-10-CM

## 2022-05-10 DIAGNOSIS — I25.10 CORONARY ATHEROSCLEROSIS OF NATIVE CORONARY ARTERY: Primary | ICD-10-CM

## 2022-05-10 LAB
CHOLEST SERPL-MCNC: 148 MG/DL
FASTING STATUS PATIENT QL REPORTED: YES
HDLC SERPL-MCNC: 47 MG/DL
LDLC SERPL CALC-MCNC: 80 MG/DL
NONHDLC SERPL-MCNC: 101 MG/DL
TRIGL SERPL-MCNC: 107 MG/DL

## 2022-05-10 PROCEDURE — 80061 LIPID PANEL: CPT

## 2022-05-10 PROCEDURE — 36415 COLL VENOUS BLD VENIPUNCTURE: CPT
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
## 2023-02-10 ENCOUNTER — VIRTUAL VISIT (OUTPATIENT)
Dept: FAMILY MEDICINE | Facility: CLINIC | Age: 88
End: 2023-02-10
Payer: MEDICARE

## 2023-02-10 DIAGNOSIS — J32.1 CHRONIC FRONTAL SINUSITIS: ICD-10-CM

## 2023-02-10 DIAGNOSIS — J30.2 SEASONAL ALLERGIC RHINITIS, UNSPECIFIED TRIGGER: ICD-10-CM

## 2023-02-10 PROCEDURE — 99442 PR PHYSICIAN TELEPHONE EVALUATION 11-20 MIN: CPT | Mod: 95 | Performed by: PHYSICIAN ASSISTANT

## 2023-02-10 RX ORDER — CETIRIZINE HYDROCHLORIDE 10 MG/1
10 TABLET ORAL DAILY
Qty: 60 TABLET | Refills: 1 | Status: SHIPPED | OUTPATIENT
Start: 2023-02-10 | End: 2023-05-30

## 2023-02-10 NOTE — PROGRESS NOTES
NIMCO is a 87 year old who is being evaluated via a billable telephone visit.      What phone number would you like to be contacted at? [phone]  How would you like to obtain your AVS? Mail a copy    Distant Location (provider location):  On-site    Assessment and Plan:     (J32.1) Chronic frontal sinusitis  Comment: intermittent, min cough, no sob, no fever/chills, zyrtec has helped him in the past  Plan: cetirizine (ZYRTEC) 10 MG tablet  Tylenol prn  See pcp in person if not improved over next week, sooner if worsens     (J30.2) Seasonal allergic rhinitis, unspecified trigger  Comment:   Plan: cetirizine (ZYRTEC) 10 MG tablet      Shellie Colorado PA-C        Subjective   NIMCO is a 87 year old presenting for the following health issues:  Nasal Congestion      HPI     Mr. Shah is seeing me for URI symptoms  He has had congestion for a couple weeks  The congestion comes and goes   He is actually feeling a bit better this am  He denies fever/chills, sob  He has had intermittent mild cough   He notes he had similar symptoms about a year ago and was prescribed something that really helped, would like another Rx     Review of Systems   See above      Objective           Vitals:  No vitals were obtained today due to virtual visit.    Physical Exam     No exam, phone visit         Phone call duration: 8 minutes

## 2023-05-05 ENCOUNTER — ALLIED HEALTH/NURSE VISIT (OUTPATIENT)
Dept: DERMATOLOGY | Facility: CLINIC | Age: 88
End: 2023-05-05
Payer: MEDICARE

## 2023-05-05 ENCOUNTER — OFFICE VISIT (OUTPATIENT)
Dept: DERMATOLOGY | Facility: CLINIC | Age: 88
End: 2023-05-05
Payer: MEDICARE

## 2023-05-05 DIAGNOSIS — Z80.8 FAMILY HISTORY OF MELANOMA: ICD-10-CM

## 2023-05-05 DIAGNOSIS — D48.9 NEOPLASM OF UNCERTAIN BEHAVIOR: Primary | ICD-10-CM

## 2023-05-05 DIAGNOSIS — D48.5 NEOPLASM OF UNCERTAIN BEHAVIOR OF SKIN: ICD-10-CM

## 2023-05-05 DIAGNOSIS — Z85.828 HISTORY OF NONMELANOMA SKIN CANCER: ICD-10-CM

## 2023-05-05 DIAGNOSIS — L82.1 SEBORRHEIC KERATOSES: ICD-10-CM

## 2023-05-05 DIAGNOSIS — L57.0 ACTINIC KERATOSES: ICD-10-CM

## 2023-05-05 DIAGNOSIS — D48.5 NEOPLASM OF UNCERTAIN BEHAVIOR OF SKIN: Primary | ICD-10-CM

## 2023-05-05 PROCEDURE — 99207 PR NO CHARGE NURSE ONLY: CPT | Performed by: DERMATOLOGY

## 2023-05-05 PROCEDURE — 17004 DESTROY PREMAL LESIONS 15/>: CPT | Performed by: DERMATOLOGY

## 2023-05-05 PROCEDURE — 99213 OFFICE O/P EST LOW 20 MIN: CPT | Mod: 25 | Performed by: DERMATOLOGY

## 2023-05-05 PROCEDURE — 88305 TISSUE EXAM BY PATHOLOGIST: CPT | Performed by: PATHOLOGY

## 2023-05-05 ASSESSMENT — PAIN SCALES - GENERAL: PAINLEVEL: NO PAIN (0)

## 2023-05-05 NOTE — PROGRESS NOTES
The following medication was given:     MEDICATION:  Lidocaine with epinephrine 1% [id number]  ROUTE: SQ  SITE: see procedure note  DOSE: 1 mL  LOT #: [id number]  : [employer]  EXPIRATION DATE: 09-  NDC#: 24240-610-21  Was there drug waste? NO  Multi-dose vial: Yes    Lissette Antonio LPN  May 5, 2023

## 2023-05-05 NOTE — LETTER
5/5/2023         RE: Ulices Cortezjustin Mcdonald  [address]  [facility] [address]        Dear Colleague,    Thank you for referring your patient, Ulices Shah Jr., to the [facility]. Please see a copy of my visit note below.    [facility] Dermatology Note  Encounter Date: May 5, 2023  Office Visit     Dermatology Problem List:  Last skin check 5/5/23, recommended yearly  0. NUB - right angle of mandible, s/p bx 5/5/23   1. Family history of melanoma  2. 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 Actinic keratoses:   - HAK - right antitragus s/p bx 1/15/21, cryo 1/26/21  - HAK - left sideburn s/p biopsy 2/25/2020  - s/p cryotherapy  - s/p Efudex cream   4. Mild eczema, back  - s/p triamcinolone     ____________________________________________     Assessment & Plan:     # Family history of melanoma.  - ABCDEs: Counseled ABCDEs of melanoma: Asymmetry, Border (irregularity), Color (not uniform, changes in color), Diameter (greater than 6 mm which is about the size of a pencil eraser), and Evolving (any changes in preexisting moles).  - Sun protection: Counseled SPF30+ sunscreen, UPF clothing, sun avoidance, tanning bed avoidance.        # History of nonmelanoma skin cancer, no clinical evidence of recurrence.  - ABCDEs: Counseled ABCDEs of melanoma: Asymmetry, Border (irregularity), Color (not uniform, changes in color), Diameter (greater than 6 mm which is about the size of a pencil eraser), and Evolving (any changes in  preexisting moles).  - Sun protection: Counseled SPF30+ sunscreen, UPF clothing, sun avoidance, tanning bed avoidance.   - Recommended regular skin checks.     # Neoplasm of unspecified behavior of the skin (D49.2) on the right angle of mandible. The differential diagnosis includes AK vs SCC.  - Photograph obtained.  - See procedure note.      # Actinic keratosis - left cheek, left sideburn, crown, left ear, face x 19. Based on the location and chronic irritation to this lesion, treatment with cryotherapy is warranted.   - See cryo procedure note.     # Geometric abrasion on the left shoulder, well healing. Pt reports at site of bandage for flu, gentle soap and water.   - Seek medical care if not improving or symptoms develop.     # Seborrheic keratosis, non irritated on the trunk and extremities. Explained to patient benign nature of lesion. No treatment is necessary at this time unless the lesion changes or becomes symptomatic.     - Monitor for changes.       Procedures Performed:   - Cryotherapy procedure note, location(s): left cheek, left sideburn, crown, left ear, face. After verbal consent and discussion of risks and benefits including, but not limited to, dyspigmentation/scar, blister, and pain, 19 AK(s) was(were) treated with 1-2 mm freeze border for 1-2 cycles with liquid nitrogen. Post cryotherapy instructions were provided.      Follow-up: 6 month(s) in-person for a skin check, or earlier for new or changing lesions    Staff and Scribe:     Scribe Disclosure:   I, Matt Hawkins, am serving as a scribe to document services personally performed by this physician, Dr. Joceline Stone, based on data collection and the provider's statements to me.       Provider Disclosure:   The documentation recorded by the scribe accurately reflects the services I personally performed and the decisions made by me.    Joceline Stone MD    Department of Dermatology  [facility]  [facility]: Phone: [phone], Fax:[phone]  [facility]: Phone: [phone], Fax: [phone]    ____________________________________________    CC: Skin Check (FBSC- no areas of concern. Hx of NMSC- SCC and BCC. /Family hx of NMSC- father )    HPI:  Mr. Ulices Shah Jr. is a(n) 88 year old male who presents today as a return patient for a skin check.    Last seen 8/3/21 for a skin check. At that time, 6 AKs were treated with cryo.     Today, he has no areas of concern.     Patient is otherwise feeling well, without additional skin concerns.    Labs Reviewed:  N/A    Physical Exam:  Vitals: There were no vitals taken for this visit.  SKIN: Total skin excluding the undergarment areas was performed. The exam included the head/face, neck, both arms, chest, back, abdomen, both legs, digits and/or nails.   - Well healed scars at sites of previous NMSC, no repigmentation or nodularity noted.   - There are erythematous macules with overyling adherent scale on the left cheek, left sideburn, crown, left ear, face x 19.    - Well healing geometric abrasion on the left shoulder.   - There are waxy stuck on tan to brown papules on the trunk and extremities.    - 5 mm keratotic scaly papule on the right angle of mandible.   - No other lesions of concern on areas examined.     Medications:  Current Outpatient Medications   Medication     apixaban ANTICOAGULANT (ELIQUIS ANTICOAGULANT) 5 MG tablet     carvedilol (COREG) 25 MG tablet     cetirizine (ZYRTEC) 10 MG tablet     clopidogrel (PLAVIX) 75 MG tablet     fluticasone (FLONASE) 50 MCG/ACT nasal spray     lisinopril (PRINIVIL,ZESTRIL) 5 MG tablet     metFORMIN (GLUCOPHAGE) 500 MG tablet     mirtazapine (REMERON) 15 MG tablet     Omega-3 Fatty Acids (FISH OIL) 1000 MG CPDR     simvastatin (ZOCOR) 40 MG tablet     sotalol (BETAPACE) 80 MG tablet     No current facility-administered medications for this  visit.      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     History of heart bypass surgery     Atherosclerosis of coronary artery     Persistent insomnia     Heart murmur, systolic     History of nonmelanoma skin cancer     Cardiomyopathy in diseases classified elsewhere (H)     Other osteoarthritis of spine, cervical region     2019 novel coronavirus disease (COVID-19)     Type 2 diabetes mellitus without complication, without long-term current use of insulin (H)     Acute respiratory failure with hypoxia (H)     SIRS (systemic inflammatory response syndrome) (H)-fever, tachypnea     Past Medical History:   Diagnosis Date     Actinic keratosis      Cancer (H)      Heart disease      History of blood transfusion      History of nonmelanoma skin cancer      Type 2 diabetes mellitus without complication, without long-term current use of insulin (H) 11/13/2020     Unspecified essential hypertension         CC Referred Self, MD  No address on file on close of this encounter.     The following medication was given:     MEDICATION:  Lidocaine with epinephrine 1% [id number]  ROUTE: SQ  SITE: see procedure note  DOSE: 1 mL  LOT #: [id number]  : AM [employer]  EXPIRATION DATE: 09-  NDC#: 22113-042-32  Was there drug waste? NO  Multi-dose vial: Yes    Lissette Antonio LPN  May 5, 2023      Again, thank you for allowing me to  participate in the care of your patient.        Sincerely,        Joceline Stone MD

## 2023-05-05 NOTE — NURSING NOTE
Ulices Shah Jr.'s chief complaint for this visit includes:  Chief Complaint   Patient presents with     Skin Check     FBSC- no areas of concern. Hx of NMSC- SCC and BCC.   Family hx of NMSC- father      PCP: Cricket Lamar    Referring Provider:  Referred Self, MD  No address on file    There were no vitals taken for this visit.  No Pain (0)        Allergies   Allergen Reactions     No Known Drug Allergy          Do you need any medication refills at today's visit?    No.     Lissette Antonio LPN

## 2023-05-05 NOTE — PROGRESS NOTES
[facility] Dermatology Note  Encounter Date: May 5, 2023  Office Visit     Dermatology Problem List:  Last skin check 5/5/23, recommended yearly  0. NUB - right angle of mandible, s/p bx 5/5/23   1. Family history of melanoma  2. 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 Actinic keratoses:   - HAK - right antitragus s/p bx 1/15/21, cryo 1/26/21  - HAK - left sideburn s/p biopsy 2/25/2020  - s/p cryotherapy  - s/p Efudex cream   4. Mild eczema, back  - s/p triamcinolone     ____________________________________________     Assessment & Plan:     # Family history of melanoma.  - ABCDEs: Counseled ABCDEs of melanoma: Asymmetry, Border (irregularity), Color (not uniform, changes in color), Diameter (greater than 6 mm which is about the size of a pencil eraser), and Evolving (any changes in preexisting moles).  - Sun protection: Counseled SPF30+ sunscreen, UPF clothing, sun avoidance, tanning bed avoidance.        # History of nonmelanoma skin cancer, no clinical evidence of recurrence.  - ABCDEs: Counseled ABCDEs of melanoma: Asymmetry, Border (irregularity), Color (not uniform, changes in color), Diameter (greater than 6 mm which is about the size of a pencil eraser), and Evolving (any changes in preexisting moles).  - Sun protection: Counseled SPF30+ sunscreen, UPF clothing, sun avoidance, tanning bed avoidance.   - Recommended regular skin checks.     # Neoplasm of unspecified behavior of the skin (D49.2) on the right angle of mandible. The differential  diagnosis includes AK vs SCC.  - Photograph obtained.  - See procedure note.      # Actinic keratosis - left cheek, left sideburn, crown, left ear, face x 19. Based on the location and chronic irritation to this lesion, treatment with cryotherapy is warranted.   - See cryo procedure note.     # Geometric abrasion on the left shoulder, well healing. Pt reports at site of bandage for flu, gentle soap and water.   - Seek medical care if not improving or symptoms develop.     # Seborrheic keratosis, non irritated on the trunk and extremities. Explained to patient benign nature of lesion. No treatment is necessary at this time unless the lesion changes or becomes symptomatic.     - Monitor for changes.       Procedures Performed:   - Cryotherapy procedure note, location(s): left cheek, left sideburn, crown, left ear, face. After verbal consent and discussion of risks and benefits including, but not limited to, dyspigmentation/scar, blister, and pain, 19 AK(s) was(were) treated with 1-2 mm freeze border for 1-2 cycles with liquid nitrogen. Post cryotherapy instructions were provided.      Follow-up: 6 month(s) in-person for a skin check, or earlier for new or changing lesions    Staff and Scribe:     Scribe Disclosure:   I, Matt Hawkins, am serving as a scribe to document services personally performed by this physician, Dr. Joceline Stone, based on data collection and the provider's statements to me.       Provider Disclosure:   The documentation recorded by the scribe accurately reflects the services I personally performed and the decisions made by me.    Joceline Stone MD    Department of Dermatology  [facility]: Phone: [phone], Fax:[phone]  [facility]: Phone: [phone], Fax: [phone]    ____________________________________________    CC: Skin Check (FBSC- no areas of concern. Hx  of NMSC- SCC and BCC. /Family hx of NMSC- father )    HPI:  Mr. Ulices Shah Jr. is a(n) 88 year old male who presents today as a return patient for a skin check.    Last seen 8/3/21 for a skin check. At that time, 6 AKs were treated with cryo.     Today, he has no areas of concern.     Patient is otherwise feeling well, without additional skin concerns.    Labs Reviewed:  N/A    Physical Exam:  Vitals: There were no vitals taken for this visit.  SKIN: Total skin excluding the undergarment areas was performed. The exam included the head/face, neck, both arms, chest, back, abdomen, both legs, digits and/or nails.   - Well healed scars at sites of previous NMSC, no repigmentation or nodularity noted.   - There are erythematous macules with overyling adherent scale on the left cheek, left sideburn, crown, left ear, face x 19.    - Well healing geometric abrasion on the left shoulder.   - There are waxy stuck on tan to brown papules on the trunk and extremities.    - 5 mm keratotic scaly papule on the right angle of mandible.   - No other lesions of concern on areas examined.     Medications:  Current Outpatient Medications   Medication     apixaban ANTICOAGULANT (ELIQUIS ANTICOAGULANT) 5 MG tablet     carvedilol (COREG) 25 MG tablet     cetirizine (ZYRTEC) 10 MG tablet     clopidogrel (PLAVIX) 75 MG tablet     fluticasone (FLONASE) 50 MCG/ACT nasal spray     lisinopril (PRINIVIL,ZESTRIL) 5 MG tablet     metFORMIN (GLUCOPHAGE) 500 MG tablet     mirtazapine (REMERON) 15 MG tablet     Omega-3 Fatty Acids (FISH OIL) 1000 MG CPDR     simvastatin (ZOCOR) 40 MG tablet     sotalol (BETAPACE) 80 MG tablet     No current facility-administered medications for this visit.      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     History of heart bypass surgery     Atherosclerosis of coronary artery     Persistent insomnia     Heart murmur, systolic     History of nonmelanoma skin cancer     Cardiomyopathy in diseases classified elsewhere (H)     Other osteoarthritis of spine, cervical region     2019 novel coronavirus disease (COVID-19)     Type 2 diabetes mellitus without complication, without long-term current use of insulin (H)     Acute respiratory failure with hypoxia (H)     SIRS (systemic inflammatory response syndrome) (H)-fever, tachypnea     Past Medical History:   Diagnosis Date     Actinic keratosis      Cancer (H)      Heart disease      History of blood transfusion      History of nonmelanoma skin cancer      Type 2 diabetes mellitus without complication, without long-term current use of insulin (H) 11/13/2020     Unspecified essential hypertension         CC Referred Self, MD  No address on file on close of this encounter.

## 2023-05-05 NOTE — PATIENT INSTRUCTIONS
Wound Care After a Biopsy    What is a skin biopsy?  A skin biopsy allows the doctor to examine a very small piece of tissue under the microscope to determine the diagnosis and the best treatment for the skin condition. A local anesthetic (numbing medicine) is injected with a very small needle into the skin area to be tested. A small piece of skin is taken from the area. Sometimes a suture (stitch) is used.     What are the risks of a skin biopsy?  I will experience scar, bleeding, swelling, pain, crusting and redness. I may experience incomplete removal or recurrence. Risks of this procedure are excessive bleeding, bruising, infection, nerve damage, numbness, thick (hypertrophic or keloidal) scar and non-diagnostic biopsy.    How should I care for my wound for the first 24 hours?  Keep the wound dry and covered for 24 hours  If it bleeds, hold direct pressure on the area for 15 minutes. If bleeding does not stop, call us or go to the emergency room  Avoid strenuous exercise the first 1-2 days or as your doctor instructs you    How should I care for the wound after 24 hours?  After 24 hours, remove the bandage  You may bathe or shower as normal  If you had a scalp biopsy, you can shampoo as usual and can use shower water to clean the biopsy site daily  Clean the wound once a day with gentle soap and water  Do not scrub, be gentle  Apply white petroleum/Vaseline after cleaning the wound with a cotton swab or a clean finger, and keep the site covered with a Bandaid /bandage. Bandages are not necessary with a scalp biopsy  If you are unable to cover the site with a Bandaid /bandage, re-apply ointment 2-3 times a day to keep the site moist. Moisture will help with healing  Avoid strenuous activity for first 1-2 days  Avoid lakes, rivers, pools, and oceans until the stitches are removed or the site is healed    How do I clean my wound?  Wash hands thoroughly with soap or use hand  before all wound care  Clean  the wound with gentle soap and water  Apply white petroleum/Vaseline  to wound after it is clean  Replace the Bandaid /bandage to keep the wound covered for the first few days or as instructed by your doctor  If you had a scalp biopsy, warm shower water to the area on a daily basis should suffice    What should I use to clean my wound?   Cotton-tipped applicators (Qtips )  White petroleum jelly (Vaseline ). Use a clean new container and use Q-tips to apply.  Bandaids  as needed  Gentle soap     How should I care for my wound long term?  Do not get your wound dirty  Keep up with wound care for one week or until the area is healed.  A small scab will form and fall off by itself when the area is completely healed. The area will be red and will become pink in color as it heals. Sun protection is very important for how your scar will turn out. Sunscreen with an SPF 30 or greater is recommended once the area is healed.  You should have some soreness but it should be mild and slowly go away over several days. Talk to your doctor about using tylenol for pain,    When should I call my doctor?  If you have increased:   Pain or swelling  Pus or drainage (clear or slightly yellow drainage is ok)  Temperature over 100F  Spreading redness or warmth around wound    When will I hear about my results?  The biopsy results can take 2 weeks to come back.  Your results will automatically release to [employer] before your provider has even reviewed them.  The clinic will call you with the results, send you a [employer] message, or have you schedule a follow-up clinic or phone time to discuss the results.  Contact our clinics if you do not hear from us in 2 weeks.    Who should I call with questions?  [facility]: [phone]  [facility]: [phone]  For urgent needs outside of business hours call the [facility] at [phone] and ask for the dermatology resident on call        Cryotherapy    What is it?  Use of a very cold liquid, such as liquid nitrogen, to freeze and destroy abnormal skin cells that need to be removed    What should I expect?  Tenderness and redness  A small blister that might grow and fill with dark purple blood. There may be crusting.  More than one treatment may be needed if the lesions do not go away.    How do I care for the treated area?  Gently wash the area with your hands when bathing.  Use a thin layer of Vaseline to help with healing. You may use a Band-Aid.   The area should heal within 7-10 days and may leave behind a pink or lighter color.   Do not use an antibiotic or Neosporin ointment.   You may take acetaminophen (Tylenol) for pain.     Call your doctor if you have:  Severe pain  Signs of infection (warmth, redness, cloudy yellow drainage, and or a bad smell)  Questions or concerns    Who should I call with questions?      [facility]: [phone]      [facility]: [phone]      For urgent needs outside of business hours call the [facility] at [phone] and ask for the dermatology resident on call           Patient Education     Checking for Skin Cancer  You can find cancer early by checking your skin each month. There are 3 kinds of skin cancer. They are melanoma, basal cell carcinoma, and squamous cell carcinoma. Doing monthly skin checks is the best way to find new marks or skin changes. Follow the instructions below for checking your skin.   The ABCDEs of checking moles for melanoma   Check your moles or growths for signs of melanoma using ABCDE:   Asymmetry: the sides of the mole or growth don t match  Border: the edges are ragged, notched, or blurred  Color: the color within the mole or growth varies  Diameter: the mole or growth is larger than 6 mm (size of a pencil eraser)  Evolving: the size, shape, or color of the mole or growth is changing (evolving is not  shown in the images below)    Checking for other types of skin cancer  Basal cell carcinoma or squamous cell carcinoma have symptoms such as:     A spot or mole that looks different from all other marks on your skin  Changes in how an area feels, such as itching, tenderness, or pain  Changes in the skin's surface, such as oozing, bleeding, or scaliness  A sore that does not heal  New swelling or redness beyond the border of a mole    Who s at risk?  Anyone can get skin cancer. But you are at greater risk if you have:   Fair skin, light-colored hair, or light-colored eyes  Many moles or abnormal moles on your skin  A history of sunburns from sunlight or tanning beds  A family history of skin cancer  A history of exposure to radiation or chemicals  A weakened immune system  If you have had skin cancer in the past, you are at risk for recurring skin cancer.   How to check your skin  Do your monthly skin checkups in front of a full-length mirror. Check all parts of your body, including your:   Head (ears, face, neck, and scalp)  Torso (front, back, and sides)  Arms (tops, undersides, upper, and lower armpits)  Hands (palms, backs, and fingers, including under the nails)  Buttocks and genitals  Legs (front, back, and sides)  Feet (tops, soles, toes, including under the nails, and between toes)  If you have a lot of moles, take digital photos of them each month. Make sure to take photos both up close and from a distance. These can help you see if any moles change over time.   Most skin changes are not cancer. But if you see any changes in your skin, call your doctor right away. Only he or she can diagnose a problem. If you have skin cancer, seeing your doctor can be the first step toward getting the treatment that could save your life.   [employer] last reviewed this educational content on 4/1/2019 2000-2020 [employer]. [address] [place] [address], [place] [address]. All rights reserved. This information is not  intended as a substitute for professional medical care. Always follow your healthcare professional's instructions.       When should I call my doctor?  If you are worsening or not improving, please, contact us or seek urgent care as noted below.     Who should I call with questions (adults)?  [facility] (adult and pediatric): [phone]  [facility] (adult): [phone]  For urgent needs outside of business hours call the [facility] at [phone] and ask for the dermatology resident on call to be paged  If this is a medical emergency and you are unable to reach an ER, Call [phone]    Who should I call with questions (pediatric)?  [facility]- Pediatric Dermatology  Dr. Margarita Momin, Dr. May Morin, Dr. Sherice Valencia, JOE Morales, Dr. Bianka Abebe, Dr. Taylor Collins & Dr. Higinio Avelar  Non-urgent nurse triage line; [phone]- Karyn and Maria Fernanda TSANG Care Coordinatorlucinda Miller (/Complex ) [phone]    If you need a prescription refill, please contact your pharmacy. Refills are approved or denied by our Physicians during normal business hours, Monday through Fridays  Per office policy, refills will not be granted if you have not been seen within the past year (or sooner depending on your child's condition)    Scheduling Information:  Pediatric Appointment Scheduling and Call Center [phone]  Radiology Scheduling- [phone]  Sedation Unit Scheduling- [phone]  [place] Scheduling- General [phone]; Pediatric Dermatology [phone]  Main  Services: [phone]  Afghan: [phone]  Czech: [phone]  Hmong/Georgian/Comoran: [phone]  Preadmission Nursing Department Fax Number: [phone] (Fax all pre-operative paperwork to this number)    For urgent matters arising during evenings, weekends, or holidays that cannot wait for normal business  hours please call [phone] and ask for the dermatology resident on call to be paged.

## 2023-05-08 NOTE — PROGRESS NOTES
Ulices Shah Jr. comes into clinic today at the request of Dr. Stone  Ordering Provider for Photos of biopsy site.    This service provided today was under the supervising provider of the day Dr. Stone , who was available if needed.    Lissette Antonio LPN      Appointment was created due to chart being closed before pictures were taken.

## 2023-05-09 LAB
PATH REPORT.COMMENTS IMP SPEC: NORMAL
PATH REPORT.FINAL DX SPEC: NORMAL
PATH REPORT.GROSS SPEC: NORMAL
PATH REPORT.MICROSCOPIC SPEC OTHER STN: NORMAL
PATH REPORT.RELEVANT HX SPEC: NORMAL

## 2023-05-10 ENCOUNTER — TELEPHONE (OUTPATIENT)
Dept: DERMATOLOGY | Facility: CLINIC | Age: 88
End: 2023-05-10

## 2023-05-10 NOTE — TELEPHONE ENCOUNTER
Called patient in regards to his pathology results. Mailbox is not set up yet and will need to call patient back to discuss.   Tried calling patients on home phone (kept ringing without anyone answering or going to voicemail) and called mobile number.       Lissette Antonio LPN

## 2023-05-10 NOTE — RESULT ENCOUNTER NOTE
Mail letter    Dear Ulices Shah Jr.,    We are writing to inform you of your test results that show precancerous lesion that should be rechecked at your visit in Nov.     Thank you for taking the time to be seen in our dermatology clinic. If you have further questions or concerns, please contact the clinic(see phone number listed below).      Sincerely,    Joceline Stone MD    Department of Dermatology  [facility]: Phone: [phone], Fax:[phone]  [facility]: Phone: [phone], Fax: [phone]

## 2023-05-10 NOTE — MR AVS SNAPSHOT
"              After Visit Summary   7/12/2018    Ulices Shah Jr.    MRN: [id number]           Patient Information     Date Of Birth          1935        Visit Information        Provider Department      7/12/2018 11:10 AM Tin Tello MD [facility]        Today's Diagnoses     Pain of left lower extremity    -  1       Follow-ups after your visit        Your next 10 appointments already scheduled     Jul 17, 2018 12:30 PM CDT   Return Visit with Joceline Stone MD   [facility]    [address] [place] [id number] [phone]              Future tests that were ordered for you today     Open Future Orders        Priority Expected Expires Ordered    US Lower Extremity Venous Duplex Left Routine  7/12/2019 7/12/2018            Who to contact     If you have questions or need follow up information about today's clinic visit or your schedule please contact [facility] directly at [phone].  Normal or non-critical lab and imaging results will be communicated to you by MyChart, letter or phone within 4 business days after the clinic has received the results. If you do not hear from us within 7 days, please contact the clinic through MyChart or phone. If you have a critical or abnormal lab result, we will notify you by phone as soon as possible.  Submit refill requests through [employer] or call your pharmacy and they will forward the refill request to us. Please allow 3 business days for your refill to be completed.          Additional Information About Your Visit        Care EveryWhere ID     This is your Care EveryWhere ID. This could be used by other organizations to access your [facility] medical records  [id number]        Your Vitals Were     Pulse Temperature Respirations Height Pulse Oximetry BMI (Body Mass Index)    80 96.8  F (36  C) (Tympanic) 18 5' 9\" (1.753 m) 98% 26.14 kg/m2       Blood Pressure " [FreeTextEntry1] : 74 y/o F with PMHx of HTN, HLD, PVCs, arthritis, L eye glaucoma here for follow up visit. \par \par # H/o APC/PVC\par - Nuclear stress test 8/10/22 normal \par - CT heart 8/9/22 normal \par - TSH wnl\par - palpitations completely resolved 9/2022\par - C/w aspirin\par - C/w Hydroxyzine 25 mg qhs\par \par #B/l waist pain - improving \par - daily waist pain, L > R, worse with flexion; improved prior with PT\par -most likely musculoskeletal\par -continue Tylenol PRN \par - pt has no medical contraindications to continue physical therapy\par \par # HTN\par - /62\par - Monitors bp at home\par - Continue amlodipine 5 mg QD\par \par # HLD \par - lipid panel 1/2023: TG 84, , HDL 53\par - Diet and lifestyle modifications \par \par #Vit D Deficiency \par - c/w supplements \par \par # HCM\par - Mammogram January 2022: birads 1\par - Colonoscopy in 2019 at [facility] was normal; was told to f/u 2029\par - DEXA scan Feb 2022 normal\par - flu vaccine given Nov 2022\par - COVID booster given January 2022\par - PCV 20 given today \par  from Last 3 Encounters:   07/12/18 124/68   06/30/17 130/62   03/27/17 130/62    Weight from Last 3 Encounters:   07/12/18 177 lb (80.3 kg)   06/30/17 184 lb (83.5 kg)   03/27/17 182 lb (82.6 kg)               Primary Care Provider Office Phone # Fax #    Kevin Brayden Vaughn [phone]       [address] [place] [address]        Equal Access to Services     DOMENICA TRINIDAD : Hadii aad ku hadasho Soomaali, waaxda luqadaha, qaybta kaalmada adeegyada, waxay idiin hayaan adeeg khsurendra jones . So [facility] [phone].    ATENCIÓN: Si habla español, tiene a payne disposición servicios gratuitos de asistencia lingüística. [facility] [phone].    We comply with applicable federal civil rights laws and [place] laws. We do not discriminate on the basis of race, color, national origin, age, disability, sex, sexual orientation, or gender identity.            Thank you!     Thank you for choosing [facility]  for your care. Our goal is always to provide you with excellent care. Hearing back from our patients is one way we can continue to improve our services. Please take a few minutes to complete the written survey that you may receive in the mail after your visit with us. Thank you!             Your Updated Medication List - Protect others around you: Learn how to safely use, store and throw away your medicines at www.disposemymeds.org.          This list is accurate as of 7/12/18 11:35 AM.  Always use your most recent med list.                   Brand Name Dispense Instructions for use Diagnosis    aspirin 81 MG tablet     100 tablet    Take 1 tablet (81 mg) by mouth daily    Chronic ischemic heart disease, unspecified, Hypertension goal BP (blood pressure) < 140/90, Hyperlipidemia LDL goal <100       atorvastatin 40 MG tablet    LIPITOR     Take 40 mg by mouth daily        clopidogrel 75 MG tablet    PLAVIX    90 tablet    Take 1 tablet by mouth daily.    Chronic ischemic heart disease,  unspecified       COREG 25 MG tablet   Generic drug:  carvedilol      Take 25 mg by mouth 2 times daily (with meals).        Fish Oil 1000 MG Cpdr      Take 1 capsule by mouth 2 times daily.        lisinopril 5 MG tablet    PRINIVIL/ZESTRIL     Take 2.5 mg by mouth 2 times daily        mirtazapine 15 MG tablet    REMERON    30 tablet    Take 1 tablet (15 mg) by mouth At Bedtime    Persistent insomnia       nitroGLYcerin 0.4 MG sublingual tablet    NITROSTAT    25 tablet    Place 1 tablet (0.4 mg) under the tongue every 5 minutes as needed    Chronic ischemic heart disease, unspecified

## 2023-05-15 ENCOUNTER — HOSPITAL ENCOUNTER (EMERGENCY)
Facility: CLINIC | Age: 88
Discharge: HOME | End: 2023-05-15
Attending: EMERGENCY MEDICINE | Admitting: EMERGENCY MEDICINE
Payer: MEDICARE

## 2023-05-15 VITALS
HEART RATE: 55 BPM | SYSTOLIC BLOOD PRESSURE: 161 MMHG | WEIGHT: 169.2 LBS | TEMPERATURE: 97.9 F | OXYGEN SATURATION: 98 % | HEIGHT: 69 IN | BODY MASS INDEX: 25.06 KG/M2 | RESPIRATION RATE: 20 BRPM | DIASTOLIC BLOOD PRESSURE: 84 MMHG

## 2023-05-15 DIAGNOSIS — R20.2 PARESTHESIAS: ICD-10-CM

## 2023-05-15 PROCEDURE — 99282 EMERGENCY DEPT VISIT SF MDM: CPT | Performed by: EMERGENCY MEDICINE

## 2023-05-15 PROCEDURE — 99283 EMERGENCY DEPT VISIT LOW MDM: CPT | Performed by: EMERGENCY MEDICINE

## 2023-05-15 NOTE — DISCHARGE INSTRUCTIONS
Return if the numbness persists or you develop weakness or any other symptoms with this numbness.  Otherwise follow-up with primary care.  We have helped to schedule appointment for you.    It was nice to meet you!

## 2023-05-15 NOTE — ED TRIAGE NOTES
Pt reports on and off numbness and pain in the left arm for months, he also has a wound to the left shoulder that he is not sure where it came from, wanted to be seen in clinic but was told to come to er

## 2023-05-15 NOTE — ED PROVIDER NOTES
History     chief complaint  HPI  Patient is an 88-year-old male with a history of hypertension, hyperlipidemia, diabetes, atrial fibrillation on Eliquis presenting with intermittent numbness for several months of his left arm and shoulder pain.  No trauma.  He tried to set up a primary care appointment but was told to come here.  The numbness in his arm and hand occur seemingly at random and last for several seconds and returned to normal.  Never has he had any weakness.  During this time of numbness he does not have any chest pain, speech changes, vision changes, leg symptoms, urinary symptoms, or anything else.  Over the last month or so he has noticed crepitus in his arm when he moves it.  No falls.  His primary care doctor retired and he has not been in to be able to see anyone.    Review of Systems:  All organ systems below were reviewed and are negative unless indicated in the HPI.    Constitutional  Eyes  ENT  Respiratory  Cardiovascular  Gastrointestinal  Genitourinary  Musculoskeletal  Skin  Neuro    Allergies:  Allergies   Allergen Reactions     No Known Drug Allergy        Problem List:    Patient Active Problem List    Diagnosis Date Noted     Spinal stenosis, lumbar region, without neurogenic claudication 06/25/2013     Priority: High     Seborrheic keratoses 05/05/2023     Priority: Medium     Family history of melanoma 05/05/2023     Priority: Medium     Type 2 diabetes mellitus without complication, without long-term current use of insulin (H) 11/13/2020     Priority: Medium     Acute respiratory failure with hypoxia (H) 11/13/2020     Priority: Medium     SIRS (systemic inflammatory response syndrome) (H)-fever, tachypnea 11/13/2020     Priority: Medium     2019 novel coronavirus disease (COVID-19) 11/12/2020     Priority: Medium     Other osteoarthritis of spine, cervical region 09/10/2020     Priority: Medium     History of nonmelanoma skin cancer 01/22/2019     Priority: Medium      Persistent insomnia 03/27/2017     Priority: Medium     Heart murmur, systolic 03/27/2017     Priority: Medium     History of heart bypass surgery 12/07/2015     Priority: Medium     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 9/3/15 09/03/2015     Priority: Medium     Basal cell carcinoma of left ear (superior helix) s/p [facility] 9-2-14 09/02/2014     Priority: Medium     Basal cell carcinoma of left nasolabial fold s/p [facility] 9-2-14 09/02/2014     Priority: Medium     Basal cell carcinoma of right mid cheek s/p [facility] 9-2-14 09/02/2014     Priority: Medium     History of basal cell carcinoma 07/29/2014     Priority: Medium     Atherosclerosis of coronary artery 10/15/2012     Priority: Medium     Cardiomyopathy in diseases classified elsewhere (H) 06/14/2012     Priority: Medium     Overview:   LVEF 50% per LVG 4/30/2012       Advanced directives, counseling/discussion 02/14/2012     Priority: Medium     Advance Directive Problem List Overview:   Name Relationship Phone    Primary Health Care Agent            Alternative Health Care Agent          Discussed advance care planning with patient; information given to patient to review. 2/14/2012        Duane Romo MD  05/15/23 1217         Hypertension goal BP (blood pressure) < 140/90 09/25/2011     Priority: Medium     Hyperlipidemia LDL goal <100 09/25/2011     Priority: Medium     DDD (degenerative disc disease), cervical 12/24/2008     Priority: Medium     Chronic ischemic heart disease 12/21/2002     Priority: Medium     Problem list name updated by automated process. Provider to review          Past Medical History:    Past Medical History:   Diagnosis Date     Actinic keratosis      Cancer (H)      Heart disease      History of blood transfusion      History of nonmelanoma skin cancer      Type 2 diabetes mellitus without complication, without long-term current use of insulin (H) 11/13/2020     Unspecified essential hypertension        Past Surgical  "History:    Past Surgical History:   Procedure Laterality Date     COLONOSCOPY  12/10/08     COLONOSCOPY N/A 3/7/2019    Procedure: COMBINED COLONOSCOPY,  MULTIPLE POLYPECTOMY BY BIOPSY;  Surgeon: Brayden Sharma DO;  Location: [facility]     EXCISE MASS HAND Right 02/2017    \"skin cancer\" removal     HC REMV CATARACT EXTRACAP,INSERT LENS, W/O ECP  10/21/2004    left     HC REMV CATARACT EXTRACAP,INSERT LENS, W/O ECP  11/18/2004    right     HC YAG LASER CAPSULOTOMY  06/18/09    right eye     HEART CATH, ANGIOPLASTY  4/30/12    3 stents     HEMILAMINECTOMY, DISCECTOMY LUMBAR THREE LEVELS, COMBINED  6/26/2013    Procedure: COMBINED HEMILAMINECTOMY, DISCECTOMY LUMBAR THREE LEVELS;  Bilateral L3, L4 and L5 Guille-Laminectomies;  Surgeon: Ollie Dodson MD;  Location:  OR     [facility] APPENDECTOMY  1949     [facility] EXPLOR HEART SURG WND W CP BYPASS  10/25/01    4 way heart bypass       Family History:    Family History   Problem Relation Age of Onset     C.A.D. Mother      Alzheimer Disease Mother      Neurologic Disorder Mother 96        dementia     Cancer Father         colon     Cancer - colorectal Father        Medications:    apixaban ANTICOAGULANT (ELIQUIS ANTICOAGULANT) 5 MG tablet  carvedilol (COREG) 25 MG tablet  cetirizine (ZYRTEC) 10 MG tablet  clopidogrel (PLAVIX) 75 MG tablet  fluticasone (FLONASE) 50 MCG/ACT nasal spray  lisinopril (PRINIVIL,ZESTRIL) 5 MG tablet  metFORMIN (GLUCOPHAGE) 500 MG tablet  mirtazapine (REMERON) 15 MG tablet  Omega-3 Fatty Acids (FISH OIL) 1000 MG CPDR  simvastatin (ZOCOR) 40 MG tablet  sotalol (BETAPACE) 80 MG tablet          Physical Exam   BP: (!) 161/84  Pulse: 55  Temp: 97.9  F (36.6  C)  Resp: 20  Height: 175.3 cm (5' 9\")  Weight: 76.7 kg (169 lb 3.2 oz)  SpO2: 98 %    Gen: Vital signs reviewed  Eyes: Sclera white, pupils round  ENT: External ears and nares normal  Card: Regular rate and rhythm  Resp: No respiratory distress. Lungs clear to auscultation bilaterally  Abd: " Soft, non-distended, non-tender  Extremities: Warm, no edema  Skin: Scabbed lesions on his shoulder from presumed biopsy site from dermatology  Shoulder: Rotator cuff muscles have normal strength.  No significant pain with range of motion.  Some crepitus.  Neuro: Alert, speech normal. Face is symmetric.  Strength and sensation intact throughout.  No dysmetria.  Visual fields grossly intact.  Hearing grossly intact.   Motor and sensory function intact in radial, median, and ulnar nerve distribution bilaterally.      ED Course        Procedures                  No results found for this or any previous visit (from the past 24 hour(s)).    Medications - No data to display      Consultations:  None    Social Determinants of Health:  Lives alone, manages ADLs    Assessments & Plan (with Medical Decision Making)       I have reviewed the nursing notes.    I have reviewed the findings, diagnosis, plan and need for follow up with the patient.      Medical Decision Making  On arrival, patient well-appearing and slightly hypertensive.  His neurologic exam is normal.  When I asked him to extend his neck he notes some reproduction of his paresthesias.  No motor sensations.  Presentation most consistent with mild disc herniation.  I do not suspect a CVA intermittently for the last several months.  At this point I do not think emergent imaging or management is indicated and I do think he would best be served by primary care follow-up.  His biopsy site wounds are healing well.  Our registration team to help him to arrange a follow-up appointment.  Discussed appropriate return precautions and he was discharged home in stable condition.      Final diagnoses:   Paresthesias         Duane Romo M.D.   [facility] Emergency Department

## 2023-05-30 ENCOUNTER — OFFICE VISIT (OUTPATIENT)
Dept: INTERNAL MEDICINE | Facility: CLINIC | Age: 88
End: 2023-05-30
Payer: MEDICARE

## 2023-05-30 VITALS
TEMPERATURE: 97 F | OXYGEN SATURATION: 99 % | RESPIRATION RATE: 16 BRPM | HEIGHT: 68 IN | DIASTOLIC BLOOD PRESSURE: 80 MMHG | WEIGHT: 167.4 LBS | BODY MASS INDEX: 25.37 KG/M2 | HEART RATE: 54 BPM | SYSTOLIC BLOOD PRESSURE: 138 MMHG

## 2023-05-30 DIAGNOSIS — I25.9 CHRONIC ISCHEMIC HEART DISEASE, UNSPECIFIED: ICD-10-CM

## 2023-05-30 DIAGNOSIS — I25.10 ATHEROSCLEROSIS OF NATIVE CORONARY ARTERY OF NATIVE HEART WITHOUT ANGINA PECTORIS: ICD-10-CM

## 2023-05-30 DIAGNOSIS — J32.1 CHRONIC FRONTAL SINUSITIS: ICD-10-CM

## 2023-05-30 DIAGNOSIS — I10 HYPERTENSION GOAL BP (BLOOD PRESSURE) < 140/90: ICD-10-CM

## 2023-05-30 DIAGNOSIS — R73.9 ELEVATED BLOOD SUGAR: ICD-10-CM

## 2023-05-30 DIAGNOSIS — J30.2 SEASONAL ALLERGIC RHINITIS, UNSPECIFIED TRIGGER: ICD-10-CM

## 2023-05-30 DIAGNOSIS — I48.91 ATRIAL FIBRILLATION, UNSPECIFIED TYPE (H): ICD-10-CM

## 2023-05-30 DIAGNOSIS — E78.5 HYPERLIPIDEMIA LDL GOAL <100: ICD-10-CM

## 2023-05-30 DIAGNOSIS — G47.00 PERSISTENT INSOMNIA: ICD-10-CM

## 2023-05-30 DIAGNOSIS — E11.9 TYPE 2 DIABETES MELLITUS WITHOUT COMPLICATION, WITHOUT LONG-TERM CURRENT USE OF INSULIN (H): ICD-10-CM

## 2023-05-30 DIAGNOSIS — Z00.00 MEDICARE ANNUAL WELLNESS VISIT, SUBSEQUENT: Primary | ICD-10-CM

## 2023-05-30 PROBLEM — J96.01 ACUTE RESPIRATORY FAILURE WITH HYPOXIA (H): Status: RESOLVED | Noted: 2020-11-13 | Resolved: 2023-05-30

## 2023-05-30 LAB
ALBUMIN SERPL BCG-MCNC: 4.1 G/DL (ref 3.5–5.2)
ALP SERPL-CCNC: 72 U/L (ref 40–129)
ALT SERPL W P-5'-P-CCNC: 16 U/L (ref 10–50)
ANION GAP SERPL CALCULATED.3IONS-SCNC: 12 MMOL/L (ref 7–15)
AST SERPL W P-5'-P-CCNC: 17 U/L (ref 10–50)
BILIRUB SERPL-MCNC: 0.4 MG/DL
BUN SERPL-MCNC: 14.1 MG/DL (ref 8–23)
CALCIUM SERPL-MCNC: 9.3 MG/DL (ref 8.8–10.2)
CHLORIDE SERPL-SCNC: 101 MMOL/L (ref 98–107)
CHOLEST SERPL-MCNC: 146 MG/DL
CREAT SERPL-MCNC: 0.94 MG/DL (ref 0.67–1.17)
CREAT UR-MCNC: 72 MG/DL
DEPRECATED HCO3 PLAS-SCNC: 24 MMOL/L (ref 22–29)
ERYTHROCYTE [DISTWIDTH] IN BLOOD BY AUTOMATED COUNT: 11.7 % (ref 10–15)
GFR SERPL CREATININE-BSD FRML MDRD: 79 ML/MIN/1.73M2
GLUCOSE SERPL-MCNC: 328 MG/DL (ref 70–99)
HBA1C MFR BLD: 11.7 %
HCT VFR BLD AUTO: 41.4 % (ref 40–53)
HDLC SERPL-MCNC: 43 MG/DL
HGB BLD-MCNC: 14.4 G/DL (ref 13.3–17.7)
LDLC SERPL CALC-MCNC: 80 MG/DL
MCH RBC QN AUTO: 31.4 PG (ref 26.5–33)
MCHC RBC AUTO-ENTMCNC: 34.8 G/DL (ref 31.5–36.5)
MCV RBC AUTO: 90 FL (ref 78–100)
MICROALBUMIN UR-MCNC: 43.1 MG/L
MICROALBUMIN/CREAT UR: 59.86 MG/G CR (ref 0–17)
NONHDLC SERPL-MCNC: 103 MG/DL
PLATELET # BLD AUTO: 165 10E3/UL (ref 150–450)
POTASSIUM SERPL-SCNC: 4.5 MMOL/L (ref 3.4–5.3)
PROT SERPL-MCNC: 7.2 G/DL (ref 6.4–8.3)
RBC # BLD AUTO: 4.59 10E6/UL (ref 4.4–5.9)
SODIUM SERPL-SCNC: 137 MMOL/L (ref 136–145)
TRIGL SERPL-MCNC: 116 MG/DL
WBC # BLD AUTO: 7.2 10E3/UL (ref 4–11)

## 2023-05-30 PROCEDURE — G0439 PPPS, SUBSEQ VISIT: HCPCS | Performed by: INTERNAL MEDICINE

## 2023-05-30 PROCEDURE — 36415 COLL VENOUS BLD VENIPUNCTURE: CPT | Performed by: INTERNAL MEDICINE

## 2023-05-30 PROCEDURE — 80053 COMPREHEN METABOLIC PANEL: CPT | Performed by: INTERNAL MEDICINE

## 2023-05-30 PROCEDURE — 82570 ASSAY OF URINE CREATININE: CPT | Performed by: INTERNAL MEDICINE

## 2023-05-30 PROCEDURE — 83036 HEMOGLOBIN GLYCOSYLATED A1C: CPT | Performed by: INTERNAL MEDICINE

## 2023-05-30 PROCEDURE — 99214 OFFICE O/P EST MOD 30 MIN: CPT | Mod: 25 | Performed by: INTERNAL MEDICINE

## 2023-05-30 PROCEDURE — 80061 LIPID PANEL: CPT | Performed by: INTERNAL MEDICINE

## 2023-05-30 PROCEDURE — 85027 COMPLETE CBC AUTOMATED: CPT | Performed by: INTERNAL MEDICINE

## 2023-05-30 PROCEDURE — 82043 UR ALBUMIN QUANTITATIVE: CPT | Performed by: INTERNAL MEDICINE

## 2023-05-30 RX ORDER — CLOPIDOGREL BISULFATE 75 MG/1
75 TABLET ORAL DAILY
Qty: 90 TABLET | Refills: 3 | Status: SHIPPED | OUTPATIENT
Start: 2023-05-30

## 2023-05-30 RX ORDER — SIMVASTATIN 40 MG
40 TABLET ORAL AT BEDTIME
Qty: 90 TABLET | Refills: 3 | Status: SHIPPED | OUTPATIENT
Start: 2023-05-30 | End: 2023-05-30

## 2023-05-30 RX ORDER — METFORMIN HCL 500 MG
500 TABLET, EXTENDED RELEASE 24 HR ORAL 2 TIMES DAILY WITH MEALS
Qty: 180 TABLET | Refills: 3 | Status: SHIPPED | OUTPATIENT
Start: 2023-05-30 | End: 2024-05-23

## 2023-05-30 RX ORDER — ROSUVASTATIN CALCIUM 20 MG/1
20 TABLET, COATED ORAL DAILY
COMMUNITY
Start: 2023-05-30

## 2023-05-30 RX ORDER — CETIRIZINE HYDROCHLORIDE 10 MG/1
10 TABLET ORAL DAILY
Qty: 90 TABLET | Refills: 3 | Status: SHIPPED | OUTPATIENT
Start: 2023-05-30 | End: 2024-09-13

## 2023-05-30 RX ORDER — MIRTAZAPINE 15 MG/1
15 TABLET, FILM COATED ORAL AT BEDTIME
Qty: 90 TABLET | Refills: 3 | Status: SHIPPED | OUTPATIENT
Start: 2023-05-30

## 2023-05-30 ASSESSMENT — ACTIVITIES OF DAILY LIVING (ADL): CURRENT_FUNCTION: NO ASSISTANCE NEEDED

## 2023-05-30 ASSESSMENT — PAIN SCALES - GENERAL: PAINLEVEL: NO PAIN (0)

## 2023-05-30 NOTE — PROGRESS NOTES
"SUBJECTIVE:   NIMCO is a 88 year old who presents for Preventive Visit.      5/30/2023     2:55 PM   Additional Questions   Roomed by Amberly ROBERT     Patient has been advised of split billing requirements and indicates understanding: Yes  Are you in the first 12 months of your Medicare coverage?  No    Healthy Habits:     In general, how would you rate your overall health?  Good    Frequency of exercise:  4-5 days/week    Duration of exercise:  15-30 minutes    Do you usually eat at least 4 servings of fruit and vegetables a day, include whole grains    & fiber and avoid regularly eating high fat or \"junk\" foods?  Yes    Taking medications regularly:  Yes    Medication side effects:  None and Other    Ability to successfully perform activities of daily living:  No assistance needed    Home Safety:  No safety concerns identified    Hearing Impairment:  Difficulty following a conversation in a noisy restaurant or crowded room, difficult to understand a speaker at a public meeting or [employer] service and difficulty understanding speech on the telephone    In the past 6 months, have you been bothered by leaking of urine?  No    In general, how would you rate your overall mental or emotional health?  Good      PHQ-2 Total Score: 2    Lives at [facility], never .  Ladyfriend in [place]. Another friend, no family.   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   Drives still,     Heart disease CABG, stents, atrial fibrillation.  Goes to [facility] for his heart.  Was walking stairs, 22 flights but then went into a fibrillation.   Back to 10 stories of steps now, then some knee pain.     No bleeding, has eliquis and clopidogrel.      HTN and on lisinopril, coreg, and simvastatin.  Sotalol as well.     Skin cancer and follows with dermatology.     Memory can be hit or miss at times.     Getting his medications from cardiology, went last fall for heart scan.       Have you ever done Advance Care Planning? (For example, a " Health Directive, POLST, or a discussion with a medical provider or your loved ones about your wishes): No, advance care planning information given to patient to review.  Patient declined advance care planning discussion at this time.       Fall risk  Fallen 2 or more times in the past year?: No  Any fall with injury in the past year?: No    Cognitive Screening   1) Repeat 3 items (Leader, Season, Table)    2) Clock draw: ABNORMAL incorrect time  3) 3 item recall: Recalls NO objects   Results: 0 items recalled: PROBABLE COGNITIVE IMPAIRMENT, **INFORM PROVIDER**    Mini-CogTM Copyright S Canelo. Licensed by the author for use in [facility]; reprinted with permission (soselma@[facility]). All rights reserved.      Do you have sleep apnea, excessive snoring or daytime drowsiness?: no   Doesn't sleep much at all.     Reviewed and updated as needed this visit by clinical staff   Tobacco  Allergies  Meds              Reviewed and updated as needed this visit by Provider                 Social History     Tobacco Use     Smoking status: Never     Smokeless tobacco: Never   Vaping Use     Vaping status: Never Used   Substance Use Topics     Alcohol use: Not Currently     Comment: rare, maybe 3 beers all year.             5/30/2023     2:43 PM   Alcohol Use   Prescreen: >3 drinks/day or >7 drinks/week? No     Do you have a current opioid prescription? No  Do you use any other controlled substances or medications that are not prescribed by a provider? None      Current providers sharing in care for this patient include:   Patient Care Team:  No Ref-Primary, Physician as PCP - General  None  Avani Perez APRN CNP as Assigned PCP  Joceline Stone MD as Assigned Surgical Provider    The following health maintenance items are reviewed in Epic and correct as of today:  Health Maintenance   Topic Date Due     MICROALBUMIN  Never done     DIABETIC FOOT EXAM  Never done     ANNUAL REVIEW OF HM ORDERS  Never done  "    EYE EXAM  Never done     ZOSTER IMMUNIZATION (1 of 2) Never done     MEDICARE ANNUAL WELLNESS VISIT  03/27/2018     A1C  12/09/2020     COVID-19 Vaccine (3 - Moderna series) 06/22/2021     BMP  02/04/2022     ADVANCE CARE PLANNING  02/17/2022     INFLUENZA VACCINE (1) 09/01/2022     LIPID  05/10/2023     COLORECTAL CANCER SCREENING  03/07/2024     FALL RISK ASSESSMENT  05/30/2024     DTAP/TDAP/TD IMMUNIZATION (2 - Td or Tdap) 09/15/2026     PHQ-2 (once per calendar year)  Completed     Pneumococcal Vaccine: 65+ Years  Completed     IPV IMMUNIZATION  Aged Out     MENINGITIS IMMUNIZATION  Aged Out     Lab work is in process  Up to date.     Review of Systems   Genitourinary: Negative for impotence and penile discharge.     OBJECTIVE:   BP (!) 150/80 (BP Location: Right arm, Patient Position: Chair)   Pulse 54   Temp 97  F (36.1  C) (Temporal)   Resp 16   Ht 1.721 m (5' 7.75\")   Wt 75.9 kg (167 lb 6.4 oz)   SpO2 99%   BMI 25.64 kg/m   Estimated body mass index is 25.64 kg/m  as calculated from the following:    Height as of this encounter: 1.721 m (5' 7.75\").    Weight as of this encounter: 75.9 kg (167 lb 6.4 oz).  Physical Exam  GENERAL: healthy, alert and no distress  EYES: Eyes grossly normal to inspection, PERRL and conjunctivae and sclerae normal  HENT: ear canals and TM's normal, nose and mouth without ulcers or lesions  NECK: no adenopathy, no asymmetry, masses, or scars and thyroid normal to palpation  RESP: lungs clear to auscultation - no rales, rhonchi or wheezes  CV: distant heart sounds irregular   ABDOMEN: soft, nontender, no hepatosplenomegaly, no masses and bowel sounds normal  MS: no gross musculoskeletal defects noted, no edema  SKIN: no suspicious lesions or rashes  NEURO: Normal strength and tone, mentation intact and speech normal  PSYCH: mentation appears normal, affect normal/bright        ASSESSMENT / PLAN:       ICD-10-CM    1. Medicare annual wellness visit, subsequent  Z00.00     "   2. Type 2 diabetes mellitus without complication, without long-term current use of insulin (H)  E11.9 Albumin Random Urine Quantitative with Creat Ratio     HEMOGLOBIN A1C     Comprehensive metabolic panel (BMP + Alb, Alk Phos, ALT, AST, Total. Bili, TP)     DISCONTINUED: simvastatin (ZOCOR) 40 MG tablet      3. Hypertension goal BP (blood pressure) < 140/90  I10 DISCONTINUED: simvastatin (ZOCOR) 40 MG tablet      4. Atherosclerosis of native coronary artery of native heart without angina pectoris  I25.10 Lipid panel reflex to direct LDL Non-fasting     DISCONTINUED: simvastatin (ZOCOR) 40 MG tablet      5. Atrial fibrillation, unspecified type (H)  I48.91 Comprehensive metabolic panel (BMP + Alb, Alk Phos, ALT, AST, Total. Bili, TP)     CBC with platelets      6. Hyperlipidemia LDL goal <100  E78.5 DISCONTINUED: simvastatin (ZOCOR) 40 MG tablet      7. Chronic frontal sinusitis  J32.1 cetirizine (ZYRTEC) 10 MG tablet      8. Seasonal allergic rhinitis, unspecified trigger  J30.2 cetirizine (ZYRTEC) 10 MG tablet      9. CHR ISCHEMIC HRT DIS NOS  I25.9 clopidogrel (PLAVIX) 75 MG tablet      10. Elevated blood sugar  R73.9 metFORMIN (GLUCOPHAGE) 500 MG tablet      11. Persistent insomnia  G47.00 mirtazapine (REMERON) 15 MG tablet          New patient here establishing care with me as Dr. Lamar retired.  He lives alone in [place].  Really has no family around.  He has a lady friend that lives in [place] and a good friend who is 91 in [facility].  He needs set up a new will.  Does go to a [employer] in [place] for many years.    Patient has a history of coronary artery disease he is followed by cardiology at [facility].  He also has atrial fibrillation.  He is on Eliquis and Plavix, Crestor, Coreg and lisinopril.  His blood pressure runs a little bit high but I will not adjust until I know which meds he is actually taking from them.    Elevated blood sugars on metformin we will check labs today.    He does have insomnia but  "sleeps when he wants to is not worried about this.    Overall is doing quite well he is quite active, either walking stairs or trying to ride his bicycle.    COUNSELING:  Reviewed preventive health counseling, as reflected in patient instructions       Regular exercise       Healthy diet/nutrition      BMI:   Estimated body mass index is 25.64 kg/m  as calculated from the following:    Height as of this encounter: 1.721 m (5' 7.75\").    Weight as of this encounter: 75.9 kg (167 lb 6.4 oz).         He reports that he has never smoked. He has never used smokeless tobacco.      Appropriate preventive services were discussed with this patient, including applicable screening as appropriate for cardiovascular disease, diabetes, osteopenia/osteoporosis, and glaucoma.  As appropriate for age/gender, discussed screening for colorectal cancer, prostate cancer, breast cancer, and cervical cancer. Checklist reviewing preventive services available has been given to the patient.    Reviewed patients plan of care and provided an AVS. The Basic Care Plan (routine screening as documented in Health Maintenance) for [place] meets the Care Plan requirement. This Care Plan has been established and reviewed with the Patient.          Akhil Mcmahon MD  [facility]    Identified Health Risks:    "

## 2023-06-01 DIAGNOSIS — E11.9 TYPE 2 DIABETES MELLITUS WITHOUT COMPLICATION, WITHOUT LONG-TERM CURRENT USE OF INSULIN (H): ICD-10-CM

## 2023-06-02 ENCOUNTER — TELEPHONE (OUTPATIENT)
Dept: INTERNAL MEDICINE | Facility: CLINIC | Age: 88
End: 2023-06-02
Payer: MEDICARE

## 2023-06-02 NOTE — TELEPHONE ENCOUNTER
----- Message from Akhil Mcmahon MD sent at 5/30/2023  5:27 PM CDT -----  Please call Filiberto and let him know his labs are good except his diabetes is way too high.  His A1c is 11.  Much higher than 2 years ago.  He should increase his metformin to twice a day and start to check his blood sugars and recheck with me as scheduled.  I will send in a glucometer and a new prescription.

## 2023-06-05 ENCOUNTER — OFFICE VISIT (OUTPATIENT)
Dept: PHARMACY | Facility: CLINIC | Age: 88
End: 2023-06-05
Payer: COMMERCIAL

## 2023-06-05 VITALS — SYSTOLIC BLOOD PRESSURE: 136 MMHG | BODY MASS INDEX: 25.64 KG/M2 | DIASTOLIC BLOOD PRESSURE: 82 MMHG | WEIGHT: 167.4 LBS

## 2023-06-05 DIAGNOSIS — J30.2 SEASONAL ALLERGIC RHINITIS, UNSPECIFIED TRIGGER: ICD-10-CM

## 2023-06-05 DIAGNOSIS — I25.10 ATHEROSCLEROSIS OF NATIVE CORONARY ARTERY OF NATIVE HEART WITHOUT ANGINA PECTORIS: ICD-10-CM

## 2023-06-05 DIAGNOSIS — E11.9 TYPE 2 DIABETES MELLITUS WITHOUT COMPLICATION, WITHOUT LONG-TERM CURRENT USE OF INSULIN (H): Primary | ICD-10-CM

## 2023-06-05 DIAGNOSIS — I10 HYPERTENSION GOAL BP (BLOOD PRESSURE) < 140/90: ICD-10-CM

## 2023-06-05 DIAGNOSIS — G47.00 PERSISTENT INSOMNIA: ICD-10-CM

## 2023-06-05 DIAGNOSIS — I48.91 ATRIAL FIBRILLATION, UNSPECIFIED TYPE (H): ICD-10-CM

## 2023-06-05 PROCEDURE — 99207 PR NO CHARGE LOS: CPT | Performed by: PHARMACIST

## 2023-06-05 NOTE — PROGRESS NOTES
Medication Therapy Management (MTM) Encounter    ASSESSMENT:                            Medication Adherence/Access: No issues identified    Type 2 Diabetes:  A1c is above goal of <8%.  Yet to start metformin. Would benefit from initiating this and watching blood sugars/working on diet.     CAD/AFib/Hypertension: Blood pressure is at goal of <140/90 mmHg. Follow-up in place with cardiology.     Allergic Rhinitis: Stable.     Insomnia: Stable per patient.     PLAN:                            1. We reviewed how to use your lancet device and test strips with your glucometer (blood sugar meter). Remember to check your blood sugars once daily in the morning before eating (fasting blood sugar). Change the lancet (needle) in your lancing device daily (or at least every few days).  The numbness/nerve issues may be worse because your sugars are high.     2. Start METFORMIN 500 mg by mouth daily for 1 week, then increase to METFORMIN 500 MG twice daily as previously prescribed.     Follow-up: after you see Dr. Mcmahon or sooner if your blood sugars are not improving or you are having side effects    SUBJECTIVE/OBJECTIVE:                          NIMCO Shah is a 88 year old male coming in for an initial visit. He was referred to me from self.      Reason for visit: Diabetes Management.    Allergies/ADRs: Reviewed in chart  Past Medical History: Reviewed in chart  Tobacco: He reports that he has never smoked. He has never used smokeless tobacco.  Alcohol: Less than 1 beverage / month      Medication Adherence/Access: no issues reported    Type 2 Diabetes:  Currently prescribed metformin  mg twice daily, but yet to start. Patient is not experiencing side effects.  Blood sugar monitoring: BG meter taught today. Reading during visit today was 316 mg/dL  Symptoms of low blood sugar? none  Symptoms of high blood sugar? Neuropathy/numbness in left arm intermittently.   Eye exam: due  Foot exam: due  Diet/Exercise: tries to watch  "\"sugars\" in his diet. He has historically been very active (lots of bike riding), but less so last year plus due to physical limitations (hard to get leg over bicycle).   Aspirin: Not taking due to Eliquis  Statin: Yes: rosuvastatin   ACEi/ARB: Yes: lisinopril.   Urine Albumin:   Lab Results   Component Value Date    UMALCR 59.86 (H) 05/30/2023      Lab Results   Component Value Date    A1C 11.7 05/30/2023    A1C 6.8 09/09/2020    A1C 7.8 01/20/2020    A1C 11.0 11/18/2019    A1C 6.9 06/27/2017    A1C 8.3 03/22/2017     CAD/AFib/Hypertension: Currently taking lisinopril 5 mg twice daily, Eliquis 5 mg twice daily, carvedilol 25 mg twice daily, sotalol 80 mg twice daily, rosuvastatin 20 mg daily, and fish oil 1 gram twice daily.  Patient does not self-monitor blood pressure. Follows with [facility] through [facility]. Patient reports no current medication side effects.  BP Readings from Last 3 Encounters:   06/05/23 136/82   05/30/23 138/80   05/15/23 (!) 161/84     Pulse Readings from Last 3 Encounters:   05/30/23 54   05/15/23 55   02/08/22 75     Creatinine   Date Value Ref Range Status   05/30/2023 0.94 0.67 - 1.17 mg/dL Final   02/04/2021 0.94 0.66 - 1.25 mg/dL Final     Allergic Rhinitis: Currently taking cetirizine 10 mg once daily and fluticasone nasal spray 2 spray(s) once daily. Patient feels that current therapy is effective.     Insomnia: Current medications include: mirtazapine 15 mg at bedtime. Patient reports to be effective and denies any known side effects.     Today's Vitals: /82   Wt 167 lb 6.4 oz (75.9 kg)   BMI 25.64 kg/m       Wt Readings from Last 5 Encounters:   06/05/23 167 lb 6.4 oz (75.9 kg)   05/30/23 167 lb 6.4 oz (75.9 kg)   05/15/23 169 lb 3.2 oz (76.7 kg)   02/08/22 172 lb 3.2 oz (78.1 kg)   09/21/21 174 lb 6 oz (79.1 kg)     ----------------  Post Discharge Medication Reconciliation Status: discharge medications reconciled, continue medications without change.    I " spent 25 minutes with this patient today. A copy of the visit note was provided to the patient's provider(s).    A summary of these recommendations was given to the patient.    Gonsalo Cat PharmD, [facility]  Medication Therapy Management Pharmacist  Pager: [phone]       Medication Therapy Recommendations  No medication therapy recommendations to display

## 2023-06-05 NOTE — PATIENT INSTRUCTIONS
Recommendations from today's MTM visit:                                                    MTM (medication therapy management) is a service provided by a clinical pharmacist designed to help you get the most of out of your medicines.   Today we reviewed what your medicines are for, how to know if they are working, that your medicines are safe and how to make your medicine regimen as easy as possible.      We reviewed how to use your lancet device and test strips with your glucometer (blood sugar meter). Remember to check your blood sugars once daily in the morning before eating (fasting blood sugar). Change the lancet (needle) in your lancing device daily (or at least every few days).  The numbness/nerve issues may be worse because your sugars are high.     Start METFORMIN 500 mg by mouth daily for 1 week, then increase to METFORMIN 500 MG twice daily.     Diabetes Goals:    Home Monitoring of Blood Sugars:Fasting  mg/dL and 2 hours after a meal less than 180 mg/dL.    Hemoglobin A1C: Less than 8%. Yours is   Lab Results   Component Value Date    A1C 11.7 05/30/2023    A1C 6.8 09/09/2020   .    Blood Pressure: Less than 140/90mmHg.    Cholesterol: You are taking rosuvastatin to help decrease the risk of heart disease.    Things you can do to help lower your blood sugars:    Diet: 3 meals and 1-2 snacks per day with about 30-60 grams of carbohydrates per meal and 15-30 grams of carbohydrates per snack. Try to fill your plate at least half-full with vegetables, fill one-quarter full with lean meats or protein, and also make sure you get at least some carbohydrate with every meal.    Exercise: 30 minutes per day of anything that will increase your heart rate and make you break a sweat! Gardening, walking, cleaning the house, changing the oil in your car, etc. If you feel like 30 minutes per day is too much, start small. Even lifting canned foods or working your arms with a resistance band in front of the TV can  "help.    Follow-up: after you see Dr. Mcmahon or sooner if your blood sugars are not improving or you are having side effects    It was great speaking with you today.  I value your experience and would be very thankful for your time in providing feedback in our clinic survey. In the next few days, you may receive an email or text message from [facility] [facility] [facility] with a link to a survey related to your  clinical pharmacist.\"     To schedule another MTM appointment, please call the clinic directly or you may call the MTM scheduling line at [phone] or toll-free at [phone].     My Clinical Pharmacist's contact information:                                                      Please feel free to contact me with any questions or concerns you have.      Gonsalo Cat, PharmD, [facility]  Medication Therapy Management Pharmacist  Pager: [phone]    "

## 2023-06-06 ENCOUNTER — TELEPHONE (OUTPATIENT)
Dept: INTERNAL MEDICINE | Facility: CLINIC | Age: 88
End: 2023-06-06
Payer: MEDICARE

## 2023-06-06 NOTE — TELEPHONE ENCOUNTER
Patient is having trouble getting his blood glucose meter to read his sugar level. He is wondering if he could come back in to meet with you. He is not home today, but he said you can leave him a message with your availability. Thank you!      Please review upon your return.    DAVE GayleN, RN

## 2023-06-06 NOTE — TELEPHONE ENCOUNTER
No answer home number, no voice mail on mobile x 3. This information was discussed with Gonsalo Cat yesterday at appointment. OK to close encounter?

## 2023-06-07 ENCOUNTER — ALLIED HEALTH/NURSE VISIT (OUTPATIENT)
Dept: FAMILY MEDICINE | Facility: CLINIC | Age: 88
End: 2023-06-07
Payer: MEDICARE

## 2023-06-07 DIAGNOSIS — E11.9 TYPE 2 DIABETES MELLITUS WITHOUT COMPLICATION, WITHOUT LONG-TERM CURRENT USE OF INSULIN (H): Primary | ICD-10-CM

## 2023-06-07 PROCEDURE — 99207 PR NO CHARGE NURSE ONLY: CPT

## 2023-06-07 NOTE — TELEPHONE ENCOUNTER
Pt seen by clinic RN.     Gonsalo Cat, BrittanyD, BCACP  Medication Therapy Management Pharmacist  Pager: [phone]

## 2023-06-07 NOTE — PROGRESS NOTES
Patient came in for instruction on how to use his glucose meter.    Patient given hands on instructions on how to use his meter.    patient voiced understanding.    Abbie Chen RN on 6/7/2023 at 9:40 AM

## 2023-06-12 ENCOUNTER — TELEPHONE (OUTPATIENT)
Dept: INTERNAL MEDICINE | Facility: CLINIC | Age: 88
End: 2023-06-12
Payer: MEDICARE

## 2023-06-12 NOTE — TELEPHONE ENCOUNTER
I can see him on Wednesday at 4 and 420.  Please give him the whole 40 minutes if he would like to be seen then.

## 2023-06-12 NOTE — TELEPHONE ENCOUNTER
Reason for Call:  Appointment Request    Patient requesting this type of appt: Per patient: Is there anyway I can be placed on a cancellation list or have an order for imaging to get my left arm looked at My left arm and fingers goes numb and I possibly have a pinch nerve and there's nothing available until Friday with another provider    Requested provider: Akhil Mcmahon    Reason patient unable to be scheduled: Not with their preferred provider    When does patient want to be seen/preferred time: 1-2 days    Comments: Pt would like to be placed on a cancellation list or have care team call back regarding this issue    Okay to leave a detailed message?: Yes at Home number on file [phone] (home)    Call taken on 6/12/2023 at 10:26 AM by Enrico Evans

## 2023-06-14 ENCOUNTER — OFFICE VISIT (OUTPATIENT)
Dept: INTERNAL MEDICINE | Facility: CLINIC | Age: 88
End: 2023-06-14
Payer: MEDICARE

## 2023-06-14 VITALS
HEART RATE: 64 BPM | SYSTOLIC BLOOD PRESSURE: 126 MMHG | DIASTOLIC BLOOD PRESSURE: 70 MMHG | OXYGEN SATURATION: 99 % | BODY MASS INDEX: 25.58 KG/M2 | WEIGHT: 167 LBS | RESPIRATION RATE: 16 BRPM | TEMPERATURE: 98.1 F

## 2023-06-14 DIAGNOSIS — M54.2 NECK PAIN: ICD-10-CM

## 2023-06-14 DIAGNOSIS — E11.9 TYPE 2 DIABETES MELLITUS WITHOUT COMPLICATION, WITHOUT LONG-TERM CURRENT USE OF INSULIN (H): ICD-10-CM

## 2023-06-14 DIAGNOSIS — T14.8XXA SKIN ABRASION: ICD-10-CM

## 2023-06-14 DIAGNOSIS — R20.0 LEFT ARM NUMBNESS: Primary | ICD-10-CM

## 2023-06-14 PROBLEM — Z23 NEED FOR SHINGLES VACCINE: Status: ACTIVE | Noted: 2023-06-14

## 2023-06-14 PROCEDURE — 99214 OFFICE O/P EST MOD 30 MIN: CPT | Performed by: INTERNAL MEDICINE

## 2023-06-14 ASSESSMENT — ENCOUNTER SYMPTOMS: NUMBNESS: 1

## 2023-06-14 NOTE — PROGRESS NOTES
Assessment & Plan     Left arm numbness  Left arm numbness probably coming from his cervical spine and a pinched disc.  Patient feels this more when he moves his head forward.  We will get an MRI to evaluate.  - MR Cervical Spine w/o Contrast; Future    Neck pain  Neck pain we will check an MRI to look for arthritis and pinched nerve    Type 2 diabetes mellitus without complication, without long-term current use of insulin (H)  Diabetes had an A1c of 11.7.  Got on metformin and is doing much better.  Sugars are in the 1  range we will continue the metformin twice daily.    Skin abrasion  Skin abrasion probably not healing well with his high diabetes hopefully will get better now.                   Akhil Mcmahon MD  [facility] 88 [facility], presenting for the following health issues:  Numbness (Left arm numbness) and Derm Problem (Check spot on back)        6/14/2023     3:48 PM   Additional Questions   Roomed by Ernestine Lamar     Numbness  Associated symptoms include numbness.   History of Present Illness       Reason for visit:  Check up    He eats 2-3 servings of fruits and vegetables daily.He consumes 0 sweetened beverage(s) daily.He exercises with enough effort to increase his heart rate 10 to 19 minutes per day.  He exercises with enough effort to increase his heart rate 4 days per week.   He is taking medications regularly.     Sore on top of the left shoulder seen by dermatology May 5th. No known accident, no pain.     Left arm numbness down to his fingers, for a month or two. Going to chiropracter and neck gets tight.   Went to the ER for the left arm.  Goes numb when leaning his head forward.      Sugars are 140-220 range. hgba1c was up to 11.7, just taking metformin for the last week 2 a day.       Review of Systems   Neurological: Positive for numbness.            Objective    /70   Pulse 64   Temp 98.1  F (36.7  C) (Temporal)   Resp  16   Wt 75.8 kg (167 lb)   SpO2 99%   BMI 25.58 kg/m    Body mass index is 25.58 kg/m .  Physical Exam     No acute distress  Left shoulder has a 1 and half inch area of abrasion which is noninfected but healing slowly  Neck has little crackling with movement  Left shoulder has crackling with examination  Strength is 5 out of 5 throughout decreased sensation in the forearm area

## 2023-06-19 ENCOUNTER — HOSPITAL ENCOUNTER (OUTPATIENT)
Dept: MRI IMAGING | Facility: CLINIC | Age: 88
Discharge: HOME | End: 2023-06-19
Attending: INTERNAL MEDICINE | Admitting: INTERNAL MEDICINE
Payer: MEDICARE

## 2023-06-19 DIAGNOSIS — R20.0 LEFT ARM NUMBNESS: ICD-10-CM

## 2023-06-19 PROCEDURE — G1010 CDSM STANSON: HCPCS

## 2023-06-23 ENCOUNTER — PRE VISIT (OUTPATIENT)
Dept: NEUROSURGERY | Facility: CLINIC | Age: 88
End: 2023-06-23
Payer: MEDICARE

## 2023-06-23 NOTE — TELEPHONE ENCOUNTER
NEUROSURGERY- NEW PREVISIT PLANNING       Record Status/Location     Referring Provider Referral Akhil Mcmahon MD   Diagnosis Referral Left arm numbness, neck pain    MRI (HEAD, NECK, SPINE) Pacs Cervical 6/19/23 - [facility]   CT      X-ray Pacs Lumbar 6/27/13 - [facility]   INJECTION Na    PHYSICAL THERAPY Na    SURGERY Encounters 6/26/13

## 2023-07-10 ENCOUNTER — OFFICE VISIT (OUTPATIENT)
Dept: NEUROSURGERY | Facility: CLINIC | Age: 88
End: 2023-07-10
Payer: MEDICARE

## 2023-07-10 VITALS — HEART RATE: 59 BPM | SYSTOLIC BLOOD PRESSURE: 136 MMHG | OXYGEN SATURATION: 96 % | DIASTOLIC BLOOD PRESSURE: 74 MMHG

## 2023-07-10 DIAGNOSIS — R20.0 LEFT ARM NUMBNESS: ICD-10-CM

## 2023-07-10 DIAGNOSIS — M54.2 NECK PAIN: ICD-10-CM

## 2023-07-10 PROCEDURE — 99203 OFFICE O/P NEW LOW 30 MIN: CPT | Performed by: NURSE PRACTITIONER

## 2023-07-10 RX ORDER — SIMVASTATIN 40 MG
40 TABLET ORAL AT BEDTIME
COMMUNITY
End: 2024-09-18

## 2023-07-10 ASSESSMENT — PAIN SCALES - GENERAL: PAINLEVEL: NO PAIN (1)

## 2023-07-10 NOTE — PATIENT INSTRUCTIONS
-Contact my office if symtpoms reoccur.   -Please contact our clinic with questions or concerns at [phone].

## 2023-07-10 NOTE — LETTER
"    7/10/2023         RE: Ulices Shah Jr.  [address]  [facility] [address]        Dear Colleague,    Thank you for referring your patient, Ulices Shah Jr., to the [facility] [place]. Please see a copy of my visit note below.    [facility]  Neurosurgery Clinic Visit      CC: cervical radiculopathy     Primary care Provider: Akhil Mcmahon    Reason For Visit:   I was asked by Dr. Akhil Mcmahon to consult on the patient for left arm numbness.    HPI: Ulices Shah Jr. is a 88 year old male with a 6-8 week history of left-sided neck pain which radiated to the left shoulder and left arm as well as paresthesias into the left 4th and 5th digits. He states he went to a chiropractor which has essentially resolved the symptoms. He notes some only intermittent paresthesias in his 4th and 5th digits on the left. He states he is happy with how he is feeling at this point. No weakness.       Past Medical History:   Diagnosis Date     Actinic keratosis      Cancer (H)      Heart disease      History of blood transfusion      History of nonmelanoma skin cancer      Type 2 diabetes mellitus without complication, without long-term current use of insulin (H) 11/13/2020     Unspecified essential hypertension        Past Medical History reviewed with patient during visit.    Past Surgical History:   Procedure Laterality Date     COLONOSCOPY  12/10/08     COLONOSCOPY N/A 3/7/2019    Procedure: COMBINED COLONOSCOPY,  MULTIPLE POLYPECTOMY BY BIOPSY;  Surgeon: Brayden Sharma DO;  Location: [facility]     EXCISE MASS HAND Right 02/2017    \"skin cancer\" removal     HC REMV CATARACT EXTRACAP,INSERT LENS, W/O ECP  10/21/2004    left     HC REMV CATARACT EXTRACAP,INSERT LENS, W/O ECP  11/18/2004    right     HC YAG LASER CAPSULOTOMY  06/18/09    right eye     HEART CATH, ANGIOPLASTY  4/30/12    3 stents     HEMILAMINECTOMY, DISCECTOMY LUMBAR THREE LEVELS, COMBINED  " 6/26/2013    Procedure: COMBINED HEMILAMINECTOMY, DISCECTOMY LUMBAR THREE LEVELS;  Bilateral L3, L4 and L5 Guille-Laminectomies;  Surgeon: Ollie Dodson MD;  Location:  OR     [facility] APPENDECTOMY  1949     [facility] EXPLOR HEART SURG WND W CP BYPASS  10/25/01    4 way heart bypass     Past Surgical History reviewed with patient during visit.    Current Outpatient Medications   Medication     apixaban ANTICOAGULANT (ELIQUIS ANTICOAGULANT) 5 MG tablet     carvedilol (COREG) 25 MG tablet     clopidogrel (PLAVIX) 75 MG tablet     lisinopril (PRINIVIL,ZESTRIL) 5 MG tablet     Omega-3 Fatty Acids (FISH OIL) 1000 MG CPDR     simvastatin (ZOCOR) 40 MG tablet     blood glucose (NO BRAND SPECIFIED) lancets standard     blood glucose (NO BRAND SPECIFIED) test strip     blood glucose monitoring (NO BRAND SPECIFIED) meter device kit     cetirizine (ZYRTEC) 10 MG tablet     fluticasone (FLONASE) 50 MCG/ACT nasal spray     metFORMIN (GLUCOPHAGE XR) 500 MG 24 hr tablet     mirtazapine (REMERON) 15 MG tablet     rosuvastatin (CRESTOR) 20 MG tablet     sotalol (BETAPACE) 80 MG tablet     No current facility-administered medications for this visit.       Allergies   Allergen Reactions     No Known Drug Allergy        Social History     Socioeconomic History     Marital status: Single     Number of children: 0     Years of education: 12   Occupational History     Occupation: retired     Employer: RETIRED   Tobacco Use     Smoking status: Never     Smokeless tobacco: Never   Vaping Use     Vaping Use: Never used   Substance and Sexual Activity     Alcohol use: Not Currently     Comment: rare, maybe 3 beers all year.     Drug use: No     Sexual activity: Not Currently   Other Topics Concern      Service Yes     Comment: [employer] for 3 years.     Blood Transfusions Yes     Comment: with heart surgery and when had a bleeding ulcer     Caffeine Concern No     Occupational Exposure Yes     Comment: asphalt fumes over the years      Hobby Hazards Yes     Comment: saw dust with wood,      Sleep Concern Yes     Comment: hard to get asleep and once wakes up is wide awake.      Stress Concern No     Weight Concern No     Special Diet Yes     Comment: cholesterol      Back Care No     Exercise Yes     Comment: 3-4 times a week and rides alot of bike (900 miles)     Bike Helmet Yes     Seat Belt Yes     Self-Exams Yes       Family History   Problem Relation Age of Onset     C.A.D. Mother      Alzheimer Disease Mother      Neurologic Disorder Mother 96        dementia     Cancer Father         colon     Cancer - colorectal Father          ROS: 10 point ROS neg other than the symptoms noted above in the HPI.    Vital Signs:   /74   Pulse 59   SpO2 96%       Examination:  Constitutional:  Alert, well nourished, NAD.  Memory: recent and remote memory   HEENT: Normocephalic, atraumatic.   Pulm:  Without shortness of breath   CV:  No pitting edema of BLE.      Neurological:  Awake  Alert  Oriented x 3  Speech clear    Motor exam:   Shoulder Abduction:   Right:  5/5   Left:  5/5  Biceps:                        Right:  5/5   Left:  5/5  Triceps:                       Right:  5/5   Left:  5/5  Wrist Extensors:          Right:  5/5   Left:  5/5  Wrist Flexors:              Right:  5/5   Left:  5/5  Intrinsics:                     Right:  5/5   Left:  5/5    Sensation normal to bilateral upper and lower extremities  Muscle tone to bilateral upper and lower extremities   Gait: Able to stand from a seated position. Normal non-antalgic, non-myelopathic gait.  Able to heel/toe walk without loss of balance    Cervical examination reveals good range of motion.  No tenderness to palpation of the cervical spine or paraspinous muscles bilaterally.    Imaging:   Cervical MRI 6/19/2023  IMPRESSION:  1. Multilevel degenerative changes of the cervical spine have  progressed from the prior examination.  2. New minimal degenerative anterolisthesis of C2 on C3 and  C7 on T1.  3. Patchy nonspecific edema-like marrow signal change centered about  the C7-T1 facet joints, presumably reactive in the setting of  degenerative facet disease with possible active/inflammatory  component. Recommend clinical correlation.  4. There is moderate to severe central spinal canal stenosis at C4-C5  and at least moderate (bordering on moderate to severe) central spinal  canal stenosis at C3-C4 and C5-C6.  5. Multilevel degenerative neural foraminal stenosis, including  moderate to severe or severe neural foraminal stenosis at multiple  levels. Please see body of report for details.  6. Confluent signal abnormality along the central skull base inferior  to the floor of the sella and also along the anterior and dorsal  aspect of the sella. This is presumably due to confluent inspissated  secretions and mucosal thickening in the sphenoid sinuses. If  clinically warranted, dedicated paranasal sinus CT imaging can be  considered.     Assessment/Plan:   Cervical radiculopathy    Reviewed cervical MRI. Due to near resolution of symptoms, no further treatment is necessary at this time. He will contact my office if symptoms worsen. He verbalized understanding and agreement.      Patient Instructions   -Contact my office if symtpoms reoccur.   -Please contact our clinic with questions or concerns at [phone].      Lauryn Mulligan CNP  [facility]  [address] [place] [address]  Tel [phone]  Fax [phone]        Again, thank you for allowing me to participate in the care of your patient.        Sincerely,        Lauryn Mulligan NP

## 2023-07-10 NOTE — PROGRESS NOTES
"[facility]  Neurosurgery Clinic Visit      CC: cervical radiculopathy     Primary care Provider: Akhil Mcmahon    Reason For Visit:   I was asked by Dr. Akhil Mcmahon to consult on the patient for left arm numbness.    HPI: Ulices Shah Jr. is a 88 year old male with a 6-8 week history of left-sided neck pain which radiated to the left shoulder and left arm as well as paresthesias into the left 4th and 5th digits. He states he went to a chiropractor which has essentially resolved the symptoms. He notes some only intermittent paresthesias in his 4th and 5th digits on the left. He states he is happy with how he is feeling at this point. No weakness.       Past Medical History:   Diagnosis Date     Actinic keratosis      Cancer (H)      Heart disease      History of blood transfusion      History of nonmelanoma skin cancer      Type 2 diabetes mellitus without complication, without long-term current use of insulin (H) 11/13/2020     Unspecified essential hypertension        Past Medical History reviewed with patient during visit.    Past Surgical History:   Procedure Laterality Date     COLONOSCOPY  12/10/08     COLONOSCOPY N/A 3/7/2019    Procedure: COMBINED COLONOSCOPY,  MULTIPLE POLYPECTOMY BY BIOPSY;  Surgeon: Brayden Sharma DO;  Location:  GI     EXCISE MASS HAND Right 02/2017    \"skin cancer\" removal     HC REMV CATARACT EXTRACAP,INSERT LENS, W/O ECP  10/21/2004    left     HC REMV CATARACT EXTRACAP,INSERT LENS, W/O ECP  11/18/2004    right     HC YAG LASER CAPSULOTOMY  06/18/09    right eye     HEART CATH, ANGIOPLASTY  4/30/12    3 stents     HEMILAMINECTOMY, DISCECTOMY LUMBAR THREE LEVELS, COMBINED  6/26/2013    Procedure: COMBINED HEMILAMINECTOMY, DISCECTOMY LUMBAR THREE LEVELS;  Bilateral L3, L4 and L5 Guille-Laminectomies;  Surgeon: Ollie Dodson MD;  Location:  OR     [facility] APPENDECTOMY  1949     [facility] EXPLOR HEART SURG WND W CP BYPASS  10/25/01    4 way heart bypass "     Past Surgical History reviewed with patient during visit.    Current Outpatient Medications   Medication     apixaban ANTICOAGULANT (ELIQUIS ANTICOAGULANT) 5 MG tablet     carvedilol (COREG) 25 MG tablet     clopidogrel (PLAVIX) 75 MG tablet     lisinopril (PRINIVIL,ZESTRIL) 5 MG tablet     Omega-3 Fatty Acids (FISH OIL) 1000 MG CPDR     simvastatin (ZOCOR) 40 MG tablet     blood glucose (NO BRAND SPECIFIED) lancets standard     blood glucose (NO BRAND SPECIFIED) test strip     blood glucose monitoring (NO BRAND SPECIFIED) meter device kit     cetirizine (ZYRTEC) 10 MG tablet     fluticasone (FLONASE) 50 MCG/ACT nasal spray     metFORMIN (GLUCOPHAGE XR) 500 MG 24 hr tablet     mirtazapine (REMERON) 15 MG tablet     rosuvastatin (CRESTOR) 20 MG tablet     sotalol (BETAPACE) 80 MG tablet     No current facility-administered medications for this visit.       Allergies   Allergen Reactions     No Known Drug Allergy        Social History     Socioeconomic History     Marital status: Single     Number of children: 0     Years of education: 12   Occupational History     Occupation: retired     Employer: RETIRED   Tobacco Use     Smoking status: Never     Smokeless tobacco: Never   Vaping Use     Vaping Use: Never used   Substance and Sexual Activity     Alcohol use: Not Currently     Comment: rare, maybe 3 beers all year.     Drug use: No     Sexual activity: Not Currently   Other Topics Concern      Service Yes     Comment: [employer] for 3 years.     Blood Transfusions Yes     Comment: with heart surgery and when had a bleeding ulcer     Caffeine Concern No     Occupational Exposure Yes     Comment: asphalt fumes over the years     Hobby Hazards Yes     Comment: saw dust with wood,      Sleep Concern Yes     Comment: hard to get asleep and once wakes up is wide awake.      Stress Concern No     Weight Concern No     Special Diet Yes     Comment: cholesterol      Back Care No     Exercise Yes     Comment:  3-4 times a week and rides alot of bike (900 miles)     Bike Helmet Yes     Seat Belt Yes     Self-Exams Yes       Family History   Problem Relation Age of Onset     C.A.D. Mother      Alzheimer Disease Mother      Neurologic Disorder Mother 96        dementia     Cancer Father         colon     Cancer - colorectal Father          ROS: 10 point ROS neg other than the symptoms noted above in the HPI.    Vital Signs:   /74   Pulse 59   SpO2 96%       Examination:  Constitutional:  Alert, well nourished, NAD.  Memory: recent and remote memory   HEENT: Normocephalic, atraumatic.   Pulm:  Without shortness of breath   CV:  No pitting edema of BLE.      Neurological:  Awake  Alert  Oriented x 3  Speech clear    Motor exam:   Shoulder Abduction:   Right:  5/5   Left:  5/5  Biceps:                        Right:  5/5   Left:  5/5  Triceps:                       Right:  5/5   Left:  5/5  Wrist Extensors:          Right:  5/5   Left:  5/5  Wrist Flexors:              Right:  5/5   Left:  5/5  Intrinsics:                     Right:  5/5   Left:  5/5    Sensation normal to bilateral upper and lower extremities  Muscle tone to bilateral upper and lower extremities   Gait: Able to stand from a seated position. Normal non-antalgic, non-myelopathic gait.  Able to heel/toe walk without loss of balance    Cervical examination reveals good range of motion.  No tenderness to palpation of the cervical spine or paraspinous muscles bilaterally.    Imaging:   Cervical MRI 6/19/2023  IMPRESSION:  1. Multilevel degenerative changes of the cervical spine have  progressed from the prior examination.  2. New minimal degenerative anterolisthesis of C2 on C3 and C7 on T1.  3. Patchy nonspecific edema-like marrow signal change centered about  the C7-T1 facet joints, presumably reactive in the setting of  degenerative facet disease with possible active/inflammatory  component. Recommend clinical correlation.  4. There is moderate to severe  central spinal canal stenosis at C4-C5  and at least moderate (bordering on moderate to severe) central spinal  canal stenosis at C3-C4 and C5-C6.  5. Multilevel degenerative neural foraminal stenosis, including  moderate to severe or severe neural foraminal stenosis at multiple  levels. Please see body of report for details.  6. Confluent signal abnormality along the central skull base inferior  to the floor of the sella and also along the anterior and dorsal  aspect of the sella. This is presumably due to confluent inspissated  secretions and mucosal thickening in the sphenoid sinuses. If  clinically warranted, dedicated paranasal sinus CT imaging can be  considered.     Assessment/Plan:   Cervical radiculopathy    Reviewed cervical MRI. Due to near resolution of symptoms, no further treatment is necessary at this time. He will contact my office if symptoms worsen. He verbalized understanding and agreement.      Patient Instructions   -Contact my office if symtpoms reoccur.   -Please contact our clinic with questions or concerns at [phone].      Lauryn Mulligan, [facility]  [address] [place] [address]  Tel [phone]  Fax [phone]

## 2023-07-27 ENCOUNTER — TELEPHONE (OUTPATIENT)
Dept: PHARMACY | Facility: CLINIC | Age: 88
End: 2023-07-27
Payer: MEDICARE

## 2023-07-27 NOTE — TELEPHONE ENCOUNTER
Attempted to call patient to check in on blood sugars before his upcoming appointment with Dr. Mcmahon. No answer. Unable to leave voicemail.      Gonsalo Cat, PharmD, [facility]  Medication Therapy Management Pharmacist  Pager: [phone]

## 2023-07-31 ENCOUNTER — OFFICE VISIT (OUTPATIENT)
Dept: INTERNAL MEDICINE | Facility: CLINIC | Age: 88
End: 2023-07-31
Payer: MEDICARE

## 2023-07-31 VITALS
OXYGEN SATURATION: 99 % | BODY MASS INDEX: 25.12 KG/M2 | TEMPERATURE: 97.5 F | SYSTOLIC BLOOD PRESSURE: 110 MMHG | HEART RATE: 54 BPM | DIASTOLIC BLOOD PRESSURE: 56 MMHG | WEIGHT: 164 LBS

## 2023-07-31 DIAGNOSIS — R01.1 HEART MURMUR: ICD-10-CM

## 2023-07-31 DIAGNOSIS — I06.0 RHEUMATIC AORTIC STENOSIS: ICD-10-CM

## 2023-07-31 DIAGNOSIS — E11.9 TYPE 2 DIABETES MELLITUS WITHOUT COMPLICATION, WITHOUT LONG-TERM CURRENT USE OF INSULIN (H): Primary | ICD-10-CM

## 2023-07-31 LAB — HBA1C MFR BLD: 8.5 %

## 2023-07-31 PROCEDURE — 99214 OFFICE O/P EST MOD 30 MIN: CPT | Performed by: INTERNAL MEDICINE

## 2023-07-31 PROCEDURE — 36415 COLL VENOUS BLD VENIPUNCTURE: CPT | Performed by: INTERNAL MEDICINE

## 2023-07-31 PROCEDURE — 83036 HEMOGLOBIN GLYCOSYLATED A1C: CPT | Performed by: INTERNAL MEDICINE

## 2023-07-31 ASSESSMENT — PAIN SCALES - GENERAL: PAINLEVEL: NO PAIN (0)

## 2023-07-31 NOTE — PROGRESS NOTES
Assessment & Plan     Type 2 diabetes mellitus without complication, without long-term current use of insulin (H)  Here for recheck of his diabetes.  Unfortunately his A1c went up to 11.2.  He is eating much better, cut his sugars down.  Has exercise when tolerated but gets a little bit short of breath.  His sugars are running anywhere from 100-1 30 metformin is twice a day he will continue that we will check an A1c today even though its only been 2 months to see if there is been a significant change which I think there will be.  - Hemoglobin A1c; Future    Heart murmur  Heart murmur has been aortic stenosis moderate on echo is at [facility] he will continue to follow there.    Rheumatic aortic stenosis  Rheumatic fever and aortic stenosis possibly making his shortness of breath worse he will continue to follow with cardiology.                 Akhil Mcmahon MD  [facility]    Graciela RINALDI is a 88 year old, presenting for the following health issues:  Diabetes      7/31/2023    10:54 AM   Additional Questions   Roomed by Ernestine MCDERMOTT     Chief Complaint   Patient presents with    Diabetes       Scrape on the left shoulder is slowly going down, less scab area, scarred up. No known trauma.     Left arm numbness is better, saw neurosurgery but due to improvement will not pursue surgery.    Sugars were high and hgba1c of 11, increased metformin to twice a day and watching his diet more, 12 grams of sugars a day.  Eats banana and cheerios, skim milk.  No sugars ice cream snacks,  healthy lunch and supper.     Sugars in the morning are 108 to 126,     No side effects of metformin.     Little more shortness of breath, has chronic murmur systolic, history of rheumatoid. Echo done at [facility].       Review of Systems         Objective    /56   Pulse 54   Temp 97.5  F (36.4  C) (Temporal)   Wt 74.4 kg (164 lb)   SpO2 99%   BMI 25.12 kg/m    Body mass index is 25.12  kg/m .  Physical Exam     Heart regular with murmur,   Lungs are clear  Ext no edema.

## 2023-11-16 ENCOUNTER — OFFICE VISIT (OUTPATIENT)
Dept: DERMATOLOGY | Facility: CLINIC | Age: 88
End: 2023-11-16
Payer: MEDICARE

## 2023-11-16 DIAGNOSIS — Z80.8 FAMILY HISTORY OF MELANOMA: ICD-10-CM

## 2023-11-16 DIAGNOSIS — Z85.828 HISTORY OF NONMELANOMA SKIN CANCER: Primary | ICD-10-CM

## 2023-11-16 DIAGNOSIS — D48.9 NEOPLASM OF UNCERTAIN BEHAVIOR: ICD-10-CM

## 2023-11-16 DIAGNOSIS — D48.5 NEOPLASM OF UNCERTAIN BEHAVIOR OF SKIN: ICD-10-CM

## 2023-11-16 PROCEDURE — 11102 TANGNTL BX SKIN SINGLE LES: CPT | Performed by: DERMATOLOGY

## 2023-11-16 PROCEDURE — 17000 DESTRUCT PREMALG LESION: CPT | Mod: XS | Performed by: DERMATOLOGY

## 2023-11-16 PROCEDURE — 88305 TISSUE EXAM BY PATHOLOGIST: CPT | Performed by: DERMATOLOGY

## 2023-11-16 PROCEDURE — 99213 OFFICE O/P EST LOW 20 MIN: CPT | Mod: 25 | Performed by: DERMATOLOGY

## 2023-11-16 NOTE — LETTER
11/16/2023         RE: Ulices Shah   [address]  [facility] [address]        Dear Colleague,    Thank you for referring your patient, Ulices Shah Jr., to the [facility]. Please see a copy of my visit note below.    [facility] Dermatology Note  Encounter Date: Nov 16, 2023  Office Visit     Dermatology Problem List:  Last skin check 11/16/23  0. NUB, sternum, s/p bx 11/16/23  1. Family history of melanoma  2. 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 Actinic keratoses:   - HAK - right antitragus s/p bx 1/15/21, cryo 1/26/21  - HAK - left sideburn s/p biopsy 2/25/2020  - s/p cryotherapy  - s/p Efudex cream   4. Mild eczema, back  - s/p triamcinolone  5. HAK, right angle of mandible, s/p bx 5/5/23, s/p cryo 11/16/23   ____________________________________________    Assessment & Plan:    # Actinic keratosis, right mandible  - Cryotherapy performed today (see procedure note(s) below).    # Neoplasm of unspecified behavior of the skin (D49.2) on the sternum The differential diagnosis includes BCC vs. BLK .   - Shave biopsy performed today (see procedure note(s) below).    # Family history of melanoma.  # History of nonmelanoma skin cancer, no clinical evidence of recurrence.  - ABCDEs: Counseled ABCDEs of melanoma: Asymmetry, Border (irregularity), Color (not uniform, changes in color), Diameter (greater than 6 mm which is about the size of a pencil eraser), and Evolving (any changes in preexisting moles).  -  Sun protection: Counseled SPF30+ sunscreen, UPF clothing, sun avoidance, tanning bed avoidance.   - Recommended regular skin checks.      Procedures Performed:   - Cryotherapy procedure note, location(s): see above. After verbal consent and discussion of risks and benefits including, but not limited to, dyspigmentation/scar, blister, and pain, 1AKs lesion(s) was(were) treated with 1-2 mm freeze border for 1-2 cycles with liquid nitrogen. Post cryotherapy instructions were provided.    - Shave biopsy procedure note, location(s): see above. After discussion of benefits and risks including but not limited to bleeding, infection, scar, incomplete removal, recurrence, and non-diagnostic biopsy, written consent and photographs were obtained. The area was cleaned with isopropyl alcohol. 0.5mL of 1% lidocaine with epinephrine was injected to obtain adequate anesthesia of lesion(s). Shave biopsy at site(s) performed. Hemostasis was achieved with aluminium chloride. Petrolatum ointment and a sterile dressing were applied. The patient tolerated the procedure and no complications were noted. The patient was provided with verbal and written post care instructions.     Follow-up: 6 month(s) in-person, or earlier for new or changing lesions    Staff and Scribe:     I, Michelle Melendez, am serving as a scribe to document services personally performed by Dr. Joceline Stnoe, based on data collection and the provider's statements to me.     Provider Disclosure:   The documentation recorded by the scribe accurately reflects the services I personally performed and the decisions made by me.    Joceline Stone MD    Department of Dermatology  [facility]: Phone: [phone], Fax:[phone]  [facility]: Phone: [phone], Fax: [phone]   ____________________________________________    CC: Skin Check (Full body  skin check.  No areas of concern.  )    HPI:  Mr. Ulices Shah Jr. is a(n) 88 year old male who presents today as a return patient for FBSE.    Nothing bleeding, crusting, or changing.     He has a spot on his back that he can't see that he would like to be checked.    Patient is otherwise feeling well, without additional skin concerns.    Labs Reviewed:  Pathology 05/05/23:  A(1). Skin, right angle of mandible, shave:  - Hypertrophic actinic keratosis    Physical Exam:  Vitals: There were no vitals taken for this visit.  SKIN: Total skin excluding the undergarment areas was performed. The exam included the head/face, neck, both arms, chest, back, abdomen, both legs, digits and/or nails.   - right mandible, scaly macule.  - Sternum, 6 mm shiny red macule.  -There is a well healed surgical scar without erythema, nodularity, irregular pigmentation or telangiectasias on the sites of prior skin cancer.     - No other lesions of concern on areas examined.     Medications:  Current Outpatient Medications   Medication     apixaban ANTICOAGULANT (ELIQUIS ANTICOAGULANT) 5 MG tablet     blood glucose (NO BRAND SPECIFIED) lancets standard     blood glucose (NO BRAND SPECIFIED) test strip     blood glucose monitoring (NO BRAND SPECIFIED) meter device kit     carvedilol (COREG) 25 MG tablet     cetirizine (ZYRTEC) 10 MG tablet     clopidogrel (PLAVIX) 75 MG tablet     fluticasone (FLONASE) 50 MCG/ACT nasal spray     lisinopril (PRINIVIL,ZESTRIL) 5 MG tablet     metFORMIN (GLUCOPHAGE XR) 500 MG 24 hr tablet     mirtazapine (REMERON) 15 MG tablet     Omega-3 Fatty Acids (FISH OIL) 1000 MG CPDR     rosuvastatin (CRESTOR) 20 MG tablet     simvastatin (ZOCOR) 40 MG tablet     sotalol (BETAPACE) 80 MG tablet     No current facility-administered medications for this visit.      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     History of heart bypass surgery     Atherosclerosis of coronary artery     Persistent insomnia     Heart murmur, systolic     History of nonmelanoma skin cancer     Other osteoarthritis of spine, cervical region     2019 novel coronavirus disease (COVID-19)     Type 2 diabetes mellitus without complication, without long-term current use of insulin (H)     SIRS (systemic inflammatory response syndrome) (H)-fever, tachypnea     Seborrheic keratoses     Family history of melanoma     Need for shingles vaccine     Past Medical History:   Diagnosis Date     Actinic keratosis      Cancer (H)      Heart disease      History of blood transfusion      History of nonmelanoma skin cancer      Type 2 diabetes mellitus without complication, without long-term current use of insulin (H) 11/13/2020     Unspecified essential hypertension         CC No referring provider defined for this encounter. on close of this encounter.      Again, thank you for allowing me to participate in the care of your patient.        Sincerely,        Joceline Stone MD

## 2023-11-16 NOTE — PATIENT INSTRUCTIONS
Cryotherapy    What is it?  Use of a very cold liquid, such as liquid nitrogen, to freeze and destroy abnormal skin cells that need to be removed    What should I expect?  Tenderness and redness  A small blister that might grow and fill with dark purple blood. There may be crusting.  More than one treatment may be needed if the lesions do not go away.    How do I care for the treated area?  Gently wash the area with your hands when bathing.  Use a thin layer of Vaseline to help with healing. You may use a Band-Aid.   The area should heal within 7-10 days and may leave behind a pink or lighter color.   Do not use an antibiotic or Neosporin ointment.   You may take acetaminophen (Tylenol) for pain.     Call your doctor if you have:  Severe pain  Signs of infection (warmth, redness, cloudy yellow drainage, and or a bad smell)  Questions or concerns    Who should I call with questions?      [facility]: [phone]      [facility]: [phone]      For urgent needs outside of business hours call the [facility] [[email or web address]] at [phone] and ask for the dermatology resident on call       Checking for Skin Cancer  You can help find cancer early by checking your skin each month. There are 3 main kinds of skin cancer: melanoma, basal cell carcinoma, and squamous cell carcinoma. Doing monthly skin checks is the best way to find new marks, sores, or skin changes. Follow these instructions for checking your skin.   The ABCDEs of checking moles for melanoma   Check your moles or growths for signs of melanoma using ABCDE:   Asymmetry: The sides of the mole or growth don t match.  Border: The edges are ragged, notched, or blurred.  Color: The color within the mole or growth varies. It could be black, brown, tan, white, or shades of red, gray, or blue.  Diameter: The mole or growth is larger than   inch or 6 mm (size of a pencil eraser).  Evolving: The size, shape,  texture, or color of the mole or growth is changing.     ABCDE's of moles on light skin.        ABCDE's of moles on dark skin may be harder to identify.     Checking for other types of skin cancer  Basal cell carcinoma or squamous cell carcinoma cause symptoms like:     A spot or mole that looks different from all other marks on your skin  Changes in how an area feels, such as itching, tenderness, or pain  Changes in the skin's surface, such as oozing, bleeding, or scaliness  A sore that doesn't heal  New swelling, redness, or spread of color beyond the border of a mole    Who s at risk?  Anyone of any skin color can get skin cancer. But you're at greater risk if you have:   Fair skin that freckles easily and burns instead of tanning  Light-colored or red hair  Light-colored eyes  Many moles or abnormal moles on your skin  A long history of unprotected exposure to sunlight or tanning beds  A history of many blistering sunburns as a child or teen  A family history of skin cancer  Been exposed to radiation or chemicals  A weakened immune system  Been exposed to arsenic  If you've had skin cancer in the past, you're at high risk of having it again.   How to check your skin  Do your monthly skin checkups in front of a full-length mirror. Use a room with good lighting so it's easier to see. Use a hand mirror to look at hard-to-see places like your buttocks and back. You can also have a trusted friend or family member help you with these checks. Check every part of your body, including your:   Head (ears, face, neck, and scalp)  Torso (front, back, sides, and under breasts)  Arms (tops, undersides, and armpits)  Hands (palms, backs, and fingers, including under the nails)  Lower back, buttocks, and genitals  Legs (front, back, and sides)  Feet (tops, soles, toes, including under the nails, and between toes)  Watch for new spots on your skin or a spot that's changing in color, shape, size.   If you have a lot of moles,  take digital photos of them each month. Make sure to take photos both up close and from a distance. These can help you see if any moles change over time.   Know your skin  Most skin changes aren't cancer. But if you see any changes in your skin, call your healthcare provider right away. Only they can tell you if a change is a problem. If you have skin cancer, seeing your provider can be the first step to getting the treatment that could save your life.   [employer] last reviewed this educational content on 10/1/2021    8996-8457 [employer]. All rights reserved. This information is not intended as a substitute for professional medical care. Always follow your healthcare professional's instructions.              Wound Care After a Biopsy    What is a skin biopsy?  A skin biopsy allows the doctor to examine a very small piece of tissue under the microscope to determine the diagnosis and the best treatment for the skin condition. A local anesthetic (numbing medicine) is injected with a very small needle into the skin area to be tested. A small piece of skin is taken from the area. Sometimes a suture (stitch) is used.     What are the risks of a skin biopsy?  I will experience scar, bleeding, swelling, pain, crusting and redness. I may experience incomplete removal or recurrence. Risks of this procedure are excessive bleeding, bruising, infection, nerve damage, numbness, thick (hypertrophic or keloidal) scar and non-diagnostic biopsy.    How should I care for my wound for the first 24 hours?  Keep the wound dry and covered for 24 hours  If it bleeds, hold direct pressure on the area for 15 minutes. If bleeding does not stop, call us or go to the emergency room  Avoid strenuous exercise the first 1-2 days or as your doctor instructs you    How should I care for the wound after 24 hours?  After 24 hours, remove the bandage  You may bathe or shower as normal  If you had a scalp biopsy, you can shampoo as usual  and can use shower water to clean the biopsy site daily  Clean the wound once a day with gentle soap and water  Do not scrub, be gentle  Apply white petroleum/Vaseline after cleaning the wound with a cotton swab or a clean finger, and keep the site covered with a Bandaid /bandage. Bandages are not necessary with a scalp biopsy  If you are unable to cover the site with a Bandaid /bandage, re-apply ointment 2-3 times a day to keep the site moist. Moisture will help with healing  Avoid strenuous activity for first 1-2 days  Avoid lakes, rivers, pools, and oceans until the stitches are removed or the site is healed    How do I clean my wound?  Wash hands thoroughly with soap or use hand  before all wound care  Clean the wound with gentle soap and water  Apply white petroleum/Vaseline  to wound after it is clean  Replace the Bandaid /bandage to keep the wound covered for the first few days or as instructed by your doctor  If you had a scalp biopsy, warm shower water to the area on a daily basis should suffice    What should I use to clean my wound?   Cotton-tipped applicators (Qtips )  White petroleum jelly (Vaseline ). Use a clean new container and use Q-tips to apply.  Bandaids  as needed  Gentle soap     How should I care for my wound long term?  Do not get your wound dirty  Keep up with wound care for one week or until the area is healed.     A small scab will form and fall off by itself when the area is completely healed. The area will be red and will become pink in color as it heals. Sun protection is very important for how your scar will turn out. Sunscreen with an SPF 30 or greater is recommended once the area is healed.  You should have some soreness but it should be mild and slowly go away over several days. Talk to your doctor about using tylenol for pain,    When should I call my doctor?  If you have increased:   Pain or swelling  Pus or drainage (clear or slightly yellow drainage is ok)  Temperature  over 100F  Spreading redness or warmth around wound    When will I hear about my results?  The biopsy results can take 2 weeks to come back.  Your results will automatically release to [email or web address] before your provider has even reviewed them.  The clinic will call you with the results, send you a [email or web address] message, or have you schedule a follow-up clinic or phone time to discuss the results.  Contact our clinics if you do not hear from us in 2 weeks.    Who should I call with questions?  [facility]: [phone]  [facility]: [phone]  For urgent needs outside of business hours call the [facility] at [phone] and ask for the dermatology resident on call

## 2023-11-16 NOTE — PROGRESS NOTES
[facility] Note  Encounter Date: Nov 16, 2023  Office Visit     Dermatology Problem List:  Last skin check 11/16/23  0. NUB, sternum, s/p bx 11/16/23  1. Family history of melanoma  2. 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 Actinic keratoses:   - HAK - right antitragus s/p bx 1/15/21, cryo 1/26/21  - HAK - left sideburn s/p biopsy 2/25/2020  - s/p cryotherapy  - s/p Efudex cream   4. Mild eczema, back  - s/p triamcinolone  5. HAK, right angle of mandible, s/p bx 5/5/23, s/p cryo 11/16/23   ____________________________________________    Assessment & Plan:    # Actinic keratosis, right mandible  - Cryotherapy performed today (see procedure note(s) below).    # Neoplasm of unspecified behavior of the skin (D49.2) on the sternum The differential diagnosis includes BCC vs. BLK .   - Shave biopsy performed today (see procedure note(s) below).    # Family history of melanoma.  # History of nonmelanoma skin cancer, no clinical evidence of recurrence.  - ABCDEs: Counseled ABCDEs of melanoma: Asymmetry, Border (irregularity), Color (not uniform, changes in color), Diameter (greater than 6 mm which is about the size of a pencil eraser), and Evolving (any changes in preexisting moles).  - Sun protection: Counseled SPF30+ sunscreen, UPF clothing, sun avoidance, tanning bed avoidance.   - Recommended regular skin checks.      Procedures Performed:   - Cryotherapy procedure note, location(s): see above. After verbal consent and discussion of risks and  benefits including, but not limited to, dyspigmentation/scar, blister, and pain, 1AKs lesion(s) was(were) treated with 1-2 mm freeze border for 1-2 cycles with liquid nitrogen. Post cryotherapy instructions were provided.    - Shave biopsy procedure note, location(s): see above. After discussion of benefits and risks including but not limited to bleeding, infection, scar, incomplete removal, recurrence, and non-diagnostic biopsy, written consent and photographs were obtained. The area was cleaned with isopropyl alcohol. 0.5mL of 1% lidocaine with epinephrine was injected to obtain adequate anesthesia of lesion(s). Shave biopsy at site(s) performed. Hemostasis was achieved with aluminium chloride. Petrolatum ointment and a sterile dressing were applied. The patient tolerated the procedure and no complications were noted. The patient was provided with verbal and written post care instructions.     Follow-up: 6 month(s) in-person, or earlier for new or changing lesions    Staff and Scribe:     I, Michelle Melendez, am serving as a scribe to document services personally performed by Dr. Joceline Stone, based on data collection and the provider's statements to me.     Provider Disclosure:   The documentation recorded by the scribe accurately reflects the services I personally performed and the decisions made by me.    Joceline Stone MD    Department of Dermatology  [facility] [place]: Phone: [phone], Fax:[phone]  [facility]: Phone: [phone], Fax: [phone]   ____________________________________________    CC: Skin Check (Full body skin check.  No areas of concern.  )    HPI:  Mr. Ulices Shah Paige is a(n) 88 year old male who presents today as a return patient for FBSE.    Nothing bleeding, crusting, or changing.     He has a spot on his back that he can't see that he would like to be  checked.    Patient is otherwise feeling well, without additional skin concerns.    Labs Reviewed:  Pathology 05/05/23:  A(1). Skin, right angle of mandible, shave:  - Hypertrophic actinic keratosis    Physical Exam:  Vitals: There were no vitals taken for this visit.  SKIN: Total skin excluding the undergarment areas was performed. The exam included the head/face, neck, both arms, chest, back, abdomen, both legs, digits and/or nails.   - right mandible, scaly macule.  - Sternum, 6 mm shiny red macule.  -There is a well healed surgical scar without erythema, nodularity, irregular pigmentation or telangiectasias on the sites of prior skin cancer.     - No other lesions of concern on areas examined.     Medications:  Current Outpatient Medications   Medication    apixaban ANTICOAGULANT (ELIQUIS ANTICOAGULANT) 5 MG tablet    blood glucose (NO BRAND SPECIFIED) lancets standard    blood glucose (NO BRAND SPECIFIED) test strip    blood glucose monitoring (NO BRAND SPECIFIED) meter device kit    carvedilol (COREG) 25 MG tablet    cetirizine (ZYRTEC) 10 MG tablet    clopidogrel (PLAVIX) 75 MG tablet    fluticasone (FLONASE) 50 MCG/ACT nasal spray    lisinopril (PRINIVIL,ZESTRIL) 5 MG tablet    metFORMIN (GLUCOPHAGE XR) 500 MG 24 hr tablet    mirtazapine (REMERON) 15 MG tablet    Omega-3 Fatty Acids (FISH OIL) 1000 MG CPDR    rosuvastatin (CRESTOR) 20 MG tablet    simvastatin (ZOCOR) 40 MG tablet    sotalol (BETAPACE) 80 MG tablet     No current facility-administered medications for this visit.      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    History of heart bypass surgery    Atherosclerosis of coronary artery    Persistent insomnia    Heart murmur, systolic    History of nonmelanoma skin cancer    Other osteoarthritis of spine, cervical region    2019 novel coronavirus disease (COVID-19)    Type 2 diabetes mellitus without complication, without long-term current use of insulin (H)    SIRS (systemic inflammatory response syndrome) (H)-fever, tachypnea    Seborrheic keratoses    Family history of melanoma    Need for shingles vaccine     Past Medical History:   Diagnosis Date    Actinic keratosis     Cancer (H)     Heart disease     History of blood transfusion     History of nonmelanoma skin cancer     Type 2 diabetes mellitus without complication, without long-term current use of insulin (H) 11/13/2020    Unspecified essential hypertension         CC No referring provider defined for this encounter. on close of this encounter.

## 2023-11-20 LAB
PATH REPORT.COMMENTS IMP SPEC: NORMAL
PATH REPORT.COMMENTS IMP SPEC: NORMAL
PATH REPORT.FINAL DX SPEC: NORMAL
PATH REPORT.GROSS SPEC: NORMAL
PATH REPORT.MICROSCOPIC SPEC OTHER STN: NORMAL
PATH REPORT.RELEVANT HX SPEC: NORMAL

## 2023-12-05 ENCOUNTER — TELEPHONE (OUTPATIENT)
Dept: DERMATOLOGY | Facility: CLINIC | Age: 88
End: 2023-12-05
Payer: MEDICARE

## 2023-12-05 NOTE — TELEPHONE ENCOUNTER
Result reviewed with Filiberto.  He verbalized understanding and had no concerns with how his biopsy site is healing.  Follow up appt scheduled.  SHARAN Tay RN  12/1/2023  9:02 AM CST       Writer called pt, no answer. Left message for pt to return our call at [phone].   Kristen Moe RN on 12/1/2023 at 9:02 AM    Joceline Stone MD  11/30/2023  7:56 PM CST       Precnancer, photos and phone in 6 months please or in person with any     inal Diagnosis   A. Sternum:  - Lichenoid actinic keratosis

## 2024-01-24 ENCOUNTER — OFFICE VISIT (OUTPATIENT)
Dept: INTERNAL MEDICINE | Facility: CLINIC | Age: 89
End: 2024-01-24
Payer: MEDICARE

## 2024-01-24 VITALS
DIASTOLIC BLOOD PRESSURE: 60 MMHG | SYSTOLIC BLOOD PRESSURE: 130 MMHG | HEART RATE: 58 BPM | OXYGEN SATURATION: 97 % | WEIGHT: 172.8 LBS | TEMPERATURE: 97.4 F | RESPIRATION RATE: 14 BRPM | BODY MASS INDEX: 26.47 KG/M2

## 2024-01-24 DIAGNOSIS — I25.10 ATHEROSCLEROSIS OF NATIVE CORONARY ARTERY OF NATIVE HEART WITHOUT ANGINA PECTORIS: ICD-10-CM

## 2024-01-24 DIAGNOSIS — I10 HYPERTENSION GOAL BP (BLOOD PRESSURE) < 140/90: ICD-10-CM

## 2024-01-24 DIAGNOSIS — I48.91 ATRIAL FIBRILLATION, UNSPECIFIED TYPE (H): ICD-10-CM

## 2024-01-24 DIAGNOSIS — J34.89 NASAL OBSTRUCTION: ICD-10-CM

## 2024-01-24 DIAGNOSIS — E11.9 TYPE 2 DIABETES MELLITUS WITHOUT COMPLICATION, WITHOUT LONG-TERM CURRENT USE OF INSULIN (H): ICD-10-CM

## 2024-01-24 DIAGNOSIS — J01.00 ACUTE NON-RECURRENT MAXILLARY SINUSITIS: Primary | ICD-10-CM

## 2024-01-24 DIAGNOSIS — R01.1 HEART MURMUR, SYSTOLIC: ICD-10-CM

## 2024-01-24 PROCEDURE — 99214 OFFICE O/P EST MOD 30 MIN: CPT | Performed by: INTERNAL MEDICINE

## 2024-01-24 RX ORDER — AZITHROMYCIN 250 MG/1
TABLET, FILM COATED ORAL
Qty: 6 TABLET | Refills: 0 | Status: SHIPPED | OUTPATIENT
Start: 2024-01-24 | End: 2024-01-29

## 2024-01-24 RX ORDER — FLUTICASONE PROPIONATE 50 MCG
1 SPRAY, SUSPENSION (ML) NASAL DAILY
Qty: 16 G | Refills: 0 | Status: SHIPPED | OUTPATIENT
Start: 2024-01-24

## 2024-01-24 RX ORDER — PREDNISONE 20 MG/1
20 TABLET ORAL DAILY
Qty: 5 TABLET | Refills: 0 | Status: SHIPPED | OUTPATIENT
Start: 2024-01-24

## 2024-01-24 RX ORDER — RESPIRATORY SYNCYTIAL VIRUS VACCINE 120MCG/0.5
0.5 KIT INTRAMUSCULAR ONCE
Qty: 1 EACH | Refills: 0 | Status: CANCELLED | OUTPATIENT
Start: 2024-01-24 | End: 2024-01-24

## 2024-01-24 NOTE — PROGRESS NOTES
Subjective     Ulices Shah Jr. is a 88 year old male who presents to clinic today for the following health issues accompanied by his :    History of Present Illness       Reason for visit:  Sinus problem  Symptom onset:  More than a month  Symptom intensity:  Moderate  Symptom progression:  Improving  Had these symptoms before:  No  What makes it worse:  NA  What makes it better:  Nasal spray    He eats 2-3 servings of fruits and vegetables daily.He consumes 0 sweetened beverage(s) daily.He exercises with enough effort to increase his heart rate 9 or less minutes per day.  He exercises with enough effort to increase his heart rate 3 or less days per week.   He is taking medications regularly.           EMR reviewed including:             Complaint, History of Chief Complaint, Corresponding Review of Systems, and Complaint Specific Physical Examination.    #1   Nasal congestion and obstruction.  Some green sinus drainage.  Denies fevers or chills.  Has been using Afrin spray for several weeks.  Notes that he now has near complete obstruction of both nares.  Does complain of some mild maxillary facial discomfort.  Denies cough, excess could have production or lower respiratory symptoms.        Exam:   ENT: Pharynx is non-erythemous, moderate slightly yellow PND, there is significant nasal obstruction, TM's not red or retracted, hearing intact bilaterally. No carotid bruits are heard. No JVD seen. Thyroid is not nodular or enlarged.   LUNGS: clear bilaterally, airflow is brisk, no intercostal retraction or stridor is noted. No coughing is noted during visit.   HEART:  regular without rubs, clicks, gallops.  1/6 systolic ejection murmur is heard consistent with aortic stenosis/sclerosis.. PMI is nondisplaced. Upstrokes are brisk. S1,S2 are heard.      #2   Type 2 diabetes  A1c's have been much better.  Compliant with checking his blood sugars regularly.  Continues to metformin.  Concurrently on ACE inhibitor,  statin, Eliquis.  Denies polyuria or polydipsia.  Blood sugars rarely under 100.  No symptoms of hypoglycemia.        Exam:   NEURO: Pt is alert and appropriate. No neurologic lateralization is noted. Cranial nerves 2-12 are intact. Peripheral sensory and motor function are grossly normal.       #3   History of atrial fibrillation and coronary artery disease  Currently on anticoagulation.  Notes intermittent atrial fibrillation.  No syncope or near syncope.  Continues Plavix combination with Coreg and cannot as well as Betapace.        Exam:  As above        Patient has been interviewed, applicable history and applied review of systems have been performed.    Vital Signs:   /60   Pulse 58   Temp 97.4  F (36.3  C)   Resp 14   Wt 78.4 kg (172 lb 12.8 oz)   SpO2 97%   BMI 26.47 kg/m        Decision Making    Problem and Complexity     1. Acute non-recurrent maxillary sinusitis  Will place patient on a short course of antibiotic.  - azithromycin (ZITHROMAX) 250 MG tablet; Take 2 tablets (500 mg) by mouth daily for 1 day, THEN 1 tablet (250 mg) daily for 4 days.  Dispense: 6 tablet; Refill: 0    2. Nasal obstruction  Recommended discontinuing the Afrin for multiple reasons.  Placed on a short course of prednisone.  Watch sugars closely.  Start Flonase for topical steroid.    - fluticasone (FLONASE) 50 MCG/ACT nasal spray; Spray 1 spray into both nostrils daily  Dispense: 16 g; Refill: 0  - predniSONE (DELTASONE) 20 MG tablet; Take 1 tablet (20 mg) by mouth daily  Dispense: 5 tablet; Refill: 0    3. Hypertension goal BP (blood pressure) < 140/90  Discontinue Afrin.  Continue current medication    4. Type 2 diabetes mellitus without complication, without long-term current use of insulin (H)  Check A1c and renal function  - HEMOGLOBIN A1C; Future  - Basic metabolic panel  (Ca, Cl, CO2, Creat, Gluc, K, Na, BUN); Future    5. Atherosclerosis of native coronary artery of native heart without angina pectoris  Stop  Afrin.  Continue current medication    6. Heart murmur, systolic  Stable and unchanged    7. Atrial fibrillation, unspecified type (H)  Stop Afrin continue current medication                                FOLLOW UP   I have asked the patient to make an appointment for followup with myself or Dr. Lisandro vigil in the next 1 to 2 weeks.    Regarding routine vaccinations:  I have reviewed the patient's vaccination schedule and discussed the benefits of prophylactic vaccination in detail.  I recommend the patient contact their pharmacist for vaccinations.  Discussed that most insurance companies now favor reimbursement to the pharmacies and it will financially behoove the patient to have vaccinations performed at their pharmacy.        I have carefully explained the diagnosis and treatment options to the patient.  The patient has displayed an understanding of the above, and all subsequent questions were answered.      DO FIORDALIZA Bellamy    Portions of this note were produced using [employer]  Although every attempt at real-time proof reading has been made, occasional grammar/syntax errors may have been missed.

## 2024-04-09 DIAGNOSIS — E11.9 TYPE 2 DIABETES MELLITUS WITHOUT COMPLICATION, WITHOUT LONG-TERM CURRENT USE OF INSULIN (H): ICD-10-CM

## 2024-05-22 DIAGNOSIS — E11.9 TYPE 2 DIABETES MELLITUS WITHOUT COMPLICATION, WITHOUT LONG-TERM CURRENT USE OF INSULIN (H): ICD-10-CM

## 2024-05-23 RX ORDER — METFORMIN HCL 500 MG
500 TABLET, EXTENDED RELEASE 24 HR ORAL 2 TIMES DAILY WITH MEALS
Qty: 180 TABLET | Refills: 0 | Status: SHIPPED | OUTPATIENT
Start: 2024-05-23 | End: 2024-09-13

## 2024-06-05 ENCOUNTER — OFFICE VISIT (OUTPATIENT)
Dept: DERMATOLOGY | Facility: CLINIC | Age: 89
End: 2024-06-05
Payer: MEDICARE

## 2024-06-05 DIAGNOSIS — L82.0 INFLAMED SEBORRHEIC KERATOSIS: ICD-10-CM

## 2024-06-05 DIAGNOSIS — L57.0 ACTINIC KERATOSES: Primary | ICD-10-CM

## 2024-06-05 PROCEDURE — 17110 DESTRUCTION B9 LES UP TO 14: CPT | Performed by: PHYSICIAN ASSISTANT

## 2024-06-05 PROCEDURE — 17000 DESTRUCT PREMALG LESION: CPT | Mod: XS | Performed by: PHYSICIAN ASSISTANT

## 2024-06-05 PROCEDURE — 17003 DESTRUCT PREMALG LES 2-14: CPT | Mod: XS | Performed by: PHYSICIAN ASSISTANT

## 2024-06-05 ASSESSMENT — PAIN SCALES - GENERAL: PAINLEVEL: NO PAIN (0)

## 2024-06-05 NOTE — PATIENT INSTRUCTIONS
Patient Education       Proper skin care from [facility]:    -Eliminate harsh soaps as they strip the natural oils from the skin, often resulting in dry itchy skin ( i.e. Dial, Zest, [place])  -Use mild soaps such as Cetaphil or Dove Sensitive Skin in the shower. You do not need to use soap on arms, legs, and trunk every time you shower unless visibly soiled.   -Avoid hot or cold showers.  -After showering, lightly dry off and apply moisturizing within 2-3 minutes. This will help trap moisture in the skin.   -Aggressive use of a moisturizer at least 1-2 times a day to the entire body (including -Vanicream, Cetaphil, Aquaphor or Cerave) and moisturize hands after every washing.  -We recommend using moisturizers that come in a tub that needs to be scooped out, not a pump. This has more of an oil base. It will hold moisture in your skin much better than a water base moisturizer. The above recommended are non-pore clogging.      Wear a sunscreen with at least SPF 30 on your face, ears, neck and V of the chest daily. Wear sunscreen on other areas of the body if those areas are exposed to the sun throughout the day. Sunscreens can contain physical and/or chemical blockers. Physical blockers are less likely to clog pores, these include zinc oxide and titanium dioxide. Reapply every two hour and after swimming.     Sunscreen examples: https://www.ewg.org/sunscreen/    UV radiation  UVA radiation remains constant throughout the day and throughout the year. It is a longer wavelength than UVB and therefore penetrates deeper into the skin leading to immediate and delayed tanning, photoaging, and skin cancer. 70-80% of UVA and UVB radiation occurs between the hours of 10am-2pm.  UVB radiation  UVB radiation causes the most harmful effects and is more significant during the summer months. However, snow and ice can reflect UVB radiation leading to skin damage during the winter months as well. UVB radiation is  responsible for tanning, burning, inflammation, delayed erythema (pinkness), pigmentation (brown spots), and skin cancer.     I recommend self monthly full body exams and yearly full body exams with a dermatology provider. If you develop a new or changing lesion please follow up for examination. Most skin cancers are pink and scaly or pink and pearly. However, we do see blue/brown/black skin cancers.  Consider the ABCDEs of melanoma when giving yourself your monthly full body exam ( don't forget the groin, buttocks, feet, toes, etc). A-asymmetry, B-borders, C-color, D-diameter, E-elevation or evolving. If you see any of these changes please follow up in clinic. If you cannot see your back I recommend purchasing a hand held mirror to use with a larger wall mirror.       Checking for Skin Cancer  You can find cancer early by checking your skin each month. There are 3 kinds of skin cancer. They are melanoma, basal cell carcinoma, and squamous cell carcinoma. Doing monthly skin checks is the best way to find new marks or skin changes. Follow the instructions below for checking your skin.   The ABCDEs of checking moles for melanoma   Check your moles or growths for signs of melanoma using ABCDE:   Asymmetry: the sides of the mole or growth don t match  Border: the edges are ragged, notched, or blurred  Color: the color within the mole or growth varies  Diameter: the mole or growth is larger than 6 mm (size of a pencil eraser)  Evolving: the size, shape, or color of the mole or growth is changing (evolving is not shown in the images below)    Checking for other types of skin cancer  Basal cell carcinoma or squamous cell carcinoma have symptoms such as:     A spot or mole that looks different from all other marks on your skin  Changes in how an area feels, such as itching, tenderness, or pain  Changes in the skin's surface, such as oozing, bleeding, or scaliness  A sore that does not heal  New swelling or redness beyond  the border of a mole    Who s at risk?  Anyone can get skin cancer. But you are at greater risk if you have:   Fair skin, light-colored hair, or light-colored eyes  Many moles or abnormal moles on your skin  A history of sunburns from sunlight or tanning beds  A family history of skin cancer  A history of exposure to radiation or chemicals  A weakened immune system  If you have had skin cancer in the past, you are at risk for recurring skin cancer.   How to check your skin  Do your monthly skin checkups in front of a full-length mirror. Check all parts of your body, including your:   Head (ears, face, neck, and scalp)  Torso (front, back, and sides)  Arms (tops, undersides, upper, and lower armpits)  Hands (palms, backs, and fingers, including under the nails)  Buttocks and genitals  Legs (front, back, and sides)  Feet (tops, soles, toes, including under the nails, and between toes)  If you have a lot of moles, take digital photos of them each month. Make sure to take photos both up close and from a distance. These can help you see if any moles change over time.   Most skin changes are not cancer. But if you see any changes in your skin, call your doctor right away. Only he or she can diagnose a problem. If you have skin cancer, seeing your doctor can be the first step toward getting the treatment that could save your life.   [employer] last reviewed this educational content on 4/1/2019 2000-2020 [employer]. [address] [place] [address], [place] [address]. All rights reserved. This information is not intended as a substitute for professional medical care. Always follow your healthcare professional's instructions.        Cryotherapy    What is it?  Use of a very cold liquid, such as liquid nitrogen, to freeze and destroy abnormal skin cells that need to be removed    What should I expect?  Tenderness and redness  A small blister that might grow and fill with dark purple blood. There may be crusting.  More  than one treatment may be needed if the lesions do not go away.    How do I care for the treated area?  Gently wash the area with your hands when bathing.  Use a thin layer of Vaseline to help with healing. You may use a Band-Aid.   The area should heal within 7-10 days and may leave behind a pink or lighter color.   Do not use an antibiotic or Neosporin ointment.   You may take acetaminophen (Tylenol) for pain.     Call your Doctor if you have:  Severe pain  Signs of infection (warmth, redness, cloudy yellow drainage, and or a bad smell)  Questions or concerns    Who should I call with questions?      [facility]: [phone]      [facility]: [phone]      For urgent needs outside of business hours call the [facility] at [phone]        and ask for the dermatology resident on call

## 2024-06-05 NOTE — NURSING NOTE
Ulices Shah Jr.'s goals for this visit include:   Chief Complaint   Patient presents with    Actinic Keratosis     Check AK on sternum and spot on left side of temple.  Patient stated the spot on his temple was previously removed and grew back.  Patient would like face checked.          He requests these members of his care team be copied on today's visit information:     PCP: Akhil Mcmahon    Referring Provider:  Akhil Mcmahon MD  [address] [place] [address]    There were no vitals taken for this visit.    Do you need any medication refills at today's visit?     Sheyla Mccain on 6/5/2024 at 3:25 PM

## 2024-06-05 NOTE — PROGRESS NOTES
[facility] Dermatology Note  Encounter Date: Jun 5, 2024  Office Visit      Dermatology Problem List:  Last skin check 11/16/23   1. 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 Actinic keratoses:   - AK, Sternum, Bx proven on 11/16/23, S/p Cryo 6/5/24  - HAK, right angle of mandible, s/p bx 5/5/23, s/p cryo 11/16/23   - HAK - right antitragus s/p bx 1/15/21, cryo 1/26/21  - HAK - left sideburn s/p biopsy 2/25/2020  - s/p cryotherapy  - s/p Efudex cream   3. Mild eczema, back  - s/p triamcinolone    FHx: melanoma   ____________________________________________    Assessment & Plan:  # Actinic keratosis. X 10 including Bx proven on his sternum   - Cryotherapy performed today, see procedure note below.    # Seborrheic keratosis, inflamed. X 5   - Cryotherapy performed today, see procedure note below.  Procedures Performed:     CRYOTHERAPY PROCEDURE NOTE: 15  lesions in the above locations were treated with liquid nitrogen utilizing a 5-10sec thaw time. Patient was advised that the treated areas will become red, swollen, may develop a blister and then should crust and peal off in the next 1-2 weeks. Post-procedure instructions were provided.    Follow-up: prn for new or changing lesions    Staff and scribe:    Scribe Disclosure:   I, LESLIE HERNÁNDEZ, am serving as a scribe; to document services personally performed by Janet Busby PA-C -based on data collection and the provider's statements to me.     Provider Disclosure:  I agree with above History, Review of  Systems, Physical exam and Plan.  I have reviewed the content of the documentation and have edited it as needed. I have personally performed the services documented here and the documentation accurately represents those services and the decisions I have made.      Electronically signed by:    All risks, benefits and alternatives were discussed with patient.  Patient is in agreement and understands the assessment and plan.  All questions were answered.    Janet Busby PA-C, MPAS  [facility] [place]: Phone: [phone], Fax: [phone]  [facility]: Phone: [phone],  Fax: [phone]  [facility]: Phone: [phone], Fax: [phone]  ____________________________________________    CC: Actinic Keratosis (Check AK on sternum and spot on left side of temple.  Patient stated the spot on his temple was previously removed and grew back.  Patient would like face checked.    )      Reviewed patients past medical history and pertinent chart review prior to patient's visit today.     HPI:  Mr. Ulices Shah Jr. is a 89 year old male who presents today as a return patient for cryotherapy on bx proven on his Sternum on 11/16/23. Additionally he notes a few other spots that are bothersome to him as well.    Patient is otherwise feeling well, without additional concerns.    Labs:  Pathology report 11/16/23  Final Diagnosis   A. Sternum:  - Lichenoid actinic keratosis       Physical Exam:  Vitals: There were no vitals taken for this visit.  SKIN: Focused examination of areas noted below was performed.   - There is/are erythematous macules with overlying adherent scale on the: x 1 Sternum ( bx proven) x 1 nasal dorsum, x 4 L cheek, x 3 R cheek, x 1 R Anti helix,   - There is a tan to brown waxy stuck on papule with surrounding erythema on the L lateral canthus, x 4 L jaw line, x 1 R temple.   - No other lesions of  concern on areas examined.     Medications:  Current Outpatient Medications   Medication Sig Dispense Refill    apixaban ANTICOAGULANT (ELIQUIS ANTICOAGULANT) 5 MG tablet Take 1 tablet (5 mg) by mouth 2 times daily 20 tablet 0    blood glucose (NO BRAND SPECIFIED) lancets standard Use to test blood sugar 1 times daily 100 Lancet 1    blood glucose (NO BRAND SPECIFIED) test strip Use to test blood sugar 1 times daily 100 strip 3    blood glucose monitoring (NO BRAND SPECIFIED) meter device kit Use to test blood sugar 1 times daily 1 kit 0    carvedilol (COREG) 25 MG tablet Take 25 mg by mouth 2 times daily (with meals).      cetirizine (ZYRTEC) 10 MG tablet Take 1 tablet (10 mg) by mouth daily 90 tablet 3    clopidogrel (PLAVIX) 75 MG tablet Take 1 tablet (75 mg) by mouth daily 90 tablet 3    fluticasone (FLONASE) 50 MCG/ACT nasal spray Spray 1 spray into both nostrils daily 16 g 0    fluticasone (FLONASE) 50 MCG/ACT nasal spray Spray 2 sprays into both nostrils daily 1 g 6    lisinopril (PRINIVIL,ZESTRIL) 5 MG tablet Take 5 mg by mouth 2 times daily      metFORMIN (GLUCOPHAGE XR) 500 MG 24 hr tablet TAKE 1 TABLET BY MOUTH TWICE DAILY WITH MEALS 180 tablet 0    mirtazapine (REMERON) 15 MG tablet Take 1 tablet (15 mg) by mouth At Bedtime 90 tablet 3    Omega-3 Fatty Acids (FISH OIL) 1000 MG CPDR Take 1 capsule by mouth 2 times daily.      predniSONE (DELTASONE) 20 MG tablet Take 1 tablet (20 mg) by mouth daily 5 tablet 0    rosuvastatin (CRESTOR) 20 MG tablet Take 1 tablet (20 mg) by mouth daily      simvastatin (ZOCOR) 40 MG tablet Take 40 mg by mouth At Bedtime      sotalol (BETAPACE) 80 MG tablet TAKE 1 TABLET BY MOUTH EVERY 12 HOURS       No current facility-administered medications for this visit.      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    History of heart bypass surgery    Atherosclerosis of coronary artery    Persistent insomnia    Heart murmur, systolic    History of nonmelanoma skin cancer    Other osteoarthritis of spine, cervical region    2019 novel coronavirus disease (COVID-19)    Type 2 diabetes mellitus without complication, without long-term current use of insulin (H)    SIRS (systemic inflammatory response syndrome) (H)-fever, tachypnea    Seborrheic keratoses    Family history of melanoma    Need for shingles vaccine    Atrial fibrillation, unspecified type (H)     Past Medical History:   Diagnosis Date    Actinic keratosis     Cancer (H)     Heart disease     History of blood transfusion     History of nonmelanoma skin cancer     Type 2 diabetes mellitus without complication, without long-term current use of insulin (H) 11/13/2020    Unspecified essential hypertension

## 2024-06-05 NOTE — LETTER
6/5/2024      Ulices PENG Alyssa Mcdonald  [address]  [facility] [address]      Dear Colleague,    Thank you for referring your patient, Ulices Shah Jr., to the [facility]. Please see a copy of my visit note below.    [facility] Dermatology Note  Encounter Date: Jun 5, 2024  Office Visit      Dermatology Problem List:  Last skin check 11/16/23   1. 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 Actinic keratoses:   - AK, Sternum, Bx proven on 11/16/23, S/p Cryo 6/5/24  - HAK, right angle of mandible, s/p bx 5/5/23, s/p cryo 11/16/23   - HAK - right antitragus s/p bx 1/15/21, cryo 1/26/21  - HAK - left sideburn s/p biopsy 2/25/2020  - s/p cryotherapy  - s/p Efudex cream   3. Mild eczema, back  - s/p triamcinolone    FHx: melanoma   ____________________________________________    Assessment & Plan:  # Actinic keratosis. X 10 including Bx proven on his sternum   - Cryotherapy performed today, see procedure note below.    # Seborrheic keratosis, inflamed. X 5   - Cryotherapy performed today, see procedure note below.  Procedures Performed:     CRYOTHERAPY PROCEDURE NOTE: 15  lesions in the above locations were treated with liquid nitrogen utilizing a 5-10sec thaw time. Patient was advised that the treated areas will become red, swollen, may develop a blister and then should crust and peal off in the next 1-2 weeks. Post-procedure instructions were provided.    Follow-up: prn for new or changing lesions    Staff and scribe:    Scribe  Disclosure:   I, LESLIE BETTY, am serving as a scribe; to document services personally performed by Janet Busby PA-C -based on data collection and the provider's statements to me.     Provider Disclosure:  I agree with above History, Review of Systems, Physical exam and Plan.  I have reviewed the content of the documentation and have edited it as needed. I have personally performed the services documented here and the documentation accurately represents those services and the decisions I have made.      Electronically signed by:    All risks, benefits and alternatives were discussed with patient.  Patient is in agreement and understands the assessment and plan.  All questions were answered.    Janet Busby PA-C, [facility]  [facility] [place]: Phone: [phone], Fax: [phone]  [facility]: Phone: [phone],  Fax: [phone]  [facility]: Phone: [phone], Fax: [phone]  ____________________________________________    CC: Actinic Keratosis (Check AK on sternum and spot on left side of temple.  Patient stated the spot on his temple was previously removed and grew back.  Patient would like face checked.    )      Reviewed patients past medical history and pertinent chart review prior to patient's visit today.     HPI:  Mr. Ulices Shah Jr. is a 89 year old male who presents today as a return patient for cryotherapy on bx proven on his Sternum on 11/16/23. Additionally he notes a few other spots that are bothersome to him as well.    Patient is otherwise feeling well, without additional concerns.    Labs:  Pathology report 11/16/23  Final Diagnosis   A. Sternum:  - Lichenoid actinic keratosis       Physical Exam:  Vitals: There were no vitals taken for this visit.  SKIN: Focused examination of areas noted below was performed.   - There is/are erythematous macules with overlying adherent scale  on the: x 1 Sternum ( bx proven) x 1 nasal dorsum, x 4 L cheek, x 3 R cheek, x 1 R Anti helix,   - There is a tan to brown waxy stuck on papule with surrounding erythema on the L lateral canthus, x 4 L jaw line, x 1 R temple.   - No other lesions of concern on areas examined.     Medications:  Current Outpatient Medications   Medication Sig Dispense Refill     apixaban ANTICOAGULANT (ELIQUIS ANTICOAGULANT) 5 MG tablet Take 1 tablet (5 mg) by mouth 2 times daily 20 tablet 0     blood glucose (NO BRAND SPECIFIED) lancets standard Use to test blood sugar 1 times daily 100 Lancet 1     blood glucose (NO BRAND SPECIFIED) test strip Use to test blood sugar 1 times daily 100 strip 3     blood glucose monitoring (NO BRAND SPECIFIED) meter device kit Use to test blood sugar 1 times daily 1 kit 0     carvedilol (COREG) 25 MG tablet Take 25 mg by mouth 2 times daily (with meals).       cetirizine (ZYRTEC) 10 MG tablet Take 1 tablet (10 mg) by mouth daily 90 tablet 3     clopidogrel (PLAVIX) 75 MG tablet Take 1 tablet (75 mg) by mouth daily 90 tablet 3     fluticasone (FLONASE) 50 MCG/ACT nasal spray Spray 1 spray into both nostrils daily 16 g 0     fluticasone (FLONASE) 50 MCG/ACT nasal spray Spray 2 sprays into both nostrils daily 1 g 6     lisinopril (PRINIVIL,ZESTRIL) 5 MG tablet Take 5 mg by mouth 2 times daily       metFORMIN (GLUCOPHAGE XR) 500 MG 24 hr tablet TAKE 1 TABLET BY MOUTH TWICE DAILY WITH MEALS 180 tablet 0     mirtazapine (REMERON) 15 MG tablet Take 1 tablet (15 mg) by mouth At Bedtime 90 tablet 3     Omega-3 Fatty Acids (FISH OIL) 1000 MG CPDR Take 1 capsule by mouth 2 times daily.       predniSONE (DELTASONE) 20 MG tablet Take 1 tablet (20 mg) by mouth daily 5 tablet 0     rosuvastatin (CRESTOR) 20 MG tablet Take 1 tablet (20 mg) by mouth daily       simvastatin (ZOCOR) 40 MG tablet Take 40 mg by mouth At Bedtime       sotalol (BETAPACE) 80 MG tablet TAKE 1 TABLET BY MOUTH EVERY 12 HOURS       No current  facility-administered medications for this visit.      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     History of heart bypass surgery     Atherosclerosis of coronary artery     Persistent insomnia     Heart murmur, systolic     History of nonmelanoma skin cancer     Other osteoarthritis of spine, cervical region     2019 novel coronavirus disease (COVID-19)     Type 2 diabetes mellitus without complication, without long-term current use of insulin (H)     SIRS (systemic inflammatory response syndrome) (H)-fever, tachypnea     Seborrheic keratoses     Family history of melanoma     Need for shingles vaccine     Atrial fibrillation, unspecified type (H)     Past Medical History:   Diagnosis Date     Actinic keratosis      Cancer (H)      Heart disease      History of blood transfusion      History of nonmelanoma skin cancer      Type 2 diabetes mellitus without complication, without long-term current use of insulin (H) 11/13/2020     Unspecified essential hypertension                         Again, thank you for allowing me to participate in the care of your patient.        Sincerely,        Janet Busby PA-C

## 2024-09-05 ENCOUNTER — NURSE TRIAGE (OUTPATIENT)
Dept: INTERNAL MEDICINE | Facility: CLINIC | Age: 89
End: 2024-09-05
Payer: MEDICARE

## 2024-09-05 NOTE — TELEPHONE ENCOUNTER
Nurse Triage SBAR    Is this a 2nd Level Triage? NO    Situation: Constipation on-going for past 4 weeks    Background: Family history of colon cancer, concerned this change might be an early sign of cancer    Assessment:  Patient has been using miralax, milk of magnesia daily and only have BM every 4 days. Concerned about this sudden change as he has had daily BM as his baseline.     Protocol Recommended Disposition:   See in Office Within 2 Weeks    Recommendation:  Per RN protocol advised patient to be seen within 2 weeks.  Advised patient of home care instructions per care advice. Patient scheduled for visit on 9/18/24 at 2;00 pm with 1:40 pm check-in, patient was informed to arrived 20 minutes prior to scheduled visit for check-in. Patient advised to seek urgent care or ED for worsening symptoms per protocol. Patient expressed verbal understanding.        Lorna Bishop, RN on 9/5/2024 at 9:14 AM              Reason for Disposition   Uses laxative (e.g., PEG / Miralax, Milk of Magnesia) or enema more than once a month    Additional Information   Negative: Abdomen pain is main symptom and male   Negative: Abdomen pain is main symptom and female   Negative: Rectal bleeding or blood in stool is main symptom   Negative: Vomiting bile (green color)   Negative: Patient sounds very sick or weak to the triager   Negative: Constant abdominal pain lasting > 2 hours   Negative: Vomiting and abdomen looks much more swollen than usual   Negative: Rectal pain or fullness from fecal impaction (rectum full of stool) and NOT better after SITZ bath, suppository or enema   Negative: Abdomen is more swollen than usual   Negative: Last bowel movement (BM) > 4 days ago   Negative: Leaking stool   Negative: Intermittent mild abdominal pain and fever   Negative: Unable to have a bowel movement (BM) without manually removing stool (using finger to pull out stool or perform disimpaction)   Negative: Unable to have a bowel movement (BM)  "without using a laxative, suppository, or enema   Negative: Constipation persists > 1 week and no improvement after using CARE ADVICE   Negative: Weight loss greater than 10 pounds (5 kg) and not dieting   Negative: Pencil-like, narrow stools   Negative: Patient wants to be seen    Answer Assessment - Initial Assessment Questions  1. STOOL PATTERN OR FREQUENCY: \"How often do you have a bowel movement (BM)?\"  (Normal range: 3 times a day to every 3 days)  \"When was your last BM?\"        Usually daily, has been waiting 3-4 days between BM for past month  2. STRAINING: \"Do you have to strain to have a BM?\"       No straining  3. RECTAL PAIN: \"Does your rectum hurt when the stool comes out?\" If Yes, ask: \"Do you have hemorrhoids? How bad is the pain?\"  (Scale 1-10; or mild, moderate, severe)      No pain  4. STOOL COMPOSITION: \"Are the stools hard?\"       Stools are loose because of laxative use  5. BLOOD ON STOOLS: \"Has there been any blood on the toilet tissue or on the surface of the BM?\" If Yes, ask: \"When was the last time?\"      No blood  6. CHRONIC CONSTIPATION: \"Is this a new problem for you?\"  If No, ask: \"How long have you had this problem?\" (days, weeks, months)       4 weeks  7. CHANGES IN DIET OR HYDRATION: \"Have there been any recent changes in your diet?\" \"How much fluids are you drinking on a daily basis?\"  \"How much have you had to drink today?\"      40 oz of fluid, coffee in AM (3-4)  8. MEDICINES: \"Have you been taking any new medicines?\" \"Are you taking any narcotic pain medicines?\" (e.g., Dilaudid, morphine, Percocet, Vicodin)      No new medication  9. LAXATIVES: \"Have you been using any stool softeners, laxatives, or enemas?\"  If Yes, ask \"What, how often, and when was the last time?\"      Miralax, milk of magensium,   10. ACTIVITY:  \"How much walking do you do every day?\"  \"Has your activity level decreased in the past week?\"         Steady decline in activity over past couple year, and knees " "have been bothering him so he doesn't walk as much  11. CAUSE: \"What do you think is causing the constipation?\"         Unsure  12. OTHER SYMPTOMS: \"Do you have any other symptoms?\" (e.g., abdomen pain, bloating, fever, vomiting)        No other symptoms  13. MEDICAL HISTORY: \"Do you have a history of hemorrhoids, rectal fissures, or rectal surgery or rectal abscess?\"          Little hemorrhoids-usually no problem besides a spot of blood  14. PREGNANCY: \"Is there any chance you are pregnant?\" \"When was your last menstrual period?\"        no    Protocols used: Constipation-A-OH    "

## 2024-09-13 DIAGNOSIS — E11.9 TYPE 2 DIABETES MELLITUS WITHOUT COMPLICATION, WITHOUT LONG-TERM CURRENT USE OF INSULIN (H): ICD-10-CM

## 2024-09-13 DIAGNOSIS — J32.1 CHRONIC FRONTAL SINUSITIS: ICD-10-CM

## 2024-09-13 DIAGNOSIS — J30.2 SEASONAL ALLERGIC RHINITIS, UNSPECIFIED TRIGGER: ICD-10-CM

## 2024-09-13 RX ORDER — CETIRIZINE HYDROCHLORIDE 10 MG/1
10 TABLET, FILM COATED ORAL DAILY
Qty: 90 TABLET | Refills: 0 | Status: SHIPPED | OUTPATIENT
Start: 2024-09-13

## 2024-09-13 RX ORDER — METFORMIN HCL 500 MG
500 TABLET, EXTENDED RELEASE 24 HR ORAL 2 TIMES DAILY WITH MEALS
Qty: 180 TABLET | Refills: 0 | Status: SHIPPED | OUTPATIENT
Start: 2024-09-13 | End: 2024-09-18

## 2024-09-18 ENCOUNTER — OFFICE VISIT (OUTPATIENT)
Dept: INTERNAL MEDICINE | Facility: CLINIC | Age: 89
End: 2024-09-18
Payer: MEDICARE

## 2024-09-18 VITALS
SYSTOLIC BLOOD PRESSURE: 136 MMHG | OXYGEN SATURATION: 99 % | BODY MASS INDEX: 24.47 KG/M2 | HEART RATE: 69 BPM | WEIGHT: 165.2 LBS | HEIGHT: 69 IN | TEMPERATURE: 97.2 F | RESPIRATION RATE: 14 BRPM | DIASTOLIC BLOOD PRESSURE: 70 MMHG

## 2024-09-18 DIAGNOSIS — I10 HYPERTENSION GOAL BP (BLOOD PRESSURE) < 140/90: ICD-10-CM

## 2024-09-18 DIAGNOSIS — K59.00 CONSTIPATION, UNSPECIFIED CONSTIPATION TYPE: Primary | ICD-10-CM

## 2024-09-18 DIAGNOSIS — I25.10 ATHEROSCLEROSIS OF CORONARY ARTERY OF NATIVE HEART WITHOUT ANGINA PECTORIS, UNSPECIFIED VESSEL OR LESION TYPE: ICD-10-CM

## 2024-09-18 DIAGNOSIS — E11.9 TYPE 2 DIABETES MELLITUS WITHOUT COMPLICATION, WITHOUT LONG-TERM CURRENT USE OF INSULIN (H): ICD-10-CM

## 2024-09-18 LAB
ALBUMIN SERPL BCG-MCNC: 4.1 G/DL (ref 3.5–5.2)
ALP SERPL-CCNC: 56 U/L (ref 40–150)
ALT SERPL W P-5'-P-CCNC: 16 U/L (ref 0–70)
ANION GAP SERPL CALCULATED.3IONS-SCNC: 9 MMOL/L (ref 7–15)
AST SERPL W P-5'-P-CCNC: 19 U/L (ref 0–45)
BILIRUB SERPL-MCNC: 0.4 MG/DL
BUN SERPL-MCNC: 11.5 MG/DL (ref 8–23)
CALCIUM SERPL-MCNC: 9.1 MG/DL (ref 8.8–10.4)
CHLORIDE SERPL-SCNC: 105 MMOL/L (ref 98–107)
CREAT SERPL-MCNC: 0.96 MG/DL (ref 0.67–1.17)
EGFRCR SERPLBLD CKD-EPI 2021: 77 ML/MIN/1.73M2
ERYTHROCYTE [DISTWIDTH] IN BLOOD BY AUTOMATED COUNT: 12.7 % (ref 10–15)
EST. AVERAGE GLUCOSE BLD GHB EST-MCNC: 131 MG/DL
GLUCOSE SERPL-MCNC: 128 MG/DL (ref 70–99)
HBA1C MFR BLD: 6.2 %
HCO3 SERPL-SCNC: 25 MMOL/L (ref 22–29)
HCT VFR BLD AUTO: 38.5 % (ref 40–53)
HGB BLD-MCNC: 13.2 G/DL (ref 13.3–17.7)
MCH RBC QN AUTO: 31.2 PG (ref 26.5–33)
MCHC RBC AUTO-ENTMCNC: 34.3 G/DL (ref 31.5–36.5)
MCV RBC AUTO: 91 FL (ref 78–100)
PLATELET # BLD AUTO: 266 10E3/UL (ref 150–450)
POTASSIUM SERPL-SCNC: 4.4 MMOL/L (ref 3.4–5.3)
PROT SERPL-MCNC: 6.9 G/DL (ref 6.4–8.3)
RBC # BLD AUTO: 4.23 10E6/UL (ref 4.4–5.9)
SODIUM SERPL-SCNC: 139 MMOL/L (ref 135–145)
WBC # BLD AUTO: 8.2 10E3/UL (ref 4–11)

## 2024-09-18 PROCEDURE — 90480 ADMN SARSCOV2 VAC 1/ONLY CMP: CPT | Performed by: INTERNAL MEDICINE

## 2024-09-18 PROCEDURE — 99214 OFFICE O/P EST MOD 30 MIN: CPT | Mod: 25 | Performed by: INTERNAL MEDICINE

## 2024-09-18 PROCEDURE — 91320 SARSCV2 VAC 30MCG TRS-SUC IM: CPT | Performed by: INTERNAL MEDICINE

## 2024-09-18 PROCEDURE — 85027 COMPLETE CBC AUTOMATED: CPT | Performed by: INTERNAL MEDICINE

## 2024-09-18 PROCEDURE — 90662 IIV NO PRSV INCREASED AG IM: CPT | Performed by: INTERNAL MEDICINE

## 2024-09-18 PROCEDURE — 80053 COMPREHEN METABOLIC PANEL: CPT | Performed by: INTERNAL MEDICINE

## 2024-09-18 PROCEDURE — 36415 COLL VENOUS BLD VENIPUNCTURE: CPT | Performed by: INTERNAL MEDICINE

## 2024-09-18 PROCEDURE — G0008 ADMIN INFLUENZA VIRUS VAC: HCPCS | Performed by: INTERNAL MEDICINE

## 2024-09-18 PROCEDURE — 83036 HEMOGLOBIN GLYCOSYLATED A1C: CPT | Performed by: INTERNAL MEDICINE

## 2024-09-18 RX ORDER — METFORMIN HCL 500 MG
500 TABLET, EXTENDED RELEASE 24 HR ORAL 2 TIMES DAILY WITH MEALS
Qty: 180 TABLET | Refills: 3 | Status: SHIPPED | OUTPATIENT
Start: 2024-09-18

## 2024-09-18 ASSESSMENT — PAIN SCALES - GENERAL: PAINLEVEL: NO PAIN (0)

## 2024-09-18 NOTE — PROGRESS NOTES
Assessment & Plan   Problem List Items Addressed This Visit       Hypertension goal BP (blood pressure) < 140/90    Relevant Orders    CBC with platelets    Atherosclerosis of coronary artery    Type 2 diabetes mellitus without complication, without long-term current use of insulin (H)    Relevant Medications    metFORMIN (GLUCOPHAGE XR) 500 MG 24 hr tablet    Other Relevant Orders    CBC with platelets    Comprehensive metabolic panel (BMP + Alb, Alk Phos, ALT, AST, Total. Bili, TP)    Hemoglobin A1c     Other Visit Diagnoses       Constipation, unspecified constipation type    -  Primary    Relevant Medications    psyllium (METAMUCIL/KONSYL) 58.6 % powder    Other Relevant Orders    CBC with platelets    CT Abdomen Pelvis w/o Contrast           Constipation that is worse and family history of colon cancer. Colonoscopy in 2019 had a polyp. Now loose stools after a few days without the takes a mediation for constipation and goes. No blood, no pain, no weight loss or fevers.     Concern of why, but with his afib and ascvd and blood thinners will wait on colonoscopy, check labs, cbc and comp and a abd ct first.  Then may need a colonoscopy, also go on fiber daily to regulate.      See him back in 4 weeks, medicare wellness.     HTN is better on recheck, normally good, has medications from cardiology and has known aortic stenosis, moderate.     Diabetes will refill metformin sugars are good for him, will check a hgba1c today.      The longitudinal plan of care for the diagnosis(es)/condition(s) as documented were addressed during this visit. Due to the added complexity in care, I will continue to support NIMCO in the subsequent management and with ongoing continuity of care.     FUTURE APPOINTMENTS:       - Follow-up for annual visit or as needed      Subjective   NIMCO is a 89 year old, presenting for the following health issues:  Constipation        9/18/2024     1:33 PM   Additional Questions   Roomed by Jen  "    History of Present Illness       Reason for visit:  Constipation  Symptom onset:  1-2 weeks ago   He is taking medications regularly.       Constipation the last month, has to take something for stools.   Weight is ok, no fevers. Eating ok appetite is good.     Girlfriend in [place] for years, he would go see her.  Now she is moving to [place] and won't see her as much.     Sugars are 110-130 range twice a day.     Will see cardiology in a few weeks, has known aortic stenosis.           Objective    /70   Pulse 69   Temp 97.2  F (36.2  C) (Temporal)   Resp 14   Ht 1.745 m (5' 8.7\")   Wt 74.9 kg (165 lb 3.2 oz)   SpO2 99%   BMI 24.61 kg/m    Body mass index is 24.61 kg/m .  Physical Exam   NAD  Heart regular  Lungs clear  Abd soft nontender.   Ext trace edema in both ankles.             Signed Electronically by: Akhil Mcmahon MD  "

## 2024-09-19 ENCOUNTER — TELEPHONE (OUTPATIENT)
Dept: INTERNAL MEDICINE | Facility: CLINIC | Age: 89
End: 2024-09-19
Payer: MEDICARE

## 2024-09-19 NOTE — TELEPHONE ENCOUNTER
----- Message from Akhil Mcmahon sent at 9/19/2024  3:05 PM CDT -----  Please let Filiberto know his labs are very good. Diabetes is perfect continue metformin,   Liver and kidneys are good.

## 2024-09-23 ENCOUNTER — NURSE TRIAGE (OUTPATIENT)
Dept: INTERNAL MEDICINE | Facility: CLINIC | Age: 89
End: 2024-09-23
Payer: MEDICARE

## 2024-09-23 NOTE — TELEPHONE ENCOUNTER
Nurse Triage SBAR    Is this a 2nd Level Triage? YES, LICENSED PRACTITIONER REVIEW IS REQUIRED    Situation: Patient calling to report he is still suffering from constipation despite using Metamucil twice daily and trying milk of magnesia on Saturday. He has not had a bowel movement for about 4-5 days. He denies any bloating, nausea or abdominal pain. Reports his abdomen is still soft. He is passing gas.     Background: Ongoing constipation, was last seen on 9/18/24    Assessment: Patient reports he is overall feeling fine but is concerned that he continues to not have regular bowel movements and would like to speak with the provider. He has increased his fluids, is taking Metamucil and has tried Milk of Magnesia with no results and is getting concerned. Did encourage patient to try another dose of MOM and increase fluids, take a walk, etc.     Protocol Recommended Disposition:   See in Office Today    Recommendation: Patient would like further recommendations for constipation. He is worried that he has still not gone for 4-5 days.  Would like to speak to provider.    Routed to provider    Does the patient meet one of the following criteria for ADS visit consideration? No    Reason for Disposition   Last bowel movement (BM) > 4 days ago    Additional Information   Negative: Abdomen pain is main symptom and male   Negative: Abdomen pain is main symptom and female   Negative: Rectal bleeding or blood in stool is main symptom   Negative: Vomiting bile (green color)   Negative: Patient sounds very sick or weak to the triager   Negative: Constant abdominal pain lasting > 2 hours   Negative: Vomiting and abdomen looks much more swollen than usual   Negative: Rectal pain or fullness from fecal impaction (rectum full of stool) and NOT better after SITZ bath, suppository or enema   Negative: Abdomen is more swollen than usual    Protocols used: Constipation-A-[place]

## 2024-09-23 NOTE — TELEPHONE ENCOUNTER
I would recommend he get a suppository or enema to initiate things from below .   Continue with plan for Ct scan.

## 2024-09-23 NOTE — TELEPHONE ENCOUNTER
Spoke with patient and gave providers update. Patient verbalized understanding.     Gerardo Salazar RN on 9/23/2024 at 10:10 AM

## 2024-09-26 ENCOUNTER — HOSPITAL ENCOUNTER (OUTPATIENT)
Dept: CT IMAGING | Facility: CLINIC | Age: 89
Discharge: HOME | End: 2024-09-26
Attending: INTERNAL MEDICINE | Admitting: INTERNAL MEDICINE
Payer: MEDICARE

## 2024-09-26 DIAGNOSIS — K59.00 CONSTIPATION, UNSPECIFIED CONSTIPATION TYPE: ICD-10-CM

## 2024-09-26 PROCEDURE — 74176 CT ABD & PELVIS W/O CONTRAST: CPT | Mod: MG

## 2024-10-17 ENCOUNTER — APPOINTMENT (OUTPATIENT)
Dept: CT IMAGING | Facility: CLINIC | Age: 89
End: 2024-10-17
Attending: STUDENT IN AN ORGANIZED HEALTH CARE EDUCATION/TRAINING PROGRAM
Payer: MEDICARE

## 2024-10-17 ENCOUNTER — HOSPITAL ENCOUNTER (EMERGENCY)
Facility: CLINIC | Age: 89
Discharge: HOME | End: 2024-10-17
Attending: STUDENT IN AN ORGANIZED HEALTH CARE EDUCATION/TRAINING PROGRAM | Admitting: STUDENT IN AN ORGANIZED HEALTH CARE EDUCATION/TRAINING PROGRAM
Payer: MEDICARE

## 2024-10-17 VITALS
RESPIRATION RATE: 18 BRPM | BODY MASS INDEX: 25.33 KG/M2 | HEIGHT: 69 IN | HEART RATE: 80 BPM | TEMPERATURE: 97.4 F | WEIGHT: 171 LBS | DIASTOLIC BLOOD PRESSURE: 81 MMHG | SYSTOLIC BLOOD PRESSURE: 142 MMHG | OXYGEN SATURATION: 97 %

## 2024-10-17 DIAGNOSIS — S12.491A OTHER CLOSED NONDISPLACED FRACTURE OF FIFTH CERVICAL VERTEBRA, INITIAL ENCOUNTER (H): ICD-10-CM

## 2024-10-17 DIAGNOSIS — S09.93XA FACIAL INJURY, INITIAL ENCOUNTER: ICD-10-CM

## 2024-10-17 DIAGNOSIS — R55 SYNCOPE AND COLLAPSE: ICD-10-CM

## 2024-10-17 LAB
ALBUMIN SERPL BCG-MCNC: 3.9 G/DL (ref 3.5–5.2)
ALBUMIN UR-MCNC: NEGATIVE MG/DL
ALP SERPL-CCNC: 49 U/L (ref 40–150)
ALT SERPL W P-5'-P-CCNC: 17 U/L (ref 0–70)
ANION GAP SERPL CALCULATED.3IONS-SCNC: 15 MMOL/L (ref 7–15)
APPEARANCE UR: CLEAR
AST SERPL W P-5'-P-CCNC: 23 U/L (ref 0–45)
BASOPHILS # BLD AUTO: 0.1 10E3/UL (ref 0–0.2)
BASOPHILS NFR BLD AUTO: 0 %
BILIRUB SERPL-MCNC: 0.5 MG/DL
BILIRUB UR QL STRIP: NEGATIVE
BUN SERPL-MCNC: 16.7 MG/DL (ref 8–23)
CALCIUM SERPL-MCNC: 9.1 MG/DL (ref 8.8–10.4)
CHLORIDE SERPL-SCNC: 104 MMOL/L (ref 98–107)
COLOR UR AUTO: YELLOW
CREAT SERPL-MCNC: 0.89 MG/DL (ref 0.67–1.17)
EGFRCR SERPLBLD CKD-EPI 2021: 83 ML/MIN/1.73M2
EOSINOPHIL # BLD AUTO: 0.1 10E3/UL (ref 0–0.7)
EOSINOPHIL NFR BLD AUTO: 1 %
ERYTHROCYTE [DISTWIDTH] IN BLOOD BY AUTOMATED COUNT: 13 % (ref 10–15)
GLUCOSE SERPL-MCNC: 126 MG/DL (ref 70–99)
GLUCOSE UR STRIP-MCNC: NEGATIVE MG/DL
HCO3 SERPL-SCNC: 21 MMOL/L (ref 22–29)
HCT VFR BLD AUTO: 37.9 % (ref 40–53)
HGB BLD-MCNC: 13.2 G/DL (ref 13.3–17.7)
HGB UR QL STRIP: ABNORMAL
IMM GRANULOCYTES # BLD: 0 10E3/UL
IMM GRANULOCYTES NFR BLD: 0 %
INR PPP: 1.34 (ref 0.85–1.15)
KETONES UR STRIP-MCNC: 80 MG/DL
LEUKOCYTE ESTERASE UR QL STRIP: NEGATIVE
LYMPHOCYTES # BLD AUTO: 1.8 10E3/UL (ref 0.8–5.3)
LYMPHOCYTES NFR BLD AUTO: 15 %
MAGNESIUM SERPL-MCNC: 1.8 MG/DL (ref 1.7–2.3)
MCH RBC QN AUTO: 31.4 PG (ref 26.5–33)
MCHC RBC AUTO-ENTMCNC: 34.8 G/DL (ref 31.5–36.5)
MCV RBC AUTO: 90 FL (ref 78–100)
MONOCYTES # BLD AUTO: 0.5 10E3/UL (ref 0–1.3)
MONOCYTES NFR BLD AUTO: 4 %
NEUTROPHILS # BLD AUTO: 9.2 10E3/UL (ref 1.6–8.3)
NEUTROPHILS NFR BLD AUTO: 79 %
NITRATE UR QL: NEGATIVE
NRBC # BLD AUTO: 0 10E3/UL
NRBC BLD AUTO-RTO: 0 /100
PH UR STRIP: 7 [PH] (ref 5–7)
PLATELET # BLD AUTO: 173 10E3/UL (ref 150–450)
POTASSIUM SERPL-SCNC: 4.2 MMOL/L (ref 3.4–5.3)
PROT SERPL-MCNC: 6.7 G/DL (ref 6.4–8.3)
RBC # BLD AUTO: 4.21 10E6/UL (ref 4.4–5.9)
RBC URINE: 1 /HPF
SODIUM SERPL-SCNC: 140 MMOL/L (ref 135–145)
SP GR UR STRIP: 1.01 (ref 1–1.03)
TROPONIN T SERPL HS-MCNC: 27 NG/L
TROPONIN T SERPL HS-MCNC: 31 NG/L
UROBILINOGEN UR STRIP-MCNC: NORMAL MG/DL
WBC # BLD AUTO: 11.5 10E3/UL (ref 4–11)
WBC URINE: 0 /HPF

## 2024-10-17 PROCEDURE — 36415 COLL VENOUS BLD VENIPUNCTURE: CPT | Performed by: STUDENT IN AN ORGANIZED HEALTH CARE EDUCATION/TRAINING PROGRAM

## 2024-10-17 PROCEDURE — G1010 CDSM STANSON: HCPCS

## 2024-10-17 PROCEDURE — 12001 RPR S/N/AX/GEN/TRNK 2.5CM/<: CPT

## 2024-10-17 PROCEDURE — 99284 EMERGENCY DEPT VISIT MOD MDM: CPT | Mod: 25 | Performed by: STUDENT IN AN ORGANIZED HEALTH CARE EDUCATION/TRAINING PROGRAM

## 2024-10-17 PROCEDURE — 82947 ASSAY GLUCOSE BLOOD QUANT: CPT | Performed by: STUDENT IN AN ORGANIZED HEALTH CARE EDUCATION/TRAINING PROGRAM

## 2024-10-17 PROCEDURE — 93010 ELECTROCARDIOGRAM REPORT: CPT | Mod: 59 | Performed by: STUDENT IN AN ORGANIZED HEALTH CARE EDUCATION/TRAINING PROGRAM

## 2024-10-17 PROCEDURE — 85025 COMPLETE CBC W/AUTO DIFF WBC: CPT | Performed by: STUDENT IN AN ORGANIZED HEALTH CARE EDUCATION/TRAINING PROGRAM

## 2024-10-17 PROCEDURE — 81001 URINALYSIS AUTO W/SCOPE: CPT | Performed by: STUDENT IN AN ORGANIZED HEALTH CARE EDUCATION/TRAINING PROGRAM

## 2024-10-17 PROCEDURE — 70486 CT MAXILLOFACIAL W/O DYE: CPT | Mod: MG

## 2024-10-17 PROCEDURE — 83735 ASSAY OF MAGNESIUM: CPT | Performed by: STUDENT IN AN ORGANIZED HEALTH CARE EDUCATION/TRAINING PROGRAM

## 2024-10-17 PROCEDURE — 85610 PROTHROMBIN TIME: CPT | Performed by: STUDENT IN AN ORGANIZED HEALTH CARE EDUCATION/TRAINING PROGRAM

## 2024-10-17 PROCEDURE — 99285 EMERGENCY DEPT VISIT HI MDM: CPT | Mod: 25

## 2024-10-17 PROCEDURE — 84484 ASSAY OF TROPONIN QUANT: CPT | Performed by: STUDENT IN AN ORGANIZED HEALTH CARE EDUCATION/TRAINING PROGRAM

## 2024-10-17 PROCEDURE — 12001 RPR S/N/AX/GEN/TRNK 2.5CM/<: CPT | Performed by: STUDENT IN AN ORGANIZED HEALTH CARE EDUCATION/TRAINING PROGRAM

## 2024-10-17 PROCEDURE — 93005 ELECTROCARDIOGRAM TRACING: CPT

## 2024-10-17 RX ORDER — LORAZEPAM 2 MG/ML
1 INJECTION INTRAMUSCULAR ONCE
Status: DISCONTINUED | OUTPATIENT
Start: 2024-10-17 | End: 2024-10-18 | Stop reason: HOSPADM

## 2024-10-17 ASSESSMENT — ACTIVITIES OF DAILY LIVING (ADL)
ADLS_ACUITY_SCORE: 36
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
## 2024-10-18 NOTE — ED PROVIDER NOTES
History     Chief Complaint   Patient presents with    Head Injury    Fall     HPI  Ulices Shah Jr. is a 89 year old male who with extensive history of spinal stenosis, type 2 diabetes, coronary disease with bypass procedure his heart murmur systolic with history of severe C-spine arthritis presenting with significant facial injury after a fall while walking out of a gas station.  He notes he was feeling fine but unfortunately cannot remember the reasoning why he fell.  He is not sure if stumbled but fell and woke up while he was in the car coming to the hospital with his friend.  He has a mild headache.  He denies any vision changes chest pains nausea vomiting bowel habits or recent fevers.    Allergies:  Allergies   Allergen Reactions    No Known Drug Allergy        Problem List:    Patient Active Problem List    Diagnosis Date Noted    Spinal stenosis, lumbar region, without neurogenic claudication 06/25/2013     Priority: High    Atrial fibrillation, unspecified type (H) 01/24/2024     Priority: Medium    Need for shingles vaccine 06/14/2023     Priority: Medium    Seborrheic keratoses 05/05/2023     Priority: Medium    Family history of melanoma 05/05/2023     Priority: Medium    Type 2 diabetes mellitus without complication, without long-term current use of insulin (H) 11/13/2020     Priority: Medium    SIRS (systemic inflammatory response syndrome) (H)-fever, tachypnea 11/13/2020     Priority: Medium    2019 novel coronavirus disease (COVID-19) 11/12/2020     Priority: Medium    Other osteoarthritis of spine, cervical region 09/10/2020     Priority: Medium    History of nonmelanoma skin cancer 01/22/2019     Priority: Medium    Persistent insomnia 03/27/2017     Priority: Medium    Heart murmur, systolic 03/27/2017     Priority: Medium    History of heart bypass surgery 12/07/2015     Priority: Medium    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 09/03/2015     Priority: Medium  "   Basal cell carcinoma of left ear (superior helix) s/p [facility] 9-2-14 09/02/2014     Priority: Medium    Basal cell carcinoma of left nasolabial fold s/p [facility] 9-2-14 09/02/2014     Priority: Medium    Basal cell carcinoma of right mid cheek s/p [facility] 9-2-14 09/02/2014     Priority: Medium    History of basal cell carcinoma 07/29/2014     Priority: Medium    Atherosclerosis of coronary artery 10/15/2012     Priority: Medium    Hypertension goal BP (blood pressure) < 140/90 09/25/2011     Priority: Medium    Hyperlipidemia LDL goal <100 09/25/2011     Priority: Medium    DDD (degenerative disc disease), cervical 12/24/2008     Priority: Medium    Chronic ischemic heart disease 12/21/2002     Priority: Medium     Problem list name updated by automated process. Provider to review          Past Medical History:    Past Medical History:   Diagnosis Date    Actinic keratosis     Cancer (H)     Heart disease     History of blood transfusion     History of nonmelanoma skin cancer     Type 2 diabetes mellitus without complication, without long-term current use of insulin (H) 11/13/2020    Unspecified essential hypertension        Past Surgical History:    Past Surgical History:   Procedure Laterality Date    COLONOSCOPY  12/10/08    COLONOSCOPY N/A 3/7/2019    Procedure: COMBINED COLONOSCOPY,  MULTIPLE POLYPECTOMY BY BIOPSY;  Surgeon: Brayden Sharma DO;  Location:  GI    EXCISE MASS HAND Right 02/2017    \"skin cancer\" removal    HC REMV CATARACT EXTRACAP,INSERT LENS, W/O ECP  10/21/2004    left    HC REMV CATARACT EXTRACAP,INSERT LENS, W/O ECP  11/18/2004    right    HC YAG LASER CAPSULOTOMY  06/18/09    right eye    HEART CATH, ANGIOPLASTY  4/30/12    3 stents    HEMILAMINECTOMY, DISCECTOMY LUMBAR THREE LEVELS, COMBINED  6/26/2013    Procedure: COMBINED HEMILAMINECTOMY, DISCECTOMY LUMBAR THREE LEVELS;  Bilateral L3, L4 and L5 Guille-Laminectomies;  Surgeon: Ollie Dodson MD;  Location:  OR    [facility] APPENDECTOMY  " "1949    [facility] EXPLOR HEART SURG WND W CP BYPASS  10/25/01    4 way heart bypass       Family History:    Family History   Problem Relation Age of Onset    C.A.D. Mother     Alzheimer Disease Mother     Neurologic Disorder Mother 96        dementia    Cancer Father         colon    Cancer - colorectal Father        Social History:  Marital Status:  Single [1]  Social History     Tobacco Use    Smoking status: Never    Smokeless tobacco: Never   Vaping Use    Vaping status: Never Used   Substance Use Topics    Alcohol use: Not Currently     Comment: rare, maybe 3 beers all year.    Drug use: No        Medications:    apixaban ANTICOAGULANT (ELIQUIS ANTICOAGULANT) 5 MG tablet  blood glucose (NO BRAND SPECIFIED) lancets standard  blood glucose (NO BRAND SPECIFIED) test strip  blood glucose monitoring (NO BRAND SPECIFIED) meter device kit  carvedilol (COREG) 25 MG tablet  clopidogrel (PLAVIX) 75 MG tablet  EQ ALLERGY RELIEF, CETIRIZINE, 10 MG tablet  fluticasone (FLONASE) 50 MCG/ACT nasal spray  fluticasone (FLONASE) 50 MCG/ACT nasal spray  lisinopril (PRINIVIL,ZESTRIL) 5 MG tablet  metFORMIN (GLUCOPHAGE XR) 500 MG 24 hr tablet  mirtazapine (REMERON) 15 MG tablet  Omega-3 Fatty Acids (FISH OIL) 1000 MG CPDR  predniSONE (DELTASONE) 20 MG tablet  psyllium (METAMUCIL/KONSYL) 58.6 % powder  rosuvastatin (CRESTOR) 20 MG tablet  sotalol (BETAPACE) 80 MG tablet          Review of Systems   HENT:          Facial injury   Neurological:  Positive for syncope.   All other systems reviewed and are negative.      Physical Exam   BP: (!) 175/89  Pulse: 77  Temp: 97.4  F (36.3  C)  Resp: 18  Height: 175.3 cm (5' 9\")  Weight: 77.6 kg (171 lb)  SpO2: 98 %      Physical Exam  Vitals and nursing note reviewed.   Constitutional:       General: He is not in acute distress.     Appearance: Normal appearance. He is normal weight. He is not ill-appearing, toxic-appearing or diaphoretic.      Comments: Significant facial trauma with bilateral " ecchymosis around eyes   HENT:      Head: Normocephalic.        Comments: 1 and half centimeter laceration to left upper forehead.  Mild hematoma.  No bogginess identified.     Right Ear: Tympanic membrane normal.      Left Ear: Tympanic membrane normal.      Nose:      Comments: Abrasion with some mild swelling at the nasal bridge  Cardiovascular:      Rate and Rhythm: Normal rate and regular rhythm.      Pulses: Normal pulses.      Heart sounds: Normal heart sounds.   Pulmonary:      Effort: Pulmonary effort is normal. No respiratory distress.      Breath sounds: Normal breath sounds. No wheezing or rales.   Abdominal:      General: Abdomen is flat. Bowel sounds are normal. There is no distension.      Palpations: Abdomen is soft.      Tenderness: There is no abdominal tenderness. There is no guarding.   Musculoskeletal:         General: Normal range of motion.   Skin:     General: Skin is warm and dry.      Capillary Refill: Capillary refill takes less than 2 seconds.   Neurological:      General: No focal deficit present.      Mental Status: He is alert and oriented to person, place, and time.      Cranial Nerves: No cranial nerve deficit.      Motor: No weakness.      Gait: Gait normal.   Psychiatric:         Mood and Affect: Mood normal.         ED Course        [facility]    -Laceration Repair    Date/Time: 10/17/2024 7:56 PM    Performed by: Tono Teague MD  Authorized by: Tono Teague MD    Risks, benefits and alternatives discussed.      ANESTHESIA (see MAR for exact dosages):     Anesthesia method:  None  LACERATION DETAILS     Location:  Scalp    Scalp location:  Frontal    Length (cm):  1    Depth (mm):  2    REPAIR TYPE:     Repair type:  Simple    EXPLORATION:     Hemostasis achieved with:  Direct pressure    Wound exploration: wound explored through full range of motion and entire depth of wound probed and visualized      Wound extent: no foreign body       Contaminated: no      TREATMENT:     Area cleansed with:  Betadine    Amount of cleaning:  Standard    Visualized foreign bodies/material removed: no      SKIN REPAIR     Repair method:  Sutures    Suture size:  4-0    Suture material:  Nylon    Suture technique:  Simple interrupted    Number of sutures:  3    APPROXIMATION     Approximation:  Close    POST-PROCEDURE DETAILS     Dressing:  Antibiotic ointment and bulky dressing      PROCEDURE    Patient Tolerance:  Patient tolerated the procedure well with no immediate complications           EKG self interpreted at 7:48 PM.  Normal sinus rhythm at 474 QTc is 450 and a QRS of 138.  Concern at this time for T wave versions in lead to 3 aVF as well as some mild depressed ST segments in V1 and V2 with signs of right bundle branch block.  Mild right axis deviation with what seems like a right bundle branch block.  Cannot visualize the recent [facility] system EKGs however EKG in 2021 did not have these findings present.  Abnormal EKG.        Results for orders placed or performed during the hospital encounter of 10/17/24 (from the past 24 hour(s))   Head CT w/o contrast    Narrative    EXAM: CT HEAD W/O CONTRAST, CT CERVICAL SPINE W/O CONTRAST, CT FACIAL BONES WITHOUT CONTRAST  LOCATION: [facility]  DATE: 10/17/2024    INDICATION: fall with LOC  COMPARISON: None.  TECHNIQUE:   1.  Routine CT Head without IV contrast. Multiplanar reformats. Dose reduction techniques were used.  2.  Routine CT Facial bones without IV contrast.  Multiplanar reformats. Dose reduction techniques were used.  3.  Routine CT Cervical spine without IV contrast. Multiplanar reformats. Dose reduction techniques were used.    FINDINGS:   Head CT:  No evidence of acute intracranial hemorrhage or mass effect. Scattered foci of decreased attenuation within the cerebral hemispheric white matter which are nonspecific, though most commonly ascribed to chronic small vessel  ischemic disease. The   ventricles and sulci are prominent consistent with mild brain parenchymal volume loss. Gray-white matter differentiation is maintained. The basilar cisterns are patent.    The globes are unremarkable. The partially imaged paranasal sinuses and mastoid air cells are unremarkable. The visualized skull base and calvarium are unremarkable.    Facial Bones CT:  Acute comminuted nasal bone fracture with overlying soft tissue swelling.  The visualized calvarium, orbits, nasorbitalethmoid complex, zygomaticomaxillary complex and pterygoid plates, maxilla and mandible are intact without evidence of fracture. No   other evidence of acute maxillofacial fracture by CT imaging.  No dislocation temporomandibular joints.  No evidence of dentoalveolar fracture.  No evidence of dental avulsion.    Complete opacification right maxillary sinus.    Cervical spine CT:  Acute oblique fracture through the anterior marginal osteophyte at C5 extending through the inferior endplate of C5. No other evidence of acute fracture or subluxation of the cervical spine by CT imaging. The vertebral bodies of the cervical spine   otherwise have normal stature and alignment. Diffuse idiopathic skeletal hyperostosis. Multilevel degenerative disc disease of the cervical spine with disc height loss, most pronounced at C4/C5. 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    The odontoid process is well approximated with the anterior body of C1 and well aligned between the lateral masses of C1.     C2/C3:  No posterior disc bulge or spinal canal narrowing. No neural foraminal narrowing.    C3/C4:  No posterior disc bulge or spinal canal narrowing. Uncovertebral joint disease and facet arthropathy with severe bilateral neural foraminal narrowing.    C4/C5:  Ossification of the posterior longitudinal ligament, eccentric to the right with severe spinal canal narrowing. Uncovertebral joint disease and facet  arthropathy with severe bilateral neural foraminal narrowing.    C5/C6:  No posterior disc bulge or spinal canal narrowing. Uncovertebral joint disease and facet arthropathy with severe bilateral neural foraminal narrowing.      C6/C7:  No posterior disc bulge or spinal canal narrowing. Uncovertebral joint disease and facet arthropathy with severe bilateral neural foraminal narrowing.     C7/T1: No posterior disc bulge or spinal canal narrowing. No neural foraminal narrowing.       Impression    IMPRESSION:  Head CT:    1.  No evidence of acute intracranial hemorrhage or mass effect.  2.  Mild nonspecific white matter changes.  3.  Mild brain parenchymal volume loss.    Facial Bones CT:  1.  Acute comminuted nasal bone fracture with overlying soft tissue swelling.  2.  No other evidence of acute maxillofacial fracture by CT imaging.    Cervical Spine CT:  1.  Acute oblique fracture through the anterior marginal osteophyte at C5 extending through the inferior endplate of C5.   2.  No other evidence of acute fracture or subluxation of the cervical spine by CT imaging.     Dr. Rodríguez discussed the results with Dr. Teague on 10/17/2024 8:28 PM CDT by telephone.   CT Facial Bones without Contrast    Narrative    EXAM: CT HEAD W/O CONTRAST, CT CERVICAL SPINE W/O CONTRAST, CT FACIAL BONES WITHOUT CONTRAST  LOCATION: [facility]  DATE: 10/17/2024    INDICATION: fall with LOC  COMPARISON: None.  TECHNIQUE:   1.  Routine CT Head without IV contrast. Multiplanar reformats. Dose reduction techniques were used.  2.  Routine CT Facial bones without IV contrast.  Multiplanar reformats. Dose reduction techniques were used.  3.  Routine CT Cervical spine without IV contrast. Multiplanar reformats. Dose reduction techniques were used.    FINDINGS:   Head CT:  No evidence of acute intracranial hemorrhage or mass effect. Scattered foci of decreased attenuation within the cerebral hemispheric white  matter which are nonspecific, though most commonly ascribed to chronic small vessel ischemic disease. The   ventricles and sulci are prominent consistent with mild brain parenchymal volume loss. Gray-white matter differentiation is maintained. The basilar cisterns are patent.    The globes are unremarkable. The partially imaged paranasal sinuses and mastoid air cells are unremarkable. The visualized skull base and calvarium are unremarkable.    Facial Bones CT:  Acute comminuted nasal bone fracture with overlying soft tissue swelling.  The visualized calvarium, orbits, nasorbitalethmoid complex, zygomaticomaxillary complex and pterygoid plates, maxilla and mandible are intact without evidence of fracture. No   other evidence of acute maxillofacial fracture by CT imaging.  No dislocation temporomandibular joints.  No evidence of dentoalveolar fracture.  No evidence of dental avulsion.    Complete opacification right maxillary sinus.    Cervical spine CT:  Acute oblique fracture through the anterior marginal osteophyte at C5 extending through the inferior endplate of C5. No other evidence of acute fracture or subluxation of the cervical spine by CT imaging. The vertebral bodies of the cervical spine   otherwise have normal stature and alignment. Diffuse idiopathic skeletal hyperostosis. Multilevel degenerative disc disease of the cervical spine with disc height loss, most pronounced at C4/C5. 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    The odontoid process is well approximated with the anterior body of C1 and well aligned between the lateral masses of C1.     C2/C3:  No posterior disc bulge or spinal canal narrowing. No neural foraminal narrowing.    C3/C4:  No posterior disc bulge or spinal canal narrowing. Uncovertebral joint disease and facet arthropathy with severe bilateral neural foraminal narrowing.    C4/C5:  Ossification of the posterior longitudinal ligament, eccentric to the right  with severe spinal canal narrowing. Uncovertebral joint disease and facet arthropathy with severe bilateral neural foraminal narrowing.    C5/C6:  No posterior disc bulge or spinal canal narrowing. Uncovertebral joint disease and facet arthropathy with severe bilateral neural foraminal narrowing.      C6/C7:  No posterior disc bulge or spinal canal narrowing. Uncovertebral joint disease and facet arthropathy with severe bilateral neural foraminal narrowing.     C7/T1: No posterior disc bulge or spinal canal narrowing. No neural foraminal narrowing.       Impression    IMPRESSION:  Head CT:    1.  No evidence of acute intracranial hemorrhage or mass effect.  2.  Mild nonspecific white matter changes.  3.  Mild brain parenchymal volume loss.    Facial Bones CT:  1.  Acute comminuted nasal bone fracture with overlying soft tissue swelling.  2.  No other evidence of acute maxillofacial fracture by CT imaging.    Cervical Spine CT:  1.  Acute oblique fracture through the anterior marginal osteophyte at C5 extending through the inferior endplate of C5.   2.  No other evidence of acute fracture or subluxation of the cervical spine by CT imaging.     Dr. Rodríguez discussed the results with Dr. Teague on 10/17/2024 8:28 PM CDT by telephone.   CT Cervical Spine w/o Contrast    Narrative    EXAM: CT HEAD W/O CONTRAST, CT CERVICAL SPINE W/O CONTRAST, CT FACIAL BONES WITHOUT CONTRAST  LOCATION: [facility]  DATE: 10/17/2024    INDICATION: fall with LOC  COMPARISON: None.  TECHNIQUE:   1.  Routine CT Head without IV contrast. Multiplanar reformats. Dose reduction techniques were used.  2.  Routine CT Facial bones without IV contrast.  Multiplanar reformats. Dose reduction techniques were used.  3.  Routine CT Cervical spine without IV contrast. Multiplanar reformats. Dose reduction techniques were used.    FINDINGS:   Head CT:  No evidence of acute intracranial hemorrhage or mass effect.  Scattered foci of decreased attenuation within the cerebral hemispheric white matter which are nonspecific, though most commonly ascribed to chronic small vessel ischemic disease. The   ventricles and sulci are prominent consistent with mild brain parenchymal volume loss. Gray-white matter differentiation is maintained. The basilar cisterns are patent.    The globes are unremarkable. The partially imaged paranasal sinuses and mastoid air cells are unremarkable. The visualized skull base and calvarium are unremarkable.    Facial Bones CT:  Acute comminuted nasal bone fracture with overlying soft tissue swelling.  The visualized calvarium, orbits, nasorbitalethmoid complex, zygomaticomaxillary complex and pterygoid plates, maxilla and mandible are intact without evidence of fracture. No   other evidence of acute maxillofacial fracture by CT imaging.  No dislocation temporomandibular joints.  No evidence of dentoalveolar fracture.  No evidence of dental avulsion.    Complete opacification right maxillary sinus.    Cervical spine CT:  Acute oblique fracture through the anterior marginal osteophyte at C5 extending through the inferior endplate of C5. No other evidence of acute fracture or subluxation of the cervical spine by CT imaging. The vertebral bodies of the cervical spine   otherwise have normal stature and alignment. Diffuse idiopathic skeletal hyperostosis. Multilevel degenerative disc disease of the cervical spine with disc height loss, most pronounced at C4/C5. 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    The odontoid process is well approximated with the anterior body of C1 and well aligned between the lateral masses of C1.     C2/C3:  No posterior disc bulge or spinal canal narrowing. No neural foraminal narrowing.    C3/C4:  No posterior disc bulge or spinal canal narrowing. Uncovertebral joint disease and facet arthropathy with severe bilateral neural foraminal  narrowing.    C4/C5:  Ossification of the posterior longitudinal ligament, eccentric to the right with severe spinal canal narrowing. Uncovertebral joint disease and facet arthropathy with severe bilateral neural foraminal narrowing.    C5/C6:  No posterior disc bulge or spinal canal narrowing. Uncovertebral joint disease and facet arthropathy with severe bilateral neural foraminal narrowing.      C6/C7:  No posterior disc bulge or spinal canal narrowing. Uncovertebral joint disease and facet arthropathy with severe bilateral neural foraminal narrowing.     C7/T1: No posterior disc bulge or spinal canal narrowing. No neural foraminal narrowing.       Impression    IMPRESSION:  Head CT:    1.  No evidence of acute intracranial hemorrhage or mass effect.  2.  Mild nonspecific white matter changes.  3.  Mild brain parenchymal volume loss.    Facial Bones CT:  1.  Acute comminuted nasal bone fracture with overlying soft tissue swelling.  2.  No other evidence of acute maxillofacial fracture by CT imaging.    Cervical Spine CT:  1.  Acute oblique fracture through the anterior marginal osteophyte at C5 extending through the inferior endplate of C5.   2.  No other evidence of acute fracture or subluxation of the cervical spine by CT imaging.     Dr. Rodríguez discussed the results with Dr. Teague on 10/17/2024 8:28 PM CDT by telephone.   CBC with platelets differential    Narrative    The following orders were created for panel order CBC with platelets differential.  Procedure                               Abnormality         Status                     ---------                               -----------         ------                     CBC with platelets and d...[[id number]]  Abnormal            Final result                 Please view results for these tests on the individual orders.   Comprehensive metabolic panel   Result Value Ref Range    Sodium 140 135 - 145 mmol/L    Potassium 4.2 3.4 - 5.3 mmol/L    Carbon  Dioxide (CO2) 21 (L) 22 - 29 mmol/L    Anion Gap 15 7 - 15 mmol/L    Urea Nitrogen 16.7 8.0 - 23.0 mg/dL    Creatinine 0.89 0.67 - 1.17 mg/dL    GFR Estimate 83 >60 mL/min/1.73m2    Calcium 9.1 8.8 - 10.4 mg/dL    Chloride 104 98 - 107 mmol/L    Glucose 126 (H) 70 - 99 mg/dL    Alkaline Phosphatase 49 40 - 150 U/L    AST 23 0 - 45 U/L    ALT 17 0 - 70 U/L    Protein Total 6.7 6.4 - 8.3 g/dL    Albumin 3.9 3.5 - 5.2 g/dL    Bilirubin Total 0.5 <=1.2 mg/dL   Troponin T, High Sensitivity   Result Value Ref Range    Troponin T, High Sensitivity 27 (H) <=22 ng/L   Magnesium   Result Value Ref Range    Magnesium 1.8 1.7 - 2.3 mg/dL   CBC with platelets and differential   Result Value Ref Range    WBC Count 11.5 (H) 4.0 - 11.0 10e3/uL    RBC Count 4.21 (L) 4.40 - 5.90 10e6/uL    Hemoglobin 13.2 (L) 13.3 - 17.7 g/dL    Hematocrit 37.9 (L) 40.0 - 53.0 %    MCV 90 78 - 100 fL    MCH 31.4 26.5 - 33.0 pg    MCHC 34.8 31.5 - 36.5 g/dL    RDW 13.0 10.0 - 15.0 %    Platelet Count 173 150 - 450 10e3/uL    % Neutrophils 79 %    % Lymphocytes 15 %    % Monocytes 4 %    % Eosinophils 1 %    % Basophils 0 %    % Immature Granulocytes 0 %    NRBCs per 100 WBC 0 <1 /100    Absolute Neutrophils 9.2 (H) 1.6 - 8.3 10e3/uL    Absolute Lymphocytes 1.8 0.8 - 5.3 10e3/uL    Absolute Monocytes 0.5 0.0 - 1.3 10e3/uL    Absolute Eosinophils 0.1 0.0 - 0.7 10e3/uL    Absolute Basophils 0.1 0.0 - 0.2 10e3/uL    Absolute Immature Granulocytes 0.0 <=0.4 10e3/uL    Absolute NRBCs 0.0 10e3/uL       Medications   LORazepam (ATIVAN) injection 1 mg (has no administration in time range)       Assessments & Plan (with Medical Decision Making)     I have reviewed the nursing notes.    I have reviewed the findings, diagnosis, plan and need for follow up with the patient.      Medical Decision Making  89-year-old male presenting with acute facial trauma after a syncopal episode.  He is got a extensive cardiac history as well as extensive spinal pathology.  He  has significant nasal bridge abrasions and a laceration to his left upper frontal forehead.  He is otherwise talking and is not confused.  He notes that he does not remember the fall and does not really remember much after the fall.  Has a mild headache.  His vitals are stable with a blood pressure of 158/78, temperature of 97.4 and a respiration of 18 with a room air saturating at 99% and a pulse of 77.  He is EKG is concerning for some new changes however this could be normal from patient's baseline but unable to see the last 2 to 3 years of EKGs but definitely normal from our last EKG.  With his syncopal episode concerned that there may be a possible arrhythmia or cardiac pathology associated with his sink sudden syncopal episode.  3 sutures to his laceration and applied a compression bandage to help with containing bleeding.  He does have some mild tenderness to her C6 area therefore placed patient in a collar and CT imaging ordered.  Lab work also ordered.  Troponin mild elevated 27.  CBC with mild elevated leukocytosis of 11.5 and a hemoglobin of 13.2.  CMP is unremarkable with glucose of 126 and no other acute electrolyte abnormalities.  His magnesium is stable at 1.8.  His urinalysis is pending as well as a repeat troponin.  CT imaging of the head and face did not show any acute signs of intracranial bleeding or facial fractures.  However patient does have a C5 fracture present.  Discussed case with neurosurgery on-call Dr. Boss who at this point believes with patient's chronic stenosis and the location of the fracture and fusion of the bones at that location that patient would likely require an MRI to safely discharge home.  With patient's syncopal episode spinal fracture requiring neurosurgery consult MRI as well as a cardiac telemetry and cardiac assessment to rule out any cardiac involvement in this believe patient to be better served at a center with both neurosurgery and cardiac capabilities versus  to maintain him here to this location.  Patient also prefers to go to [facility] as this is where he completes majority of his medical care.  Discussed case with on-call trauma to physician  the ER physican Dr. Lopez who understand and agree with plan.  Patient will be transferred via ALS to maintain cardiac monitoring to ensure no acute arrhythmia significant contributor to patient's symptoms today.  Patient is happy with this plan.  Pain is controlled at this time.      New Prescriptions    No medications on file       Final diagnoses:   Syncope and collapse   Facial injury, initial encounter   Other closed nondisplaced fracture of fifth cervical vertebra, initial encounter (H)       10/17/2024   [facility] EMERGENCY DEPT       Tono Teague MD  10/17/24 1458

## 2024-10-21 ENCOUNTER — TELEPHONE (OUTPATIENT)
Dept: INTERNAL MEDICINE | Facility: CLINIC | Age: 89
End: 2024-10-21
Payer: MEDICARE

## 2024-10-21 NOTE — TELEPHONE ENCOUNTER
Reason for Call:  Appointment Request    Patient requesting this type of appt:  Hospital/ED Follow-Up     Requested provider:  Any provider is okay @ [facility]    Reason patient unable to be scheduled: Not within requested timeframe    When does patient want to be seen/preferred time:  before  10/25/24- Friday. Latest Monday 10/28/24 is okay, but would have to be in the afternoon, due to a morning appt.     Comments:  Hospital follow up. [facility], due to fall and fracture. Admittted 10/17- 10-22. Dr. Fern Ledezma to leave a detailed message?: Yes at Cell number on file:    [phone]       Call taken on 10/21/2024 at 9:17 AM by CHUY CLINTON

## 2024-10-23 NOTE — TELEPHONE ENCOUNTER
Called and spoke to patient. Confirmed the appt time for 10/28 with PCP for Ed/ hosp follow up. Pt aware and verbalized understanding.     Shannan Mtz, VF

## 2024-10-24 ENCOUNTER — TRANSFERRED RECORDS (OUTPATIENT)
Dept: HEALTH INFORMATION MANAGEMENT | Facility: CLINIC | Age: 89
End: 2024-10-24

## 2024-10-28 ENCOUNTER — OFFICE VISIT (OUTPATIENT)
Dept: INTERNAL MEDICINE | Facility: CLINIC | Age: 89
End: 2024-10-28
Payer: MEDICARE

## 2024-10-28 ENCOUNTER — TELEPHONE (OUTPATIENT)
Dept: INTERNAL MEDICINE | Facility: CLINIC | Age: 89
End: 2024-10-28

## 2024-10-28 VITALS
HEART RATE: 74 BPM | TEMPERATURE: 97.3 F | DIASTOLIC BLOOD PRESSURE: 74 MMHG | OXYGEN SATURATION: 95 % | SYSTOLIC BLOOD PRESSURE: 120 MMHG | HEIGHT: 69 IN | WEIGHT: 166 LBS | BODY MASS INDEX: 24.59 KG/M2

## 2024-10-28 DIAGNOSIS — E11.9 TYPE 2 DIABETES MELLITUS WITHOUT COMPLICATION, WITHOUT LONG-TERM CURRENT USE OF INSULIN (H): ICD-10-CM

## 2024-10-28 DIAGNOSIS — R55 SYNCOPE, UNSPECIFIED SYNCOPE TYPE: ICD-10-CM

## 2024-10-28 DIAGNOSIS — Z95.0 S/P PLACEMENT OF CARDIAC PACEMAKER: ICD-10-CM

## 2024-10-28 DIAGNOSIS — S12.491D OTHER CLOSED NONDISPLACED FRACTURE OF FIFTH CERVICAL VERTEBRA WITH ROUTINE HEALING, SUBSEQUENT ENCOUNTER: Primary | ICD-10-CM

## 2024-10-28 PROCEDURE — 99214 OFFICE O/P EST MOD 30 MIN: CPT | Performed by: INTERNAL MEDICINE

## 2024-10-28 PROCEDURE — G2211 COMPLEX E/M VISIT ADD ON: HCPCS | Performed by: INTERNAL MEDICINE

## 2024-10-28 NOTE — PROGRESS NOTES
Assessment & Plan   Problem List Items Addressed This Visit       Type 2 diabetes mellitus without complication, without long-term current use of insulin (H)     Other Visit Diagnoses       Other closed nondisplaced fracture of fifth cervical vertebra with routine healing, subsequent encounter    -  Primary    Relevant Orders    Adult Neurosurgery  Referral    S/P placement of cardiac pacemaker        Syncope, unspecified syncope type               Patient who had a syncopal episode.  He fell down and injured his forehead and neck.  He was taken to the emergency room found to have a neck fracture and then transferred to [facility].  He did see neurosurgery and they recommended a cervical collar for the next 3 months.  Will try to get him set up with neurosurgery here so it is easier follow-up.  Referral is made.    He also has stitches placed in his forehead and that is well-healed and 3 sutures are removed today.    His syncopal episode was thought to be from his second-degree AV block he also has a history of atrial fibrillation they did place a pacemaker at [facility] where he normally sees his cardiologist.  He is doing well he has more energy he feels good he is walking for levels of [address].  His bandages are removed from the pacemaker site and that is clean and dry and doing well.    He does still have atrial fibrillation and he is on Eliquis for anticoagulation he is concerned about the cost but difficult for him to the clinic now therefore Coumadin would be challenging we will continue with the Eliquis now if he can afford it.  He will work with his pharmacist to get this to be cheaper.    He will continue to get rides from his friends as he cannot drive with the cervical collar in place.     MED REC REQUIRED  Post Medication Reconciliation Status: discharge medications reconciled and changed, per note/orders  FUTURE APPOINTMENTS:       - Follow-up visit in 2 months      Graciela RINALDI is  "a 89 year old, presenting for the following health issues:  Hospital F/U    [facility]         Hospital Follow-up Visit:    Hospital/Nursing Home/IP Rehab Facility:  [facility]   Date of Admission: 10/17/24  Date of Discharge: 10/22/24  Reason(s) for Admission: Fall, cervical fracture, nasal bone fracture   Was the patient in the ICU or did the patient experience delirium during hospitalization?  No  Do you have any other stressors you would like to discuss with your provider? No    Problems taking medications regularly:  None  Medication changes since discharge: clopidogrel 75 mg pacemaker  Problems adhering to non-medication therapy:  None    Summary of hospitalization:  CareEverywhere information obtained and reviewed  Diagnostic Tests/Treatments reviewed.  Follow up needed: none  Other Healthcare Providers Involved in Patient s Care:         Homecare  Update since discharge: improved.         Plan of care communicated with patient     Lives on his own, still drives.  Doing ok he says.  He is walking better at [employer], 4 set of steps.     Passed out and broke his neck, came to our ER. Then transferred to [facility].     Got a pacemaker placed due to the syncope. Had 2nd degree AV block,     Laceration on his forehead, sutures need to be removed.     Broke his neck and needs brace for 3 months, has neurosurgery follow up.         Objective    /74   Pulse 74   Temp 97.3  F (36.3  C) (Temporal)   Ht 1.74 m (5' 8.5\")   Wt 75.3 kg (166 lb)   SpO2 95%   BMI 24.87 kg/m    There is no height or weight on file to calculate BMI.  Physical Exam   No acute distress  His heart is regular  Lungs are clear  Extremities have 2+ pitting edema bilaterally  His forehead and has 3 sutures which are removed and is well-healed  His left pacemaker left chest is healed nicely as well            Signed Electronically by: Akhil Mcmahon MD    "

## 2024-10-28 NOTE — TELEPHONE ENCOUNTER
Home Care is calling regarding an established patient with [facility].    Requesting orders from: Akhil Mcmahon  Provider is following patient: Yes  Is this a 60-day recertification request?  No    Orders Requested    Skilled Nursing  Request for initial certification (first set of orders)   Frequency:  2x/wk for 1 wks  then 1x/wk for 3 wks  1-4 PRN visits     SHARAN Ruiz with [facility] would also like to clarify if patient should be taking Sotalol? She states this medication is not on patient's med list, but there is an empty bottle of this in patient's home. She states patient does not know, but she does not believe he has been taking it.     Information was gathered and will be sent to provider to confirm provider will be following patient.  RN will contact Home Care with information after provider review.  Confirmed ok to leave a detailed message with call back.  Contact information confirmed and updated as needed.      DAVE ChávezN, RN

## 2024-10-29 NOTE — TELEPHONE ENCOUNTER
RN TRIAGE CALL:    Patient Contact    Attempt # 1    Was call answered?  No.  Left message on voicemail with information to call me back. Generic message left as voicemail is not identified.     DAVE ChávezN, RN

## 2024-10-30 ENCOUNTER — PATIENT OUTREACH (OUTPATIENT)
Dept: CARE COORDINATION | Facility: CLINIC | Age: 89
End: 2024-10-30
Payer: MEDICARE

## 2024-10-30 ENCOUNTER — TELEPHONE (OUTPATIENT)
Dept: INTERNAL MEDICINE | Facility: CLINIC | Age: 89
End: 2024-10-30
Payer: MEDICARE

## 2024-10-30 NOTE — PROGRESS NOTES
Clinic Care Coordination Contact  [facility]  Received notification from in pt care management team that pt called and asked for HHA support.  The CM team directed him to ask for this support with his current home care agency of [facility].   [facility] was asked to call out to verify this was done and no other needs.     Clinical Data: Care Coordinator Outreach    Outreach Documentation Number of Outreach Attempt   10/30/2024  12:25 PM 1       Unable to leave a message due to: Busy signal.      Plan: Care Coordinator will try to reach patient again in 1-2 business days.    MARTI Murray   [facility] - Care Coordination  [facility]   [phone]

## 2024-10-30 NOTE — TELEPHONE ENCOUNTER
Home Care is calling regarding an established patient with [facility].       Requesting orders from: Akhil Mcmahon  Provider is following patient: Yes  Is this a 60-day recertification request?  No    Orders Requested    HHA (Home Health Aide)  Request for initial certification (first set of orders)   Frequency:  1x/wk for 3 wks      Information was gathered and will be sent to provider for review.  RN will contact Home Care with information after provider review.  Confirmed ok to leave a detailed message with call back.  Contact information confirmed and updated as needed.    Abbie Chen RN

## 2024-10-30 NOTE — TELEPHONE ENCOUNTER
Writer spoke with SHARAN Ruiz, October 30, 2024 at 9:22 AM gave verbal per Dr. Mcmahon approval for Home Care orders as requested.    Gerardo Salazar RN on 10/30/2024 at 9:22 AM

## 2024-10-31 NOTE — PROGRESS NOTES
Clinic Care Coordination Contact  Care Team Conversations    Per notation in chart home care that is currently in place called clinic to get orders for home health aid.  This was approved by PCP.  Since this was only need will close care coordination episode at this time.     Rica Lombardo, [facility] - Care Coordination  [facility]   [phone]

## 2024-11-08 ENCOUNTER — TELEPHONE (OUTPATIENT)
Dept: CARDIOLOGY | Facility: CLINIC | Age: 89
End: 2024-11-08
Payer: MEDICARE

## 2024-11-08 DIAGNOSIS — Z95.0 CARDIAC PACEMAKER IN SITU: Primary | ICD-10-CM

## 2024-11-08 NOTE — TELEPHONE ENCOUNTER
Pt would like to transfer cardiology care to [facility] from [facility]. He reports he had a pacemaker implanted there.     Returned call to pt. Informed him that he should be seen for his 1 week post op at [facility], but will put in orders to establish with cardiology and device through [facility].     Routed message to scheduling.

## 2024-11-11 ENCOUNTER — NURSE TRIAGE (OUTPATIENT)
Dept: INTERNAL MEDICINE | Facility: CLINIC | Age: 89
End: 2024-11-11

## 2024-11-11 ENCOUNTER — OFFICE VISIT (OUTPATIENT)
Dept: INTERNAL MEDICINE | Facility: CLINIC | Age: 89
End: 2024-11-11
Payer: MEDICARE

## 2024-11-11 VITALS
DIASTOLIC BLOOD PRESSURE: 68 MMHG | HEART RATE: 82 BPM | TEMPERATURE: 98.5 F | OXYGEN SATURATION: 99 % | HEIGHT: 69 IN | RESPIRATION RATE: 16 BRPM | SYSTOLIC BLOOD PRESSURE: 132 MMHG | WEIGHT: 170.13 LBS | BODY MASS INDEX: 25.2 KG/M2

## 2024-11-11 DIAGNOSIS — E11.9 TYPE 2 DIABETES MELLITUS WITHOUT COMPLICATION, WITHOUT LONG-TERM CURRENT USE OF INSULIN (H): ICD-10-CM

## 2024-11-11 DIAGNOSIS — R06.02 SOB (SHORTNESS OF BREATH): ICD-10-CM

## 2024-11-11 DIAGNOSIS — I25.10 ATHEROSCLEROSIS OF NATIVE CORONARY ARTERY OF NATIVE HEART WITHOUT ANGINA PECTORIS: ICD-10-CM

## 2024-11-11 DIAGNOSIS — M79.89 LEG SWELLING: Primary | ICD-10-CM

## 2024-11-11 LAB
ALBUMIN SERPL BCG-MCNC: 4.3 G/DL (ref 3.5–5.2)
ALP SERPL-CCNC: 54 U/L (ref 40–150)
ALT SERPL W P-5'-P-CCNC: 14 U/L (ref 0–70)
ANION GAP SERPL CALCULATED.3IONS-SCNC: 14 MMOL/L (ref 7–15)
AST SERPL W P-5'-P-CCNC: 19 U/L (ref 0–45)
BASOPHILS # BLD AUTO: 0.1 10E3/UL (ref 0–0.2)
BASOPHILS NFR BLD AUTO: 1 %
BILIRUB SERPL-MCNC: 0.4 MG/DL
BUN SERPL-MCNC: 12 MG/DL (ref 8–23)
CALCIUM SERPL-MCNC: 9 MG/DL (ref 8.8–10.4)
CHLORIDE SERPL-SCNC: 104 MMOL/L (ref 98–107)
CREAT SERPL-MCNC: 1.11 MG/DL (ref 0.67–1.17)
EGFRCR SERPLBLD CKD-EPI 2021: 64 ML/MIN/1.73M2
EOSINOPHIL # BLD AUTO: 0.1 10E3/UL (ref 0–0.7)
EOSINOPHIL NFR BLD AUTO: 1 %
ERYTHROCYTE [DISTWIDTH] IN BLOOD BY AUTOMATED COUNT: 13.4 % (ref 10–15)
GLUCOSE SERPL-MCNC: 108 MG/DL (ref 70–99)
HCO3 SERPL-SCNC: 23 MMOL/L (ref 22–29)
HCT VFR BLD AUTO: 36.8 % (ref 40–53)
HGB BLD-MCNC: 12.7 G/DL (ref 13.3–17.7)
IMM GRANULOCYTES # BLD: 0 10E3/UL
IMM GRANULOCYTES NFR BLD: 0 %
LYMPHOCYTES # BLD AUTO: 2.3 10E3/UL (ref 0.8–5.3)
LYMPHOCYTES NFR BLD AUTO: 25 %
MCH RBC QN AUTO: 31.4 PG (ref 26.5–33)
MCHC RBC AUTO-ENTMCNC: 34.5 G/DL (ref 31.5–36.5)
MCV RBC AUTO: 91 FL (ref 78–100)
MONOCYTES # BLD AUTO: 0.5 10E3/UL (ref 0–1.3)
MONOCYTES NFR BLD AUTO: 6 %
NEUTROPHILS # BLD AUTO: 6.3 10E3/UL (ref 1.6–8.3)
NEUTROPHILS NFR BLD AUTO: 68 %
NRBC # BLD AUTO: 0 10E3/UL
NRBC BLD AUTO-RTO: 0 /100
NT-PROBNP SERPL-MCNC: 1493 PG/ML (ref 0–1800)
PLATELET # BLD AUTO: 214 10E3/UL (ref 150–450)
POTASSIUM SERPL-SCNC: 3.9 MMOL/L (ref 3.4–5.3)
PROT SERPL-MCNC: 6.9 G/DL (ref 6.4–8.3)
RBC # BLD AUTO: 4.05 10E6/UL (ref 4.4–5.9)
SODIUM SERPL-SCNC: 141 MMOL/L (ref 135–145)
WBC # BLD AUTO: 9.3 10E3/UL (ref 4–11)

## 2024-11-11 PROCEDURE — 80053 COMPREHEN METABOLIC PANEL: CPT | Performed by: INTERNAL MEDICINE

## 2024-11-11 PROCEDURE — G2211 COMPLEX E/M VISIT ADD ON: HCPCS | Performed by: INTERNAL MEDICINE

## 2024-11-11 PROCEDURE — 85025 COMPLETE CBC W/AUTO DIFF WBC: CPT | Performed by: INTERNAL MEDICINE

## 2024-11-11 PROCEDURE — 83880 ASSAY OF NATRIURETIC PEPTIDE: CPT | Performed by: INTERNAL MEDICINE

## 2024-11-11 PROCEDURE — 99214 OFFICE O/P EST MOD 30 MIN: CPT | Performed by: INTERNAL MEDICINE

## 2024-11-11 PROCEDURE — 36415 COLL VENOUS BLD VENIPUNCTURE: CPT | Performed by: INTERNAL MEDICINE

## 2024-11-11 RX ORDER — FUROSEMIDE 20 MG/1
20 TABLET ORAL DAILY
Qty: 4 TABLET | Refills: 0 | Status: SHIPPED | OUTPATIENT
Start: 2024-11-11

## 2024-11-11 NOTE — TELEPHONE ENCOUNTER
Patient notified of providers advice. Appointment made. Labs pended. Abbie Chen RN on 11/11/2024 at 11:38 AM

## 2024-11-11 NOTE — TELEPHONE ENCOUNTER
Nurse Triage SBAR    Is this a 2nd Level Triage? YES, LICENSED PRACTITIONER REVIEW IS REQUIRED    Situation: Patient and home care nurse calling. Patient has new onset edema that is bilateral and extends from his feet to both calves and is 2-3 + pitting. He is unable to wear his shoes. He reports his weight is up 2 lbs. Denies shortness of breath. No redness or warmth to legs. /62. Edema started last week but progressively worsened over the weekend despite elevation.     Background: No hx of CHF    Assessment: Patient needs to be seen today for evaluation. He and home care nurse did ask for a course of Lasix.     Protocol Recommended Disposition:   See in Office Today    Recommendation: Please advise if you would like to see patient in clinic?     Call home care nurseMelany back [phone]    Routed to provider    Does the patient meet one of the following criteria for ADS visit consideration? No    DAVE GayleN, RN      Reason for Disposition   MODERATE swelling of both ankles (e.g., swelling extends up to the knees) AND new-onset or worsening    Additional Information   Negative: Sounds like a life-threatening emergency to the triager   Negative: Chest pain   Negative: Followed an insect bite and has localized swelling (e.g., small area of puffy or swollen skin)   Negative: Followed a knee injury   Negative: Ankle or foot injury   Negative: Pregnant with leg swelling or edema   Negative: Difficulty breathing at rest   Negative: Entire foot is cool or blue in comparison to other side   Negative: SEVERE swelling (e.g., swelling extends above knee, entire leg is swollen, weeping fluid)   Negative: Cast on leg or ankle and has increasing pain   Negative: Can't walk or can barely stand (new-onset)   Negative: Fever and red area (or area very tender to touch)   Negative: Patient sounds very sick or weak to the triager   Negative: Swelling of face, arm or hands  (Exception: Slight puffiness of fingers  during hot weather.)   Negative: Pregnant 20 or more weeks and sudden weight gain (i.e., > 2 lbs, 1 kg in one week)   Negative: Thigh or calf pain and only 1 side and present > 1 hour   Negative: Thigh, calf, or ankle swelling in only one leg   Negative: Thigh, calf, or ankle swelling in both legs, but one side is definitely more swollen (Exception: Longstanding difference between legs.)    Protocols used: Leg Swelling and Edema-A-OH

## 2024-11-11 NOTE — PROGRESS NOTES
"  Assessment & Plan   Problem List Items Addressed This Visit       Atherosclerosis of coronary artery    Type 2 diabetes mellitus without complication, without long-term current use of insulin (H)     Other Visit Diagnoses       Leg swelling    -  Primary    Relevant Medications    furosemide (LASIX) 20 MG tablet    SOB (shortness of breath)              Here with no sob, swelling in both of his legs the last few days, some salt in frozen dinners.    Will give him 4 days of lasix to bring down the fluid and weight.   Kidneys and potassium are ok. No sign or symptoms of heart failure.     Labs are reassurring with normal bnp , renal and liver panels.     Constipation still using metamucil and MOM.      Followup as needed.            BMI  Estimated body mass index is 25.49 kg/m  as calculated from the following:    Height as of this encounter: 1.74 m (5' 8.5\").    Weight as of this encounter: 77.2 kg (170 lb 2 oz).       FUTURE APPOINTMENTS:annual visit       Graciela RINALDI is a 89 year old, presenting for the following health issues:  Edema      11/11/2024     1:53 PM   Additional Questions   Roomed by Sherice BEY MA     HPI         Edema   Started 3 days ago   Both feet and both lower legs are swelling  Just had a pacemaker put in 2-3 weeks ago.    Walking steps 4 times a day.     Constipation taking metamcucil and does a little better, always uses MOM then goes.      Not much salt, weight is up 4 pounds. Does eat frozen dinners.     Labs today are ok, BNP in range, kidneys are good.           Objective    /68   Pulse 82   Temp 98.5  F (36.9  C) (Temporal)   Resp 16   Ht 1.74 m (5' 8.5\")   Wt 77.2 kg (170 lb 2 oz)   SpO2 99%   BMI 25.49 kg/m    Body mass index is 25.49 kg/m .  Physical Exam   NAD  Heart regular   Lungs clear  Ext 2+ pitting edema.         Signed Electronically by: Akhil Mcmahon MD  "

## 2024-11-13 ENCOUNTER — TELEPHONE (OUTPATIENT)
Dept: INTERNAL MEDICINE | Facility: CLINIC | Age: 89
End: 2024-11-13
Payer: MEDICARE

## 2024-11-13 NOTE — TELEPHONE ENCOUNTER
Reason for Call:  Form, our goal is to have forms completed with 72 hours, however, some forms may require a visit or additional information.    Type of letter, form or note:  Home Health Certification    Who is the form from?: Home care    Where did the form come from: form was faxed in    What clinic location was the form placed at?: [facility]    Where the form was placed: Given to physician    What number is listed as a contact on the form?: [phone]       Additional comments: [facility]    Call taken on 11/13/2024 at 2:50 PM by Penelope Dyer

## 2024-11-18 ENCOUNTER — TELEPHONE (OUTPATIENT)
Dept: INTERNAL MEDICINE | Facility: CLINIC | Age: 89
End: 2024-11-18
Payer: MEDICARE

## 2024-11-18 DIAGNOSIS — M79.89 LEG SWELLING: ICD-10-CM

## 2024-11-18 RX ORDER — FUROSEMIDE 20 MG/1
20 TABLET ORAL DAILY
Qty: 30 TABLET | Refills: 3 | Status: SHIPPED | OUTPATIENT
Start: 2024-11-18

## 2024-11-18 NOTE — TELEPHONE ENCOUNTER
Called and notified SHARAN Mosley of PCP response, per below note. She stated she was driving so she would like writer to call and notify patient of prescription so he can potentially pick it up today. Called and notified patient of PCP response, per below note. He state she will go  the prescription from [facility] today. He was undecided about if he wants to do the labs in clinic or have home care draw them. He states he will discuss it with his home care nurse later this week and let us know. Called SHARAN Mosley back and notified her that patient was undecided about if he wants to come to clinic for labs or have home care draw them. She states she will discuss it with patient on Friday and will help him make a lab only appointment if needed.     Sara Nguyen, BSN, RN

## 2024-11-18 NOTE — TELEPHONE ENCOUNTER
Home care checked weight today and he is up 5 pounds from last week.    3 plus pitting edema from below the knees down. No weeping.    Patient had reported shoes fitting better last Wednesday after starting  Lasix Tuesday.    Some shortness of breath with stairs, but nothing with normal walking.  No redness.  No chest pain.    Please advise.    Abbie Chen RN on 11/18/2024 at 9:36 AM

## 2024-11-18 NOTE — TELEPHONE ENCOUNTER
Please have him try taking the Lasix 20 mg every day.  I sent a prescription to [facility].  Recheck her lab after a week of taking the Lasix continuously.

## 2024-11-19 NOTE — TELEPHONE ENCOUNTER
Spoke with pt. He is [place] in [place] on [place]12[place]24 [place] for 6-8 week post [employer] Sophie. Implanted at [facility]. Provider follow up scheduled for 4/2025.

## 2024-11-25 ENCOUNTER — TELEPHONE (OUTPATIENT)
Dept: INTERNAL MEDICINE | Facility: CLINIC | Age: 89
End: 2024-11-25
Payer: MEDICARE

## 2024-11-25 ENCOUNTER — TELEPHONE (OUTPATIENT)
Dept: CARDIOLOGY | Facility: CLINIC | Age: 89
End: 2024-11-25
Payer: MEDICARE

## 2024-11-25 NOTE — TELEPHONE ENCOUNTER
Received message from Kristen at [facility] ([phone]).  Per Kristen, patient will be seeing them in their device clinic tomorrow.  She stated there seems to be some confusion regarding where patient is following and she was calling to confirm if he was following with us or not.      Per chart review, patient is scheduled to establish care with our device clinic on 12/12/24 and with Dr. Anna in 4/2025.  Per the appointment notes for the upcoming device check, patient's device was implanted at [facility].    Called and spoke with Kristen and patient is scheduled for his 6 week follow-up with them tomorrow, 11/26/24.  Patient is not scheduled to establish care with our cardiology clinic until 4/2025.  Recommended patient keep his follow-up with [facility] tomorrow and then be seen for a device check at our clinic in 4/2025 after seeing the provider.  Kristen agreed that this seemed safest for the patient.     Called and spoke with patient and reviewed recommendation to keep his 6 week check at [facility] and then to transfer care to our device clinic after he has established cardiology care with Dr. Anna in April.  Patient verbalized understanding and agreement with plan.  Device check for 12/12/24 cancelled and rescheduled for 4/3/25.      SHARAN Correa

## 2024-11-25 NOTE — TELEPHONE ENCOUNTER
Melany RN with [facility]  calling asking if lasix dose can be increased for a couple days to help with edema. Melany states patient is currently taking lasix 20mg daily. (Started on 11/18 for same symptoms - see telephone encounter 11/18).    Today: Reports +3 pitting edema to BLE below knees. Denies SOB, however nurse does note some dyspnea with stairs. Per report, lung sounds clear. No chest pain.    Weight on Friday 170lbs. Weight today 171lbs.    Vitals during today's visit:  /78 (left arm, standing)  RR 18  HR 87  O2: 98%  T: 97.7    Michelle Frankel RN on 11/25/2024 at 8:36 AM

## 2024-11-25 NOTE — TELEPHONE ENCOUNTER
Writer spoke with Melany November 25, 2024 at 10:17 AM gave verbal per Dr. Mcmahon approval for Home Care orders as requested.    RN will add visit to see patient on Friday and return call if necessary.     Gerardo Salazar RN on 11/25/2024 at 10:17 AM

## 2024-12-04 ENCOUNTER — OFFICE VISIT (OUTPATIENT)
Dept: INTERNAL MEDICINE | Facility: CLINIC | Age: 89
End: 2024-12-04
Payer: MEDICARE

## 2024-12-04 ENCOUNTER — TELEPHONE (OUTPATIENT)
Dept: INTERNAL MEDICINE | Facility: CLINIC | Age: 89
End: 2024-12-04

## 2024-12-04 VITALS
WEIGHT: 163 LBS | SYSTOLIC BLOOD PRESSURE: 122 MMHG | TEMPERATURE: 97.6 F | HEART RATE: 80 BPM | OXYGEN SATURATION: 99 % | BODY MASS INDEX: 24.71 KG/M2 | DIASTOLIC BLOOD PRESSURE: 76 MMHG | HEIGHT: 68 IN

## 2024-12-04 DIAGNOSIS — M79.89 LEG SWELLING: Primary | ICD-10-CM

## 2024-12-04 LAB
ANION GAP SERPL CALCULATED.3IONS-SCNC: 16 MMOL/L (ref 7–15)
BUN SERPL-MCNC: 18.2 MG/DL (ref 8–23)
CALCIUM SERPL-MCNC: 9.2 MG/DL (ref 8.8–10.4)
CHLORIDE SERPL-SCNC: 101 MMOL/L (ref 98–107)
CREAT SERPL-MCNC: 1.04 MG/DL (ref 0.67–1.17)
EGFRCR SERPLBLD CKD-EPI 2021: 69 ML/MIN/1.73M2
GLUCOSE SERPL-MCNC: 139 MG/DL (ref 70–99)
HCO3 SERPL-SCNC: 21 MMOL/L (ref 22–29)
POTASSIUM SERPL-SCNC: 3.9 MMOL/L (ref 3.4–5.3)
SODIUM SERPL-SCNC: 138 MMOL/L (ref 135–145)

## 2024-12-04 PROCEDURE — 36415 COLL VENOUS BLD VENIPUNCTURE: CPT | Performed by: INTERNAL MEDICINE

## 2024-12-04 PROCEDURE — 80048 BASIC METABOLIC PNL TOTAL CA: CPT | Performed by: INTERNAL MEDICINE

## 2024-12-04 PROCEDURE — 99213 OFFICE O/P EST LOW 20 MIN: CPT | Performed by: INTERNAL MEDICINE

## 2024-12-04 PROCEDURE — G2211 COMPLEX E/M VISIT ADD ON: HCPCS | Performed by: INTERNAL MEDICINE

## 2024-12-04 ASSESSMENT — PAIN SCALES - GENERAL: PAINLEVEL_OUTOF10: NO PAIN (0)

## 2024-12-04 NOTE — LETTER
December 5, 2024      NIMCO Shah Jr.  [address]   [place] [address]            Dear ,    We are writing to inform you of your test results.    Your kidneys are good, continue lasix 20mg a day per your provider. If you have any questions please contact us at [phone]    Resulted Orders   Basic metabolic panel  (Ca, Cl, CO2, Creat, Gluc, K, Na, BUN)   Result Value Ref Range    Sodium 138 135 - 145 mmol/L    Potassium 3.9 3.4 - 5.3 mmol/L    Chloride 101 98 - 107 mmol/L    Carbon Dioxide (CO2) 21 (L) 22 - 29 mmol/L    Anion Gap 16 (H) 7 - 15 mmol/L    Urea Nitrogen 18.2 8.0 - 23.0 mg/dL    Creatinine 1.04 0.67 - 1.17 mg/dL    GFR Estimate 69 >60 mL/min/1.73m2      Comment:      eGFR calculated using 2021 CKD-EPI equation.    Calcium 9.2 8.8 - 10.4 mg/dL      Comment:      Reference intervals for this test were updated on 7/16/2024 to reflect our healthy population more accurately. There may be differences in the flagging of prior results with similar values performed with this method. Those prior results can be interpreted in the context of the updated reference intervals.    Glucose 139 (H) 70 - 99 mg/dL       If you have any questions or concerns, please call the clinic at the number listed above.

## 2024-12-04 NOTE — PROGRESS NOTES
"  Assessment & Plan   Problem List Items Addressed This Visit    None  Visit Diagnoses       Leg swelling    -  Primary           Leg swelling is much better.  I saw him on November 11.  His weight is down 7 to 8 pounds here 10 pounds at home.  He is getting home care and he has been taking Lasix 20 mg a day sometimes up to 40.  Will continue on 20 mg daily to hopefully get his swelling down a few more pounds.  We will check his kidney function and potassium today since has been on a diuretic.  His BNP and other labs were stable previously.  His lungs are now clear and he is doing well elevating his legs.       The longitudinal plan of care for the diagnosis(es)/condition(s) as documented were addressed during this visit. Due to the added complexity in care, I will continue to support NIMCO in the subsequent management and with ongoing continuity of care.      Graciela RINALDI is a 89 year old, presenting for the following health issues:  Edema      12/4/2024    10:21 AM   Additional Questions   Roomed by Neyda STEWART     History of Present Illness       Reason for visit:  Edema check up          Medication Followup of Lasix  Taking Medication as prescribed: yes  Side Effects:  None  Medication Helping Symptoms:  yes, leg swelling went down, still little swelling but much better       Took lasix for several days and legs got better, swelling is down, weight is down.   Breathing is good.     Nurse sets up his pills, he is unsure if on lasix.  Even went up to 40 mg on Nov 25 but now ok on 20mg daily.     Walking lots when seeing his girlfriend.     Checking the weight every day, down 10 pounds in a week or so.          Objective    /76   Pulse 80   Temp 97.6  F (36.4  C) (Temporal)   Ht 1.727 m (5' 8\")   Wt 73.9 kg (163 lb)   SpO2 99%   BMI 24.78 kg/m    Body mass index is 24.78 kg/m .  Physical Exam   No acute distress  Heart is regular  Lungs are clear  Extremities have trace edema but much improved " bilaterally          Signed Electronically by: Akhil Mcmahon MD

## 2024-12-04 NOTE — TELEPHONE ENCOUNTER
----- Message from Akhil Mcmahon sent at 12/4/2024 12:06 PM CST -----  Please call him and let him know his kidneys are good, continue lasix 20mg a day

## 2024-12-09 ENCOUNTER — ANCILLARY PROCEDURE (OUTPATIENT)
Dept: GENERAL RADIOLOGY | Facility: CLINIC | Age: 89
End: 2024-12-09
Attending: NURSE PRACTITIONER
Payer: MEDICARE

## 2024-12-09 ENCOUNTER — OFFICE VISIT (OUTPATIENT)
Dept: NEUROSURGERY | Facility: CLINIC | Age: 89
End: 2024-12-09
Attending: INTERNAL MEDICINE
Payer: MEDICARE

## 2024-12-09 VITALS
WEIGHT: 165 LBS | SYSTOLIC BLOOD PRESSURE: 122 MMHG | BODY MASS INDEX: 25.09 KG/M2 | RESPIRATION RATE: 16 BRPM | TEMPERATURE: 97 F | HEART RATE: 80 BPM | DIASTOLIC BLOOD PRESSURE: 70 MMHG

## 2024-12-09 DIAGNOSIS — S12.491D OTHER CLOSED NONDISPLACED FRACTURE OF FIFTH CERVICAL VERTEBRA WITH ROUTINE HEALING, SUBSEQUENT ENCOUNTER: ICD-10-CM

## 2024-12-09 DIAGNOSIS — S12.400A C5 CERVICAL FRACTURE (H): Primary | ICD-10-CM

## 2024-12-09 DIAGNOSIS — S12.400A C5 CERVICAL FRACTURE (H): ICD-10-CM

## 2024-12-09 PROCEDURE — 99213 OFFICE O/P EST LOW 20 MIN: CPT | Performed by: NURSE PRACTITIONER

## 2024-12-09 PROCEDURE — 72040 X-RAY EXAM NECK SPINE 2-3 VW: CPT | Mod: TC | Performed by: PREVENTIVE MEDICINE

## 2024-12-09 ASSESSMENT — PAIN SCALES - GENERAL: PAINLEVEL_OUTOF10: NO PAIN (0)

## 2024-12-09 NOTE — LETTER
"12/9/2024      Ulices Shah Jr.  [address]  [place] [address]      Dear Colleague,    Thank you for referring your patient, Ulices Shah Jr., to the [facility] [place]. Please see a copy of my visit note below.    [facility]  Neurosurgery Clinic Visit      CC: syncopal episode    Primary care Provider: Akhil Mcmahon    Reason For Visit:   I was asked by Dr. Akhil Mcmahon to consult on the patient for other closed nondisplaced fracture of the fifth cervical vertebra, subsequent encounter.    HPI: Ulices Shah Jr. is a 89 year old male with a syncopal episode 10/17/2024. He presented to the ED and was found to have a nondisplaced fracture of C5 osteophyte. He was placed in a cervical collar and transferred to [facility] for a cervical MRI and further recommendations. He was seen by NSGY at [facility] and was recommended to wear a cervical collar for 3 months. Today he presents for evaluation. He denies any neck pain, radicular arm pain, paresthesias or overt weakness. Has been wearing his cervical collar at all times as recommended. No new or worsened symptoms.     Past Medical History:   Diagnosis Date     Actinic keratosis      Cancer (H)      Heart disease      History of blood transfusion      History of nonmelanoma skin cancer      Type 2 diabetes mellitus without complication, without long-term current use of insulin (H) 11/13/2020     Unspecified essential hypertension        Past Medical History reviewed with patient during visit.    Past Surgical History:   Procedure Laterality Date     COLONOSCOPY  12/10/08     COLONOSCOPY N/A 3/7/2019    Procedure: COMBINED COLONOSCOPY,  MULTIPLE POLYPECTOMY BY BIOPSY;  Surgeon: Brayden Sharma DO;  Location: [facility]     EXCISE MASS HAND Right 02/2017    \"skin cancer\" removal     HC REMV CATARACT EXTRACAP,INSERT LENS, W/O ECP  10/21/2004    left     HC REMV CATARACT EXTRACAP,INSERT LENS, W/O ECP  " 11/18/2004    right     HC YAG LASER CAPSULOTOMY  06/18/09    right eye     HEART CATH, ANGIOPLASTY  4/30/12    3 stents     HEMILAMINECTOMY, DISCECTOMY LUMBAR THREE LEVELS, COMBINED  6/26/2013    Procedure: COMBINED HEMILAMINECTOMY, DISCECTOMY LUMBAR THREE LEVELS;  Bilateral L3, L4 and L5 Guille-Laminectomies;  Surgeon: Ollie Dodson MD;  Location:  OR     [facility] APPENDECTOMY  1949     [facility] EXPLOR HEART SURG WND W CP BYPASS  10/25/01    4 way heart bypass     Past Surgical History reviewed with patient during visit.    Current Outpatient Medications   Medication Sig Dispense Refill     apixaban ANTICOAGULANT (ELIQUIS ANTICOAGULANT) 5 MG tablet Take 1 tablet (5 mg) by mouth 2 times daily.       blood glucose (NO BRAND SPECIFIED) lancets standard Use to test blood sugar 1 times daily 100 Lancet 1     blood glucose (NO BRAND SPECIFIED) test strip Use to test blood sugar 1 times daily 100 strip 3     blood glucose monitoring (NO BRAND SPECIFIED) meter device kit Use to test blood sugar 1 times daily 1 kit 0     carvedilol (COREG) 25 MG tablet Take 25 mg by mouth 2 times daily (with meals).       clopidogrel (PLAVIX) 75 MG tablet Take 1 tablet (75 mg) by mouth daily 90 tablet 3     EQ ALLERGY RELIEF, CETIRIZINE, 10 MG tablet Take 1 tablet by mouth once daily 90 tablet 0     fluticasone (FLONASE) 50 MCG/ACT nasal spray Spray 1 spray into both nostrils daily 16 g 0     fluticasone (FLONASE) 50 MCG/ACT nasal spray Spray 2 sprays into both nostrils daily 1 g 6     furosemide (LASIX) 20 MG tablet Take 1 tablet (20 mg) by mouth daily. 30 tablet 3     lisinopril (PRINIVIL,ZESTRIL) 5 MG tablet Take 5 mg by mouth 2 times daily       metFORMIN (GLUCOPHAGE XR) 500 MG 24 hr tablet Take 1 tablet (500 mg) by mouth 2 times daily (with meals). 180 tablet 3     mirtazapine (REMERON) 15 MG tablet Take 1 tablet (15 mg) by mouth At Bedtime 90 tablet 3     Omega-3 Fatty Acids (FISH OIL) 1000 MG CPDR Take 1 capsule by mouth 2 times  daily.       psyllium (METAMUCIL/KONSYL) 58.6 % powder Take 18 g (1 Tablespoonful) by mouth daily. 660 g 1     rosuvastatin (CRESTOR) 20 MG tablet Take 1 tablet (20 mg) by mouth daily       sotalol (BETAPACE) 80 MG tablet TAKE 1 TABLET BY MOUTH EVERY 12 HOURS       No current facility-administered medications for this visit.       Allergies   Allergen Reactions     No Known Drug Allergy        Social History     Socioeconomic History     Marital status: Single     Spouse name: None     Number of children: 0     Years of education: 12     Highest education level: None   Occupational History     Occupation: retired     Employer: RETIRED   Tobacco Use     Smoking status: Never     Smokeless tobacco: Never   Vaping Use     Vaping status: Never Used   Substance and Sexual Activity     Alcohol use: Not Currently     Comment: rare, maybe 3 beers all year.     Drug use: No     Sexual activity: Not Currently   Other Topics Concern      Service Yes     Comment: [employer] for 3 years.     Blood Transfusions Yes     Comment: with heart surgery and when had a bleeding ulcer     Caffeine Concern No     Occupational Exposure Yes     Comment: asphalt fumes over the years     Hobby Hazards Yes     Comment: saw dust with wood,      Sleep Concern Yes     Comment: hard to get asleep and once wakes up is wide awake.      Stress Concern No     Weight Concern No     Special Diet Yes     Comment: cholesterol      Back Care No     Exercise Yes     Comment: 3-4 times a week and rides alot of bike (900 miles)     Bike Helmet Yes     Seat Belt Yes     Self-Exams Yes     Social Drivers of Health     Financial Resource Strain: Low Risk  (10/18/2024)    Received from [employer]    Financial Resource Strain      Difficulty of Paying Living Expenses: 3   Food Insecurity: No Food Insecurity (10/18/2024)    Received from [employer]    Food Insecurity      Do you worry  your food will run out before you are able to buy more?: 1   Transportation Needs: No Transportation Needs (10/18/2024)    Received from [facility]    Transportation Needs      Does lack of transportation keep you from medical appointments?: 1      Does lack of transportation keep you from work, meetings or getting things that you need?: 1   Social Connections: Socially Integrated (10/18/2024)    Received from [facility] [employer] [facility]    Social Connections      Do you often feel lonely or isolated from those around you?: 0   Interpersonal Safety: Low Risk  (9/18/2024)    Interpersonal Safety      Do you feel physically and emotionally safe where you currently live?: Yes      Within the past 12 months, have you been hit, slapped, kicked or otherwise physically hurt by someone?: No      Within the past 12 months, have you been humiliated or emotionally abused in other ways by your partner or ex-partner?: No   Housing Stability: Low Risk  (10/18/2024)    Received from [facility]    Housing Stability      What is your housing situation today?: 1       Family History   Problem Relation Age of Onset     C.A.D. Mother      Alzheimer Disease Mother      Neurologic Disorder Mother 96        dementia     Cancer Father         colon     Cancer - colorectal Father          ROS: 10 point ROS neg other than the symptoms noted above in the HPI.    Vital Signs:   /70   Pulse 80   Temp 97  F (36.1  C) (Temporal)   Resp 16   Wt 165 lb (74.8 kg)   BMI 25.09 kg/m        Examination:  Constitutional:  Alert, well nourished, NAD.  Memory: recent and remote memory   HEENT: Normocephalic, atraumatic.   Pulm:  Without shortness of breath   CV:  No pitting edema of BLE.      Neurological:  Awake  Alert  Oriented x 3  Speech clear    Motor exam:   Shoulder Abduction:   Right:  5/5   Left:  5/5  Biceps:                        Right:  5/5   Left:   5/5  Triceps:                       Right:  5/5   Left:  5/5  Wrist Extensors:          Right:  5/5   Left:  5/5  Wrist Flexors:              Right:  5/5   Left:  5/5  Intrinsics:                     Right:  5/5   Left:  5/5    Sensation normal to bilateral upper and lower extremities    Imaging:   Narrative & Impression   EXAM: XR CERVICAL SPINE 2/3 VIEWS 12/9/2024 1:55 PM     HISTORY: C5 cervical fracture (H)      COMPARISON: 10/17/2024.                                                                      IMPRESSION: Anterior C5 osteophyte/discovertebral unit fracture not  visualized on radiographs. Similar presumed sequelae of diffuse  idiopathic skeletal hyperostosis. Preserved vertebral body alignment  including similar trace C2-C3 anterolisthesis. No prevertebral edema.  Nonfocal extraspinal structures.   C  Assessment/Plan:   Cervical 5 fracture    Recommend continue to wear the cervical collar at all times. Follow up in 6 weeks with cervical CT prior. He verbalized understanding and agreement.     Patient Instructions   -Continue to wear your cervical collar at all times as previously recommended.  -Follow up in 6 weeks with cervical CT prior.   -Please contact our clinic with questions or concerns at [phone].    Lauryn Mulligan CNP  [facility]  [address] [place] [id number]  Tel [phone]  Fax [phone]      Again, thank you for allowing me to participate in the care of your patient.        Sincerely,        Lauryn Mulligan, FLORENC[facility]

## 2024-12-09 NOTE — PATIENT INSTRUCTIONS
-Continue to wear your cervical collar at all times as previously recommended.  -Follow up in 6 weeks with cervical CT prior.   -Please contact our clinic with questions or concerns at [phone].

## 2024-12-09 NOTE — NURSING NOTE
"Ulices Shah Jr. is a 89 year old male who presents for:  Chief Complaint   Patient presents with    Neurologic Problem     10/17/2024 Other closed nondisplaced fracture of fifth cervical vertebra with routine healing        Initial Vitals:  /70   Pulse 80   Temp 97  F (36.1  C) (Temporal)   Resp 16   Wt 165 lb (74.8 kg)   BMI 25.09 kg/m   Estimated body mass index is 25.09 kg/m  as calculated from the following:    Height as of 12/4/24: 5' 8\" (1.727 m).    Weight as of this encounter: 165 lb (74.8 kg).. Body surface area is 1.89 meters squared. BP completed using cuff size: regular  No Pain (0)    Nursing Comments:     Deanna Wilson    "

## 2024-12-09 NOTE — PROGRESS NOTES
"[facility]  Neurosurgery Clinic Visit      CC: syncopal episode    Primary care Provider: Akhil Mcmahon    Reason For Visit:   I was asked by Dr. Akhil Mcmahon to consult on the patient for other closed nondisplaced fracture of the fifth cervical vertebra, subsequent encounter.    HPI: Ulices Shah Jr. is a 89 year old male with a syncopal episode 10/17/2024. He presented to the ED and was found to have a nondisplaced fracture of C5 osteophyte. He was placed in a cervical collar and transferred to [facility] for a cervical MRI and further recommendations. He was seen by NSGY at [facility] and was recommended to wear a cervical collar for 3 months. Today he presents for evaluation. He denies any neck pain, radicular arm pain, paresthesias or overt weakness. Has been wearing his cervical collar at all times as recommended. No new or worsened symptoms.     Past Medical History:   Diagnosis Date    Actinic keratosis     Cancer (H)     Heart disease     History of blood transfusion     History of nonmelanoma skin cancer     Type 2 diabetes mellitus without complication, without long-term current use of insulin (H) 11/13/2020    Unspecified essential hypertension        Past Medical History reviewed with patient during visit.    Past Surgical History:   Procedure Laterality Date    COLONOSCOPY  12/10/08    COLONOSCOPY N/A 3/7/2019    Procedure: COMBINED COLONOSCOPY,  MULTIPLE POLYPECTOMY BY BIOPSY;  Surgeon: Brayden Sharma DO;  Location:  GI    EXCISE MASS HAND Right 02/2017    \"skin cancer\" removal    HC REMV CATARACT EXTRACAP,INSERT LENS, W/O ECP  10/21/2004    left    HC REMV CATARACT EXTRACAP,INSERT LENS, W/O ECP  11/18/2004    right    HC YAG LASER CAPSULOTOMY  06/18/09    right eye    HEART CATH, ANGIOPLASTY  4/30/12    3 stents    HEMILAMINECTOMY, DISCECTOMY LUMBAR THREE LEVELS, COMBINED  6/26/2013    Procedure: COMBINED HEMILAMINECTOMY, DISCECTOMY LUMBAR THREE LEVELS;  Bilateral L3, L4 and " L5 Guille-Laminectomies;  Surgeon: Ollie Dodson MD;  Location:  OR    [facility] APPENDECTOMY  1949    [facility] EXPLOR HEART SURG WND W CP BYPASS  10/25/01    4 way heart bypass     Past Surgical History reviewed with patient during visit.    Current Outpatient Medications   Medication Sig Dispense Refill    apixaban ANTICOAGULANT (ELIQUIS ANTICOAGULANT) 5 MG tablet Take 1 tablet (5 mg) by mouth 2 times daily.      blood glucose (NO BRAND SPECIFIED) lancets standard Use to test blood sugar 1 times daily 100 Lancet 1    blood glucose (NO BRAND SPECIFIED) test strip Use to test blood sugar 1 times daily 100 strip 3    blood glucose monitoring (NO BRAND SPECIFIED) meter device kit Use to test blood sugar 1 times daily 1 kit 0    carvedilol (COREG) 25 MG tablet Take 25 mg by mouth 2 times daily (with meals).      clopidogrel (PLAVIX) 75 MG tablet Take 1 tablet (75 mg) by mouth daily 90 tablet 3    EQ ALLERGY RELIEF, CETIRIZINE, 10 MG tablet Take 1 tablet by mouth once daily 90 tablet 0    fluticasone (FLONASE) 50 MCG/ACT nasal spray Spray 1 spray into both nostrils daily 16 g 0    fluticasone (FLONASE) 50 MCG/ACT nasal spray Spray 2 sprays into both nostrils daily 1 g 6    furosemide (LASIX) 20 MG tablet Take 1 tablet (20 mg) by mouth daily. 30 tablet 3    lisinopril (PRINIVIL,ZESTRIL) 5 MG tablet Take 5 mg by mouth 2 times daily      metFORMIN (GLUCOPHAGE XR) 500 MG 24 hr tablet Take 1 tablet (500 mg) by mouth 2 times daily (with meals). 180 tablet 3    mirtazapine (REMERON) 15 MG tablet Take 1 tablet (15 mg) by mouth At Bedtime 90 tablet 3    Omega-3 Fatty Acids (FISH OIL) 1000 MG CPDR Take 1 capsule by mouth 2 times daily.      psyllium (METAMUCIL/KONSYL) 58.6 % powder Take 18 g (1 Tablespoonful) by mouth daily. 660 g 1    rosuvastatin (CRESTOR) 20 MG tablet Take 1 tablet (20 mg) by mouth daily      sotalol (BETAPACE) 80 MG tablet TAKE 1 TABLET BY MOUTH EVERY 12 HOURS       No current facility-administered medications  for this visit.       Allergies   Allergen Reactions    No Known Drug Allergy        Social History     Socioeconomic History    Marital status: Single     Spouse name: None    Number of children: 0    Years of education: 12    Highest education level: None   Occupational History    Occupation: retired     Employer: RETIRED   Tobacco Use    Smoking status: Never    Smokeless tobacco: Never   Vaping Use    Vaping status: Never Used   Substance and Sexual Activity    Alcohol use: Not Currently     Comment: rare, maybe 3 beers all year.    Drug use: No    Sexual activity: Not Currently   Other Topics Concern     Service Yes     Comment: [employer] for 3 years.    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     Stress Concern No    Weight Concern No    Special Diet Yes     Comment: cholesterol     Back Care No    Exercise Yes     Comment: 3-4 times a week and rides alot of bike (900 miles)    Bike Helmet Yes    Seat Belt Yes    Self-Exams Yes     Social Drivers of Health     Financial Resource Strain: Low Risk  (10/18/2024)    Received from [facility]    Financial Resource Strain     Difficulty of Paying Living Expenses: 3   Food Insecurity: No Food Insecurity (10/18/2024)    Received from [facility]    Food Insecurity     Do you worry your food will run out before you are able to buy more?: 1   Transportation Needs: No Transportation Needs (10/18/2024)    [facility]    Transportation Needs     Does lack of transportation keep you from medical appointments?: 1     Does lack of transportation keep you from work, meetings or  getting things that you need?: 1   Social Connections: Socially Integrated (10/18/2024)    Received from [facility]    Social Connections     Do you often feel lonely or isolated from those around you?: 0   Interpersonal Safety: Low Risk  (9/18/2024)    Interpersonal Safety     Do you feel physically and emotionally safe where you currently live?: Yes     Within the past 12 months, have you been hit, slapped, kicked or otherwise physically hurt by someone?: No     Within the past 12 months, have you been humiliated or emotionally abused in other ways by your partner or ex-partner?: No   Housing Stability: Low Risk  (10/18/2024)    Received from [facility]    Housing Stability     What is your housing situation today?: 1       Family History   Problem Relation Age of Onset    C.A.D. Mother     Alzheimer Disease Mother     Neurologic Disorder Mother 96        dementia    Cancer Father         colon    Cancer - colorectal Father          ROS: 10 point ROS neg other than the symptoms noted above in the HPI.    Vital Signs:   /70   Pulse 80   Temp 97  F (36.1  C) (Temporal)   Resp 16   Wt 165 lb (74.8 kg)   BMI 25.09 kg/m        Examination:  Constitutional:  Alert, well nourished, NAD.  Memory: recent and remote memory   HEENT: Normocephalic, atraumatic.   Pulm:  Without shortness of breath   CV:  No pitting edema of BLE.      Neurological:  Awake  Alert  Oriented x 3  Speech clear    Motor exam:   Shoulder Abduction:   Right:  5/5   Left:  5/5  Biceps:                        Right:  5/5   Left:  5/5  Triceps:                       Right:  5/5   Left:  5/5  Wrist Extensors:          Right:  5/5   Left:  5/5  Wrist Flexors:              Right:  5/5   Left:  5/5  Intrinsics:                     Right:  5/5   Left:  5/5    Sensation normal to bilateral upper and lower extremities    Imaging:   Narrative & Impression   EXAM: XR CERVICAL SPINE 2/3  VIEWS 12/9/2024 1:55 PM     HISTORY: C5 cervical fracture (H)      COMPARISON: 10/17/2024.                                                                      IMPRESSION: Anterior C5 osteophyte/discovertebral unit fracture not  visualized on radiographs. Similar presumed sequelae of diffuse  idiopathic skeletal hyperostosis. Preserved vertebral body alignment  including similar trace C2-C3 anterolisthesis. No prevertebral edema.  Nonfocal extraspinal structures.   C  Assessment/Plan:   Cervical 5 fracture    Recommend continue to wear the cervical collar at all times. Follow up in 6 weeks with cervical CT prior. He verbalized understanding and agreement.     Patient Instructions   -Continue to wear your cervical collar at all times as previously recommended.  -Follow up in 6 weeks with cervical CT prior.   -Please contact our clinic with questions or concerns at [phone].    Lauryn Mulligan, [facility]  [address] [place] [id number]  Tel [phone]  Fax [phone]

## 2024-12-10 DIAGNOSIS — E11.9 TYPE 2 DIABETES MELLITUS WITHOUT COMPLICATION, WITHOUT LONG-TERM CURRENT USE OF INSULIN (H): ICD-10-CM

## 2024-12-17 ENCOUNTER — TELEPHONE (OUTPATIENT)
Dept: INTERNAL MEDICINE | Facility: CLINIC | Age: 89
End: 2024-12-17
Payer: MEDICARE

## 2024-12-17 NOTE — TELEPHONE ENCOUNTER
Reason for Call:  Appointment Request    Patient requesting this type of appt:  Amari    Requested provider: Akhil Mcmahon    Reason patient unable to be scheduled: Not within requested timeframe    When does patient want to be seen/preferred time: Same day    Comments: Patient went to the gas station to pump gas and suddenly experiences confusion, forgot how to pump gas and how to open door. Requesting a visit with pcp asap.     Okay to leave a detailed message?: Yes at Home number on file [phone] (home)    Call taken on 12/17/2024 at 3:19 PM by Gabbie Castillo

## 2024-12-17 NOTE — TELEPHONE ENCOUNTER
MA called patient to schedule. He is wondering if he needs to be seen by Dr. Mcmahon or not. Wondering what he should do for memory concerns. He stated that he had to have a  help with his gas. He is worried about if he should be driving or not.       Neyda Mcclelland MA

## 2024-12-18 ENCOUNTER — VIRTUAL VISIT (OUTPATIENT)
Dept: INTERNAL MEDICINE | Facility: CLINIC | Age: 89
End: 2024-12-18
Payer: MEDICARE

## 2024-12-18 DIAGNOSIS — R41.0 CONFUSION: Primary | ICD-10-CM

## 2024-12-18 DIAGNOSIS — E11.9 TYPE 2 DIABETES MELLITUS WITHOUT COMPLICATION, WITHOUT LONG-TERM CURRENT USE OF INSULIN (H): ICD-10-CM

## 2024-12-18 PROCEDURE — 99441 PR PHYSICIAN TELEPHONE EVALUATION 5-10 MIN: CPT | Performed by: INTERNAL MEDICINE

## 2024-12-18 NOTE — PROGRESS NOTES
NIMCO is a 89 year old who is being evaluated via a billable telephone visit.    What phone number would you like to be contacted at? [phone]   How would you like to obtain your AVS? Mail a copy  Originating Location (pt. Location): Home    Distant Location (provider location):  On-site    Assessment & Plan   Problem List Items Addressed This Visit       Type 2 diabetes mellitus without complication, without long-term current use of insulin (H)     Other Visit Diagnoses       Confusion    -  Primary           Confusion, just one time, no sign of infection, no medication causes. Doing well. Will continue as he is doing.    If more episodes will let me know and then more work up.     Perfect memory and mentation today.    Subjective   NIMCO is a 89 year old, presenting for the following health issues:  Follow Up        12/18/2024    12:01 PM   Additional Questions   Roomed by Shannan     History of Present Illness       Reason for visit:  Follow up    He eats 2-3 servings of fruits and vegetables daily.He consumes 0 sweetened beverage(s) daily.He exercises with enough effort to increase his heart rate 10 to 19 minutes per day.  He exercises with enough effort to increase his heart rate 7 days per week.   He is taking medications regularly.       Called him and he is ok, needs neck brace another month, has had it for 51 days.     He was down at his girlfriends and he stopped to get gas, couldn't remember how to get gas in the car.  Memory loss on how to open gas door, remembered to drive to dealer and girl helped him figure it out.  Has had the car for 8-9 years and now forgot how to open the gas door, hadn't filled it for a few weeks.  Drives a [employer], gets 40 mpg.      Otherwise feels good,     No pain pills, no nyquil or sleeping pill.    Does have trouble sleeping some nights, just lays there or goes on computer.     No findings for a stroke or other problem.    No bladder infection. No fevers.      Wonders about  memory pills like vitamin.          Objective    Vitals - Patient Reported  Weight (Patient Reported): 77.6 kg (171 lb)  Pain Score: No Pain (0)      Physical Exam   General: Alert and no distress //Respiratory: No audible wheeze, cough, or shortness of breath // Psychiatric:  Appropriate affect, tone, and pace of words          Phone call duration: 8 minutes  Signed Electronically by: Akhil Mcmahon MD

## 2024-12-18 NOTE — TELEPHONE ENCOUNTER
Patient calling back to ask about appointment. Let patient know his phone appointment is at 2:40PM today. Patient verbalized time. Needed several reminders during conversations that it was over the phone, not in person.    Zuleyma Ardon RN on 12/18/2024 at 10:31 AM

## 2024-12-30 DIAGNOSIS — J32.1 CHRONIC FRONTAL SINUSITIS: ICD-10-CM

## 2024-12-30 DIAGNOSIS — J30.2 SEASONAL ALLERGIC RHINITIS, UNSPECIFIED TRIGGER: ICD-10-CM

## 2024-12-30 RX ORDER — CETIRIZINE HYDROCHLORIDE 10 MG/1
10 TABLET, FILM COATED ORAL DAILY
Qty: 90 TABLET | Refills: 0 | Status: SHIPPED | OUTPATIENT
Start: 2024-12-30

## 2025-01-20 ENCOUNTER — OFFICE VISIT (OUTPATIENT)
Dept: NEUROSURGERY | Facility: CLINIC | Age: 89
End: 2025-01-20
Payer: MEDICARE

## 2025-01-20 VITALS
SYSTOLIC BLOOD PRESSURE: 130 MMHG | HEART RATE: 72 BPM | BODY MASS INDEX: 25.7 KG/M2 | RESPIRATION RATE: 16 BRPM | DIASTOLIC BLOOD PRESSURE: 80 MMHG | WEIGHT: 169 LBS | TEMPERATURE: 97.4 F | OXYGEN SATURATION: 99 %

## 2025-01-20 DIAGNOSIS — S12.491D OTHER CLOSED NONDISPLACED FRACTURE OF FIFTH CERVICAL VERTEBRA WITH ROUTINE HEALING, SUBSEQUENT ENCOUNTER: Primary | ICD-10-CM

## 2025-01-20 PROCEDURE — 99213 OFFICE O/P EST LOW 20 MIN: CPT | Performed by: NURSE PRACTITIONER

## 2025-01-20 ASSESSMENT — PAIN SCALES - GENERAL: PAINLEVEL_OUTOF10: NO PAIN (0)

## 2025-01-20 NOTE — NURSING NOTE
"Ulices Shah Jr. is a 89 year old male who presents for:  Chief Complaint   Patient presents with    Neurologic Problem     Other closed nondisplaced fracture of fifth cervical vertebra with routine healing        Initial Vitals:  /80   Pulse 72   Temp 97.4  F (36.3  C) (Temporal)   Resp 16   Wt 169 lb (76.7 kg)   SpO2 99%   BMI 25.70 kg/m   Estimated body mass index is 25.7 kg/m  as calculated from the following:    Height as of 12/4/24: 5' 8\" (1.727 m).    Weight as of this encounter: 169 lb (76.7 kg).. Body surface area is 1.92 meters squared. BP completed using cuff size: regular  No Pain (0)    Nursing Comments:     Deanna Wilson    "

## 2025-01-20 NOTE — PATIENT INSTRUCTIONS
-Cervical CT as previously recommended.  -Continue to wear your cervical collar for now.   -Our office will contact you with the results of the CT scan  -Please contact our clinic with questions or concerns at [phone].

## 2025-01-20 NOTE — PROGRESS NOTES
[facility] Neurosurgery  Neurosurgery Followup:    HPI: 89M with a syncopal episode 10/17/2024. He presented to the ED and was found to have a nondisplaced fracture of C5 osteophyte. He was placed in a cervical collar and transferred to [facility] for a cervical MRI and further recommendations. He was seen by NSGY at [facility] and was recommended to wear a cervical collar for 3 months. Today he presents for evaluation. He denies any neck pain, radicular arm pain, paresthesias or overt weakness. Has been wearing his cervical collar at all times as recommended. No new or worsened symptoms.     Presented for a follow up. Denies any symptoms. Did not obtain cervical CT as recommended. Continues to wear cervical collar.    Medical, surgical, family, and social history unchanged since prior exam.    Exam:  Constitutional:  Alert, well nourished, NAD.  HEENT: Normocephalic, atraumatic.   Pulm:  Without shortness of breath   CV:  No pitting edema of BLE.     Vital Signs:  /80   Pulse 72   Temp 97.4  F (36.3  C) (Temporal)   Resp 16   Wt 169 lb (76.7 kg)   SpO2 99%   BMI 25.70 kg/m      Neurological:  Awake  Alert  Oriented x 3  Motor exam:     Shoulder Abduction:  Right:  5/5    Left:  5/5  Biceps:                      Right:  5/5    Left:  5/5  Triceps:                     Right:  5/5    Left:  5/5  Wrist Extensors:       Right:  5/5    Left:  5/5  Wrist Flexors:           Right:  5/5    Left:  5/5  Intrinsics:                  Right:  5/5    Left:  5/5     Able to spontaneously move U/E bilaterally  Sensation intact throughout all U/E dermatomes      Imaging:  No new imaging to review.    A/P: cervical 5 fracture    Continue to wear cervical collar as recommended. Obtain cervical CT as recommended. Our office will contact him with the results and further recommendations. He verbalized understanding and agreement.    Patient Instructions   -Cervical CT as previously recommended.  -Continue to wear your cervical  collar for now.   -Our office will contact you with the results of the CT scan  -Please contact our clinic with questions or concerns at [phone].    Lauryn Mulligan [facility]  [address] [place] [id number]  Tel [phone]  Fax [phone]

## 2025-01-23 DIAGNOSIS — E11.9 TYPE 2 DIABETES MELLITUS WITHOUT COMPLICATION, WITHOUT LONG-TERM CURRENT USE OF INSULIN (H): ICD-10-CM

## 2025-01-27 ENCOUNTER — HOSPITAL ENCOUNTER (OUTPATIENT)
Dept: CT IMAGING | Facility: CLINIC | Age: 89
Discharge: HOME | End: 2025-01-27
Attending: NURSE PRACTITIONER | Admitting: NURSE PRACTITIONER
Payer: MEDICARE

## 2025-01-27 DIAGNOSIS — S12.400A C5 CERVICAL FRACTURE (H): ICD-10-CM

## 2025-01-27 PROCEDURE — 72125 CT NECK SPINE W/O DYE: CPT

## 2025-01-28 ENCOUNTER — TELEPHONE (OUTPATIENT)
Dept: NEUROSURGERY | Facility: CLINIC | Age: 89
End: 2025-01-28
Payer: MEDICARE

## 2025-01-28 NOTE — TELEPHONE ENCOUNTER
Contacted patient to discuss cervical CT results. Discussed with Dr. Damon. Ok to wean from cervical collar at this time. No follow up needed. He verbalized understanding and agreement.

## 2025-02-05 DIAGNOSIS — E11.9 TYPE 2 DIABETES MELLITUS WITHOUT COMPLICATION, WITHOUT LONG-TERM CURRENT USE OF INSULIN (H): ICD-10-CM

## 2025-03-11 DIAGNOSIS — J32.1 CHRONIC FRONTAL SINUSITIS: ICD-10-CM

## 2025-03-11 DIAGNOSIS — J30.2 SEASONAL ALLERGIC RHINITIS, UNSPECIFIED TRIGGER: ICD-10-CM

## 2025-03-11 DIAGNOSIS — M79.89 LEG SWELLING: ICD-10-CM

## 2025-03-11 RX ORDER — FUROSEMIDE 20 MG/1
20 TABLET ORAL DAILY
Qty: 90 TABLET | Refills: 3 | Status: SHIPPED | OUTPATIENT
Start: 2025-03-11

## 2025-03-11 RX ORDER — CETIRIZINE HYDROCHLORIDE 10 MG/1
10 TABLET, FILM COATED ORAL DAILY
Qty: 90 TABLET | Refills: 3 | Status: SHIPPED | OUTPATIENT
Start: 2025-03-11

## 2025-04-03 ENCOUNTER — ANCILLARY PROCEDURE (OUTPATIENT)
Dept: CARDIOLOGY | Facility: CLINIC | Age: 89
End: 2025-04-03
Attending: INTERNAL MEDICINE
Payer: MEDICARE

## 2025-04-03 DIAGNOSIS — Z95.0 CARDIAC PACEMAKER IN SITU: ICD-10-CM

## 2025-04-03 LAB
MDC_IDC_EPISODE_DTM: NORMAL
MDC_IDC_EPISODE_ID: 1
MDC_IDC_EPISODE_TYPE: NORMAL
MDC_IDC_EPISODE_TYPE_INDUCED: NO
MDC_IDC_LEAD_CONNECTION_STATUS: NORMAL
MDC_IDC_LEAD_CONNECTION_STATUS: NORMAL
MDC_IDC_LEAD_IMPLANT_DT: NORMAL
MDC_IDC_LEAD_IMPLANT_DT: NORMAL
MDC_IDC_LEAD_LOCATION: NORMAL
MDC_IDC_LEAD_LOCATION: NORMAL
MDC_IDC_LEAD_LOCATION_DETAIL_1: NORMAL
MDC_IDC_LEAD_LOCATION_DETAIL_1: NORMAL
MDC_IDC_LEAD_MFG: NORMAL
MDC_IDC_LEAD_MFG: NORMAL
MDC_IDC_LEAD_MODEL: NORMAL
MDC_IDC_LEAD_MODEL: NORMAL
MDC_IDC_LEAD_POLARITY_TYPE: NORMAL
MDC_IDC_LEAD_POLARITY_TYPE: NORMAL
MDC_IDC_LEAD_SERIAL: NORMAL
MDC_IDC_LEAD_SERIAL: NORMAL
MDC_IDC_MSMT_BATTERY_DTM: NORMAL
MDC_IDC_MSMT_BATTERY_REMAINING_LONGEVITY: 147 MO
MDC_IDC_MSMT_BATTERY_RRT_TRIGGER: 2.62
MDC_IDC_MSMT_BATTERY_STATUS: NORMAL
MDC_IDC_MSMT_BATTERY_VOLTAGE: 3.1 V
MDC_IDC_MSMT_LEADCHNL_RA_IMPEDANCE_VALUE: 304 OHM
MDC_IDC_MSMT_LEADCHNL_RA_IMPEDANCE_VALUE: 456 OHM
MDC_IDC_MSMT_LEADCHNL_RA_PACING_THRESHOLD_AMPLITUDE: 0.5 V
MDC_IDC_MSMT_LEADCHNL_RA_PACING_THRESHOLD_PULSEWIDTH: 0.4 MS
MDC_IDC_MSMT_LEADCHNL_RA_SENSING_INTR_AMPL: 2.38 MV
MDC_IDC_MSMT_LEADCHNL_RA_SENSING_INTR_AMPL: 3.5 MV
MDC_IDC_MSMT_LEADCHNL_RV_IMPEDANCE_VALUE: 380 OHM
MDC_IDC_MSMT_LEADCHNL_RV_IMPEDANCE_VALUE: 513 OHM
MDC_IDC_MSMT_LEADCHNL_RV_PACING_THRESHOLD_AMPLITUDE: 0.38 V
MDC_IDC_MSMT_LEADCHNL_RV_PACING_THRESHOLD_PULSEWIDTH: 0.4 MS
MDC_IDC_MSMT_LEADCHNL_RV_SENSING_INTR_AMPL: 17.88 MV
MDC_IDC_MSMT_LEADCHNL_RV_SENSING_INTR_AMPL: 18.25 MV
MDC_IDC_PG_IMPLANT_DTM: NORMAL
MDC_IDC_PG_MFG: NORMAL
MDC_IDC_PG_MODEL: NORMAL
MDC_IDC_PG_SERIAL: NORMAL
MDC_IDC_PG_TYPE: NORMAL
MDC_IDC_SESS_CLINIC_NAME: NORMAL
MDC_IDC_SESS_DTM: NORMAL
MDC_IDC_SESS_TYPE: NORMAL
MDC_IDC_SET_BRADY_AT_MODE_SWITCH_RATE: 171 {BEATS}/MIN
MDC_IDC_SET_BRADY_HYSTRATE: NORMAL
MDC_IDC_SET_BRADY_LOWRATE: 60 {BEATS}/MIN
MDC_IDC_SET_BRADY_MAX_SENSOR_RATE: 130 {BEATS}/MIN
MDC_IDC_SET_BRADY_MAX_TRACKING_RATE: 130 {BEATS}/MIN
MDC_IDC_SET_BRADY_MODE: NORMAL
MDC_IDC_SET_BRADY_PAV_DELAY_LOW: 180 MS
MDC_IDC_SET_BRADY_SAV_DELAY_LOW: 150 MS
MDC_IDC_SET_LEADCHNL_RA_PACING_AMPLITUDE: 3.5 V
MDC_IDC_SET_LEADCHNL_RA_PACING_ANODE_ELECTRODE_1: NORMAL
MDC_IDC_SET_LEADCHNL_RA_PACING_ANODE_LOCATION_1: NORMAL
MDC_IDC_SET_LEADCHNL_RA_PACING_CAPTURE_MODE: NORMAL
MDC_IDC_SET_LEADCHNL_RA_PACING_CATHODE_ELECTRODE_1: NORMAL
MDC_IDC_SET_LEADCHNL_RA_PACING_CATHODE_LOCATION_1: NORMAL
MDC_IDC_SET_LEADCHNL_RA_PACING_POLARITY: NORMAL
MDC_IDC_SET_LEADCHNL_RA_PACING_PULSEWIDTH: 0.4 MS
MDC_IDC_SET_LEADCHNL_RA_SENSING_ANODE_ELECTRODE_1: NORMAL
MDC_IDC_SET_LEADCHNL_RA_SENSING_ANODE_LOCATION_1: NORMAL
MDC_IDC_SET_LEADCHNL_RA_SENSING_CATHODE_ELECTRODE_1: NORMAL
MDC_IDC_SET_LEADCHNL_RA_SENSING_CATHODE_LOCATION_1: NORMAL
MDC_IDC_SET_LEADCHNL_RA_SENSING_POLARITY: NORMAL
MDC_IDC_SET_LEADCHNL_RA_SENSING_SENSITIVITY: 0.3 MV
MDC_IDC_SET_LEADCHNL_RV_PACING_AMPLITUDE: 2 V
MDC_IDC_SET_LEADCHNL_RV_PACING_ANODE_ELECTRODE_1: NORMAL
MDC_IDC_SET_LEADCHNL_RV_PACING_CAPTURE_MODE: NORMAL
MDC_IDC_SET_LEADCHNL_RV_PACING_CATHODE_ELECTRODE_1: NORMAL
MDC_IDC_SET_LEADCHNL_RV_PACING_CATHODE_LOCATION_1: NORMAL
MDC_IDC_SET_LEADCHNL_RV_PACING_POLARITY: NORMAL
MDC_IDC_SET_LEADCHNL_RV_PACING_PULSEWIDTH: 0.4 MS
MDC_IDC_SET_LEADCHNL_RV_SENSING_ANODE_ELECTRODE_1: NORMAL
MDC_IDC_SET_LEADCHNL_RV_SENSING_ANODE_LOCATION_1: NORMAL
MDC_IDC_SET_LEADCHNL_RV_SENSING_CATHODE_ELECTRODE_1: NORMAL
MDC_IDC_SET_LEADCHNL_RV_SENSING_CATHODE_LOCATION_1: NORMAL
MDC_IDC_SET_LEADCHNL_RV_SENSING_POLARITY: NORMAL
MDC_IDC_SET_LEADCHNL_RV_SENSING_SENSITIVITY: 0.9 MV
MDC_IDC_SET_ZONE_DETECTION_INTERVAL: 350 MS
MDC_IDC_SET_ZONE_DETECTION_INTERVAL: 350 MS
MDC_IDC_SET_ZONE_STATUS: NORMAL
MDC_IDC_SET_ZONE_STATUS: NORMAL
MDC_IDC_SET_ZONE_TYPE: NORMAL
MDC_IDC_SET_ZONE_VENDOR_TYPE: NORMAL
MDC_IDC_STAT_AT_BURDEN_PERCENT: 100 %
MDC_IDC_STAT_AT_DTM_END: NORMAL
MDC_IDC_STAT_AT_DTM_START: NORMAL
MDC_IDC_STAT_BRADY_DTM_END: NORMAL
MDC_IDC_STAT_BRADY_DTM_START: NORMAL
MDC_IDC_STAT_BRADY_RA_PERCENT_PACED: 0 %
MDC_IDC_STAT_BRADY_RV_PERCENT_PACED: 93.71 %
MDC_IDC_STAT_EPISODE_RECENT_COUNT: 0
MDC_IDC_STAT_EPISODE_RECENT_COUNT_DTM_END: NORMAL
MDC_IDC_STAT_EPISODE_RECENT_COUNT_DTM_START: NORMAL
MDC_IDC_STAT_EPISODE_TOTAL_COUNT: 0
MDC_IDC_STAT_EPISODE_TOTAL_COUNT: 1
MDC_IDC_STAT_EPISODE_TOTAL_COUNT: 1
MDC_IDC_STAT_EPISODE_TOTAL_COUNT_DTM_END: NORMAL
MDC_IDC_STAT_EPISODE_TOTAL_COUNT_DTM_START: NORMAL
MDC_IDC_STAT_EPISODE_TYPE: NORMAL
MDC_IDC_STAT_TACHYTHERAPY_RECENT_DTM_END: NORMAL
MDC_IDC_STAT_TACHYTHERAPY_RECENT_DTM_START: NORMAL
MDC_IDC_STAT_TACHYTHERAPY_TOTAL_DTM_END: NORMAL
MDC_IDC_STAT_TACHYTHERAPY_TOTAL_DTM_START: NORMAL

## 2025-04-03 PROCEDURE — 93280 PM DEVICE PROGR EVAL DUAL: CPT | Performed by: INTERNAL MEDICINE

## 2025-04-07 ENCOUNTER — OFFICE VISIT (OUTPATIENT)
Dept: CARDIOLOGY | Facility: CLINIC | Age: 89
End: 2025-04-07
Attending: INTERNAL MEDICINE
Payer: MEDICARE

## 2025-04-07 ENCOUNTER — TELEPHONE (OUTPATIENT)
Dept: INTERNAL MEDICINE | Facility: CLINIC | Age: 89
End: 2025-04-07

## 2025-04-07 VITALS
WEIGHT: 168.5 LBS | RESPIRATION RATE: 18 BRPM | OXYGEN SATURATION: 98 % | HEART RATE: 72 BPM | SYSTOLIC BLOOD PRESSURE: 136 MMHG | BODY MASS INDEX: 25.54 KG/M2 | HEIGHT: 68 IN | DIASTOLIC BLOOD PRESSURE: 70 MMHG

## 2025-04-07 DIAGNOSIS — I10 BENIGN ESSENTIAL HYPERTENSION: Primary | ICD-10-CM

## 2025-04-07 DIAGNOSIS — I25.10 CORONARY ARTERY DISEASE INVOLVING NATIVE CORONARY ARTERY OF NATIVE HEART WITHOUT ANGINA PECTORIS: ICD-10-CM

## 2025-04-07 DIAGNOSIS — Z95.0 CARDIAC PACEMAKER IN SITU: ICD-10-CM

## 2025-04-07 DIAGNOSIS — I35.0 NONRHEUMATIC AORTIC VALVE STENOSIS: ICD-10-CM

## 2025-04-07 PROCEDURE — 3075F SYST BP GE 130 - 139MM HG: CPT | Performed by: INTERNAL MEDICINE

## 2025-04-07 PROCEDURE — 99204 OFFICE O/P NEW MOD 45 MIN: CPT | Performed by: INTERNAL MEDICINE

## 2025-04-07 PROCEDURE — 3078F DIAST BP <80 MM HG: CPT | Performed by: INTERNAL MEDICINE

## 2025-04-07 PROCEDURE — 93000 ELECTROCARDIOGRAM COMPLETE: CPT | Performed by: INTERNAL MEDICINE

## 2025-04-07 PROCEDURE — 1126F AMNT PAIN NOTED NONE PRSNT: CPT | Performed by: INTERNAL MEDICINE

## 2025-04-07 PROCEDURE — G2211 COMPLEX E/M VISIT ADD ON: HCPCS | Performed by: INTERNAL MEDICINE

## 2025-04-07 ASSESSMENT — PAIN SCALES - GENERAL: PAINLEVEL_OUTOF10: NO PAIN (0)

## 2025-04-07 NOTE — LETTER
4/7/2025    Akhil Mcmahon MD  [address] [facility], [place] [address]    RE: Ulices Shah Jr.       Dear Colleague,     I had the pleasure of seeing Ulices J Alyssa Mcdonald in the [facility].  HISTORY OF PRESENT ILLNESS:  Ulices PENG Alyssa De La Torre. a 89 year old male with coronary artery disease, SSS sp PPM/permanent atrial fibrillation, dyslipidemia, nondisplaced C5 fracture, hypertension and dyslipidemia was seen at the request of his primary care MD for enrollment into  pacemaker clinic and follow up of CAD/aortic stenosis     The patient has previously received all of his care in the [facility] system, the patient has previously received all of his care in the [facility] system, and is now transferred to [facility].  He underwent bypass surgery in 2003 at which time a LIMA graft was placed the LAD and separate saphenous venous bypass graft were placed to the circumflex marginal and of the posterior sending branch of the right coronary.  He sustained a non-ST segment elevation MI in 2012 and underwent diagnostic graft and coronary angiography at the [facility].  All native coronaries were proximally occluded.  The LIMA graft to the LAD was widely patent.  The saphenous venous bypass grafts to the circumflex marginal branch had a 75% mid shaft narrowing and the saphenous venous bypass graft to the posterior descending had thrombotic subtotal narrowing.  The patient was treated with intravenous heparin and 2B/3A glycoprotein inhibitor agents and then underwent successful implantation of drug-eluting stents in both the midshaft of the circumflex marginal vein graft as well as the shaft of the saphenous venous bypass graft to the right coronary.  He has been free of angina since then.  His most recent echocardiogram in October 2024 showed inferior wall hypokinesis with a normal ejection fraction and mild aortic stenosis.    The patient suffered a syncopal episode in October 2024 and was found to  have second-degree AV block.  A pacemaker was placed at that time but subsequent interrogation demonstrated persistent atrial fibrillation with a well-controlled ventricular response rate.  The patient has been maintained on apixaban and there have been no attempts to restore sinus rhythm.    Despite his advanced age, the patient remains relatively active.  He reports that he exercises very frequently before the COVID pandemic in 2019, but since then has been less active.  He climbs up steps at his senior living facility and has mild exertional dyspnea without chest discomfort.  He is able to complete activities of daily living without frequent limitation.  He has never been  and he lives independently.  He has a girlfriend.    His internist has requested long-term cardiology follow-up in our clinic as well as enrollment in our pacemaker clinic.  The patient's most recent device interrogation in October demonstrated satisfactory device function with underlying permanent atrial fibrillation.  The patient is an avid basketball fan and follows collegiate, [employer], and local high school teams.      PAST MEDICAL HISTORY  1)  SSS sp PPM/permanent  atrial fibrillation CHADVaSc 4 (  Hypertension, Age(2) Vascular disease (1) on apixaban  2)  Coronary Artery Disease : SP old CAB with LIMA to LAD, SVG to CX marginal, SVG to RCA PDA. PCI SVG to CX  PCI SVG to PDA 5/2012.with SHILPA. Currently angina free. TTE LVEF 60% with inferior wall hypokinesis   3) Aortic stenosis : DICKSON 1.3cm2/ MSG15 mmHg DI 0.34 EF 55 to 60%  4) Essential hypertension well controlled  5)  dyslipidemia          Orders this Visit:  Orders Placed This Encounter   Procedures     EKG 12-lead complete w/read - Clinics (performed today)     No orders of the defined types were placed in this encounter.    There are no discontinued medications.    Encounter Diagnosis   Name Primary?     Cardiac pacemaker in situ        CURRENT MEDICATIONS:  Current Outpatient  Medications   Medication Sig Dispense Refill     apixaban ANTICOAGULANT (ELIQUIS ANTICOAGULANT) 5 MG tablet Take 1 tablet (5 mg) by mouth 2 times daily.       blood glucose (NO BRAND SPECIFIED) lancets standard Use to test blood sugar 1 times daily 100 Lancet 1     blood glucose (NO BRAND SPECIFIED) test strip Use to test blood sugar 1 times daily 100 strip 3     blood glucose monitoring (NO BRAND SPECIFIED) meter device kit Use to test blood sugar 1 times daily 1 kit 0     carvedilol (COREG) 25 MG tablet Take 25 mg by mouth 2 times daily (with meals).       cetirizine (EQ ALLERGY RELIEF, CETIRIZINE,) 10 MG tablet Take 1 tablet by mouth once daily 90 tablet 3     clopidogrel (PLAVIX) 75 MG tablet Take 1 tablet (75 mg) by mouth daily 90 tablet 3     furosemide (LASIX) 20 MG tablet Take 1 tablet by mouth once daily 90 tablet 3     lisinopril (PRINIVIL,ZESTRIL) 5 MG tablet Take 5 mg by mouth 2 times daily       metFORMIN (GLUCOPHAGE XR) 500 MG 24 hr tablet Take 1 tablet (500 mg) by mouth 2 times daily (with meals). 180 tablet 3     mirtazapine (REMERON) 15 MG tablet Take 1 tablet (15 mg) by mouth At Bedtime 90 tablet 3     Omega-3 Fatty Acids (FISH OIL) 1000 MG CPDR Take 1 capsule by mouth 2 times daily.       psyllium (METAMUCIL/KONSYL) 58.6 % powder Take 18 g (1 Tablespoonful) by mouth daily. 660 g 1     rosuvastatin (CRESTOR) 20 MG tablet Take 1 tablet (20 mg) by mouth daily       sotalol (BETAPACE) 80 MG tablet TAKE 1 TABLET BY MOUTH EVERY 12 HOURS       fluticasone (FLONASE) 50 MCG/ACT nasal spray Spray 1 spray into both nostrils daily (Patient not taking: Reported on 4/7/2025) 16 g 0     fluticasone (FLONASE) 50 MCG/ACT nasal spray Spray 2 sprays into both nostrils daily (Patient not taking: Reported on 12/18/2024) 1 g 6       ALLERGIES     Allergies   Allergen Reactions     No Known Drug Allergy        PAST MEDICAL, SURGICAL, FAMILY, SOCIAL HISTORY:  History was reviewed and updated as needed, see medical  "record.    Single  retired several . Bicycling avid before, but since covid has    Review of Systems:  A 12-point review of systems was completed, see medical record for detailed review of systems information.    Physical Exam:  Vitals: /70 (BP Location: Right arm, Patient Position: Sitting, Cuff Size: Adult Regular)   Pulse 72   Resp 18   Ht 1.727 m (5' 8\")   Wt 76.4 kg (168 lb 8 oz)   SpO2 98%   BMI 25.62 kg/m      Constitutional: No apparent distress pleasant, cooperative, appears stated age    HEENT: pupils equal round reactive to light, thyroid normal size, JVP is normal    Chest: Clear to percussion and auscultation    Cardiac: Irregular S1 and S2.  Grade 2/6 stock ejection murmur.    Abdomen: Scaphoid.  Liver percusses to 6 cm spleen is not palpable aorta is not tender or enlarged    Extremitiies: no edema.    Neurological:  Cranial nerves II through XII are intact, strength equal and symmetrical, displays normal insight and judgment      ASSESSMENT: The patient has persistent/permanent atrial fibrillation [id number] and given his advanced age and lack of symptoms, I do not believe he would benefit from aggressive attempts to maintain a sinus rhythm.  He should remain on chronic anticoagulation for prevention of stroke.  Although he has mild chronic exertional dyspnea, the degree of aortic stenosis was at most moderate on his last study in October 2024.  A repeat echo would be reasonable at this point and on an annual basis as we follow his aortic stenosis, atrial fibrillation, atrial fibrillation, and chronic coronary disease..  Unless he develops chest discomfort or limiting exertional dyspnea, I would avoid invasive testing for coronary artery disease.  Will plan to enroll the patient in pacemaker clinic and continue his present excellent guideline directed medical therapy as prescribed by his internist Dr. Mcmahon.    RECOMMENDATIONS:   1) TTE today  2) enroll in pacemaker " clinic  3) follow up in one year with Paige GREER if new symptoms        Recent Lab Results:  LIPID RESULTS:  Lab Results   Component Value Date    CHOL 146 05/30/2023    CHOL 163 04/16/2021    HDL 43 05/30/2023    HDL 50 04/16/2021    LDL 80 05/30/2023    LDL 88 04/16/2021    TRIG 116 05/30/2023    TRIG 126 04/16/2021    CHOLHDLRATIO 4.0 03/19/2013       LIVER ENZYME RESULTS:  Lab Results   Component Value Date    AST 19 11/11/2024    AST 32 11/12/2020    ALT 14 11/11/2024    ALT 28 11/12/2020       CBC RESULTS:  Lab Results   Component Value Date    WBC 9.3 11/11/2024    WBC 7.7 02/04/2021    RBC 4.05 (L) 11/11/2024    RBC 4.09 (L) 02/04/2021    HGB 12.7 (L) 11/11/2024    HGB 12.8 (L) 02/04/2021    HCT 36.8 (L) 11/11/2024    HCT 37.8 (L) 02/04/2021    MCV 91 11/11/2024    MCV 92 02/04/2021    MCH 31.4 11/11/2024    MCH 31.3 02/04/2021    MCHC 34.5 11/11/2024    MCHC 33.9 02/04/2021    RDW 13.4 11/11/2024    RDW 13.1 02/04/2021     11/11/2024     02/04/2021       BMP RESULTS:  Lab Results   Component Value Date     12/04/2024     02/04/2021    POTASSIUM 3.9 12/04/2024    POTASSIUM 4.0 02/04/2021    CHLORIDE 101 12/04/2024    CHLORIDE 109 02/04/2021    CO2 21 (L) 12/04/2024    CO2 24 02/04/2021    ANIONGAP 16 (H) 12/04/2024    ANIONGAP 7 02/04/2021     (H) 12/04/2024     (H) 02/04/2021    BUN 18.2 12/04/2024    BUN 19 02/04/2021    CR 1.04 12/04/2024    CR 0.94 02/04/2021    GFRESTIMATED 69 12/04/2024    GFRESTIMATED 74 02/04/2021    GFRESTBLACK 86 02/04/2021    VIVIEN 9.2 12/04/2024    VIVIEN 8.5 02/04/2021        A1C RESULTS:  Lab Results   Component Value Date    A1C 6.2 (H) 09/18/2024    A1C 6.8 (H) 09/09/2020       INR RESULTS:  Lab Results   Component Value Date    INR 1.34 (H) 10/17/2024    INR 1.16 (H) 02/04/2021    INR 1.13 11/15/2020       We greatly appreciate the opportunity to be involved in the care of your patient, Ulices Shah Jr..    Sincerely,  Josue Do  MD Wilfrido      CC  Ron Prieto MD  [address] [place] [address]                                                                       Thank you for allowing me to participate in the care of your patient.      Sincerely,     Josue Anna MD     [facility]  cc:   Ron Prieto MD  [address] [place] [address]

## 2025-04-07 NOTE — PROGRESS NOTES
HISTORY OF PRESENT ILLNESS:  Ulices Shah Jr. a 89 year old male with coronary artery disease, SSS sp PPM/permanent atrial fibrillation, dyslipidemia, nondisplaced C5 fracture, hypertension and dyslipidemia was seen at the request of his primary care MD for enrollment into  pacemaker clinic and follow up of CAD/aortic stenosis     The patient has previously received all of his care in the [facility] system, the patient has previously received all of his care in the [facility] system, and is now transferred to [facility].  He underwent bypass surgery in 2003 at which time a LIMA graft was placed the LAD and separate saphenous venous bypass graft were placed to the circumflex marginal and of the posterior sending branch of the right coronary.  He sustained a non-ST segment elevation MI in 2012 and underwent diagnostic graft and coronary angiography at the [facility].  All native coronaries were proximally occluded.  The LIMA graft to the LAD was widely patent.  The saphenous venous bypass grafts to the circumflex marginal branch had a 75% mid shaft narrowing and the saphenous venous bypass graft to the posterior descending had thrombotic subtotal narrowing.  The patient was treated with intravenous heparin and 2B/3A glycoprotein inhibitor agents and then underwent successful implantation of drug-eluting stents in both the midshaft of the circumflex marginal vein graft as well as the shaft of the saphenous venous bypass graft to the right coronary.  He has been free of angina since then.  His most recent echocardiogram in October 2024 showed inferior wall hypokinesis with a normal ejection fraction and mild aortic stenosis.    The patient suffered a syncopal episode in October 2024 and was found to have second-degree AV block.  A pacemaker was placed at that time but subsequent interrogation demonstrated persistent atrial fibrillation with a well-controlled ventricular response rate.  The patient has been  maintained on apixaban and there have been no attempts to restore sinus rhythm.    Despite his advanced age, the patient remains relatively active.  He reports that he exercises very frequently before the COVID pandemic in 2019, but since then has been less active.  He climbs up steps at his senior living facility and has mild exertional dyspnea without chest discomfort.  He is able to complete activities of daily living without frequent limitation.  He has never been  and he lives independently.  He has a girlfriend.    His internist has requested long-term cardiology follow-up in our clinic as well as enrollment in our pacemaker clinic.  The patient's most recent device interrogation in October demonstrated satisfactory device function with underlying permanent atrial fibrillation.  The patient is an avid basketball fan and follows collegiate, [employer], and local high school teams.      PAST MEDICAL HISTORY  1)  SSS sp PPM/permanent  atrial fibrillation CHADVaSc 4 (  Hypertension, Age(2) Vascular disease (1) on apixaban  2)  Coronary Artery Disease : SP old CAB with LIMA to LAD, SVG to CX marginal, SVG to RCA PDA. PCI SVG to CX  PCI SVG to PDA 5/2012.with SHILPA. Currently angina free. TTE LVEF 60% with inferior wall hypokinesis   3) Aortic stenosis : DICKSON 1.3cm2/ MSG15 mmHg DI 0.34 EF 55 to 60%  4) Essential hypertension well controlled  5)  dyslipidemia          Orders this Visit:  Orders Placed This Encounter   Procedures    EKG 12-lead complete w/read - Clinics (performed today)     No orders of the defined types were placed in this encounter.    There are no discontinued medications.    Encounter Diagnosis   Name Primary?    Cardiac pacemaker in situ        CURRENT MEDICATIONS:  Current Outpatient Medications   Medication Sig Dispense Refill    apixaban ANTICOAGULANT (ELIQUIS ANTICOAGULANT) 5 MG tablet Take 1 tablet (5 mg) by mouth 2 times daily.      blood glucose (NO BRAND SPECIFIED) lancets standard Use to  test blood sugar 1 times daily 100 Lancet 1    blood glucose (NO BRAND SPECIFIED) test strip Use to test blood sugar 1 times daily 100 strip 3    blood glucose monitoring (NO BRAND SPECIFIED) meter device kit Use to test blood sugar 1 times daily 1 kit 0    carvedilol (COREG) 25 MG tablet Take 25 mg by mouth 2 times daily (with meals).      cetirizine (EQ ALLERGY RELIEF, CETIRIZINE,) 10 MG tablet Take 1 tablet by mouth once daily 90 tablet 3    clopidogrel (PLAVIX) 75 MG tablet Take 1 tablet (75 mg) by mouth daily 90 tablet 3    furosemide (LASIX) 20 MG tablet Take 1 tablet by mouth once daily 90 tablet 3    lisinopril (PRINIVIL,ZESTRIL) 5 MG tablet Take 5 mg by mouth 2 times daily      metFORMIN (GLUCOPHAGE XR) 500 MG 24 hr tablet Take 1 tablet (500 mg) by mouth 2 times daily (with meals). 180 tablet 3    mirtazapine (REMERON) 15 MG tablet Take 1 tablet (15 mg) by mouth At Bedtime 90 tablet 3    Omega-3 Fatty Acids (FISH OIL) 1000 MG CPDR Take 1 capsule by mouth 2 times daily.      psyllium (METAMUCIL/KONSYL) 58.6 % powder Take 18 g (1 Tablespoonful) by mouth daily. 660 g 1    rosuvastatin (CRESTOR) 20 MG tablet Take 1 tablet (20 mg) by mouth daily      sotalol (BETAPACE) 80 MG tablet TAKE 1 TABLET BY MOUTH EVERY 12 HOURS      fluticasone (FLONASE) 50 MCG/ACT nasal spray Spray 1 spray into both nostrils daily (Patient not taking: Reported on 4/7/2025) 16 g 0    fluticasone (FLONASE) 50 MCG/ACT nasal spray Spray 2 sprays into both nostrils daily (Patient not taking: Reported on 12/18/2024) 1 g 6       ALLERGIES     Allergies   Allergen Reactions    No Known Drug Allergy        PAST MEDICAL, SURGICAL, FAMILY, SOCIAL HISTORY:  History was reviewed and updated as needed, see medical record.    Single  retired several . Bicycling avid before, but since covid has    Review of Systems:  A 12-point review of systems was completed, see medical record for detailed review of systems information.    Physical  "Exam:  Vitals: /70 (BP Location: Right arm, Patient Position: Sitting, Cuff Size: Adult Regular)   Pulse 72   Resp 18   Ht 1.727 m (5' 8\")   Wt 76.4 kg (168 lb 8 oz)   SpO2 98%   BMI 25.62 kg/m      Constitutional: No apparent distress pleasant, cooperative, appears stated age    HEENT: pupils equal round reactive to light, thyroid normal size, JVP is normal    Chest: Clear to percussion and auscultation    Cardiac: Irregular S1 and S2.  Grade 2/6 stock ejection murmur.    Abdomen: Scaphoid.  Liver percusses to 6 cm spleen is not palpable aorta is not tender or enlarged    Extremitiies: no edema.    Neurological:  Cranial nerves II through XII are intact, strength equal and symmetrical, displays normal insight and judgment      ASSESSMENT: The patient has persistent/permanent atrial fibrillation [id number] and given his advanced age and lack of symptoms, I do not believe he would benefit from aggressive attempts to maintain a sinus rhythm.  He should remain on chronic anticoagulation for prevention of stroke.  Although he has mild chronic exertional dyspnea, the degree of aortic stenosis was at most moderate on his last study in October 2024.  A repeat echo would be reasonable at this point and on an annual basis as we follow his aortic stenosis, atrial fibrillation, atrial fibrillation, and chronic coronary disease..  Unless he develops chest discomfort or limiting exertional dyspnea, I would avoid invasive testing for coronary artery disease.  Will plan to enroll the patient in pacemaker clinic and continue his present excellent guideline directed medical therapy as prescribed by his internist Dr. Mcmahon.    RECOMMENDATIONS:   1) TTE today  2) enroll in pacemaker clinic  3) follow up in one year with TTE,. Brian if new symptoms        Recent Lab Results:  LIPID RESULTS:  Lab Results   Component Value Date    CHOL 146 05/30/2023    CHOL 163 04/16/2021    HDL 43 05/30/2023    HDL 50 04/16/2021    " LDL 80 05/30/2023    LDL 88 04/16/2021    TRIG 116 05/30/2023    TRIG 126 04/16/2021    CHOLHDLRATIO 4.0 03/19/2013       LIVER ENZYME RESULTS:  Lab Results   Component Value Date    AST 19 11/11/2024    AST 32 11/12/2020    ALT 14 11/11/2024    ALT 28 11/12/2020       CBC RESULTS:  Lab Results   Component Value Date    WBC 9.3 11/11/2024    WBC 7.7 02/04/2021    RBC 4.05 (L) 11/11/2024    RBC 4.09 (L) 02/04/2021    HGB 12.7 (L) 11/11/2024    HGB 12.8 (L) 02/04/2021    HCT 36.8 (L) 11/11/2024    HCT 37.8 (L) 02/04/2021    MCV 91 11/11/2024    MCV 92 02/04/2021    MCH 31.4 11/11/2024    MCH 31.3 02/04/2021    MCHC 34.5 11/11/2024    MCHC 33.9 02/04/2021    RDW 13.4 11/11/2024    RDW 13.1 02/04/2021     11/11/2024     02/04/2021       BMP RESULTS:  Lab Results   Component Value Date     12/04/2024     02/04/2021    POTASSIUM 3.9 12/04/2024    POTASSIUM 4.0 02/04/2021    CHLORIDE 101 12/04/2024    CHLORIDE 109 02/04/2021    CO2 21 (L) 12/04/2024    CO2 24 02/04/2021    ANIONGAP 16 (H) 12/04/2024    ANIONGAP 7 02/04/2021     (H) 12/04/2024     (H) 02/04/2021    BUN 18.2 12/04/2024    BUN 19 02/04/2021    CR 1.04 12/04/2024    CR 0.94 02/04/2021    GFRESTIMATED 69 12/04/2024    GFRESTIMATED 74 02/04/2021    GFRESTBLACK 86 02/04/2021    VIVIEN 9.2 12/04/2024    VIVIEN 8.5 02/04/2021        A1C RESULTS:  Lab Results   Component Value Date    A1C 6.2 (H) 09/18/2024    A1C 6.8 (H) 09/09/2020       INR RESULTS:  Lab Results   Component Value Date    INR 1.34 (H) 10/17/2024    INR 1.16 (H) 02/04/2021    INR 1.13 11/15/2020       We greatly appreciate the opportunity to be involved in the care of your patient, Ulices Shah Jr..    Sincerely,  Josue Anna MD        Ron Prieto MD  [address] [place] [address]

## 2025-04-29 ENCOUNTER — OFFICE VISIT (OUTPATIENT)
Dept: INTERNAL MEDICINE | Facility: CLINIC | Age: 89
End: 2025-04-29
Payer: MEDICARE

## 2025-04-29 VITALS
OXYGEN SATURATION: 99 % | RESPIRATION RATE: 18 BRPM | DIASTOLIC BLOOD PRESSURE: 72 MMHG | HEART RATE: 72 BPM | HEIGHT: 68 IN | BODY MASS INDEX: 26.78 KG/M2 | WEIGHT: 176.7 LBS | TEMPERATURE: 97.1 F | SYSTOLIC BLOOD PRESSURE: 135 MMHG

## 2025-04-29 DIAGNOSIS — M79.89 LEG SWELLING: Primary | ICD-10-CM

## 2025-04-29 DIAGNOSIS — E11.9 TYPE 2 DIABETES MELLITUS WITHOUT COMPLICATION, WITHOUT LONG-TERM CURRENT USE OF INSULIN (H): ICD-10-CM

## 2025-04-29 DIAGNOSIS — I50.31 ACUTE DIASTOLIC CONGESTIVE HEART FAILURE (H): ICD-10-CM

## 2025-04-29 DIAGNOSIS — E11.59 TYPE 2 DIABETES MELLITUS WITH OTHER CIRCULATORY COMPLICATION, WITHOUT LONG-TERM CURRENT USE OF INSULIN (H): ICD-10-CM

## 2025-04-29 DIAGNOSIS — R06.02 SOB (SHORTNESS OF BREATH): ICD-10-CM

## 2025-04-29 DIAGNOSIS — I25.10 ATHEROSCLEROSIS OF NATIVE CORONARY ARTERY OF NATIVE HEART WITHOUT ANGINA PECTORIS: ICD-10-CM

## 2025-04-29 PROCEDURE — 99214 OFFICE O/P EST MOD 30 MIN: CPT | Performed by: INTERNAL MEDICINE

## 2025-04-29 PROCEDURE — 3075F SYST BP GE 130 - 139MM HG: CPT | Performed by: INTERNAL MEDICINE

## 2025-04-29 PROCEDURE — 1126F AMNT PAIN NOTED NONE PRSNT: CPT | Performed by: INTERNAL MEDICINE

## 2025-04-29 PROCEDURE — G2211 COMPLEX E/M VISIT ADD ON: HCPCS | Performed by: INTERNAL MEDICINE

## 2025-04-29 PROCEDURE — 3078F DIAST BP <80 MM HG: CPT | Performed by: INTERNAL MEDICINE

## 2025-04-29 RX ORDER — FUROSEMIDE 20 MG/1
40 TABLET ORAL DAILY
Qty: 180 TABLET | Refills: 3 | Status: SHIPPED | OUTPATIENT
Start: 2025-04-29

## 2025-04-29 ASSESSMENT — ENCOUNTER SYMPTOMS: SHORTNESS OF BREATH: 1

## 2025-04-29 ASSESSMENT — PAIN SCALES - GENERAL: PAINLEVEL_OUTOF10: NO PAIN (0)

## 2025-04-29 NOTE — PROGRESS NOTES
"  Assessment & Plan   Problem List Items Addressed This Visit       Atherosclerosis of coronary artery    Type 2 diabetes mellitus with other circulatory complication, without long-term current use of insulin (H)     Other Visit Diagnoses       Leg swelling    -  Primary    Relevant Medications    furosemide (LASIX) 20 MG tablet    Other Relevant Orders    Comprehensive metabolic panel (BMP + Alb, Alk Phos, ALT, AST, Total. Bili, TP)    SOB (shortness of breath)        Relevant Orders    CBC with platelets    BNP-N terminal pro    Acute diastolic congestive heart failure (H)               Patient who is usually healthy at 89 years old almost 89.  Appears to have acute heart failure.  He has known atrial fibrillation, has got a pacemaker.  He is got moderate aortic stenosis.  He just saw cardiology and the plan was to get an echocardiogram next week.  Unfortunately his weight is up 8 pounds.  He got more shortness of breath when he walks up stairs and his legs are swollen quite a bit.  He is already taking Lasix 20 mg a day.  He is on anticoagulation of Eliquis and Plavix.  He has low-dose lisinopril twice a day, metformin sotalol and rosuvastatin.    We will check labs for BNP electrolytes and CBC to make sure he is not anemic.  His shortness of breath with exertion and at rest and along with the swelling and signs of heart failure.  Unclear if this is diastolic or systolic we will get an echocardiogram as already scheduled and check his labs.  However in the meantime we will increase his Lasix from 20 to 40 mg each morning.  In the future may need to add Jardiance to help out with his diastolic dysfunction     BMI  Estimated body mass index is 26.87 kg/m  as calculated from the following:    Height as of this encounter: 1.727 m (5' 8\").    Weight as of this encounter: 80.2 kg (176 lb 11.2 oz).       Follow-up repeat a blood pressure and weight check next week when he is doing his echo          The longitudinal plan " "of care for the diagnosis(es)/condition(s) as documented were addressed during this visit. Due to the added complexity in care, I will continue to support NIMCO in the subsequent management and with ongoing continuity of care.      Subjective   NIMCO is a 89 year old, presenting for the following health issues:  Recheck Medication and Shortness of Breath        4/29/2025    12:52 PM   Additional Questions   Roomed by Jen     Shortness of Breath  This is a chronic problem. The current episode started more than 1 year ago. The problem occurs daily. The problem has been gradually worsening. The symptoms are aggravated by exertion.   History of Present Illness       Reason for visit:  Recheck medication   He is taking medications regularly.        Feet and ankles are swollen on both sides, comes and goes.      Takes a water pill each morning, occasionally takes one at night.    Breathing is a little short.      Weight is up from 168 to 176,     Saw cardiology and has echo next week. Has a pacemaker and chronic a fib.     Lives alone, cooks for himself.  Some frozen dinners.      Sugars are good 120-130. On metformin         Objective    /72 (BP Location: Left arm, Patient Position: Sitting, Cuff Size: Adult Large)   Pulse 72   Temp 97.1  F (36.2  C) (Temporal)   Resp 18   Ht 1.727 m (5' 8\")   Wt 80.2 kg (176 lb 11.2 oz)   SpO2 99%   BMI 26.87 kg/m    Body mass index is 26.87 kg/m .  Physical Exam   No acute distress  Heart is regular  Lungs are clear with few crackles at the base  Abdomen is soft  Extremities have 2+ pitting edema up to the knee bilaterally            Signed Electronically by: Ahkil Mcmahon MD  "

## 2025-05-07 ENCOUNTER — RESULTS FOLLOW-UP (OUTPATIENT)
Dept: CARDIOLOGY | Facility: CLINIC | Age: 89
End: 2025-05-07

## 2025-05-07 ENCOUNTER — HOSPITAL ENCOUNTER (OUTPATIENT)
Dept: CARDIOLOGY | Facility: CLINIC | Age: 89
Discharge: HOME | End: 2025-05-07
Attending: INTERNAL MEDICINE
Payer: MEDICARE

## 2025-05-07 DIAGNOSIS — I35.0 NONRHEUMATIC AORTIC VALVE STENOSIS: ICD-10-CM

## 2025-05-07 DIAGNOSIS — Z95.0 CARDIAC PACEMAKER IN SITU: ICD-10-CM

## 2025-05-07 DIAGNOSIS — I25.10 CORONARY ARTERY DISEASE INVOLVING NATIVE CORONARY ARTERY OF NATIVE HEART WITHOUT ANGINA PECTORIS: ICD-10-CM

## 2025-05-07 DIAGNOSIS — I10 BENIGN ESSENTIAL HYPERTENSION: ICD-10-CM

## 2025-05-07 LAB — LVEF ECHO: NORMAL

## 2025-05-07 PROCEDURE — 93306 TTE W/DOPPLER COMPLETE: CPT

## 2025-05-07 PROCEDURE — 93306 TTE W/DOPPLER COMPLETE: CPT | Mod: 26 | Performed by: INTERNAL MEDICINE

## 2025-07-03 DIAGNOSIS — I25.9 CHRONIC ISCHEMIC HEART DISEASE, UNSPECIFIED: ICD-10-CM

## 2025-07-03 DIAGNOSIS — E11.9 TYPE 2 DIABETES MELLITUS WITHOUT COMPLICATION, WITHOUT LONG-TERM CURRENT USE OF INSULIN (H): Primary | ICD-10-CM

## 2025-07-03 NOTE — TELEPHONE ENCOUNTER
Patient is scheduled for an appointment with you to recheck his medications and his annual wellness.  Spoke with the pharmacy and patient has not picked up or refilled some medications as prescribed, he has an Rx for Eliquis (no refills) and an Rx for Plavix (refills available).  Patient also has Crestor on his list that pharmacy said he is not filling as prescribed.    Routing Rx refills to you for review and need, patient is not out of any medications for at least a week.    Penelope Dyer XRO/

## 2025-07-07 ENCOUNTER — ANCILLARY PROCEDURE (OUTPATIENT)
Dept: CARDIOLOGY | Facility: CLINIC | Age: 89
End: 2025-07-07
Attending: INTERNAL MEDICINE
Payer: MEDICARE

## 2025-07-07 DIAGNOSIS — Z95.0 CARDIAC PACEMAKER IN SITU: ICD-10-CM

## 2025-07-07 PROCEDURE — 93294 REM INTERROG EVL PM/LDLS PM: CPT | Performed by: INTERNAL MEDICINE

## 2025-07-07 PROCEDURE — 93296 REM INTERROG EVL PM/IDS: CPT | Performed by: INTERNAL MEDICINE

## 2025-07-07 RX ORDER — LISINOPRIL 5 MG/1
5 TABLET ORAL 2 TIMES DAILY
Qty: 180 TABLET | Refills: 3 | Status: SHIPPED | OUTPATIENT
Start: 2025-07-07

## 2025-07-07 RX ORDER — CLOPIDOGREL BISULFATE 75 MG/1
75 TABLET ORAL DAILY
Qty: 90 TABLET | Refills: 3 | Status: SHIPPED | OUTPATIENT
Start: 2025-07-07

## 2025-07-08 LAB
MDC_IDC_LEAD_CONNECTION_STATUS: NORMAL
MDC_IDC_LEAD_CONNECTION_STATUS: NORMAL
MDC_IDC_LEAD_IMPLANT_DT: NORMAL
MDC_IDC_LEAD_IMPLANT_DT: NORMAL
MDC_IDC_LEAD_LOCATION: NORMAL
MDC_IDC_LEAD_LOCATION: NORMAL
MDC_IDC_LEAD_LOCATION_DETAIL_1: NORMAL
MDC_IDC_LEAD_LOCATION_DETAIL_1: NORMAL
MDC_IDC_LEAD_MFG: NORMAL
MDC_IDC_LEAD_MFG: NORMAL
MDC_IDC_LEAD_MODEL: NORMAL
MDC_IDC_LEAD_MODEL: NORMAL
MDC_IDC_LEAD_POLARITY_TYPE: NORMAL
MDC_IDC_LEAD_POLARITY_TYPE: NORMAL
MDC_IDC_LEAD_SERIAL: NORMAL
MDC_IDC_LEAD_SERIAL: NORMAL
MDC_IDC_MSMT_BATTERY_DTM: NORMAL
MDC_IDC_MSMT_BATTERY_REMAINING_LONGEVITY: 142 MO
MDC_IDC_MSMT_BATTERY_RRT_TRIGGER: 2.62
MDC_IDC_MSMT_BATTERY_STATUS: NORMAL
MDC_IDC_MSMT_BATTERY_VOLTAGE: 3.05 V
MDC_IDC_MSMT_LEADCHNL_RA_IMPEDANCE_VALUE: 285 OHM
MDC_IDC_MSMT_LEADCHNL_RA_IMPEDANCE_VALUE: 437 OHM
MDC_IDC_MSMT_LEADCHNL_RA_PACING_THRESHOLD_AMPLITUDE: 0.5 V
MDC_IDC_MSMT_LEADCHNL_RA_PACING_THRESHOLD_PULSEWIDTH: 0.4 MS
MDC_IDC_MSMT_LEADCHNL_RA_SENSING_INTR_AMPL: 3 MV
MDC_IDC_MSMT_LEADCHNL_RA_SENSING_INTR_AMPL: 3 MV
MDC_IDC_MSMT_LEADCHNL_RV_IMPEDANCE_VALUE: 361 OHM
MDC_IDC_MSMT_LEADCHNL_RV_IMPEDANCE_VALUE: 475 OHM
MDC_IDC_MSMT_LEADCHNL_RV_PACING_THRESHOLD_AMPLITUDE: 0.5 V
MDC_IDC_MSMT_LEADCHNL_RV_PACING_THRESHOLD_PULSEWIDTH: 0.4 MS
MDC_IDC_MSMT_LEADCHNL_RV_SENSING_INTR_AMPL: 17.12 MV
MDC_IDC_MSMT_LEADCHNL_RV_SENSING_INTR_AMPL: 17.12 MV
MDC_IDC_PG_IMPLANT_DTM: NORMAL
MDC_IDC_PG_MFG: NORMAL
MDC_IDC_PG_MODEL: NORMAL
MDC_IDC_PG_SERIAL: NORMAL
MDC_IDC_PG_TYPE: NORMAL
MDC_IDC_SESS_CLINIC_NAME: NORMAL
MDC_IDC_SESS_DTM: NORMAL
MDC_IDC_SESS_TYPE: NORMAL
MDC_IDC_SET_BRADY_AT_MODE_SWITCH_RATE: 171 {BEATS}/MIN
MDC_IDC_SET_BRADY_HYSTRATE: NORMAL
MDC_IDC_SET_BRADY_LOWRATE: 60 {BEATS}/MIN
MDC_IDC_SET_BRADY_MAX_SENSOR_RATE: 130 {BEATS}/MIN
MDC_IDC_SET_BRADY_MAX_TRACKING_RATE: 130 {BEATS}/MIN
MDC_IDC_SET_BRADY_MODE: NORMAL
MDC_IDC_SET_BRADY_PAV_DELAY_LOW: 180 MS
MDC_IDC_SET_BRADY_SAV_DELAY_LOW: 150 MS
MDC_IDC_SET_LEADCHNL_RA_PACING_AMPLITUDE: 3.5 V
MDC_IDC_SET_LEADCHNL_RA_PACING_ANODE_ELECTRODE_1: NORMAL
MDC_IDC_SET_LEADCHNL_RA_PACING_ANODE_LOCATION_1: NORMAL
MDC_IDC_SET_LEADCHNL_RA_PACING_CAPTURE_MODE: NORMAL
MDC_IDC_SET_LEADCHNL_RA_PACING_CATHODE_ELECTRODE_1: NORMAL
MDC_IDC_SET_LEADCHNL_RA_PACING_CATHODE_LOCATION_1: NORMAL
MDC_IDC_SET_LEADCHNL_RA_PACING_POLARITY: NORMAL
MDC_IDC_SET_LEADCHNL_RA_PACING_PULSEWIDTH: 0.4 MS
MDC_IDC_SET_LEADCHNL_RA_SENSING_ANODE_ELECTRODE_1: NORMAL
MDC_IDC_SET_LEADCHNL_RA_SENSING_ANODE_LOCATION_1: NORMAL
MDC_IDC_SET_LEADCHNL_RA_SENSING_CATHODE_ELECTRODE_1: NORMAL
MDC_IDC_SET_LEADCHNL_RA_SENSING_CATHODE_LOCATION_1: NORMAL
MDC_IDC_SET_LEADCHNL_RA_SENSING_POLARITY: NORMAL
MDC_IDC_SET_LEADCHNL_RA_SENSING_SENSITIVITY: 0.3 MV
MDC_IDC_SET_LEADCHNL_RV_PACING_AMPLITUDE: 2 V
MDC_IDC_SET_LEADCHNL_RV_PACING_ANODE_ELECTRODE_1: NORMAL
MDC_IDC_SET_LEADCHNL_RV_PACING_CAPTURE_MODE: NORMAL
MDC_IDC_SET_LEADCHNL_RV_PACING_CATHODE_ELECTRODE_1: NORMAL
MDC_IDC_SET_LEADCHNL_RV_PACING_CATHODE_LOCATION_1: NORMAL
MDC_IDC_SET_LEADCHNL_RV_PACING_POLARITY: NORMAL
MDC_IDC_SET_LEADCHNL_RV_PACING_PULSEWIDTH: 0.4 MS
MDC_IDC_SET_LEADCHNL_RV_SENSING_ANODE_ELECTRODE_1: NORMAL
MDC_IDC_SET_LEADCHNL_RV_SENSING_ANODE_LOCATION_1: NORMAL
MDC_IDC_SET_LEADCHNL_RV_SENSING_CATHODE_ELECTRODE_1: NORMAL
MDC_IDC_SET_LEADCHNL_RV_SENSING_CATHODE_LOCATION_1: NORMAL
MDC_IDC_SET_LEADCHNL_RV_SENSING_POLARITY: NORMAL
MDC_IDC_SET_LEADCHNL_RV_SENSING_SENSITIVITY: 0.9 MV
MDC_IDC_SET_ZONE_DETECTION_INTERVAL: 350 MS
MDC_IDC_SET_ZONE_DETECTION_INTERVAL: 350 MS
MDC_IDC_SET_ZONE_STATUS: NORMAL
MDC_IDC_SET_ZONE_STATUS: NORMAL
MDC_IDC_SET_ZONE_TYPE: NORMAL
MDC_IDC_SET_ZONE_VENDOR_TYPE: NORMAL
MDC_IDC_STAT_AT_BURDEN_PERCENT: 100 %
MDC_IDC_STAT_AT_DTM_END: NORMAL
MDC_IDC_STAT_AT_DTM_START: NORMAL
MDC_IDC_STAT_BRADY_DTM_END: NORMAL
MDC_IDC_STAT_BRADY_DTM_START: NORMAL
MDC_IDC_STAT_BRADY_RA_PERCENT_PACED: 0 %
MDC_IDC_STAT_BRADY_RV_PERCENT_PACED: 96.31 %
MDC_IDC_STAT_EPISODE_RECENT_COUNT: 0
MDC_IDC_STAT_EPISODE_RECENT_COUNT_DTM_END: NORMAL
MDC_IDC_STAT_EPISODE_RECENT_COUNT_DTM_START: NORMAL
MDC_IDC_STAT_EPISODE_TOTAL_COUNT: 0
MDC_IDC_STAT_EPISODE_TOTAL_COUNT: 1
MDC_IDC_STAT_EPISODE_TOTAL_COUNT: 1
MDC_IDC_STAT_EPISODE_TOTAL_COUNT_DTM_END: NORMAL
MDC_IDC_STAT_EPISODE_TOTAL_COUNT_DTM_START: NORMAL
MDC_IDC_STAT_EPISODE_TYPE: NORMAL
MDC_IDC_STAT_TACHYTHERAPY_RECENT_DTM_END: NORMAL
MDC_IDC_STAT_TACHYTHERAPY_RECENT_DTM_START: NORMAL
MDC_IDC_STAT_TACHYTHERAPY_TOTAL_DTM_END: NORMAL
MDC_IDC_STAT_TACHYTHERAPY_TOTAL_DTM_START: NORMAL

## 2025-07-17 ENCOUNTER — LAB (OUTPATIENT)
Dept: LAB | Facility: CLINIC | Age: 89
End: 2025-07-17
Payer: MEDICARE

## 2025-07-17 DIAGNOSIS — I25.10 ATHEROSCLEROSIS OF CORONARY ARTERY: ICD-10-CM

## 2025-07-17 DIAGNOSIS — Z13.6 SCREENING FOR CARDIOVASCULAR CONDITION: Primary | ICD-10-CM

## 2025-07-17 DIAGNOSIS — E11.59 TYPE 2 DIABETES MELLITUS WITH OTHER CIRCULATORY COMPLICATION, WITHOUT LONG-TERM CURRENT USE OF INSULIN (H): ICD-10-CM

## 2025-07-17 DIAGNOSIS — M79.89 LEG SWELLING: ICD-10-CM

## 2025-07-17 DIAGNOSIS — E11.9 TYPE 2 DIABETES MELLITUS WITHOUT COMPLICATION, WITHOUT LONG-TERM CURRENT USE OF INSULIN (H): ICD-10-CM

## 2025-07-17 DIAGNOSIS — R06.02 SOB (SHORTNESS OF BREATH): ICD-10-CM

## 2025-07-17 LAB
ALBUMIN SERPL BCG-MCNC: 4.1 G/DL (ref 3.5–5.2)
ALP SERPL-CCNC: 53 U/L (ref 40–150)
ALT SERPL W P-5'-P-CCNC: 14 U/L (ref 0–70)
ANION GAP SERPL CALCULATED.3IONS-SCNC: 9 MMOL/L (ref 7–15)
AST SERPL W P-5'-P-CCNC: 20 U/L (ref 0–45)
BILIRUB SERPL-MCNC: 0.7 MG/DL
BUN SERPL-MCNC: 15.5 MG/DL (ref 8–23)
CALCIUM SERPL-MCNC: 9.1 MG/DL (ref 8.8–10.4)
CHLORIDE SERPL-SCNC: 104 MMOL/L (ref 98–107)
CHOLEST SERPL-MCNC: 155 MG/DL
CREAT SERPL-MCNC: 1.19 MG/DL (ref 0.67–1.17)
CREAT UR-MCNC: 233.7 MG/DL
EGFRCR SERPLBLD CKD-EPI 2021: 59 ML/MIN/1.73M2
ERYTHROCYTE [DISTWIDTH] IN BLOOD BY AUTOMATED COUNT: 13.4 % (ref 10–15)
EST. AVERAGE GLUCOSE BLD GHB EST-MCNC: 157 MG/DL
FASTING STATUS PATIENT QL REPORTED: YES
FASTING STATUS PATIENT QL REPORTED: YES
GLUCOSE SERPL-MCNC: 150 MG/DL (ref 70–99)
HBA1C MFR BLD: 7.1 %
HCO3 SERPL-SCNC: 26 MMOL/L (ref 22–29)
HCT VFR BLD AUTO: 35.1 % (ref 40–53)
HDLC SERPL-MCNC: 42 MG/DL
HGB BLD-MCNC: 12 G/DL (ref 13.3–17.7)
LDLC SERPL CALC-MCNC: 90 MG/DL
MCH RBC QN AUTO: 31 PG (ref 26.5–33)
MCHC RBC AUTO-ENTMCNC: 34.2 G/DL (ref 31.5–36.5)
MCV RBC AUTO: 91 FL (ref 78–100)
MICROALBUMIN UR-MCNC: 54.3 MG/L
MICROALBUMIN/CREAT UR: 23.23 MG/G CR (ref 0–17)
NONHDLC SERPL-MCNC: 113 MG/DL
NT-PROBNP SERPL-MCNC: 1015 PG/ML (ref 0–852)
PLATELET # BLD AUTO: 182 10E3/UL (ref 150–450)
POTASSIUM SERPL-SCNC: 4.1 MMOL/L (ref 3.4–5.3)
PROT SERPL-MCNC: 7 G/DL (ref 6.4–8.3)
RBC # BLD AUTO: 3.87 10E6/UL (ref 4.4–5.9)
SODIUM SERPL-SCNC: 139 MMOL/L (ref 135–145)
TRIGL SERPL-MCNC: 116 MG/DL
WBC # BLD AUTO: 7.1 10E3/UL (ref 4–11)

## 2025-07-30 ENCOUNTER — OFFICE VISIT (OUTPATIENT)
Dept: INTERNAL MEDICINE | Facility: CLINIC | Age: 89
End: 2025-07-30
Payer: MEDICARE

## 2025-07-30 VITALS
WEIGHT: 167.5 LBS | BODY MASS INDEX: 25.39 KG/M2 | TEMPERATURE: 97.5 F | DIASTOLIC BLOOD PRESSURE: 82 MMHG | RESPIRATION RATE: 20 BRPM | OXYGEN SATURATION: 99 % | SYSTOLIC BLOOD PRESSURE: 135 MMHG | HEIGHT: 68 IN | HEART RATE: 80 BPM

## 2025-07-30 DIAGNOSIS — I50.31 ACUTE DIASTOLIC CONGESTIVE HEART FAILURE (H): ICD-10-CM

## 2025-07-30 DIAGNOSIS — E11.59 TYPE 2 DIABETES MELLITUS WITH OTHER CIRCULATORY COMPLICATION, WITHOUT LONG-TERM CURRENT USE OF INSULIN (H): Primary | ICD-10-CM

## 2025-07-30 DIAGNOSIS — M79.89 LEG SWELLING: ICD-10-CM

## 2025-07-30 PROCEDURE — G2211 COMPLEX E/M VISIT ADD ON: HCPCS | Performed by: INTERNAL MEDICINE

## 2025-07-30 PROCEDURE — 3079F DIAST BP 80-89 MM HG: CPT | Performed by: INTERNAL MEDICINE

## 2025-07-30 PROCEDURE — 3075F SYST BP GE 130 - 139MM HG: CPT | Performed by: INTERNAL MEDICINE

## 2025-07-30 PROCEDURE — 1126F AMNT PAIN NOTED NONE PRSNT: CPT | Performed by: INTERNAL MEDICINE

## 2025-07-30 PROCEDURE — 99213 OFFICE O/P EST LOW 20 MIN: CPT | Performed by: INTERNAL MEDICINE

## 2025-07-30 RX ORDER — METFORMIN HYDROCHLORIDE 500 MG/1
500 TABLET, EXTENDED RELEASE ORAL 2 TIMES DAILY WITH MEALS
Qty: 180 TABLET | Refills: 3 | Status: SHIPPED | OUTPATIENT
Start: 2025-07-30

## 2025-07-30 ASSESSMENT — PAIN SCALES - GENERAL: PAINLEVEL_OUTOF10: NO PAIN (0)

## 2025-07-30 NOTE — PROGRESS NOTES
"  Assessment & Plan   Problem List Items Addressed This Visit       Type 2 diabetes mellitus with other circulatory complication, without long-term current use of insulin (H) - Primary    Relevant Medications    metFORMIN (GLUCOPHAGE XR) 500 MG 24 hr tablet     Other Visit Diagnoses         Leg swelling          Acute diastolic congestive heart failure (H)               Patient is here for general follow-up.  He is doing pretty well continues to drive, lives at [facility].  Congestive heart failure he is a little bit of swelling but has Lasix 20 mg a day swelling goes away when he elevates his legs at night.  No shortness of breath    Diabetes he is on metformin blood sugars are good his A1c was 7.1.  Other labs were stable for kidney function continues also on lisinopril.    Patient can follow-up in 6 months for a wellness check.    The longitudinal plan of care for the diagnosis(es)/condition(s) as documented were addressed during this visit. Due to the added complexity in care, I will continue to support NIMCO in the subsequent management and with ongoing continuity of care.    BMI  Estimated body mass index is 25.47 kg/m  as calculated from the following:    Height as of this encounter: 1.727 m (5' 8\").    Weight as of this encounter: 76 kg (167 lb 8 oz).           Subjective   NIMCO is a 89 year old, presenting for the following health issues:  Recheck Medication      7/30/2025     1:39 PM   Additional Questions   Roomed by Jen     History of Present Illness       Reason for visit:  Recheck medication   He is taking medications regularly.        Doing well    Sugars was 123 today.     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     Had some med refills.    Little swelling in his feet ok, goes away at night, has lasix 40 mg a day. ECho was ok in May.   Little fatigue after 21 steps, does continue to do the steps instead of elevator.     No chest pains, no lightheaded feeling.      Labs were done and doing " "ok,         Objective    /82 (BP Location: Left arm, Patient Position: Sitting, Cuff Size: Adult Regular)   Pulse 80   Temp 97.5  F (36.4  C) (Temporal)   Resp 20   Ht 1.727 m (5' 8\")   Wt 76 kg (167 lb 8 oz)   SpO2 99%   BMI 25.47 kg/m    Body mass index is 25.47 kg/m .  Physical Exam   NAD  Heart regular   Lungs clear  Ext trace edema in his feet.       Signed Electronically by: Akhil Mcmahon MD  "

## (undated) RX ORDER — LIDOCAINE HYDROCHLORIDE 10 MG/ML
INJECTION, SOLUTION EPIDURAL; INFILTRATION; INTRACAUDAL; PERINEURAL
Status: DISPENSED
Start: 2019-03-07